# Patient Record
Sex: MALE | Race: WHITE | NOT HISPANIC OR LATINO | Employment: OTHER | URBAN - METROPOLITAN AREA
[De-identification: names, ages, dates, MRNs, and addresses within clinical notes are randomized per-mention and may not be internally consistent; named-entity substitution may affect disease eponyms.]

---

## 2018-07-03 DIAGNOSIS — C76.0 HEAD AND NECK CANCER (H): Primary | ICD-10-CM

## 2018-07-05 NOTE — TELEPHONE ENCOUNTER
FUTURE VISIT INFORMATION      FUTURE VISIT INFORMATION:    Date: 7/10/18    Time: 10:45AM    Location: [facility]  REFERRAL INFORMATION:    Referring provider:  Dr Lalito Moctezuma    Referring providers clinic:  [facility]    Reason for visit/diagnosis  Squamous cell carcinoma    RECORDS REQUESTED FROM:       Clinic name Comments Records Status Imaging Status   [facility]  6/29/18 CT Head  6/26/18 CT Neck  2/7/17 neck Bx  6/28/18  FNA neck Care Everywhere pending   [facility] 6/29/18 Visit notes with Dr Moctezuma  6/28/18 visit notes with Dr Sharif Powell Care Everywhere                              RECORDS STATUS      7/5/18 - 11:59AM sent a request for path fax:  [phone], via [employer] - Amay     7/6/18 Patient dropped off Slides and images [place]. Slides were sent to Surgical Path and images will get put in PACS - Amay

## 2018-07-09 PROCEDURE — 00000346 ZZHCL STATISTIC REVIEW OUTSIDE SLIDES TC 88321: Performed by: OTOLARYNGOLOGY

## 2018-07-10 ENCOUNTER — PRE VISIT (OUTPATIENT)
Dept: OTOLARYNGOLOGY | Facility: CLINIC | Age: 68
End: 2018-07-10

## 2018-07-10 ENCOUNTER — OFFICE VISIT (OUTPATIENT)
Dept: OTOLARYNGOLOGY | Facility: CLINIC | Age: 68
End: 2018-07-10
Payer: MEDICARE

## 2018-07-10 ENCOUNTER — HOSPITAL ENCOUNTER (OUTPATIENT)
Dept: PET IMAGING | Facility: CLINIC | Age: 68
Discharge: HOME | End: 2018-07-10
Attending: OTOLARYNGOLOGY | Admitting: OTOLARYNGOLOGY
Payer: MEDICARE

## 2018-07-10 ENCOUNTER — HOSPITAL ENCOUNTER (OUTPATIENT)
Dept: PET IMAGING | Facility: CLINIC | Age: 68
End: 2018-07-10
Attending: OTOLARYNGOLOGY
Payer: MEDICARE

## 2018-07-10 VITALS — HEIGHT: 72 IN | WEIGHT: 195 LBS | BODY MASS INDEX: 26.41 KG/M2

## 2018-07-10 DIAGNOSIS — C80.1 METASTATIC SQUAMOUS CELL CARCINOMA INVOLVING LYMPH NODE WITH UNKNOWN PRIMARY SITE (H): Primary | ICD-10-CM

## 2018-07-10 DIAGNOSIS — C76.0 HEAD AND NECK CANCER (H): ICD-10-CM

## 2018-07-10 DIAGNOSIS — C77.9 METASTATIC SQUAMOUS CELL CARCINOMA INVOLVING LYMPH NODE WITH UNKNOWN PRIMARY SITE (H): Primary | ICD-10-CM

## 2018-07-10 LAB
COPATH REPORT: NORMAL
CREAT BLD-MCNC: 0.9 MG/DL (ref 0.66–1.25)
GFR SERPL CREATININE-BSD FRML MDRD: 84 ML/MIN/1.7M2
GLUCOSE BLDC GLUCOMTR-MCNC: 110 MG/DL (ref 70–99)

## 2018-07-10 PROCEDURE — 34300033 ZZH RX 343: Performed by: OTOLARYNGOLOGY

## 2018-07-10 PROCEDURE — 71260 CT THORAX DX C+: CPT

## 2018-07-10 PROCEDURE — A9552 F18 FDG: HCPCS | Performed by: OTOLARYNGOLOGY

## 2018-07-10 PROCEDURE — 82565 ASSAY OF CREATININE: CPT

## 2018-07-10 PROCEDURE — 70491 CT SOFT TISSUE NECK W/DYE: CPT

## 2018-07-10 PROCEDURE — 25000128 H RX IP 250 OP 636: Performed by: OTOLARYNGOLOGY

## 2018-07-10 RX ORDER — IOPAMIDOL 755 MG/ML
45-150 INJECTION, SOLUTION INTRAVASCULAR ONCE
Status: COMPLETED | OUTPATIENT
Start: 2018-07-10 | End: 2018-07-10

## 2018-07-10 RX ORDER — ASPIRIN 325 MG
325 TABLET ORAL
COMMUNITY
End: 2020-02-04

## 2018-07-10 RX ADMIN — FLUDEOXYGLUCOSE F-18 12 MCI.: 500 INJECTION, SOLUTION INTRAVENOUS at 07:40

## 2018-07-10 RX ADMIN — IOPAMIDOL 116 ML: 755 INJECTION, SOLUTION INTRAVENOUS at 08:53

## 2018-07-10 ASSESSMENT — PAIN SCALES - GENERAL: PAINLEVEL: NO PAIN (0)

## 2018-07-10 NOTE — PROGRESS NOTES
Dear Dr. Moctezuma:    I had the pleasure of meeting Lazaro Porter in consultation today at the [facility] at your request.     History of Present Illness:     HISTORY OF PRESENT ILLNESS:  Lazaro Porter was seen in clinic today.  He is a gentleman who over the course of a couple months has had a growing mass in the right side of his neck that is about 4 cm.  It is mobile back and forth, but not in an up and down fashion.  It does not cause him pain.  FNA on this shows squamous cell carcinoma.  He has never had any skin cancer or anything on that side of his face.   He is essentially a nonsmoker and nondrinker.  He has not been able to have any sort of a fine needle aspiration workup for HPV.  He underwent a PET CT scan today and the PET CT scan today does not really show a primary disease in his throat.  It just shows that the lesion on the right side of his neck seems to be close to the carotid and vagus sternocleidomastoid muscle.  He has no other current complaints at the present time today at all.                MEDICATIONS:     Current Outpatient Prescriptions   Medication Sig Dispense Refill     aspirin 325 MG tablet Take 325 mg by mouth       Multiple Vitamins-Minerals (MULTIVITAMIN & MINERAL PO)          ALLERGIES:  Not on File    HABITS/SOCIAL HISTORY: non smoker   PAST MEDICAL HISTORY: No past medical history on file.     FAMILY HISTORY:  No family history on file.    REVIEW OF SYSTEMS:  12 point ROS was negative other than the symptoms noted above in the HPI.    PHYSICAL EXAMINATION: PHYSICAL EXAMINATION:    Constitutional:  The patient was unaccompanied, well-groomed, and in no acute distress.    Skin:  Warm and pink.    Neurologic:  Alert and oriented x 3.  CN's III-XII within normal limits.  Voice normal.   Psychiatric:  The patient's affect was calm, cooperative, and appropriate.    Respiratory:  Breathing comfortably without stridor or exertion of accessory muscles.     Eyes: Pupils were equal and reactive.  Extraocular movement intact.    Head:  Normocephalic and atraumatic.  No lesions or scars.    Ears:  Pinnae and tragus non-tender.  EAC's and TM's were clear.     Nose:  Sinuses were non-tender.  Anterior rhinoscopy revealed midline septum and absence of purulence or polyps.    OC/OP:  Normal tongue, floor of mouth, buccal mucosa, and palate.  No lesions or masses on inspection or palpation.  No abnormal lymph tissue in the oropharynx.  The pterygoid region is non-tender.    HP/LX:  Mirror exam of the larynx reveals a sharp epiglottis and mobile arytenoids.  No pooling seen.  The cords themselves appear clear and mobile.   Neck:  Supple with normal laryngeal and tracheal landmarks.  The parotid beds were without masses.  No palpable thyroid.  Lymphatic:  There is no palpable lymphadenopathy in the neck.       Fiberoptic Endoscopy:  Consent for fiberoptic laryngoscopy was obtained, and we confirmed correctness of procedure and identity of patient.  Fiberoptic laryngoscopy was indicated due to  primary.  The nose was topically decongested and anesthetized.  The fiberoptic laryngoscope was passed under endoscopic vision.  The turbinates were normal.  The inferior and middle meati were clear bilaterally without purulence, masses, or polyps.  The nasopharynx was clear.  The Eustachian tubes were clear.  The soft palate appeared normal with good mobility.  The epiglottis was sharp and the visualized portion of the vallecula was clear.  The larynx was clear with mobile cords.  The arytenoids were clear and there was no pooling in the hypopharynx.        ASSESSMENT:  Patient with an unknown primary head and neck cancer on the right neck.  This could very well be HPV.        PLAN:  He needs to go to the operating room for directed biopsies and needs to have a repeat FNA on the right side of the neck just to be able to get enough material to potentially look at a p-16 staining.  We  plan to take him to the OR over the course of the next couple of weeks and we will see him in the operating room at that point in time to get him worked up and to get a good disposition on him.      cc: Lalito Moctezuma MD    [place] [id number]       FO/ms              Thank you very much for the opportunity to participate in the care of your patient.      Garcia Worthy M.D.  Otolaryngology- Head & Neck Surgery  [phone]

## 2018-07-10 NOTE — MR AVS SNAPSHOT
After Visit Summary   7/10/2018    Lazaro Porter    MRN: [id number]           Patient Information     Date Of Birth          1950        Visit Information        Provider Department      7/10/2018 10:45 AM Garcia Worthy MD [facility]        Today's Diagnoses     Metastatic squamous cell carcinoma involving lymph node with unknown primary site (H)    -  1      Care Instructions    -DL with biopsies scheduled with Dr. Worthy on 18. We will call with results and follow up plan once pathology complete.  -Please schedule pre op physical with PCP prior to surgery.           Follow-ups after your visit        Your next 10 appointments already scheduled     2018  3:30 PM CDT   (Arrive by 3:15 PM)   Return Visit with MD RAULITO [facility] ([facility])    [address] [place] [id number] [phone]              Who to contact     Please call your clinic at [phone] to:    Ask questions about your health    Make or cancel appointments    Discuss your medicines    Learn about your test results    Speak to your doctor            Additional Information About Your Visit        MyChart Information     Tricidat is an electronic gateway that provides easy, online access to your medical records. With Offerti, you can request a clinic appointment, read your test results, renew a prescription or communicate with your care team.     To sign up for Tricidat visit the website at [email or web address]   You will be asked to enter the access code listed below, as well as some personal information. Please follow the directions to create your username and password.     Your access code is: [email or web address]  Expires: 10/2/2018  6:31 AM     Your access code will  in 90 days. If you need help or a new code, please contact your [facility] or call [phone] for  assistance.        Care EveryWhere ID     This is your Care EveryWhere ID. This could be used by other organizations to access your [place] medical records  [id number]        Your Vitals Were     Height BMI (Body Mass Index)                1.829 m (6') 26.45 kg/m2           Blood Pressure from Last 3 Encounters:   07/18/18 127/89    Weight from Last 3 Encounters:   07/18/18 88.5 kg (195 lb)   07/10/18 88.5 kg (195 lb)              We Performed the Following     Glucose by meter     Jillian-Operative Worksheet (Head & Neck)        Primary Care Provider    None Specified       No primary provider on file.        Equal Access to Services     [facility]: Hadii ivet Karimi, renita card, stella kaalmatreasure coy, talon hardwick . So [facility] [place] [phone].    ATENCIÓN: Si habla español, tiene a corona disposición servicios gratuitos de asistencia lingüística. Llame al [phone].    We comply with applicable federal civil rights laws and [place] laws. We do not discriminate on the basis of race, color, national origin, age, disability, sex, sexual orientation, or gender identity.            Thank you!     Thank you for choosing [facility]SE [facility]  for your care. Our goal is always to provide you with excellent care. Hearing back from our patients is one way we can continue to improve our services. Please take a few minutes to complete the written survey that you may receive in the mail after your visit with us. Thank you!             Your Updated Medication List - Protect others around you: Learn how to safely use, store and throw away your medicines at www.disposemymeds.org.          This list is accurate as of 7/10/18 11:59 PM.  Always use your most recent med list.                   Brand Name Dispense Instructions for use Diagnosis    acetaminophen 325 MG tablet    TYLENOL    100 tablet    Take 2 tablets (650 mg) by mouth every 4 hours as needed for other (mild pain)     Postoperative pain       aspirin 325 MG tablet      Take 325 mg by mouth        MULTIVITAMIN & MINERAL PO           oxyCODONE IR 5 MG tablet    ROXICODONE    35 tablet    Take 1-2 tablets (5-10 mg) by mouth every 6 hours as needed for moderate to severe pain or severe pain    Postoperative pain

## 2018-07-10 NOTE — LETTER
7/10/2018       RE: Lazaro Porter  [address] [place] [id number]     Dear Colleague,    Thank you for referring your patient, Lazaro Porter, to the [facility] at [facility]. Please see a copy of my visit note below.    Dear Dr. Moctezuma:    I had the pleasure of meeting Lazaro Porter in consultation today at the [facility] at your request.     History of Present Illness:     HISTORY OF PRESENT ILLNESS:  Lazaro Porter was seen in clinic today.  He is a gentleman who over the course of a couple months has had a growing mass in the right side of his neck that is about 4 cm.  It is mobile back and forth, but not in an up and down fashion.  It does not cause him pain.  FNA on this shows squamous cell carcinoma.  He has never had any skin cancer or anything on that side of his face.   He is essentially a nonsmoker and nondrinker.  He has not been able to have any sort of a fine needle aspiration workup for HPV.  He underwent a PET CT scan today and the PET CT scan today does not really show a primary disease in his throat.  It just shows that the lesion on the right side of his neck seems to be close to the carotid and vagus sternocleidomastoid muscle.  He has no other current complaints at the present time today at all.        MEDICATIONS:     Current Outpatient Prescriptions   Medication Sig Dispense Refill     aspirin 325 MG tablet Take 325 mg by mouth       Multiple Vitamins-Minerals (MULTIVITAMIN & MINERAL PO)          ALLERGIES:  Not on File    HABITS/SOCIAL HISTORY: non smoker   PAST MEDICAL HISTORY: No past medical history on file.     FAMILY HISTORY:  No family history on file.    REVIEW OF SYSTEMS:  12 point ROS was negative other than the symptoms noted above in the HPI.    PHYSICAL EXAMINATION: PHYSICAL EXAMINATION:    Constitutional:  The patient was unaccompanied, well-groomed, and in no acute distress.    Skin:  Warm  and pink.    Neurologic:  Alert and oriented x 3.  CN's III-XII within normal limits.  Voice normal.   Psychiatric:  The patient's affect was calm, cooperative, and appropriate.    Respiratory:  Breathing comfortably without stridor or exertion of accessory muscles.    Eyes: Pupils were equal and reactive.  Extraocular movement intact.    Head:  Normocephalic and atraumatic.  No lesions or scars.    Ears:  Pinnae and tragus non-tender.  EAC's and TM's were clear.     Nose:  Sinuses were non-tender.  Anterior rhinoscopy revealed midline septum and absence of purulence or polyps.    OC/OP:  Normal tongue, floor of mouth, buccal mucosa, and palate.  No lesions or masses on inspection or palpation.  No abnormal lymph tissue in the oropharynx.  The pterygoid region is non-tender.    HP/LX:  Mirror exam of the larynx reveals a sharp epiglottis and mobile arytenoids.  No pooling seen.  The cords themselves appear clear and mobile.   Neck:  Supple with normal laryngeal and tracheal landmarks.  The parotid beds were without masses.  No palpable thyroid.  Lymphatic:  There is no palpable lymphadenopathy in the neck.       Fiberoptic Endoscopy:  Consent for fiberoptic laryngoscopy was obtained, and we confirmed correctness of procedure and identity of patient.  Fiberoptic laryngoscopy was indicated due to  primary.  The nose was topically decongested and anesthetized.  The fiberoptic laryngoscope was passed under endoscopic vision.  The turbinates were normal.  The inferior and middle meati were clear bilaterally without purulence, masses, or polyps.  The nasopharynx was clear.  The Eustachian tubes were clear.  The soft palate appeared normal with good mobility.  The epiglottis was sharp and the visualized portion of the vallecula was clear.  The larynx was clear with mobile cords.  The arytenoids were clear and there was no pooling in the hypopharynx.      ASSESSMENT:  Patient with an unknown primary head and neck cancer on  the right neck.  This could very well be HPV.        PLAN:  He needs to go to the operating room for directed biopsies and needs to have a repeat FNA on the right side of the neck just to be able to get enough material to potentially look at a p-16 staining.  We plan to take him to the OR over the course of the next couple of weeks and we will see him in the operating room at that point in time to get him worked up and to get a good disposition on him.        Thank you very much for the opportunity to participate in the care of your patient.      Garcia Worthy M.D.  Otolaryngology- Head & Neck Surgery  [phone]    cc: Lalito Moctezuma MD    [place] [id number]

## 2018-07-10 NOTE — PATIENT INSTRUCTIONS
-DL with biopsies scheduled with Dr. Worthy on 7/18/18. We will call with results and follow up plan once pathology complete.  -Please schedule pre op physical with PCP prior to surgery.

## 2018-07-10 NOTE — NURSING NOTE
Chief Complaint   Patient presents with     Consult     Tumor right side of neck     Hussain Real, EMT

## 2018-07-17 ENCOUNTER — TRANSFERRED RECORDS (OUTPATIENT)
Dept: HEALTH INFORMATION MANAGEMENT | Facility: CLINIC | Age: 68
End: 2018-07-17

## 2018-07-17 ENCOUNTER — ANESTHESIA EVENT (OUTPATIENT)
Dept: SURGERY | Facility: AMBULATORY SURGERY CENTER | Age: 68
End: 2018-07-17

## 2018-07-18 ENCOUNTER — HOSPITAL ENCOUNTER (OUTPATIENT)
Facility: AMBULATORY SURGERY CENTER | Age: 68
End: 2018-07-18
Attending: OTOLARYNGOLOGY
Payer: MEDICARE

## 2018-07-18 ENCOUNTER — SURGERY (OUTPATIENT)
Age: 68
End: 2018-07-18
Payer: MEDICARE

## 2018-07-18 ENCOUNTER — ANESTHESIA (OUTPATIENT)
Dept: SURGERY | Facility: AMBULATORY SURGERY CENTER | Age: 68
End: 2018-07-18

## 2018-07-18 VITALS
OXYGEN SATURATION: 98 % | RESPIRATION RATE: 16 BRPM | DIASTOLIC BLOOD PRESSURE: 89 MMHG | TEMPERATURE: 97 F | WEIGHT: 195 LBS | HEIGHT: 72 IN | BODY MASS INDEX: 26.41 KG/M2 | SYSTOLIC BLOOD PRESSURE: 127 MMHG

## 2018-07-18 DIAGNOSIS — G89.18 POSTOPERATIVE PAIN: Primary | ICD-10-CM

## 2018-07-18 RX ORDER — SODIUM CHLORIDE, SODIUM LACTATE, POTASSIUM CHLORIDE, CALCIUM CHLORIDE 600; 310; 30; 20 MG/100ML; MG/100ML; MG/100ML; MG/100ML
INJECTION, SOLUTION INTRAVENOUS CONTINUOUS
Status: DISCONTINUED | OUTPATIENT
Start: 2018-07-18 | End: 2018-07-18 | Stop reason: HOSPADM

## 2018-07-18 RX ORDER — ONDANSETRON 4 MG/1
4 TABLET, ORALLY DISINTEGRATING ORAL
Status: DISCONTINUED | OUTPATIENT
Start: 2018-07-18 | End: 2018-07-19 | Stop reason: HOSPADM

## 2018-07-18 RX ORDER — ONDANSETRON 2 MG/ML
INJECTION INTRAMUSCULAR; INTRAVENOUS PRN
Status: DISCONTINUED | OUTPATIENT
Start: 2018-07-18 | End: 2018-07-18

## 2018-07-18 RX ORDER — OXYMETAZOLINE HYDROCHLORIDE 0.05 G/100ML
SPRAY NASAL PRN
Status: DISCONTINUED | OUTPATIENT
Start: 2018-07-18 | End: 2018-07-18 | Stop reason: HOSPADM

## 2018-07-18 RX ORDER — GABAPENTIN 300 MG/1
300 CAPSULE ORAL ONCE
Status: COMPLETED | OUTPATIENT
Start: 2018-07-18 | End: 2018-07-18

## 2018-07-18 RX ORDER — SODIUM CHLORIDE, SODIUM LACTATE, POTASSIUM CHLORIDE, CALCIUM CHLORIDE 600; 310; 30; 20 MG/100ML; MG/100ML; MG/100ML; MG/100ML
INJECTION, SOLUTION INTRAVENOUS CONTINUOUS
Status: DISCONTINUED | OUTPATIENT
Start: 2018-07-18 | End: 2018-07-19 | Stop reason: HOSPADM

## 2018-07-18 RX ORDER — LIDOCAINE 40 MG/G
CREAM TOPICAL
Status: DISCONTINUED | OUTPATIENT
Start: 2018-07-18 | End: 2018-07-18 | Stop reason: HOSPADM

## 2018-07-18 RX ORDER — NALOXONE HYDROCHLORIDE 0.4 MG/ML
.1-.4 INJECTION, SOLUTION INTRAMUSCULAR; INTRAVENOUS; SUBCUTANEOUS
Status: DISCONTINUED | OUTPATIENT
Start: 2018-07-18 | End: 2018-07-19 | Stop reason: HOSPADM

## 2018-07-18 RX ORDER — FENTANYL CITRATE 50 UG/ML
INJECTION, SOLUTION INTRAMUSCULAR; INTRAVENOUS PRN
Status: DISCONTINUED | OUTPATIENT
Start: 2018-07-18 | End: 2018-07-18

## 2018-07-18 RX ORDER — ONDANSETRON 2 MG/ML
4 INJECTION INTRAMUSCULAR; INTRAVENOUS EVERY 30 MIN PRN
Status: DISCONTINUED | OUTPATIENT
Start: 2018-07-18 | End: 2018-07-19 | Stop reason: HOSPADM

## 2018-07-18 RX ORDER — DEXAMETHASONE SODIUM PHOSPHATE 10 MG/ML
INJECTION, SOLUTION INTRAMUSCULAR; INTRAVENOUS PRN
Status: DISCONTINUED | OUTPATIENT
Start: 2018-07-18 | End: 2018-07-18

## 2018-07-18 RX ORDER — ONDANSETRON 4 MG/1
4 TABLET, ORALLY DISINTEGRATING ORAL EVERY 30 MIN PRN
Status: DISCONTINUED | OUTPATIENT
Start: 2018-07-18 | End: 2018-07-19 | Stop reason: HOSPADM

## 2018-07-18 RX ORDER — PROPOFOL 10 MG/ML
INJECTION, EMULSION INTRAVENOUS PRN
Status: DISCONTINUED | OUTPATIENT
Start: 2018-07-18 | End: 2018-07-18

## 2018-07-18 RX ORDER — FENTANYL CITRATE 50 UG/ML
25-50 INJECTION, SOLUTION INTRAMUSCULAR; INTRAVENOUS
Status: DISCONTINUED | OUTPATIENT
Start: 2018-07-18 | End: 2018-07-19 | Stop reason: HOSPADM

## 2018-07-18 RX ORDER — ACETAMINOPHEN 325 MG/1
650 TABLET ORAL EVERY 4 HOURS PRN
Qty: 100 TABLET | Refills: 0
Start: 2018-07-18 | End: 2018-07-28

## 2018-07-18 RX ORDER — GLYCOPYRROLATE 0.2 MG/ML
INJECTION, SOLUTION INTRAMUSCULAR; INTRAVENOUS PRN
Status: DISCONTINUED | OUTPATIENT
Start: 2018-07-18 | End: 2018-07-18

## 2018-07-18 RX ORDER — PROPOFOL 10 MG/ML
INJECTION, EMULSION INTRAVENOUS CONTINUOUS PRN
Status: DISCONTINUED | OUTPATIENT
Start: 2018-07-18 | End: 2018-07-18

## 2018-07-18 RX ORDER — ACETAMINOPHEN 325 MG/1
650 TABLET ORAL
Status: DISCONTINUED | OUTPATIENT
Start: 2018-07-18 | End: 2018-07-19 | Stop reason: HOSPADM

## 2018-07-18 RX ORDER — OXYCODONE HYDROCHLORIDE 5 MG/1
5 TABLET ORAL
Status: COMPLETED | OUTPATIENT
Start: 2018-07-18 | End: 2018-07-18

## 2018-07-18 RX ORDER — LIDOCAINE HYDROCHLORIDE 20 MG/ML
INJECTION, SOLUTION INFILTRATION; PERINEURAL PRN
Status: DISCONTINUED | OUTPATIENT
Start: 2018-07-18 | End: 2018-07-18

## 2018-07-18 RX ORDER — ACETAMINOPHEN 325 MG/1
975 TABLET ORAL ONCE
Status: COMPLETED | OUTPATIENT
Start: 2018-07-18 | End: 2018-07-18

## 2018-07-18 RX ORDER — OXYCODONE HYDROCHLORIDE 5 MG/1
5-10 TABLET ORAL EVERY 6 HOURS PRN
Qty: 35 TABLET | Refills: 0 | Status: ON HOLD | OUTPATIENT
Start: 2018-07-18 | End: 2018-08-03

## 2018-07-18 RX ADMIN — DEXAMETHASONE SODIUM PHOSPHATE 4 MG: 10 INJECTION, SOLUTION INTRAMUSCULAR; INTRAVENOUS at 09:03

## 2018-07-18 RX ADMIN — Medication 50 MG: at 08:43

## 2018-07-18 RX ADMIN — PROPOFOL 200 MG: 10 INJECTION, EMULSION INTRAVENOUS at 08:43

## 2018-07-18 RX ADMIN — GLYCOPYRROLATE 0.2 MG: 0.2 INJECTION, SOLUTION INTRAMUSCULAR; INTRAVENOUS at 08:54

## 2018-07-18 RX ADMIN — ONDANSETRON 4 MG: 2 INJECTION INTRAMUSCULAR; INTRAVENOUS at 09:12

## 2018-07-18 RX ADMIN — GABAPENTIN 300 MG: 300 CAPSULE ORAL at 06:43

## 2018-07-18 RX ADMIN — OXYCODONE HYDROCHLORIDE 5 MG: 5 TABLET ORAL at 10:07

## 2018-07-18 RX ADMIN — OXYMETAZOLINE HYDROCHLORIDE 10 ML: 0.05 SPRAY NASAL at 09:34

## 2018-07-18 RX ADMIN — ACETAMINOPHEN 975 MG: 325 TABLET ORAL at 06:43

## 2018-07-18 RX ADMIN — PROPOFOL 150 MCG/KG/MIN: 10 INJECTION, EMULSION INTRAVENOUS at 08:43

## 2018-07-18 RX ADMIN — LIDOCAINE HYDROCHLORIDE 100 MG: 20 INJECTION, SOLUTION INFILTRATION; PERINEURAL at 08:43

## 2018-07-18 RX ADMIN — SODIUM CHLORIDE, SODIUM LACTATE, POTASSIUM CHLORIDE, CALCIUM CHLORIDE: 600; 310; 30; 20 INJECTION, SOLUTION INTRAVENOUS at 08:27

## 2018-07-18 RX ADMIN — FENTANYL CITRATE 50 MCG: 50 INJECTION, SOLUTION INTRAMUSCULAR; INTRAVENOUS at 08:43

## 2018-07-18 NOTE — ANESTHESIA POSTPROCEDURE EVALUATION
Patient: Lazaro Porter    Procedure(s):  Direct Laryngoscopy with Direct Biopsies - Wound Class: II-Clean Contaminated    Diagnosis:Unkown Primary Squamous Cell Carcinoma of the Neck  Diagnosis Additional Information: No value filed.    Anesthesia Type:  General, ETT    Note:  Anesthesia Post Evaluation    Patient location during evaluation: bedside  Patient participation: Able to fully participate in evaluation  Level of consciousness: awake  Pain management: adequate  Airway patency: patent  Cardiovascular status: acceptable  Respiratory status: acceptable  Hydration status: acceptable  PONV: controlled     Anesthetic complications: None          Last vitals:  Vitals:    07/18/18 0945 07/18/18 1000 07/18/18 1015   BP: 135/79 137/90 136/86   Resp: 16 16 16   Temp:   36.1  C (97  F)   SpO2: 97% 97% 98%         Electronically Signed By: Diogenes Mcginnis MD, MD  July 18, 2018  10:29 AM

## 2018-07-18 NOTE — ANESTHESIA PREPROCEDURE EVALUATION
Anesthesia Evaluation     . Pt has had prior anesthetic. Type: General    No history of anesthetic complications          ROS/MED HX    ENT/Pulmonary:  - neg pulmonary ROS     Neurologic:  - neg neurologic ROS     Cardiovascular:  - neg cardiovascular ROS       METS/Exercise Tolerance:  3 - Able to walk 1-2 blocks without stopping   Hematologic:  - neg hematologic  ROS       Musculoskeletal:  - neg musculoskeletal ROS       GI/Hepatic:  - neg GI/hepatic ROS       Renal/Genitourinary:  - ROS Renal section negative       Endo:  - neg endo ROS       Psychiatric:  - neg psychiatric ROS       Infectious Disease:  - neg infectious disease ROS       Malignancy:      - no malignancy   Other:                     Physical Exam  Normal systems: dental    Airway   Mallampati: II  TM distance: >3 FB  Neck ROM: full    Dental     Cardiovascular   Rhythm and rate: regular and normal      Pulmonary    breath sounds clear to auscultation                    Anesthesia Plan      History & Physical Review  History and physical reviewed and following examination; no interval change.    ASA Status:  1 .    NPO Status:  > 6 hours    Plan for General and ETT with Intravenous induction. Maintenance will be TIVA.    PONV prophylaxis:  Ondansetron (or other 5HT-3) and Dexamethasone or Solumedrol       Postoperative Care  Postoperative pain management:  Oral pain medications and Multi-modal analgesia.      Consents  Anesthetic plan, risks, benefits and alternatives discussed with:  Patient..                          .

## 2018-07-18 NOTE — BRIEF OP NOTE
[facility]    Brief Operative Note    Pre-operative diagnosis: Unkown Primary Squamous Cell Carcinoma of the Neck  Post-operative diagnosis * No post-op diagnosis entered *  Procedure: Procedure(s):  Direct Laryngoscopy with Direct Biopsies - Wound Class: II-Clean Contaminated  Surgeon: Surgeon(s) and Role:     * Garcia Worthy MD - Primary  Anesthesia: General   Estimated blood loss: Less than 5 cc  Drains: None  Specimens:   ID Type Source Tests Collected by Time Destination   A : Nasopharynx Tissue Throat SURGICAL PATHOLOGY EXAM Garcia Worthy MD 7/18/2018  8:28 AM    B : Right Puir form Tissue Throat SURGICAL PATHOLOGY EXAM Garcia Worthy MD 7/18/2018  9:09 AM    C : Right Tongue Base Tissue Throat SURGICAL PATHOLOGY EXAM Garcia Worthy MD 7/18/2018  9:11 AM    D : Left Tongue Base Tissue Throat SURGICAL PATHOLOGY EXAM Garcia Worthy MD 7/18/2018  9:12 AM    E : Right Tonsilar Fossa Tissue Throat SURGICAL PATHOLOGY EXAM Garcia Worthy MD 7/18/2018  9:14 AM    F : Left Tonsilar Fossa Tissue Throat SURGICAL PATHOLOGY EXAM Garcia Worthy MD 7/18/2018  9:15 AM    G : Right Neck Mass Test for P 16 Fluid Neck FINE NEEDLE ASPIRATION Garcia Worthy MD 7/18/2018  9:22 AM      Findings:   right level two mass, fixed vertically but mobile horizontally. No lesions on DL examination. .  Complications: None.  Implants: None.

## 2018-07-18 NOTE — DISCHARGE INSTRUCTIONS
[facility]  Home Care Following Anesthesia  For 24 hours after surgery:  1. Get plenty of rest.  A responsible adult must stay with you for at least 24 hours after you leave the surgery center.  2. Do not drive or use heavy equipment.  If you have weakness or tingling, don't drive or use heavy equipment until this feeling goes away.   3. Do not drink alcohol.   4. Avoid strenuous or risky activities.  Ask for help when climbing stairs.  5. You may feel lightheaded.  IF so, sit for a few minutes before standing.  Have someone help you get up.   6. If you have nausea (feel sick to your stomach): Drink only clear liquids such as apple juice, ginger ale, broth or 7-Up.  Rest may also help.  Be sure to drink enough fluids.  Move to a regular diet as you feel able.   7. You may have a slight fever.  Call the doctor if your fever is over 100 F (37.7 C) (taken under the tongue) or lasts longer than 24 hours.  8. You may have a dry mouth, a sore throat, muscle aches or trouble sleeping. These should go away after 24 hours.  9. Do not make important or legal decisions.     Tips for taking pain medications  To get the best pain relief possible, remember these points:    Take pain medications as directed, before pain becomes severe.    Pain medication can upset your stomach: taking it with food may help.    Constipation is a common side effect of pain medication. Drink plenty of  fluids.    Eat foods high in fiber. Take a stool softener if recommended by your doctor or pharmacist.    Do not drink alcohol, drive or operate machinery while taking pain medications.    Ask about other ways to control pain, such as with heat, ice or relaxation.    Tylenol/Acetaminophen Consumption  To help encourage the safe use of acetaminophen, the makers of TYLENOL  have lowered the maximum daily dose for single-ingredient Extra Strength TYLENOL  (acetaminophen) products sold in the [place] from 8 pills per day  (4,000 mg) to 6 pills per day (3,000 mg). The dosing interval has also changed from 2 pills every 4-6 hours to 2 pills every 6 hours.    If you feel your pain relief is insufficient, you may take Tylenol/Acetaminophen in addition to your narcotic pain medication.     Be careful not to exceed 3,000 mg of Tylenol/Acetaminophen in a 24 hour period from all sources.    If you are taking extra strength Tylenol/acetaminophen (500 mg), the maximum dose is 6 tablets in 24 hours.    If you are taking regular strength acetaminophen (325 mg), the maximum dose is 9 tablets in 24 hours.    Call a doctor for any of the followin. Signs of infection (fever, growing tenderness at the surgery site, a large amount of drainage or bleeding, severe pain, foul-smelling drainage, redness, swelling).  2. It has been over 8 to 10 hours since surgery and you are still not able to urinate (pass water).  3. Headache for over 24 hours.  Your doctor is:       Dr. Garcia Worthy, ENT Otolaryngology: [phone]               Or dial [phone] and ask for the resident on call for:  ENT Otolaryngology  For emergency care, call the:  [place] [facility]:  [phone] (TTY for hearing impaired: [phone])

## 2018-07-18 NOTE — IP AVS SNAPSHOT
[facility]    [address] [place] [address]    Phone:  [phone]    Fax:  [phone]                                       After Visit Summary   7/18/2018    Lazaro Porter    MRN: [id number]           After Visit Summary Signature Page     I have received my discharge instructions, and my questions have been answered. I have discussed any challenges I see with this plan with the nurse or doctor.    ..........................................................................................................................................  Patient/Patient Representative Signature      ..........................................................................................................................................  Patient Representative Print Name and Relationship to Patient    ..................................................               ................................................  Date                                            Time    ..........................................................................................................................................  Reviewed by Signature/Title    ...................................................              ..............................................  Date                                                            Time

## 2018-07-18 NOTE — IP AVS SNAPSHOT
MRN:[id number]                      After Visit Summary   7/18/2018    Lazaro Porter    MRN: [id number]           Thank you!     Thank you for choosing [facility] for your care. Our goal is always to provide you with excellent care. Hearing back from our patients is one way we can continue to improve our services. Please take a few minutes to complete the written survey that you may receive in the mail after you visit with us. Thank you!        Patient Information     Date Of Birth          1950        About your hospital stay     You were admitted on:  July 18, 2018 [facility]    You were discharged on:  July 18, 2018       Who to Call     For medical emergencies, please call 911.  For non-urgent questions about your medical care, please call your primary care provider or clinic, None  For questions related to your surgery, please call your surgery clinic        Attending Provider     Provider Specialty    Garcia Worthy MD Otolaryngology       Primary Care Provider    None Specified      After Care Instructions     Diet Instructions       Resume pre procedure diet. May want to avoid spicy, hot, or acidic foods, as they me result uncomfortable.            Discharge Instructions       Patient to follow up with surgeon on 7/31            Discharge Instructions - Lifting restrictions       Lifting Restrictions 10 pounds until seen at Post-op follow up appointment            No driving or operating machinery       While taking narcotic pain medication            Notify Physician        Please notify your doctor if you experience wound breakdown, sustained bleeding from the wound site, or increasing redness, swelling, and/or purulent malorodorous discharge from the wound site which may indicate infection. If you feel it is acute, please return to the emergency department. If you have questions or concerns during the day please call  "ENT clinic and [phone]. If at night you can call [facility] at [phone] and ask for the \"ENT resident on call\".                  Your next 10 appointments already scheduled     Jul 31, 2018  3:30 PM CDT   (Arrive by 3:15 PM)   Return Visit with Garcia Worthy MD   [facility] ([facility])    [address]  4th Floor  [place] [address] [phone]              Further instructions from your care team       [facility]  Home Care Following Anesthesia  For 24 hours after surgery:  1. Get plenty of rest.  A responsible adult must stay with you for at least 24 hours after you leave the surgery center.  2. Do not drive or use heavy equipment.  If you have weakness or tingling, don't drive or use heavy equipment until this feeling goes away.   3. Do not drink alcohol.   4. Avoid strenuous or risky activities.  Ask for help when climbing stairs.  5. You may feel lightheaded.  IF so, sit for a few minutes before standing.  Have someone help you get up.   6. If you have nausea (feel sick to your stomach): Drink only clear liquids such as apple juice, ginger ale, broth or 7-Up.  Rest may also help.  Be sure to drink enough fluids.  Move to a regular diet as you feel able.   7. You may have a slight fever.  Call the doctor if your fever is over 100 F (37.7 C) (taken under the tongue) or lasts longer than 24 hours.  8. You may have a dry mouth, a sore throat, muscle aches or trouble sleeping. These should go away after 24 hours.  9. Do not make important or legal decisions.     Tips for taking pain medications  To get the best pain relief possible, remember these points:    Take pain medications as directed, before pain becomes severe.    Pain medication can upset your stomach: taking it with food may help.    Constipation is a common side effect of pain medication. Drink plenty of  fluids.    Eat foods high " in fiber. Take a stool softener if recommended by your doctor or pharmacist.    Do not drink alcohol, drive or operate machinery while taking pain medications.    Ask about other ways to control pain, such as with heat, ice or relaxation.    Tylenol/Acetaminophen Consumption  To help encourage the safe use of acetaminophen, the makers of TYLENOL  have lowered the maximum daily dose for single-ingredient Extra Strength TYLENOL  (acetaminophen) products sold in the [place] from 8 pills per day (4,000 mg) to 6 pills per day (3,000 mg). The dosing interval has also changed from 2 pills every 4-6 hours to 2 pills every 6 hours.    If you feel your pain relief is insufficient, you may take Tylenol/Acetaminophen in addition to your narcotic pain medication.     Be careful not to exceed 3,000 mg of Tylenol/Acetaminophen in a 24 hour period from all sources.    If you are taking extra strength Tylenol/acetaminophen (500 mg), the maximum dose is 6 tablets in 24 hours.    If you are taking regular strength acetaminophen (325 mg), the maximum dose is 9 tablets in 24 hours.    Call a doctor for any of the followin. Signs of infection (fever, growing tenderness at the surgery site, a large amount of drainage or bleeding, severe pain, foul-smelling drainage, redness, swelling).  2. It has been over 8 to 10 hours since surgery and you are still not able to urinate (pass water).  3. Headache for over 24 hours.  Your doctor is:       Dr. Garcia Worthy, ENT Otolaryngology: [phone]               Or dial [phone] and ask for the resident on call for:  ENT Otolaryngology  For emergency care, call the:  [facility]:  [phone] (TTY for hearing impaired: [phone])                Pending Results     Date and Time Order Name Status Description    2018 0911 Surgical pathology exam In process             Admission Information     Date & Time Provider Department Dept. Phone    2018 Garcia Worthy  MD [facility] [phone]      Your Vitals Were     Blood Pressure Temperature Respirations Height Weight Pulse Oximetry    135/79 97  F (36.1  C) (Temporal) 12 1.829 m (6') 88.5 kg (195 lb) 97%    BMI (Body Mass Index)                   26.45 kg/m2           [employer] Information     [employer] is an electronic gateway that provides easy, online access to your medical records. With [employer], you can request a clinic appointment, read your test results, renew a prescription or communicate with your care team.     To sign up for [employer] visit the website at [email or web address]   You will be asked to enter the access code listed below, as well as some personal information. Please follow the directions to create your username and password.     Your access code is: [email or web address]  Expires: 10/2/2018  6:31 AM     Your access code will  in 90 days. If you need help or a new code, please contact your [facility] or call [phone] for assistance.        Care EveryWhere ID     This is your Care EveryWhere ID. This could be used by other organizations to access your [facility] medical records  [id number]        Equal Access to Services     [facility]: Romario rojaso Sowilliam, waaxda luqadaha, qaybta kaalmada adeegyada, talon fernandez. So [facility] [phone].    ATENCIÓN: Si habla español, tiene a corona disposición servicios gratuitos de asistencia lingüística. Llame al [phone].    We comply with applicable federal civil rights laws and [place] laws. We do not discriminate on the basis of race, color, national origin, age, disability, sex, sexual orientation, or gender identity.               Review of your medicines      UNREVIEWED medicines. Ask your doctor about these medicines        Dose / Directions    aspirin 325 MG tablet        Dose:  325 mg   Take 325 mg by mouth   Refills:  0       MULTIVITAMIN & MINERAL  PO        Refills:  0         START taking        Dose / Directions    acetaminophen 325 MG tablet   Commonly known as:  TYLENOL   Used for:  Postoperative pain        Dose:  650 mg   Take 2 tablets (650 mg) by mouth every 4 hours as needed for other (mild pain)   Quantity:  100 tablet   Refills:  0       oxyCODONE IR 5 MG tablet   Commonly known as:  ROXICODONE   Used for:  Postoperative pain        Dose:  5-10 mg   Take 1-2 tablets (5-10 mg) by mouth every 6 hours as needed for moderate to severe pain or severe pain   Quantity:  35 tablet   Refills:  0            Where to get your medicines      Some of these will need a paper prescription and others can be bought over the counter. Ask your nurse if you have questions.     Bring a paper prescription for each of these medications     oxyCODONE IR 5 MG tablet       You don't need a prescription for these medications     acetaminophen 325 MG tablet                Protect others around you: Learn how to safely use, store and throw away your medicines at www.disposemymeds.org.        Information about OPIOIDS     PRESCRIPTION OPIOIDS: WHAT YOU NEED TO KNOW   We gave you an opioid (narcotic) pain medicine. It is important to manage your pain, but opioids are not always the best choice. You should first try all the other options your care team gave you. Take this medicine for as short a time (and as few doses) as possible.     These medicines have risks:    DO NOT drive when on new or higher doses of pain medicine. These medicines can affect your alertness and reaction times, and you could be arrested for driving under the influence (DUI). If you need to use opioids long-term, talk to your care team about driving.    DO NOT operate heave machinery    DO NOT do any other dangerous activities while taking these medicines.     DO NOT drink any alcohol while taking these medicines.      If the opioid prescribed includes acetaminophen, DO NOT take with any other medicines  that contain acetaminophen. Read all labels carefully. Look for the word  acetaminophen  or  Tylenol.  Ask your pharmacist if you have questions or are unsure.    You can get addicted to pain medicines, especially if you have a history of addiction (chemical, alcohol or substance dependence). Talk to your care team about ways to reduce this risk.    Store your pills in a secure place, locked if possible. We will not replace any lost or stolen medicine. If you don t finish your medicine, please throw away (dispose) as directed by your pharmacist. The [place] Pollution Control Agency has more information about safe disposal: https://www.pca.[facility].[place].us/living-green/managing-unwanted-medications.     All opioids tend to cause constipation. Drink plenty of water and eat foods that have a lot of fiber, such as fruits, vegetables, prune juice, apple juice and high-fiber cereal. Take a laxative (Miralax, milk of magnesia, Colace, Senna) if you don t move your bowels at least every other day.              Medication List: This is a list of all your medications and when to take them. Check marks below indicate your daily home schedule. Keep this list as a reference.      Medications           Morning Afternoon Evening Bedtime As Needed    acetaminophen 325 MG tablet   Commonly known as:  TYLENOL   Take 2 tablets (650 mg) by mouth every 4 hours as needed for other (mild pain)   Last time this was given:  975 mg on 7/18/2018  6:43 AM                                aspirin 325 MG tablet   Take 325 mg by mouth                                MULTIVITAMIN & MINERAL PO                                oxyCODONE IR 5 MG tablet   Commonly known as:  ROXICODONE   Take 1-2 tablets (5-10 mg) by mouth every 6 hours as needed for moderate to severe pain or severe pain

## 2018-07-18 NOTE — ANESTHESIA CARE TRANSFER NOTE
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    Anesthesia Type:   General, ETT     Note:  Airway :Nasal Cannula  Patient transferred to:PACU  Comments: To [facility]  VSS   Drowsy but responding appropriately.   Denies discomfort.   Report to Annette ABREU RNHandoff Report: Identifed the Patient, Identified the Reponsible Provider, Reviewed the pertinent medical history, Discussed the surgical course, Reviewed Intra-OP anesthesia mangement and issues during anesthesia, Set expectations for post-procedure period and Allowed opportunity for questions and acknowledgement of understanding      Vitals: (Last set prior to Anesthesia Care Transfer)    CRNA VITALS  7/18/2018 0910 - 7/18/2018 0947      7/18/2018             Resp Rate (set): 10                Electronically Signed By: LORIE Santana CRNA  July 18, 2018  9:47 AM

## 2018-07-19 NOTE — OP NOTE
Procedure Date: 07/18/2018      PREOPERATIVE DIAGNOSIS:  Unknown primary, right neck.      POSTOPERATIVE DIAGNOSIS:  Unknown primary, right neck.      PROCEDURES:     1.  Direct laryngoscopy and biopsies of right piriform sinus and bilateral tongue bases.     2.  Direct visualization of right and left tonsils and biopsy.     3.  Direct visualization of the nasopharynx and biopsy.   4.  Fine-needle aspiration cytology of right neck.      STAFF SURGEON:  Garcia Worthy MD      RESIDENT SURGEON:  Alexia Guerra MD       BLOOD LOSS:  Less than 10 mL.      COMPLICATIONS:  None.      ANESTHESIA:  General.      OPERATIVE FINDINGS:  Vertically fixed right neck mass with lateral mobility and no obvious tumor site in the throat.        INDICATIONS FOR SURGERY:  Lazaro Porter is a gentleman who is 67 years of age.  He had fine-needle aspiration cytology positive in his right neck for squamous cell carcinoma.  He had a PET scan that was performed, and the PET scan does not demonstrate any uptake in the pharynx, oropharynx, hypopharynx or larynx except for in the right neck mass.  He has an unknown primary, but we know oftentimes one can do directed biopsies to find out the site of the actual disease and establish whether or not it is HPV positive or not as well.      PROCEDURE:  After informed consent was obtained from the patient, he was brought to the operating room, and general endotracheal anesthesia was established.  The patient was draped in the usual fashion.  His eyes were protected, and a split sheet was used.  We first did a manual examination of his oral cavity, oropharynx, hypopharynx and neck.  We found that there was a vertical fixation of the neck mass without horizontal fixation of the neck mass; it measured approximately 4 cm or so.  Palpation of the entire oral cavity, oropharynx and hypopharynx manually and bimanually revealed no additional adenopathy and did not reveal any areas of induration in  the back of the tongue or similar.      Next, we used a Dedo laryngoscope, and we visualized the entire oral cavity, oropharynx, hypopharynx and larynx area.  There was some edema in the area of the epiglottis, but outside of a little bit of epiglottic edema, there were absolutely no abnormalities endolaryngeally, piriform sinuses or visualized abnormalities of the tongue base.  Therefore, we felt that we should go ahead and do directed biopsies of these areas.  We used the Dedo laryngoscope to biopsy first multiple specimens from the right piriform sinus and the right and left tongue base.  These were done in directed fashion.      Next, we visualized the oral cavity and oropharynx.  We took biopsies with a 4 mm cup forceps from both the right and left tonsillar fossae.  He had a tonsillectomy done when he was a child, so tonsils were not removed; they had been removed many years prior.  These biopsies were done and also sent for routine pathology.      Next, we went ahead and we visualized the nasopharynx.  We did not see any abnormalities in the nasopharynx, but we did directed biopsies from the central portion of the nasopharynx behind the soft palate.  This was done with a 4 mm cup forceps as well.  Afrin-soaked cotton was placed over the biopsy sites.  There was no bleeding from any of these areas, and these were kept in place when we did fine-needle aspiration cytology.  We next went ahead and used a 24 gauge needle, and we interrogated the right neck mass with 3 separate syringes, a 24 gauge needle and at least 8-12 sticks per needle.  We went ahead, and this was all placed in formalin for the capacity for it to be examined for HPV, p16 staining and similar.  At this point in time, the bed was turned, the drapes were removed, and the patient was awoken and brought to postoperative recovery.         OLYA ROBERTSON MD             D: 07/18/2018   T: 07/19/2018   MT: niall      Name:     WILLIAN SIDDIQUI   MRN:       [id number]        Account:        [id number]   :      1950           Procedure Date: 2018      Document: [id number]

## 2018-07-20 LAB — COPATH REPORT: NORMAL

## 2018-07-24 LAB — COPATH REPORT: NORMAL

## 2018-07-30 DIAGNOSIS — C76.0 HEAD AND NECK CANCER (H): Primary | ICD-10-CM

## 2018-07-31 ENCOUNTER — OFFICE VISIT (OUTPATIENT)
Dept: OTOLARYNGOLOGY | Facility: CLINIC | Age: 68
End: 2018-07-31
Payer: MEDICARE

## 2018-07-31 VITALS — HEIGHT: 72 IN | BODY MASS INDEX: 26.41 KG/M2 | WEIGHT: 195 LBS

## 2018-07-31 DIAGNOSIS — C76.0 HEAD AND NECK CANCER (H): Primary | ICD-10-CM

## 2018-07-31 ASSESSMENT — PAIN SCALES - GENERAL: PAINLEVEL: NO PAIN (0)

## 2018-07-31 NOTE — MR AVS SNAPSHOT
After Visit Summary   2018    Lazaro Porter    MRN: [id number]           Patient Information     Date Of Birth          1950        Visit Information        Provider Department      2018 3:30 PM Garcia Worthy MD [facility]        Today's Diagnoses     Head and neck cancer (H)    -  1      Care Instructions    1.  You were seen in the ENT Clinic today by Dr. Worthy.  If you have any questions or concerns after your appointment, please call [phone].  Press option #1 for scheduling related needs.  Press option #3 for Nurse advice.  2.  Plan is to proceed with scheduled surgery on Friday 8/3 for transoral robotic resection of the base of tongue.  Pt was provided with presurgical instruction.      Chanell HERNANDEZ, RN  [facility] ENT   Head & Neck Surgery                 Follow-ups after your visit        Who to contact     Please call your clinic at [phone] to:    Ask questions about your health    Make or cancel appointments    Discuss your medicines    Learn about your test results    Speak to your doctor            Additional Information About Your Visit        [employer] Information     [employer] is an electronic gateway that provides easy, online access to your medical records. With [employer], you can request a clinic appointment, read your test results, renew a prescription or communicate with your care team.     To sign up for [employer] visit the website at [email or web address]   You will be asked to enter the access code listed below, as well as some personal information. Please follow the directions to create your username and password.     Your access code is: [email or web address]  Expires: 10/2/2018  6:31 AM     Your access code will  in 90 days. If you need help or a new code, please contact your [facility] or call [phone] for assistance.        Care EveryWhere ID     This is your Care EveryWhere  ID. This could be used by other organizations to access your [facility] medical records  [id number]        Your Vitals Were     Height BMI (Body Mass Index)                1.829 m (6') 26.45 kg/m2           Blood Pressure from Last 3 Encounters:   08/03/18 138/79   07/18/18 127/89    Weight from Last 3 Encounters:   08/03/18 88.3 kg (194 lb 10.7 oz)   07/31/18 88.5 kg (195 lb)   07/18/18 88.5 kg (195 lb)              Today, you had the following     No orders found for display       Primary Care Provider    None Specified       No primary provider on file.        Equal Access to Services     [facility]: Hadyanet Karimi, renita card, stella coy, talon hardwick . So [facility] [phone].    ATENCIÓN: Si habla español, tiene a corona disposición servicios gratuitos de asistencia lingüística. Llame al [phone].    We comply with applicable federal civil rights laws and [place] laws. We do not discriminate on the basis of race, color, national origin, age, disability, sex, sexual orientation, or gender identity.            Thank you!     Thank you for choosing [facility]  for your care. Our goal is always to provide you with excellent care. Hearing back from our patients is one way we can continue to improve our services. Please take a few minutes to complete the written survey that you may receive in the mail after your visit with us. Thank you!             Your Updated Medication List - Protect others around you: Learn how to safely use, store and throw away your medicines at www.disposemymeds.org.          This list is accurate as of 7/31/18 11:59 PM.  Always use your most recent med list.                   Brand Name Dispense Instructions for use Diagnosis    aspirin 325 MG tablet      Take 325 mg by mouth        MULTIVITAMIN & MINERAL PO           oxyCODONE 5 MG/5ML solution    ROXICODONE    250 mL    Take 5 mLs (5 mg) by mouth every 4  hours as needed for severe pain    Postoperative pain

## 2018-07-31 NOTE — PATIENT INSTRUCTIONS
1.  You were seen in the ENT Clinic today by Dr. Worthy.  If you have any questions or concerns after your appointment, please call [phone].  Press option #1 for scheduling related needs.  Press option #3 for Nurse advice.  2.  Plan is to proceed with scheduled surgery on Friday 8/3 for transoral robotic resection of the base of tongue.  Pt was provided with presurgical instruction.      Chanell GUTIERREZN, RN  [facility] ENT   Head & Neck Surgery

## 2018-07-31 NOTE — LETTER
7/31/2018       RE: Lazaro Porter  [address] [place] [id number]     Dear Colleague,    Thank you for referring your patient, Lazaro Porter, to the [facility] at [facility]. Please see a copy of my visit note below.    Lazaro Porter care here for follow-up today.  He had a number of questions about the merits of resecting his lingual tonsils as a way to stage his carcinoma at the present time.  He had a negative PET scan and negative biopsies. We first talked about the right-sided neck mass.  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 Calvillo will perform in the operating room on Friday.        We talked about the merits of a lingual tonsillectomy for trying to  focus down the treatment a little bit more.   Typically for carcinomas of an unknown primary that are p16 positive, there will be relatively generous full dosing to the expected tumor site with a fall off of dosing based on the residence of the tumor and likely tonsil or ipsilateral tonsillar and ipsilateral tongue base.  We explained to the patient today that in approximately 40-80% of cases, by performing a lingual tonsillectomy, there might be able to be a little bit better of a focus of radiation so that the whole Waldeyer's ring does not have to get near a full dose radiation.  We went over a lot of the medical literature on this. There are approximately 31 articles on PubMed and we went over the abstracts of several of the articles with the patient in clinic today.  It appears that the patient will like to go ahead and have a lingual tonsillectomy knowing that dose reductions of radiation may not be huge by definitely finding the primary site.        We spent about 45 minutes explaining this to the patient today.   He had a lot of questions about radiation side effect and chemotherapy side effects which we answered for him as well.  He is to undergo a transoral right lingual tonsillectomy on Friday.  100% counselling time.      Again, thank you for allowing me to participate in the care of your patient.      Sincerely,    Garcia Worthy MD

## 2018-07-31 NOTE — NURSING NOTE
Relevant Diagnosis: Pre-op   Teaching Topic: Transoral robotic resection of base of tongue   Person(s) involved in teaching:  Patient     Teaching Concerns Addressed:  Pre op teaching included the need for an H&P, NPO status pre op, hospital routines, expected recovery, activity  restrictions, antimicrobial scrub, s/s of infection, pain control methods and the importance of follow up appointments.  The patient voiced an understanding of all instructions and will call with questions.     Motivation Level:  Asks Questions:   Yes  Eager to Learn:   Yes  Cooperative:   Yes  Receptive (willing/able to accept information):   Yes     Patient  demonstrates understanding of the following:  Reason for the appointment, diagnosis and treatment plan:   Yes  Knowledge of proper use of medications and conditions for which they are ordered (with special attention to potential side effects or drug interactions):   Yes  Which situations necessitate calling provider and whom to contact:   Yes        Proper use and care of  (medical equip, care aids, etc.):   NA  Nutritional needs and diet plan:   Yes  Pain management techniques:   Yes  Patient instructed on hand hygiene:  Yes  How and/when to access community resources:   NA     Infection Prevention:  Patient   demonstrates understanding of the following:  Surgical procedure site care taught   Signs and symptoms of infection taught Yes  Wound care taught Yes     Instructional Materials Used/Given: Pre op booklet.

## 2018-07-31 NOTE — PROGRESS NOTES
Lazaro German care here for follow-up today.  He had a number of questions about the merits of resecting his lingual tonsils as a way to stage his carcinoma at the present time.  He had a negative PET scan and negative biopsies. We first talked about the right-sided neck mass.  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 Calvillo will perform in the operating room on Friday.        We talked about the merits of a lingual tonsillectomy for trying to  focus down the treatment a little bit more.   Typically for carcinomas of an unknown primary that are p16 positive, there will be relatively generous full dosing to the expected tumor site with a fall off of dosing based on the residence of the tumor and likely tonsil or ipsilateral tonsillar and ipsilateral tongue base.  We explained to the patient today that in approximately 40-80% of cases, by performing a lingual tonsillectomy, there might be able to be a little bit better of a focus of radiation so that the whole Waldeyer's ring does not have to get near a full dose radiation.  We went over a lot of the medical literature on this. There are approximately 31 articles on PubMed and we went over the abstracts of several of the articles with the patient in clinic today.  It appears that the patient will like to go ahead and have a lingual tonsillectomy knowing that dose reductions of radiation may not be huge by definitely finding the primary site.        We spent about 45 minutes explaining this to the patient today.  He had a lot of questions about radiation side effect and chemotherapy side effects which we answered for him as well.  He is to undergo a transoral right lingual tonsillectomy on Friday.  100% counselling time.      FO/ms

## 2018-08-02 ENCOUNTER — ANESTHESIA EVENT (OUTPATIENT)
Dept: SURGERY | Facility: CLINIC | Age: 68
End: 2018-08-02
Payer: MEDICARE

## 2018-08-03 ENCOUNTER — HOSPITAL ENCOUNTER (OUTPATIENT)
Dept: INTERVENTIONAL RADIOLOGY/VASCULAR | Facility: CLINIC | Age: 68
End: 2018-08-03
Attending: OTOLARYNGOLOGY | Admitting: OTOLARYNGOLOGY
Payer: MEDICARE

## 2018-08-03 ENCOUNTER — ANESTHESIA (OUTPATIENT)
Dept: SURGERY | Facility: CLINIC | Age: 68
End: 2018-08-03
Payer: MEDICARE

## 2018-08-03 ENCOUNTER — HOSPITAL ENCOUNTER (OUTPATIENT)
Facility: CLINIC | Age: 68
Discharge: HOME | End: 2018-08-03
Attending: OTOLARYNGOLOGY | Admitting: OTOLARYNGOLOGY
Payer: MEDICARE

## 2018-08-03 VITALS
DIASTOLIC BLOOD PRESSURE: 79 MMHG | TEMPERATURE: 98 F | HEIGHT: 72 IN | RESPIRATION RATE: 16 BRPM | SYSTOLIC BLOOD PRESSURE: 138 MMHG | OXYGEN SATURATION: 97 % | WEIGHT: 194.67 LBS | BODY MASS INDEX: 26.37 KG/M2

## 2018-08-03 DIAGNOSIS — C76.0 HEAD AND NECK CANCER (H): ICD-10-CM

## 2018-08-03 DIAGNOSIS — G89.18 POSTOPERATIVE PAIN: Primary | ICD-10-CM

## 2018-08-03 LAB
ABO + RH BLD: NORMAL
ABO + RH BLD: NORMAL
BLD GP AB SCN SERPL QL: NORMAL
BLOOD BANK CMNT PATIENT-IMP: NORMAL
ERYTHROCYTE [DISTWIDTH] IN BLOOD BY AUTOMATED COUNT: 13.4 % (ref 10–15)
GLUCOSE BLDC GLUCOMTR-MCNC: 99 MG/DL (ref 70–99)
HCT VFR BLD AUTO: 41.8 % (ref 40–53)
HGB BLD-MCNC: 13.4 G/DL (ref 13.3–17.7)
MCH RBC QN AUTO: 30.5 PG (ref 26.5–33)
MCHC RBC AUTO-ENTMCNC: 32.1 G/DL (ref 31.5–36.5)
MCV RBC AUTO: 95 FL (ref 78–100)
PLATELET # BLD AUTO: 200 10E9/L (ref 150–450)
RBC # BLD AUTO: 4.4 10E12/L (ref 4.4–5.9)
SPECIMEN EXP DATE BLD: NORMAL
WBC # BLD AUTO: 5.2 10E9/L (ref 4–11)

## 2018-08-03 PROCEDURE — A9270 NON-COVERED ITEM OR SERVICE: HCPCS | Mod: GY | Performed by: ANESTHESIOLOGY

## 2018-08-03 PROCEDURE — 71000014 ZZH RECOVERY PHASE 1 LEVEL 2 FIRST HR: Performed by: OTOLARYNGOLOGY

## 2018-08-03 PROCEDURE — 86900 BLOOD TYPING SEROLOGIC ABO: CPT | Performed by: ANESTHESIOLOGY

## 2018-08-03 PROCEDURE — 88305 TISSUE EXAM BY PATHOLOGIST: CPT | Performed by: OTOLARYNGOLOGY

## 2018-08-03 PROCEDURE — 37000009 ZZH ANESTHESIA TECHNICAL FEE, EACH ADDTL 15 MIN: Performed by: OTOLARYNGOLOGY

## 2018-08-03 PROCEDURE — 27210794 ZZH OR GENERAL SUPPLY STERILE: Performed by: OTOLARYNGOLOGY

## 2018-08-03 PROCEDURE — 25000132 ZZH RX MED GY IP 250 OP 250 PS 637: Mod: GY | Performed by: ANESTHESIOLOGY

## 2018-08-03 PROCEDURE — 37000008 ZZH ANESTHESIA TECHNICAL FEE, 1ST 30 MIN: Performed by: OTOLARYNGOLOGY

## 2018-08-03 PROCEDURE — 36415 COLL VENOUS BLD VENIPUNCTURE: CPT | Performed by: ANESTHESIOLOGY

## 2018-08-03 PROCEDURE — 86901 BLOOD TYPING SEROLOGIC RH(D): CPT | Performed by: ANESTHESIOLOGY

## 2018-08-03 PROCEDURE — 25000128 H RX IP 250 OP 636: Performed by: NURSE ANESTHETIST, CERTIFIED REGISTERED

## 2018-08-03 PROCEDURE — C9399 UNCLASSIFIED DRUGS OR BIOLOG: HCPCS | Performed by: NURSE ANESTHETIST, CERTIFIED REGISTERED

## 2018-08-03 PROCEDURE — 25000566 ZZH SEVOFLURANE, EA 15 MIN: Performed by: OTOLARYNGOLOGY

## 2018-08-03 PROCEDURE — 40000170 ZZH STATISTIC PRE-PROCEDURE ASSESSMENT II: Performed by: OTOLARYNGOLOGY

## 2018-08-03 PROCEDURE — 38505 NEEDLE BIOPSY LYMPH NODES: CPT

## 2018-08-03 PROCEDURE — 88307 TISSUE EXAM BY PATHOLOGIST: CPT | Performed by: OTOLARYNGOLOGY

## 2018-08-03 PROCEDURE — 25000125 ZZHC RX 250: Performed by: OTOLARYNGOLOGY

## 2018-08-03 PROCEDURE — 25000125 ZZHC RX 250: Performed by: NURSE ANESTHETIST, CERTIFIED REGISTERED

## 2018-08-03 PROCEDURE — 36000090 ZZH SURGERY LEVEL 8 W FLUORO 1ST 30 MIN - UMMC: Performed by: OTOLARYNGOLOGY

## 2018-08-03 PROCEDURE — 86850 RBC ANTIBODY SCREEN: CPT | Performed by: ANESTHESIOLOGY

## 2018-08-03 PROCEDURE — 85027 COMPLETE CBC AUTOMATED: CPT | Performed by: ANESTHESIOLOGY

## 2018-08-03 PROCEDURE — 36000088 ZZH SURGERY LEVEL 8 EA 15 ADDTL MIN - UMMC: Performed by: OTOLARYNGOLOGY

## 2018-08-03 PROCEDURE — 88333 PATH CONSLTJ SURG CYTO XM 1: CPT | Performed by: OTOLARYNGOLOGY

## 2018-08-03 PROCEDURE — 82962 GLUCOSE BLOOD TEST: CPT

## 2018-08-03 PROCEDURE — 88342 IMHCHEM/IMCYTCHM 1ST ANTB: CPT | Performed by: OTOLARYNGOLOGY

## 2018-08-03 PROCEDURE — 25000128 H RX IP 250 OP 636: Performed by: ANESTHESIOLOGY

## 2018-08-03 PROCEDURE — A9270 NON-COVERED ITEM OR SERVICE: HCPCS | Mod: GY | Performed by: OTOLARYNGOLOGY

## 2018-08-03 PROCEDURE — 71000027 ZZH RECOVERY PHASE 2 EACH 15 MINS: Performed by: OTOLARYNGOLOGY

## 2018-08-03 RX ORDER — SODIUM CHLORIDE, SODIUM LACTATE, POTASSIUM CHLORIDE, CALCIUM CHLORIDE 600; 310; 30; 20 MG/100ML; MG/100ML; MG/100ML; MG/100ML
INJECTION, SOLUTION INTRAVENOUS CONTINUOUS
Status: DISCONTINUED | OUTPATIENT
Start: 2018-08-03 | End: 2018-08-03 | Stop reason: HOSPADM

## 2018-08-03 RX ORDER — NALOXONE HYDROCHLORIDE 0.4 MG/ML
.1-.4 INJECTION, SOLUTION INTRAMUSCULAR; INTRAVENOUS; SUBCUTANEOUS
Status: DISCONTINUED | OUTPATIENT
Start: 2018-08-03 | End: 2018-08-03 | Stop reason: HOSPADM

## 2018-08-03 RX ORDER — OXYCODONE HCL 5 MG/5 ML
5 SOLUTION, ORAL ORAL
Status: DISCONTINUED | OUTPATIENT
Start: 2018-08-03 | End: 2018-08-03 | Stop reason: HOSPADM

## 2018-08-03 RX ORDER — DEXAMETHASONE SODIUM PHOSPHATE 4 MG/ML
4 INJECTION, SOLUTION INTRA-ARTICULAR; INTRALESIONAL; INTRAMUSCULAR; INTRAVENOUS; SOFT TISSUE EVERY 10 MIN PRN
Status: DISCONTINUED | OUTPATIENT
Start: 2018-08-03 | End: 2018-08-03 | Stop reason: HOSPADM

## 2018-08-03 RX ORDER — ONDANSETRON 2 MG/ML
INJECTION INTRAMUSCULAR; INTRAVENOUS PRN
Status: DISCONTINUED | OUTPATIENT
Start: 2018-08-03 | End: 2018-08-03

## 2018-08-03 RX ORDER — PROPOFOL 10 MG/ML
INJECTION, EMULSION INTRAVENOUS PRN
Status: DISCONTINUED | OUTPATIENT
Start: 2018-08-03 | End: 2018-08-03

## 2018-08-03 RX ORDER — DEXAMETHASONE SODIUM PHOSPHATE 4 MG/ML
INJECTION, SOLUTION INTRA-ARTICULAR; INTRALESIONAL; INTRAMUSCULAR; INTRAVENOUS; SOFT TISSUE PRN
Status: DISCONTINUED | OUTPATIENT
Start: 2018-08-03 | End: 2018-08-03

## 2018-08-03 RX ORDER — FENTANYL CITRATE 50 UG/ML
25-50 INJECTION, SOLUTION INTRAMUSCULAR; INTRAVENOUS EVERY 5 MIN PRN
Status: DISCONTINUED | OUTPATIENT
Start: 2018-08-03 | End: 2018-08-03 | Stop reason: HOSPADM

## 2018-08-03 RX ORDER — GLYCOPYRROLATE 0.2 MG/ML
INJECTION, SOLUTION INTRAMUSCULAR; INTRAVENOUS PRN
Status: DISCONTINUED | OUTPATIENT
Start: 2018-08-03 | End: 2018-08-03

## 2018-08-03 RX ORDER — ONDANSETRON 4 MG/1
4 TABLET, ORALLY DISINTEGRATING ORAL
Status: DISCONTINUED | OUTPATIENT
Start: 2018-08-03 | End: 2018-08-03 | Stop reason: HOSPADM

## 2018-08-03 RX ORDER — ONDANSETRON 2 MG/ML
4 INJECTION INTRAMUSCULAR; INTRAVENOUS EVERY 30 MIN PRN
Status: DISCONTINUED | OUTPATIENT
Start: 2018-08-03 | End: 2018-08-03 | Stop reason: HOSPADM

## 2018-08-03 RX ORDER — SODIUM CHLORIDE, SODIUM LACTATE, POTASSIUM CHLORIDE, CALCIUM CHLORIDE 600; 310; 30; 20 MG/100ML; MG/100ML; MG/100ML; MG/100ML
INJECTION, SOLUTION INTRAVENOUS CONTINUOUS PRN
Status: DISCONTINUED | OUTPATIENT
Start: 2018-08-03 | End: 2018-08-03

## 2018-08-03 RX ORDER — ACETAMINOPHEN 325 MG/1
975 TABLET ORAL ONCE
Status: COMPLETED | OUTPATIENT
Start: 2018-08-03 | End: 2018-08-03

## 2018-08-03 RX ORDER — OXYCODONE HCL 5 MG/5 ML
5 SOLUTION, ORAL ORAL EVERY 4 HOURS PRN
Qty: 250 ML | Refills: 0 | Status: SHIPPED | OUTPATIENT
Start: 2018-08-03 | End: 2018-10-30

## 2018-08-03 RX ORDER — ACETAMINOPHEN 325 MG/1
650 TABLET ORAL
Status: DISCONTINUED | OUTPATIENT
Start: 2018-08-03 | End: 2018-08-03 | Stop reason: HOSPADM

## 2018-08-03 RX ORDER — ONDANSETRON 4 MG/1
4 TABLET, ORALLY DISINTEGRATING ORAL EVERY 30 MIN PRN
Status: DISCONTINUED | OUTPATIENT
Start: 2018-08-03 | End: 2018-08-03 | Stop reason: HOSPADM

## 2018-08-03 RX ORDER — FENTANYL CITRATE 50 UG/ML
INJECTION, SOLUTION INTRAMUSCULAR; INTRAVENOUS PRN
Status: DISCONTINUED | OUTPATIENT
Start: 2018-08-03 | End: 2018-08-03

## 2018-08-03 RX ORDER — LIDOCAINE 40 MG/G
CREAM TOPICAL
Status: DISCONTINUED | OUTPATIENT
Start: 2018-08-03 | End: 2018-08-03 | Stop reason: HOSPADM

## 2018-08-03 RX ORDER — OXYMETAZOLINE HYDROCHLORIDE 0.05 G/100ML
SPRAY NASAL PRN
Status: DISCONTINUED | OUTPATIENT
Start: 2018-08-03 | End: 2018-08-03 | Stop reason: HOSPADM

## 2018-08-03 RX ORDER — HYDRALAZINE HYDROCHLORIDE 20 MG/ML
2.5-5 INJECTION INTRAMUSCULAR; INTRAVENOUS EVERY 10 MIN PRN
Status: DISCONTINUED | OUTPATIENT
Start: 2018-08-03 | End: 2018-08-03 | Stop reason: HOSPADM

## 2018-08-03 RX ORDER — LIDOCAINE HYDROCHLORIDE 10 MG/ML
INJECTION, SOLUTION EPIDURAL; INFILTRATION; INTRACAUDAL; PERINEURAL PRN
Status: DISCONTINUED | OUTPATIENT
Start: 2018-08-03 | End: 2018-08-03 | Stop reason: HOSPADM

## 2018-08-03 RX ORDER — FENTANYL CITRATE 50 UG/ML
25-50 INJECTION, SOLUTION INTRAMUSCULAR; INTRAVENOUS
Status: DISCONTINUED | OUTPATIENT
Start: 2018-08-03 | End: 2018-08-03 | Stop reason: HOSPADM

## 2018-08-03 RX ORDER — ESMOLOL HYDROCHLORIDE 10 MG/ML
INJECTION INTRAVENOUS PRN
Status: DISCONTINUED | OUTPATIENT
Start: 2018-08-03 | End: 2018-08-03

## 2018-08-03 RX ORDER — EPHEDRINE SULFATE 50 MG/ML
INJECTION, SOLUTION INTRAMUSCULAR; INTRAVENOUS; SUBCUTANEOUS PRN
Status: DISCONTINUED | OUTPATIENT
Start: 2018-08-03 | End: 2018-08-03

## 2018-08-03 RX ORDER — HYDROMORPHONE HYDROCHLORIDE 1 MG/ML
.3-.5 INJECTION, SOLUTION INTRAMUSCULAR; INTRAVENOUS; SUBCUTANEOUS EVERY 10 MIN PRN
Status: DISCONTINUED | OUTPATIENT
Start: 2018-08-03 | End: 2018-08-03 | Stop reason: HOSPADM

## 2018-08-03 RX ORDER — MEPERIDINE HYDROCHLORIDE 50 MG/ML
12.5 INJECTION INTRAMUSCULAR; INTRAVENOUS; SUBCUTANEOUS
Status: DISCONTINUED | OUTPATIENT
Start: 2018-08-03 | End: 2018-08-03 | Stop reason: HOSPADM

## 2018-08-03 RX ADMIN — SUGAMMADEX 200 MG: 100 INJECTION, SOLUTION INTRAVENOUS at 14:32

## 2018-08-03 RX ADMIN — MIDAZOLAM 2 MG: 1 INJECTION INTRAMUSCULAR; INTRAVENOUS at 11:35

## 2018-08-03 RX ADMIN — ROCURONIUM BROMIDE 10 MG: 10 INJECTION INTRAVENOUS at 14:01

## 2018-08-03 RX ADMIN — Medication 0.3 MG: at 15:20

## 2018-08-03 RX ADMIN — DEXAMETHASONE SODIUM PHOSPHATE 6 MG: 4 INJECTION, SOLUTION INTRA-ARTICULAR; INTRALESIONAL; INTRAMUSCULAR; INTRAVENOUS; SOFT TISSUE at 13:12

## 2018-08-03 RX ADMIN — DEXAMETHASONE SODIUM PHOSPHATE 4 MG: 4 INJECTION, SOLUTION INTRA-ARTICULAR; INTRALESIONAL; INTRAMUSCULAR; INTRAVENOUS; SOFT TISSUE at 12:13

## 2018-08-03 RX ADMIN — ROCURONIUM BROMIDE 35 MG: 10 INJECTION INTRAVENOUS at 13:00

## 2018-08-03 RX ADMIN — ONDANSETRON 4 MG: 2 INJECTION INTRAMUSCULAR; INTRAVENOUS at 14:23

## 2018-08-03 RX ADMIN — SODIUM CHLORIDE, POTASSIUM CHLORIDE, SODIUM LACTATE AND CALCIUM CHLORIDE: 600; 310; 30; 20 INJECTION, SOLUTION INTRAVENOUS at 11:34

## 2018-08-03 RX ADMIN — ESMOLOL HYDROCHLORIDE 30 MG: 10 INJECTION, SOLUTION INTRAVENOUS at 14:07

## 2018-08-03 RX ADMIN — ROCURONIUM BROMIDE 65 MG: 10 INJECTION INTRAVENOUS at 11:51

## 2018-08-03 RX ADMIN — FENTANYL CITRATE 100 MCG: 50 INJECTION, SOLUTION INTRAMUSCULAR; INTRAVENOUS at 13:40

## 2018-08-03 RX ADMIN — GLYCOPYRROLATE 0.2 MG: 0.2 INJECTION, SOLUTION INTRAMUSCULAR; INTRAVENOUS at 12:15

## 2018-08-03 RX ADMIN — FENTANYL CITRATE 100 MCG: 50 INJECTION, SOLUTION INTRAMUSCULAR; INTRAVENOUS at 13:00

## 2018-08-03 RX ADMIN — PROPOFOL 120 MG: 10 INJECTION, EMULSION INTRAVENOUS at 11:51

## 2018-08-03 RX ADMIN — SODIUM CHLORIDE, POTASSIUM CHLORIDE, SODIUM LACTATE AND CALCIUM CHLORIDE: 600; 310; 30; 20 INJECTION, SOLUTION INTRAVENOUS at 12:45

## 2018-08-03 RX ADMIN — ACETAMINOPHEN 975 MG: 325 TABLET, FILM COATED ORAL at 10:24

## 2018-08-03 RX ADMIN — Medication 10 MG: at 12:24

## 2018-08-03 RX ADMIN — FENTANYL CITRATE 100 MCG: 50 INJECTION, SOLUTION INTRAMUSCULAR; INTRAVENOUS at 11:51

## 2018-08-03 ASSESSMENT — ENCOUNTER SYMPTOMS
SEIZURES: 0
DYSRHYTHMIAS: 0

## 2018-08-03 ASSESSMENT — COPD QUESTIONNAIRES: COPD: 0

## 2018-08-03 ASSESSMENT — LIFESTYLE VARIABLES: TOBACCO_USE: 0

## 2018-08-03 NOTE — DISCHARGE INSTRUCTIONS
[facility]  Same-Day Surgery   Adult Discharge Orders & Instructions     For 24 hours after surgery    1. Get plenty of rest.  A responsible adult must stay with you for at least 24 hours after you leave the hospital.   2. Do not drive or use heavy equipment.  If you have weakness or tingling, don't drive or use heavy equipment until this feeling goes away.  3. Do not drink alcohol.  4. Avoid strenuous or risky activities.  Ask for help when climbing stairs.   5. You may feel lightheaded.  IF so, sit for a few minutes before standing.  Have someone help you get up.   6. If you have nausea (feel sick to your stomach): Drink only clear liquids such as apple juice, ginger ale, broth or 7-Up.  Rest may also help.  Be sure to drink enough fluids.  Move to a regular diet as you feel able.  7. You may have a slight fever. Call the doctor if your fever is over 100 F (37.7 C) (taken under the tongue) or lasts longer than 24 hours.  8. You may have a dry mouth, a sore throat, muscle aches or trouble sleeping.  These should go away after 24 hours.  9. Do not make important or legal decisions.   Call your doctor for any of the followin.  Signs of infection (fever, growing tenderness at the surgery site, a large amount of drainage or bleeding, severe pain, foul-smelling drainage, redness, swelling).    2. It has been over 8 to 10 hours since surgery and you are still not able to urinate (pass water).    3.  Headache for over 24 hours.    To contact a doctor, call Dr Calvillo's office at [phone] or:        [phone] and ask for the resident on call for ENT/OTOLARYNGOLOGY (answered 24 hours a day)      Emergency Department:    [facility]: [phone]       (TTY for hearing impaired: [phone])

## 2018-08-03 NOTE — ANESTHESIA POSTPROCEDURE EVALUATION
Patient: Lazaro Porter    Procedure(s):  Transoral Robotic Resection of the Base of Tongue,   IR Guided Core Needle Biopsy of Right Neck Mass - Wound Class: II-Clean Contaminated  Ultrasound guided Core Biopsy of a Right Neck Mass - Wound Class: I-Clean    Diagnosis:Head and Neck Cancer  Diagnosis Additional Information: No value filed.    Anesthesia Type:  No value filed.    Note:  Anesthesia Post Evaluation    Patient location during evaluation: PACU  Patient participation: Able to fully participate in evaluation  Level of consciousness: awake and alert  Pain management: adequate  Cardiovascular status: acceptable  Respiratory status: acceptable  Hydration status: acceptable  PONV: controlled     Anesthetic complications: None          Last vitals:  Vitals:    08/03/18 0948   BP: (!) 151/100   Resp: 16   Temp: 36.7  C (98.1  F)   SpO2: 97%         Electronically Signed By: Bri Beltre MD  August 3, 2018  3:52 PM

## 2018-08-03 NOTE — IP AVS SNAPSHOT
MRN:[id number]                      After Visit Summary   8/3/2018    Lazaro Porter    MRN: [id number]           Thank you!     Thank you for choosing [facility] for your care. Our goal is always to provide you with excellent care. Hearing back from our patients is one way we can continue to improve our services. Please take a few minutes to complete the written survey that you may receive in the mail after you visit with us. Thank you!        Patient Information     Date Of Birth          1950        About your hospital stay     You were admitted on:  August 3, 2018 You last received care in the:  Post Anesthesia Care Unit [facility]    You were discharged on:  August 3, 2018       Who to Call     For medical emergencies, please call 911.  For non-urgent questions about your medical care, please call your primary care provider or clinic, None  For questions related to your surgery, please call your surgery clinic        Attending Provider     Provider Blas Singh MD Otolaryngology       Primary Care Provider    None Specified      After Care Instructions     Diet Instructions       Resume pre procedure diet. Start with liquids that aren't too hot, spicy, or acidic. Advance as tolerated. Avoid sharp foods like chips, pretzels, crackers for 2 weeks.            Discharge Instructions       Patient to follow up with surgeon in ~7-14 days.            Discharge Instructions - Lifting restrictions       Lifting Restrictions 10 pounds for two weeks                  Further instructions from your care team       [facility]  Same-Day Surgery   Adult Discharge Orders & Instructions     For 24 hours after surgery    1. Get plenty of rest.  A responsible adult must stay with you for at least 24 hours after you leave the hospital.   2. Do not drive or use heavy equipment.  If you have weakness or tingling, don't drive or use heavy equipment until  this feeling goes away.  3. Do not drink alcohol.  4. Avoid strenuous or risky activities.  Ask for help when climbing stairs.   5. You may feel lightheaded.  IF so, sit for a few minutes before standing.  Have someone help you get up.   6. If you have nausea (feel sick to your stomach): Drink only clear liquids such as apple juice, ginger ale, broth or 7-Up.  Rest may also help.  Be sure to drink enough fluids.  Move to a regular diet as you feel able.  7. You may have a slight fever. Call the doctor if your fever is over 100 F (37.7 C) (taken under the tongue) or lasts longer than 24 hours.  8. You may have a dry mouth, a sore throat, muscle aches or trouble sleeping.  These should go away after 24 hours.  9. Do not make important or legal decisions.   Call your doctor for any of the followin.  Signs of infection (fever, growing tenderness at the surgery site, a large amount of drainage or bleeding, severe pain, foul-smelling drainage, redness, swelling).    2. It has been over 8 to 10 hours since surgery and you are still not able to urinate (pass water).    3.  Headache for over 24 hours.    To contact a doctor, call Dr Calvillo's office at [phone] or:        [phone] and ask for the resident on call for ENT/OTOLARYNGOLOGY (answered 24 hours a day)      Emergency Department:    [facility]: [phone]       (TTY for hearing impaired: [phone])    Pending Results     Date and Time Order Name Status Description    8/3/2018 1235 Surgical pathology exam In process             Admission Information     Date & Time Provider Department Dept. Phone    8/3/2018 Blas Calvillo MD Post Anesthesia Care Unit [facility] [phone]      Your Vitals Were     Blood Pressure Temperature Respirations Height Weight Pulse Oximetry    151/100 98.1  F (36.7  C) (Oral) 16 1.829 m (6') 88.3 kg (194 lb 10.7 oz) 97%    BMI (Body Mass Index)                   26.4 kg/m2           MyChart Information   "   [employer] lets you send messages to your doctor, view your test results, renew your prescriptions, schedule appointments and more. To sign up, go to [email or web address] . Click on \"Log in\" on the left side of the screen, which will take you to the Welcome page. Then click on \"Sign up Now\" on the right side of the page.     You will be asked to enter the access code listed below, as well as some personal information. Please follow the directions to create your username and password.     Your access code is: [email or web address]  Expires: 10/2/2018  6:31 AM     Your access code will  in 90 days. If you need help or a new code, please call your [place] clinic or [phone].        Care EveryWhere ID     This is your Care EveryWhere ID. This could be used by other organizations to access your [place] medical records  [id number]        Equal Access to Services     JOZEF CLEMENTS AH: Hadyanet Karimi, waadriana card, qahilary kaalmada zbigniew, talon hardwick . So [facility] [phone].    ATENCIÓN: Si eugeniela miguel a, tiene a corona disposición servicios gratuitos de asistencia lingüística. Llame al [phone].    We comply with applicable federal civil rights laws and [place] laws. We do not discriminate on the basis of race, color, national origin, age, disability, sex, sexual orientation, or gender identity.               Review of your medicines      UNREVIEWED medicines. Ask your doctor about these medicines        Dose / Directions    aspirin 325 MG tablet        Dose:  325 mg   Take 325 mg by mouth   Refills:  0       MULTIVITAMIN & MINERAL PO        Refills:  0         START taking        Dose / Directions    oxyCODONE 5 MG/5ML solution   Commonly known as:  ROXICODONE   Used for:  Postoperative pain   Replaces:  oxyCODONE IR 5 MG tablet        Dose:  5 mg   Take 5 mLs (5 mg) by mouth every 4 hours as needed for severe pain   Quantity:  250 mL   Refills:  0         STOP " taking     oxyCODONE IR 5 MG tablet   Commonly known as:  ROXICODONE   Replaced by:  oxyCODONE 5 MG/5ML solution                Where to get your medicines      Some of these will need a paper prescription and others can be bought over the counter. Ask your nurse if you have questions.     Bring a paper prescription for each of these medications     oxyCODONE 5 MG/5ML solution                Protect others around you: Learn how to safely use, store and throw away your medicines at www.disposemymeds.org.        Information about OPIOIDS     PRESCRIPTION OPIOIDS: WHAT YOU NEED TO KNOW   We gave you an opioid (narcotic) pain medicine. It is important to manage your pain, but opioids are not always the best choice. You should first try all the other options your care team gave you. Take this medicine for as short a time (and as few doses) as possible.     These medicines have risks:    DO NOT drive when on new or higher doses of pain medicine. These medicines can affect your alertness and reaction times, and you could be arrested for driving under the influence (DUI). If you need to use opioids long-term, talk to your care team about driving.    DO NOT operate heave machinery    DO NOT do any other dangerous activities while taking these medicines.     DO NOT drink any alcohol while taking these medicines.      If the opioid prescribed includes acetaminophen, DO NOT take with any other medicines that contain acetaminophen. Read all labels carefully. Look for the word  acetaminophen  or  Tylenol.  Ask your pharmacist if you have questions or are unsure.    You can get addicted to pain medicines, especially if you have a history of addiction (chemical, alcohol or substance dependence). Talk to your care team about ways to reduce this risk.    Store your pills in a secure place, locked if possible. We will not replace any lost or stolen medicine. If you don t finish your medicine, please throw away (dispose) as directed by  your pharmacist. The [place] Pollution Control Agency has more information about safe disposal: https://www.pca.[facility].mn.us/living-green/managing-unwanted-medications.     All opioids tend to cause constipation. Drink plenty of water and eat foods that have a lot of fiber, such as fruits, vegetables, prune juice, apple juice and high-fiber cereal. Take a laxative (Miralax, milk of magnesia, Colace, Senna) if you don t move your bowels at least every other day.              Medication List: This is a list of all your medications and when to take them. Check marks below indicate your daily home schedule. Keep this list as a reference.      Medications           Morning Afternoon Evening Bedtime As Needed    aspirin 325 MG tablet   Take 325 mg by mouth                                MULTIVITAMIN & MINERAL PO                                oxyCODONE 5 MG/5ML solution   Commonly known as:  ROXICODONE   Take 5 mLs (5 mg) by mouth every 4 hours as needed for severe pain

## 2018-08-03 NOTE — ANESTHESIA CARE TRANSFER NOTE
Patient: Lazaro Porter    Procedure(s):  Transoral Robotic Resection of the Base of Tongue,   IR Guided Core Needle Biopsy of Right Neck Mass - Wound Class: II-Clean Contaminated  Ultrasound guided Core Biopsy of a Right Neck Mass - Wound Class: I-Clean    Diagnosis: Head and Neck Cancer  Diagnosis Additional Information: No value filed.    Anesthesia Type:   No value filed.     Note:  Airway :Face Mask  Patient transferred to:PACU  Comments: Anesthesia Care Transfer Note      Transfer to:  PACU    Patient Vital Signs:  Stable    Airway:  None    Patient transported to PACU with supplemental O2.  Patient alert and breathing comfortably.  VSS.  Care transferred with report to PACU SARA Naidu CRNAHandoff Report: Identifed the Patient, Identified the Reponsible Provider, Reviewed the pertinent medical history, Discussed the surgical course, Reviewed Intra-OP anesthesia mangement and issues during anesthesia, Set expectations for post-procedure period and Allowed opportunity for questions and acknowledgement of understanding      Vitals: (Last set prior to Anesthesia Care Transfer)    CRNA VITALS  8/3/2018 1414 - 8/3/2018 1450      8/3/2018             Resp Rate (observed): 13                Electronically Signed By: LORIE Frias CRNA  August 3, 2018  2:50 PM

## 2018-08-03 NOTE — ANESTHESIA PREPROCEDURE EVALUATION
Anesthesia Evaluation     . Pt has had prior anesthetic.     No history of anesthetic complications          ROS/MED HX    ENT/Pulmonary: Comment: Carcinoma of the neck     (-) tobacco use, asthma and COPD   Neurologic:  - neg neurologic ROS    (-) seizures, CVA and TIA   Cardiovascular:        (-) hypertension, arrhythmias, irregular heartbeat/palpitations and dyslipidemia   METS/Exercise Tolerance:  4 - Raking leaves, gardening   Hematologic:         Musculoskeletal:         GI/Hepatic:        (-) GERD and liver disease   Renal/Genitourinary:      (-) renal disease   Endo:      (-) Type I DM   Psychiatric:         Infectious Disease:         Malignancy:   (+) Malignancy           Other:                     Physical Exam  Normal systems: dental    Airway   Mallampati: III  TM distance: <3 FB  Neck ROM: full    Dental     Cardiovascular   Rhythm and rate: regular and normal  (-) no weak pulses and no murmur    Pulmonary    breath sounds clear to auscultation(-) no rhonchi                    Anesthesia Plan      History & Physical Review      ASA Status:  2 .    NPO Status:  > 8 hours    Plan for General and ETT with Intravenous induction. Maintenance will be Balanced.      Additional equipment: 2nd IV GETA. PIVx2. Standard ASA monitors. IV opioids, adjuncts. PACU postop    Risks and benefits of anesthetic discussed with patient including sore throat, voice hoarseness, injury to vocal cords, throat, mouth, teeth, tongue, and lips from intubation; nausea/vomiting; cardiac arrest, respiratory complications, MI, stroke, blood clots, death.    Transfusion risks discussed include infection, and complications involving the heart and lungs (transfusion reaction)    Presented opportunity to answer patient and family questions. Questions addressed.        Postoperative Care      Consents  Anesthetic plan, risks, benefits and alternatives discussed with:  Patient.  Use of blood products discussed: Yes.   Use of blood  products discussed with Patient.  Consented to blood products.  .                          .

## 2018-08-03 NOTE — IP AVS SNAPSHOT
Post Anesthesia Care Unit [address] [address]    Phone:  [phone]                                       After Visit Summary   8/3/2018    Lazaro Porter    MRN: [id number]           After Visit Summary Signature Page     I have received my discharge instructions, and my questions have been answered. I have discussed any challenges I see with this plan with the nurse or doctor.    ..........................................................................................................................................  Patient/Patient Representative Signature      ..........................................................................................................................................  Patient Representative Print Name and Relationship to Patient    ..................................................               ................................................  Date                                            Time    ..........................................................................................................................................  Reviewed by Signature/Title    ...................................................              ..............................................  Date                                                            Time

## 2018-08-03 NOTE — BRIEF OP NOTE
[facility], [place]    Brief Operative Note    Pre-operative diagnosis: Head and Neck Cancer  Post-operative diagnosis * No post-op diagnosis entered *  Procedure: Procedure(s):  Transoral Robotic Resection of the Base of Tongue,   IR Guided Core Needle Biopsy of Right Neck Mass - Wound Class: II-Clean Contaminated  Ultrasound guided Core Biopsy of a Right Neck Mass - Wound Class: I-Clean   Direct laryngoscopy with electrocautery  Surgeon: Surgeon(s) and Role:  Panel 1:     * Blas Calvillo MD - Primary     * Alexia Pederson MD- Resident surgon  Panel 2:     * Denis Monson MD - Primary  Anesthesia: General   Estimated blood loss: 20 cc  Drains: None  Specimens:   ID Type Source Tests Collected by Time Destination   A : Right Level 2 lymphnode Tissue Neck SURGICAL PATHOLOGY EXAM Blas Calvillo MD 8/3/2018 12:34 PM    B : Right Tongue Bass Tissue Tongue SURGICAL PATHOLOGY EXAM Blas Calvillo MD 8/3/2018  2:02 PM      Findings:   see full op note for details.  Complications: None.  Implants: None.

## 2018-08-06 ENCOUNTER — TELEPHONE (OUTPATIENT)
Dept: OTOLARYNGOLOGY | Facility: CLINIC | Age: 68
End: 2018-08-06

## 2018-08-06 LAB — COPATH REPORT: NORMAL

## 2018-08-06 NOTE — TELEPHONE ENCOUNTER
RN following up with pt regarding plan of care moving forward.  Pt states he is not currently scheduled for a post op appt and wonders if he should be following up with Dr. Worthy or Dr. Calvillo.  Pt is also wondering about scheduling an appt with radiation oncology.  Pt is having family over this weekend Sunday-Tuesday.    RN will touch base with Dr. Worthy tomorrow morning and contact the pt to discuss plans for the next couple weeks.    Chanell GUTIERREZN, RN  [phone]  [facility]   Head & Neck Surgery   8/6/2018 3:54 PM

## 2018-08-06 NOTE — OP NOTE
Procedure Date: 08/03/2018      ATTENDING SURGEON:  Blas Calvillo MD      ASSISTANT:  Alexia Guerra MD.      PREOPERATIVE DIAGNOSIS:  Metastatic squamous cell carcinoma to the right neck causing neck pain.      PROCEDURES:   1.  Diagnostic laryngoscopy.   2.  da Deniz-assisted demucosalization of the right tongue base for identification of primary lesion.        OTHER PROCEDURE:  Core biopsy performed of the right neck by Dr. Monson in Interventional Radiology.      ANESTHESIA:  General.      OPERATIVE INDICATIONS:  Mr. Porter is a patient who was diagnosed with metastatic squamous cell carcinoma to the right neck, although primary site has not yet been identified.  After discussion at Tumor Board, we decided that it would be beneficial to try a robotic procedure to identify the primary lesion if possible to help narrow his radiation field.  In addition, we also wanted to get p16 information about the nature of the tumor and thus, we scheduled him for a core biopsy of the right neck.      OPERATIVE PROCEDURE:  Dr. Monson's team conducted the biopsy first; please see operative notes for details of the procedure.  Once this was complete, we turned the patient 180 degrees, then performed a timeout.  We started by doing diagnostic laryngoscopy and placing both the [place] and the FK retractor.  I felt that I got the best exposure with the FK retractor.  I suspended this into place.  We then docked the robot and placed the spatula Bovie arms and the  microbipolar arm.  I then started just posterior and tapped low of the circumvallate papillae on the right side and came to the lateral aspect and near the midline of the tongue medially.  I took 2-3 mm of depth all the way towards the vallecula so that I could see the tip of the epiglottis. As I could not quite open the vallecula with the FK retractor, once this initial specimen was passed off, I took the Dedo laryngoscope and examined the area for any  additional residual tongue base mucosa on the right side that might be present close to the vallecula and I did identify a small amount of tissue here, so we took a laryngoscope and I performed additional resections using the Sohail suction cautery all the way down to the apex of the vallecula.  Once this was complete, I again performed diagnostic laryngoscopy to ensure that the right-sided tongue base mucosa had been completely removed and indeed, it had.  I cauterized the area to ensure that there was excellent hemostasis and the patient was then brought out of anesthesia without difficulty and taken to the recovery room in satisfactory condition.      COMPLICATIONS:  There were no immediate complications.        BLOOD LOSS:  Less than 10 mL.         ANGELIC EL MD             D: 2018   T: 2018   MT: ADRIANA      Name:     WILLIAN SIDDIQUI   MRN:      [id number]        Account:        [id number]   :      1950           Procedure Date: 2018      Document: [id number]

## 2018-08-07 ENCOUNTER — TELEPHONE (OUTPATIENT)
Dept: OTOLARYNGOLOGY | Facility: CLINIC | Age: 68
End: 2018-08-07

## 2018-08-07 DIAGNOSIS — C76.0 HEAD AND NECK CANCER (H): Primary | ICD-10-CM

## 2018-08-07 NOTE — TELEPHONE ENCOUNTER
RN calling pt to discuss plan of care.  Dr. Worthy recommends the pt be scheduled for chemoradiation therapy consultation appts as soon as possible.  Pt should also follow up with Dr. Worthy in clinic within the next 1-2 weeks.  RN will send a message to clinic coordinators to request assistance in scheduling radiation and chemotherapy consultations in addition to follow up in ENT.  Pt states he has spoken to his dentist and he is aware of the situation.  RN informed pt that he will likely need a clearance exam prior to beginning radiation.    Plan to follow up with pathology when available.     Chanell GUTIERREZN, RN  [phone]  [facility] ENT   Head & Neck Surgery   8/7/2018 10:04 AM

## 2018-08-08 ENCOUNTER — PRE VISIT (OUTPATIENT)
Dept: RADIATION ONCOLOGY | Facility: CLINIC | Age: 68
End: 2018-08-08

## 2018-08-08 ENCOUNTER — TELEPHONE (OUTPATIENT)
Dept: OTOLARYNGOLOGY | Facility: CLINIC | Age: 68
End: 2018-08-08

## 2018-08-08 NOTE — TELEPHONE ENCOUNTER
RN calling pt with pathology results from robotic surgery.  There was a small focus of HPV P16+ SCC in the right tongue base.  The neck node was rebiopsied and is also consistent with HPV P16+ SCC. Pt was informed of results.  The plan is still for the pt to proceed with consultations with CRT.    FINAL DIAGNOSIS:   A. Right level 2 lymph node biopsy, CT guided:        - Metastatic P16 positive non-keratinizing squamous cell carcinoma   consistent with HPV-associated   oropharyngeal squamous cell carcinoma     B. Right tongue base:        - Small foci of P16 positive non-keratinizing squamous cell carcinoma    consistent with HPV-associated   oropharyngeal squamous cell carcinoma   - The largest tumor focus: 0.1 cm     Chanell GUTIERREZN, RN  [phone]  [facility] ENT   Head & Neck Surgery   8/8/2018 8:09 AM

## 2018-08-08 NOTE — TELEPHONE ENCOUNTER
Date of appointment: 8/15/18   Diagnosis/reason for appointment: Cons-H&N cancer-   Referring provider/facility: Dr. Worthy  Who called: Pool message    Recent Studies  Imaging:  Pathology:  Labs:  Previous radiation:No per patient  Records in EPIC/CE    Additional information: Was discussed at tumor conference

## 2018-08-15 ENCOUNTER — OFFICE VISIT (OUTPATIENT)
Dept: RADIATION ONCOLOGY | Facility: CLINIC | Age: 68
End: 2018-08-15
Attending: RADIOLOGY
Payer: MEDICARE

## 2018-08-15 VITALS — BODY MASS INDEX: 25.23 KG/M2 | WEIGHT: 186 LBS

## 2018-08-15 DIAGNOSIS — C01 MALIGNANT NEOPLASM OF BASE OF TONGUE (H): ICD-10-CM

## 2018-08-15 PROCEDURE — G0463 HOSPITAL OUTPT CLINIC VISIT: HCPCS | Performed by: RADIOLOGY

## 2018-08-15 ASSESSMENT — ENCOUNTER SYMPTOMS
SEIZURES: 0
SENSORY CHANGE: 0
DEPRESSION: 0
COUGH: 0
EYE PAIN: 0
TREMORS: 0
BLOOD IN STOOL: 0
DIZZINESS: 0
NECK PAIN: 1
EYE DISCHARGE: 0
SPUTUM PRODUCTION: 0
DOUBLE VISION: 0
MYALGIAS: 0
MEMORY LOSS: 0
HEMATURIA: 0
HEMOPTYSIS: 0
SPEECH CHANGE: 0
FREQUENCY: 0
CHILLS: 0
VOMITING: 0
BRUISES/BLEEDS EASILY: 0
CLAUDICATION: 0
POLYDIPSIA: 0
FALLS: 0
HEADACHES: 1
PND: 0
BACK PAIN: 0
INSOMNIA: 0
NAUSEA: 0
FLANK PAIN: 0
DIAPHORESIS: 0
BLURRED VISION: 0
DIARRHEA: 0
CONSTIPATION: 0
WHEEZING: 0
DYSURIA: 0
PHOTOPHOBIA: 0
FEVER: 0
TINGLING: 0
WEAKNESS: 0
HEARTBURN: 0
WEIGHT LOSS: 1
SHORTNESS OF BREATH: 0
PALPITATIONS: 0
NERVOUS/ANXIOUS: 0
LOSS OF CONSCIOUSNESS: 0
FOCAL WEAKNESS: 0
ORTHOPNEA: 0
HALLUCINATIONS: 0
EYE REDNESS: 0
ABDOMINAL PAIN: 0

## 2018-08-15 ASSESSMENT — LIFESTYLE VARIABLES: SUBSTANCE_ABUSE: 0

## 2018-08-15 NOTE — LETTER
8/15/2018      RE: Lazaro Porter  [address] [place] [id number]       Consultation Note    Name: Lazaro Porter MRN: [id number]   : 1950   Date of Service: 8/15/2018  Referring: Dr. 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     Mr. Porter s history begins with a several month history of an enlarging right neck mass. He underwent a biopsy of this  mass at [facility] ([place], [place]) prior to presentation at the [facility] which was consistent with squamous cell carcinoma, however p16/HPV testing was unable to be done given specimen inadequacy ([facility] specimen [id number]; [facility] specimen USR ). He presented for evaluation initially to ENT on 7/10/2018 where he was found to have a fixed 4 cm lymph node in right level II. The remainder of his examination including flexible endoscopic laryngoscopy did not reveal any abnormality. A PET/CT on 7/10/2018 demonstrated a 3.5 x 2.2 cm hypermetabolic (SUVmax 17.7) right level 2 lymph node with imaging appearance concerning for extranodal extension. There was a loss of the fascial border between the sternocleidomastoid and >180 degree encasement of the right internal carotid artery. No primary tumor was identified in the mucosal space of the head and neck on this study.     Mr. Porter underwent DL with directed biopsies of the nasopharynx, right and left base of tongue, right and left tonsillar fossa, and the right pyriform sinus on 2018 under the care of Dr. Worthy. Operative notes indicate examination findings consistent with  the previous clinical findings, namely a single 4 cm right level II lymph node without any other concerning lymphadenopathy or mucosal abnormality in the oral cavity or pharyngeal axis. There was mild edema noted of the epiglottis, however this was not deemed to be very concerning. Pathology from this procedure (specimen: [id number]) showed no evidence of malignancy within the mucosal sites with FNA of the known right level 2 disease demonstrating a minute focus of p16-positive squamous cell carcinoma.    Mr. Porter then underwent a direct laryngoscopy and robotic demucosalization of the right tongue base on 8/3/2018. A core biopsy of the above lymph node was taken during this procedure. Pathology from this procedure (specimen [id number]) demonstrated p16 positive nonkeratinizing squamous cell carcinoma in both the right level II lymph node and in the right tongue base specimen. The primary right tongue base sample was measured to be 1 mm in largest dimension. Mr. Porter's case was discussed at the [facility]'s interdisciplinary head and neck tumor board with the consensus recommendation to proceed with definitive chemoradiotherapy due to the clinical and radiographic evidence of extracapsular disease extension with questionable involvement of the right internal carotid artery on his staging PET/CT study. He presents to clinic today with his daughter for a further discussion regarding the utility of head and neck radiotherapy in the treatment of his disease.    On exam today, Mr. Porter states that he is doing well and, aside from his right neck mass, has no pressing concerns or complaints. This mass is mildly tender to palpation and his review of systems are otherwise positive for mild to moderate oropharyngeal pain related to his recent biopsies as well as mild fatigue and intermittent headaches. He otherwise denies any fever/chills, vision changes, dysphagia, significant weight loss,  "nausea/vomiting, dyspnea, chest pain or abdominal pain.    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 Recreationally works as an [employer]    Family History:    Mother: He reports that his mother had an unknown cancer which she did not receive treatment for. She did end up passing away from this disease process    Maternal Grandfather: He reports his maternal grandfather had \"jaw cancer\" which was treated with surgical resection and adjuvant radiotherapy.     Review of Systems   A 10-point review of systems was performed. Pertinent findings are noted in the HPI.    Physical Exam   ECOG Performance Status: 0     Weight: 84.4 kg  Pain: 2/10    Gen: Alert, in NAD  Head: NC/AT  Eyes: PERRL, EOMI, sclera anicteric  Ears: No external auricular lesions  Nose/sinus: No rhinorrhea or epistaxis  Oral cavity/oropharynx: MMM. No visible oral cavity lesions. Good dentition with several amalgam fillings in place. Post-procedural irregularity of the right base of tongue on palpation without any additional lesions/masses apparent within the oropharynx or oral cavity.  Neck: 4.5 cm fixed lymph node in the right level II. No other palpable lymph nodes within the bilateral neck.    Pulm: Breathing comfortably on room air. No wheezing, stridor or respiratory distress.  CV: Extremities are warm and well-perfused, no cyanosis, no pedal edema  Musculoskeletal: Normal bulk and tone  Skin: Normal color and turgor,  Neuro: A/Ox3, CN III-XII intact, normal gait    Flexible Fiberoptic Nasopharyngoscopy: Consent for " fiberoptic laryngoscopy was obtained, and we confirmed correctness of procedure and identity of patient. Fiberoptic laryngoscopy was indicated due to right base of tongue tumor. The nose was topically decongested and anesthetized. The fiberoptic laryngoscope was passed through the right naris under endoscopic vision. The turbinates were normal. The inferior and middle meati were clear without purulence, masses, or polyps. The nasopharynx was clear. The Eustachian tubes were clear. The soft palate appeared normal with good mobility. Visualization of the right base of tongue demonstrated post-procedural demucosalization with overlying thickened oral secretions. The remainder of the oropharynx including the vallecula was unremarkable in appearance. The epiglottis was sharp and the larynx was clear with mobile cords bilaterally. The arytenoids were unremarkable and there was no pooling of secretions within the hypopharynx. The scope was then withdrawn and the procedure was terminated without incident.    Imaging/Path/Labs   Imaging: Reviewed.    Path: Reviewed.    Labs: Reviewed.    Assessment    Mr. 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     Plan   We had a long discussion with Mr. Porter regarding treatment of his newly-diagnosed oropharyngeal carcinoma. Specifically, we reiterated the head and neck tumor board's recommendation for concurrent chemoradiotherapy with curative intent and we proposed a treatment course consisting of 70 Gy delivered in 33 daily fractions to the right neck and base of tongue with elective coverage of the contralateral neck. Mr. Porter is currently scheduled to see Dr. Rolon of medical oncology on 8/21/2018 for a further discussion regarding the  use of concurrent chemoradiotherapy for radiosensitization purposes and we deferred discussion of systemic therapy to Dr. Rolon and his team. We reviewed the potential acute and late-term radiation-induced toxicities associated with head and neck radiotherapy and answered Mr. Porter and his daughter's questions to their stated satisfaction. Informed consent for radiotherapy was obtained in clinic.    We will have Mr. Porter follow-up in radiation oncology clinic on 8/21/2018 for a CT-simulation session for radiotherapy planning purposes when he returns to see Lobito Worthy and Gonzales. We are tentatively planning to initiate therapy on Wednesday, 8/29/2018, although will coordinate with our colleagues in medical oncology prior to finalizing any plans for treatment. In the interim, Mr. Porter will follow-up with his primary dentist back in [place], [place] for prophylactic fluoride treatment and pre-radiotherapy dental clearance.     Regarding prophylactic PEG placement, we discussed that, given his excellent performance status and the ability to potentially partially spare the contralateral oropharynx armed with the knowledge of his primary tumor site, we favor reactive PEG placement if he is unable to maintain adequate caloric or fluid intake by mouth. We will defer the decision on port placement to Dr. Rolon. Mr. Porter was provided with our clinic's contact information and given instructions to call us at any point should he develop additional questions or concerns following today's consultation.    The patient was seen and discussed with staff, Dr. Garay. Thank you for involving us in the care of this patient.  Please feel free to contact us with questions or concerns at any time.    Camilo Maeyr MD  Radiation Oncology Resident, PGY-4  [facility]  Phone: [phone]      Attending addendum:   I saw and examined the patient with the resident and agree with the documented plan of  care.    Nathan Garay MD/PhD  Dept of Radiation Oncology  [facility]        HPI  INITIAL PATIENT ASSESSMENT    Diagnosis: Head and Neck Cancer    Prior radiation therapy: None    Prior chemotherapy: None    Prior hormonal therapy:No    Pain Eval:  Denies    Psychosocial  Living arrangements: Lives with family  Fall Risk: independent   referral needs: Not needed    Advanced Directive: Yes - Location: At Home  Implantable Cardiac Device? No      Nurse face-to-face time: Level 5:  over 15 min face to face time    Review of Systems   Constitutional: Positive for malaise/fatigue and weight loss. Negative for chills, diaphoresis and fever.   HENT: Negative.    Eyes: Negative for blurred vision, double vision, photophobia, pain, discharge and redness.   Respiratory: Negative for cough, hemoptysis, sputum production, shortness of breath and wheezing.    Cardiovascular: Negative for chest pain, palpitations, orthopnea, claudication, leg swelling and PND.   Gastrointestinal: Negative for abdominal pain, blood in stool, constipation, diarrhea, heartburn, melena, nausea and vomiting.   Genitourinary: Negative for dysuria, flank pain, frequency, hematuria and urgency.   Musculoskeletal: Positive for neck pain. Negative for back pain, falls, joint pain and myalgias.   Skin: Negative.    Neurological: Positive for headaches. Negative for dizziness, tingling, tremors, sensory change, speech change, focal weakness, seizures, loss of consciousness and weakness.   Endo/Heme/Allergies: Negative for environmental allergies and polydipsia. Does not bruise/bleed easily.   Psychiatric/Behavioral: Negative for depression, hallucinations, memory loss, substance abuse and suicidal ideas. The patient is not nervous/anxious and does not have insomnia.        Nathan Garay MD

## 2018-08-15 NOTE — PROGRESS NOTES
Department of Radiation Oncology  [address] [address]  [phone]       Consultation Note    Name: Lazaro Porter MRN: [id number]   : 1950   Date of Service: 8/15/2018  Referring: Dr. 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     Mr. Porter s history begins with a several month history of an enlarging right neck mass. He underwent a biopsy of this  mass at [facility] ([place], [place]) prior to presentation at the [facility] which was consistent with squamous cell carcinoma, however p16/HPV testing was unable to be done given specimen inadequacy ([facility] specimen [id number]; [facility] specimen [facility]R ). He presented for evaluation initially to ENT on 7/10/2018 where he was found to have a fixed 4 cm lymph node in right level II. The remainder of his examination including flexible endoscopic laryngoscopy did not reveal any abnormality. A PET/CT on 7/10/2018 demonstrated a 3.5 x 2.2 cm hypermetabolic (SUVmax 17.7) right level 2 lymph node with imaging appearance concerning for extranodal extension. There was a loss of the fascial border between the sternocleidomastoid and >180 degree encasement of the right internal carotid artery. No primary tumor was identified in the mucosal space of the head and neck on this study.     Mr. Porter underwent DL with directed biopsies of the nasopharynx, right and left base of tongue, right and left tonsillar fossa, and the right pyriform sinus on 2018 under the care of   Zaynab. Operative notes indicate examination findings consistent with the previous clinical findings, namely a single 4 cm right level II lymph node without any other concerning lymphadenopathy or mucosal abnormality in the oral cavity or pharyngeal axis. There was mild edema noted of the epiglottis, however this was not deemed to be very concerning. Pathology from this procedure (specimen: [id number]) showed no evidence of malignancy within the mucosal sites with FNA of the known right level 2 disease demonstrating a minute focus of p16-positive squamous cell carcinoma.    Mr. Porter then underwent a direct laryngoscopy and robotic demucosalization of the right tongue base on 8/3/2018. A core biopsy of the above lymph node was taken during this procedure. Pathology from this procedure (specimen [id number]) demonstrated p16 positive nonkeratinizing squamous cell carcinoma in both the right level II lymph node and in the right tongue base specimen. The primary right tongue base sample was measured to be 1 mm in largest dimension. Mr. Porter's case was discussed at the [facility]'s interdisciplinary head and neck tumor board with the consensus recommendation to proceed with definitive chemoradiotherapy due to the clinical and radiographic evidence of extracapsular disease extension with questionable involvement of the right internal carotid artery on his staging PET/CT study. He presents to clinic today with his daughter for a further discussion regarding the utility of head and neck radiotherapy in the treatment of his disease.    On exam today, Mr. Porter states that he is doing well and, aside from his right neck mass, has no pressing concerns or complaints. This mass is mildly tender to palpation and his review of systems are otherwise positive for mild to moderate oropharyngeal pain related to his recent biopsies as well as mild fatigue and intermittent headaches. He otherwise denies any  "fever/chills, vision changes, dysphagia, significant weight loss, nausea/vomiting, dyspnea, chest pain or abdominal pain.    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 Recreationally works as an [employer]    Family History:    Mother: He reports that his mother had an unknown cancer which she did not receive treatment for. She did end up passing away from this disease process    Maternal Grandfather: He reports his maternal grandfather had \"jaw cancer\" which was treated with surgical resection and adjuvant radiotherapy.     Review of Systems   A 10-point review of systems was performed. Pertinent findings are noted in the HPI.    Physical Exam   ECOG Performance Status: 0     Weight: 84.4 kg  Pain: 2/10    Gen: Alert, in NAD  Head: NC/AT  Eyes: PERRL, EOMI, sclera anicteric  Ears: No external auricular lesions  Nose/sinus: No rhinorrhea or epistaxis  Oral cavity/oropharynx: MMM. No visible oral cavity lesions. Good dentition with several amalgam fillings in place. Post-procedural irregularity of the right base of tongue on palpation without any additional lesions/masses apparent within the oropharynx or oral cavity.  Neck: 4.5 cm fixed lymph node in the right level II. No other palpable lymph nodes within the bilateral neck.    Pulm: Breathing comfortably on room air. No wheezing, stridor or respiratory distress.  CV: Extremities are warm and well-perfused, no cyanosis, no pedal edema  Musculoskeletal: Normal bulk and tone  Skin: Normal color and turgor,  Neuro: A/Ox3, CN III-XII intact, normal " gait    Flexible Fiberoptic Nasopharyngoscopy: Consent for fiberoptic laryngoscopy was obtained, and we confirmed correctness of procedure and identity of patient. Fiberoptic laryngoscopy was indicated due to right base of tongue tumor. The nose was topically decongested and anesthetized. The fiberoptic laryngoscope was passed through the right naris under endoscopic vision. The turbinates were normal. The inferior and middle meati were clear without purulence, masses, or polyps. The nasopharynx was clear. The Eustachian tubes were clear. The soft palate appeared normal with good mobility. Visualization of the right base of tongue demonstrated post-procedural demucosalization with overlying thickened oral secretions. The remainder of the oropharynx including the vallecula was unremarkable in appearance. The epiglottis was sharp and the larynx was clear with mobile cords bilaterally. The arytenoids were unremarkable and there was no pooling of secretions within the hypopharynx. The scope was then withdrawn and the procedure was terminated without incident.    Imaging/Path/Labs   Imaging: Reviewed.    Path: Reviewed.    Labs: Reviewed.    Assessment    Mr. 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     Plan   We had a long discussion with Mr. Porter regarding treatment of his newly-diagnosed oropharyngeal carcinoma. Specifically, we reiterated the head and neck tumor board's recommendation for concurrent chemoradiotherapy with curative intent and we proposed a treatment course consisting of 70 Gy delivered in 33 daily fractions to the right neck and base of tongue with elective coverage of the contralateral neck. Mr. Porter is currently scheduled to see Dr. Rolon of medical  oncology on 8/21/2018 for a further discussion regarding the use of concurrent chemoradiotherapy for radiosensitization purposes and we deferred discussion of systemic therapy to Dr. Rolon and his team. We reviewed the potential acute and late-term radiation-induced toxicities associated with head and neck radiotherapy and answered Mr. Porter and his daughter's questions to their stated satisfaction. Informed consent for radiotherapy was obtained in clinic.    We will have Mr. Porter follow-up in radiation oncology clinic on 8/21/2018 for a CT-simulation session for radiotherapy planning purposes when he returns to see Lobito Worthy and Gonzales. We are tentatively planning to initiate therapy on Wednesday, 8/29/2018, although will coordinate with our colleagues in medical oncology prior to finalizing any plans for treatment. In the interim, Mr. Porter will follow-up with his primary dentist back in [place], [place] for prophylactic fluoride treatment and pre-radiotherapy dental clearance.     Regarding prophylactic PEG placement, we discussed that, given his excellent performance status and the ability to potentially partially spare the contralateral oropharynx armed with the knowledge of his primary tumor site, we favor reactive PEG placement if he is unable to maintain adequate caloric or fluid intake by mouth. We will defer the decision on port placement to Dr. Rolon. Mr. Porter was provided with our clinic's contact information and given instructions to call us at any point should he develop additional questions or concerns following today's consultation.    The patient was seen and discussed with staff, Dr. Garay. Thank you for involving us in the care of this patient.  Please feel free to contact us with questions or concerns at any time.    Camilo Mayer MD  Radiation Oncology Resident, PGY-4  [facility]  Phone: [phone]      Attending addendum:   I saw and examined the patient with  the resident and agree with the documented plan of care.    Nathan Garay MD/PhD  Dept of Radiation Oncology  [facility]

## 2018-08-15 NOTE — MR AVS SNAPSHOT
After Visit Summary   8/15/2018    Lazaro Porter    MRN: [id number]           Patient Information     Date Of Birth          1950        Visit Information        Provider Department      8/15/2018 10:00 AM Nathan Garay MD Radiation Oncology Clinic        Today's Diagnoses     Malignant neoplasm of base of tongue (H)           Follow-ups after your visit        Your next 10 appointments already scheduled     Aug 21, 2018 12:00 PM CDT   TCT/SIM Suite Visit with Nathan Garay MD   Radiation Oncology Clinic ([facility])    [facility]  1st Floor  [address] [address]   [phone]            Aug 21, 2018  2:15 PM CDT   (Arrive by 2:00 PM)   New Patient Visit with Pillo Rolon MD   [facility] ([facility])    [address]  [address]  [facility] [address] [phone]            Aug 21, 2018  3:30 PM CDT   (Arrive by 3:15 PM)   Return Visit with Garcia Worthy MD   [facility] ([facility])    [address] [place] [address]  4th Floor  [facility] [id number] [phone]              Who to contact     Please call your clinic at [phone] to:    Ask questions about your health    Make or cancel appointments    Discuss your medicines    Learn about your test results    Speak to your doctor            Additional Information About Your Visit        MyChart Information     H[employer]Sur[employer] is an electronic gateway that provides easy, online access to your medical records. With [employer], you can request a clinic appointment, read your test results, renew a prescription or communicate with your care team.     To sign up for HackSurfert visit the website at [email or web address]   You will be asked to enter the access code listed below, as well as some personal information. Please follow the directions to create your username and  password.     Your access code is: [email or web address]  Expires: 10/2/2018  6:31 AM     Your access code will  in 90 days. If you need help or a new code, please contact your [facility] or call [phone] for assistance.        Care EveryWhere ID     This is your Care EveryWhere ID. This could be used by other organizations to access your [facility] medical records  [id number]        Your Vitals Were     BMI (Body Mass Index)                   25.23 kg/m2            Blood Pressure from Last 3 Encounters:   18 138/79   18 127/89    Weight from Last 3 Encounters:   08/15/18 84.4 kg (186 lb)   18 88.3 kg (194 lb 10.7 oz)   18 88.5 kg (195 lb)              Today, you had the following     No orders found for display       Primary Care Provider    None Specified       No primary provider on file.        Equal Access to Services     JOZEF CLEMENTS : Hadii ivet Karimi, waadriana card, qahilary kaalmada zbigniew, talon hardwick . So [facility] [phone].    ATENCIÓN: Si habla español, tiene a corona disposición servicios gratuitos de asistencia lingüística. Llame al [phone].    We comply with applicable federal civil rights laws and [place] laws. We do not discriminate on the basis of race, color, national origin, age, disability, sex, sexual orientation, or gender identity.            Thank you!     Thank you for choosing [facility] ONCOLOGY CLINIC  for your care. Our goal is always to provide you with excellent care. Hearing back from our patients is one way we can continue to improve our services. Please take a few minutes to complete the written survey that you may receive in the mail after your visit with us. Thank you!             Your Updated Medication List - Protect others around you: Learn how to safely use, store and throw away your medicines at www.disposemymeds.org.          This list is accurate as of 8/15/18  3:34 PM.   Always use your most recent med list.                   Brand Name Dispense Instructions for use Diagnosis    aspirin 325 MG tablet      Take 325 mg by mouth        MULTIVITAMIN & MINERAL PO           oxyCODONE 5 MG/5ML solution    ROXICODONE    250 mL    Take 5 mLs (5 mg) by mouth every 4 hours as needed for severe pain    Postoperative pain

## 2018-08-15 NOTE — PROGRESS NOTES
HPI  INITIAL PATIENT ASSESSMENT    Diagnosis: Head and Neck Cancer    Prior radiation therapy: None    Prior chemotherapy: None    Prior hormonal therapy:No    Pain Eval:  Denies    Psychosocial  Living arrangements: Lives with family  Fall Risk: independent   referral needs: Not needed    Advanced Directive: Yes - Location: At Home  Implantable Cardiac Device? No      Nurse face-to-face time: Level 5:  over 15 min face to face time    Review of Systems   Constitutional: Positive for malaise/fatigue and weight loss. Negative for chills, diaphoresis and fever.   HENT: Negative.    Eyes: Negative for blurred vision, double vision, photophobia, pain, discharge and redness.   Respiratory: Negative for cough, hemoptysis, sputum production, shortness of breath and wheezing.    Cardiovascular: Negative for chest pain, palpitations, orthopnea, claudication, leg swelling and PND.   Gastrointestinal: Negative for abdominal pain, blood in stool, constipation, diarrhea, heartburn, melena, nausea and vomiting.   Genitourinary: Negative for dysuria, flank pain, frequency, hematuria and urgency.   Musculoskeletal: Positive for neck pain. Negative for back pain, falls, joint pain and myalgias.   Skin: Negative.    Neurological: Positive for headaches. Negative for dizziness, tingling, tremors, sensory change, speech change, focal weakness, seizures, loss of consciousness and weakness.   Endo/Heme/Allergies: Negative for environmental allergies and polydipsia. Does not bruise/bleed easily.   Psychiatric/Behavioral: Negative for depression, hallucinations, memory loss, substance abuse and suicidal ideas. The patient is not nervous/anxious and does not have insomnia.

## 2018-08-21 ENCOUNTER — ALLIED HEALTH/NURSE VISIT (OUTPATIENT)
Dept: RADIATION ONCOLOGY | Facility: CLINIC | Age: 68
End: 2018-08-21
Attending: RADIOLOGY
Payer: MEDICARE

## 2018-08-21 ENCOUNTER — THERAPY VISIT (OUTPATIENT)
Dept: SPEECH THERAPY | Facility: CLINIC | Age: 68
End: 2018-08-21
Payer: MEDICARE

## 2018-08-21 ENCOUNTER — TRANSFERRED RECORDS (OUTPATIENT)
Dept: HEALTH INFORMATION MANAGEMENT | Facility: CLINIC | Age: 68
End: 2018-08-21

## 2018-08-21 ENCOUNTER — OFFICE VISIT (OUTPATIENT)
Dept: OTOLARYNGOLOGY | Facility: CLINIC | Age: 68
End: 2018-08-21
Payer: MEDICARE

## 2018-08-21 ENCOUNTER — ONCOLOGY VISIT (OUTPATIENT)
Dept: ONCOLOGY | Facility: CLINIC | Age: 68
End: 2018-08-21
Attending: INTERNAL MEDICINE
Payer: MEDICARE

## 2018-08-21 VITALS
BODY MASS INDEX: 25.87 KG/M2 | HEART RATE: 58 BPM | HEIGHT: 72 IN | RESPIRATION RATE: 18 BRPM | WEIGHT: 191 LBS | OXYGEN SATURATION: 98 % | SYSTOLIC BLOOD PRESSURE: 153 MMHG | TEMPERATURE: 97.7 F | DIASTOLIC BLOOD PRESSURE: 85 MMHG

## 2018-08-21 VITALS
SYSTOLIC BLOOD PRESSURE: 156 MMHG | HEIGHT: 72 IN | BODY MASS INDEX: 25.87 KG/M2 | DIASTOLIC BLOOD PRESSURE: 94 MMHG | WEIGHT: 191 LBS

## 2018-08-21 DIAGNOSIS — C01 MALIGNANT NEOPLASM OF BASE OF TONGUE (H): ICD-10-CM

## 2018-08-21 DIAGNOSIS — R13.11 ORAL PHASE DYSPHAGIA: Primary | ICD-10-CM

## 2018-08-21 DIAGNOSIS — R13.12 OROPHARYNGEAL DYSPHAGIA: Primary | ICD-10-CM

## 2018-08-21 DIAGNOSIS — C01 MALIGNANT NEOPLASM OF BASE OF TONGUE (H): Primary | ICD-10-CM

## 2018-08-21 DIAGNOSIS — R13.10 DYSPHAGIA: ICD-10-CM

## 2018-08-21 PROBLEM — E78.00 HYPERCHOLESTEROLEMIA: Status: ACTIVE | Noted: 2018-08-21

## 2018-08-21 PROBLEM — R22.30 SHOULDER MASS: Status: ACTIVE | Noted: 2017-01-06

## 2018-08-21 PROCEDURE — 77333 RADIATION TREATMENT AID(S): CPT | Performed by: RADIOLOGY

## 2018-08-21 PROCEDURE — 77334 RADIATION TREATMENT AID(S): CPT | Performed by: RADIOLOGY

## 2018-08-21 PROCEDURE — 99205 OFFICE O/P NEW HI 60 MIN: CPT | Mod: ZP | Performed by: INTERNAL MEDICINE

## 2018-08-21 PROCEDURE — G0463 HOSPITAL OUTPT CLINIC VISIT: HCPCS | Mod: 25

## 2018-08-21 PROCEDURE — 77470 SPECIAL RADIATION TREATMENT: CPT | Performed by: RADIOLOGY

## 2018-08-21 RX ORDER — EPINEPHRINE 0.3 MG/.3ML
0.3 INJECTION SUBCUTANEOUS EVERY 5 MIN PRN
Status: CANCELLED | OUTPATIENT
Start: 2018-08-29

## 2018-08-21 RX ORDER — SODIUM CHLORIDE 9 MG/ML
1000 INJECTION, SOLUTION INTRAVENOUS CONTINUOUS PRN
Status: CANCELLED
Start: 2018-08-29

## 2018-08-21 RX ORDER — ALBUTEROL SULFATE 0.83 MG/ML
2.5 SOLUTION RESPIRATORY (INHALATION)
Status: CANCELLED | OUTPATIENT
Start: 2018-08-29

## 2018-08-21 RX ORDER — PALONOSETRON 0.05 MG/ML
0.25 INJECTION, SOLUTION INTRAVENOUS ONCE
Status: CANCELLED
Start: 2018-08-29

## 2018-08-21 RX ORDER — EPINEPHRINE 1 MG/ML
0.3 INJECTION, SOLUTION INTRAMUSCULAR; SUBCUTANEOUS EVERY 5 MIN PRN
Status: CANCELLED | OUTPATIENT
Start: 2018-08-29

## 2018-08-21 RX ORDER — METHYLPREDNISOLONE SODIUM SUCCINATE 125 MG/2ML
125 INJECTION, POWDER, LYOPHILIZED, FOR SOLUTION INTRAMUSCULAR; INTRAVENOUS
Status: CANCELLED
Start: 2018-08-29

## 2018-08-21 RX ORDER — ALBUTEROL SULFATE 90 UG/1
1-2 AEROSOL, METERED RESPIRATORY (INHALATION)
Status: CANCELLED
Start: 2018-08-29

## 2018-08-21 RX ORDER — LORAZEPAM 2 MG/ML
0.5 INJECTION INTRAMUSCULAR EVERY 4 HOURS PRN
Status: CANCELLED
Start: 2018-08-29

## 2018-08-21 RX ORDER — DIPHENHYDRAMINE HYDROCHLORIDE 50 MG/ML
50 INJECTION INTRAMUSCULAR; INTRAVENOUS
Status: CANCELLED
Start: 2018-08-29

## 2018-08-21 RX ORDER — MEPERIDINE HYDROCHLORIDE 25 MG/ML
25 INJECTION INTRAMUSCULAR; INTRAVENOUS; SUBCUTANEOUS EVERY 30 MIN PRN
Status: CANCELLED | OUTPATIENT
Start: 2018-08-29

## 2018-08-21 ASSESSMENT — PAIN SCALES - GENERAL
PAINLEVEL: MILD PAIN (3)
PAINLEVEL: MILD PAIN (3)

## 2018-08-21 NOTE — MR AVS SNAPSHOT
After Visit Summary   8/21/2018    Lazaro Porter    MRN: [id number]           Patient Information     Date Of Birth          1950        Visit Information        Provider Department      8/21/2018 3:30 PM Sohnda Rodriguez SLP [facility]        Today's Diagnoses     Oropharyngeal dysphagia    -  1    Malignant neoplasm of base of tongue (H)           Follow-ups after your visit        Your next 10 appointments already scheduled     Aug 29, 2018 11:15 AM CDT   EXTERNAL RADIATION TREATMENT with P RAD ONC VARIAN   Radiation Oncology Clinic ([facility])    [facility]  1st Floor  [address] [address]   [phone]            Aug 30, 2018 11:15 AM CDT   EXTERNAL RADIATION TREATMENT with P RAD ONC VARIAN   Radiation Oncology Clinic ([facility])    [facility]  1st Floor  [address] [address]   [phone]            Aug 31, 2018 11:15 AM CDT   EXTERNAL RADIATION TREATMENT with UMP RAD ONC VARIAN   Radiation Oncology Clinic ([facility])    [facility]  1st Floor  [address] [address]   [phone]            Sep 04, 2018 11:15 AM CDT   EXTERNAL RADIATION TREATMENT with UMP RAD ONC VARIAN   Radiation Oncology Clinic ([facility])    [facility]  1st Floor  [address] [address]   [phone]            Sep 05, 2018 11:15 AM CDT   EXTERNAL RADIATION TREATMENT with UMP RAD ONC VARIAN   Radiation Oncology Clinic ([facility])    [facility]  1st Floor  [address] [address]   [phone]            Sep 05, 2018 11:45 AM CDT   ON TREATMENT VISIT with Nathan Garay MD   Radiation Oncology Clinic ([facility])    [facility]  1st Floor  [address]  [address]   [phone]            Sep 06, 2018 11:15 AM CDT   EXTERNAL RADIATION TREATMENT with UMP RAD ONC VARIAN   Radiation Oncology Clinic ([facility])    [facility]  1st Floor  [address] [address]   [phone]            Sep 07, 2018 11:15 AM CDT   EXTERNAL RADIATION TREATMENT with UMP RAD ONC VARIAN   Radiation Oncology Clinic ([facility])    [facility]  1st Floor  [address] [address]   [phone]            Sep 10, 2018 11:15 AM CDT   EXTERNAL RADIATION TREATMENT with UMP RAD ONC VARIAN   Radiation Oncology Clinic ([facility])    [facility]  1st Floor  [address] [address]   [phone]            Sep 10, 2018 11:45 AM CDT   ON TREATMENT VISIT with Nathan Garay MD   Radiation Oncology Clinic ([facility])    [facility]  1st Floor  [address] [address]   [phone]              Who to contact     Please call your clinic at [phone] to:    Ask questions about your health    Make or cancel appointments    Discuss your medicines    Learn about your test results    Speak to your doctor            Additional Information About Your Visit        Externauticshart Information     Rest[employer] is an electronic gateway that provides easy, online access to your medical records. With [employer], you can request a clinic appointment, read your test results, renew a prescription or communicate with your care team.     To sign up for NextPoint Networkst visit the website at [email or web address]   You will be asked to enter the access code listed below, as well as some personal information. Please follow the directions to create your username and password.     Your access code is: [email or web address]  Expires: 10/2/2018  6:31 AM     Your access code will  in 90 days. If you need help or a new  code, please contact your [facility] or call [phone] for assistance.        Care EveryWhere ID     This is your Care EveryWhere ID. This could be used by other organizations to access your [facility] medical records  [id number]         Blood Pressure from Last 3 Encounters:   08/21/18 (!) 156/94   08/21/18 153/85   08/03/18 138/79    Weight from Last 3 Encounters:   08/21/18 86.6 kg (191 lb)   08/21/18 86.6 kg (191 lb)   08/15/18 84.4 kg (186 lb)              Today, you had the following     No orders found for display       Primary Care Provider Office Phone # Fax #    Nathan Garay [phone]       [address] [place] [address]        Equal Access to Services     JOZEF CLEMENTS : Hadii ivet patel hadasho Sowilliam, waaxda luqadaha, qaybta kaalmada aderoberto carlosyatreasure, talon hardwick . So [facility] [phone].    ATENCIÓN: Si habla español, tiene a corona disposición servicios gratuitos de asistencia lingüística. Uche al [phone].    We comply with applicable federal civil rights laws and [place] laws. We do not discriminate on the basis of race, color, national origin, age, disability, sex, sexual orientation, or gender identity.            Thank you!     Thank you for choosing [facility]  for your care. Our goal is always to provide you with excellent care. Hearing back from our patients is one way we can continue to improve our services. Please take a few minutes to complete the written survey that you may receive in the mail after your visit with us. Thank you!             Your Updated Medication List - Protect others around you: Learn how to safely use, store and throw away your medicines at www.disposemymeds.org.          This list is accurate as of 8/21/18 11:59 PM.  Always use your most recent med list.                   Brand Name Dispense Instructions for use Diagnosis    aspirin 325 MG tablet      Take 325  mg by mouth        IBUPROFEN PO      Take 200 mg by mouth        MULTIVITAMIN & MINERAL PO           oxyCODONE 5 MG/5ML solution    ROXICODONE    250 mL    Take 5 mLs (5 mg) by mouth every 4 hours as needed for severe pain    Postoperative pain

## 2018-08-21 NOTE — PATIENT INSTRUCTIONS
1.  You were seen in the ENT Clinic today by Dr. Worthy.  If you have any questions or concerns after your appointment, please call [phone].  Press option #1 for scheduling related needs.  Press option #3 for Nurse advice.  2.  Plan is to return to clinic in 4 weeks for follow up exam.     Chanell GUTIERREZN, RN  [facility]   Head & Neck Surgery

## 2018-08-21 NOTE — MR AVS SNAPSHOT
"              After Visit Summary   8/21/2018    Lazaro Porter    MRN: [id number]           Patient Information     Date Of Birth          1950        Visit Information        Provider Department      8/21/2018 3:30 PM Garcia Worthy MD [facility]ose [facility] Th[facility]        Today's Diagnoses     Dysphagia    -  1       Follow-ups after your visit        Additional Services     SPEECH THERAPY REFERRAL       *This therapy referral will be filtered to a centralized scheduling office at [facility] and the patient will receive a call to schedule an appointment at a [place] location most convenient for them. *     [facility] provides Speech Therapy evaluation and treatment and many specialty services across the [place] system.  If requesting a specialty program, please choose from the list below.  If you have not heard from the scheduling office within 2 business days, please call [phone] for all locations, with the exception of [place], please call [phone] and [facility], please call [phone]      Treatment: Evaluation & Treatment  Speech Treatment Diagnosis: Dysphagia  Special Instructions: Clinical swallow evaluation and treatment   Special Programs: Clinical Swallow Study    Please be aware that coverage of these services is subject to the terms and limitations of your health insurance plan.  Call member services at your health plan with any benefit or coverage questions.      **Note to Provider:  If you are referring outside of [place] for the therapy appointment, please list the name of the location in the \"special instructions\" above, print the referral and give to the patient to schedule the appointment.                  Follow-up notes from your care team     Return in about 4 weeks (around 9/18/2018).      Your next 10 appointments already scheduled     Aug 29, 2018 11:15 AM CDT   EXTERNAL RADIATION TREATMENT with UMP RAD ONC VARIAN "   Radiation Oncology Clinic ([facility])    [facility]  1st Floor  [address] [facility] [address]   [phone]            Aug 30, 2018 11:15 AM CDT   EXTERNAL RADIATION TREATMENT with UMP RAD ONC VARIAN   Radiation Oncology Clinic ([facility])    [facility]  1st Floor  [address] [facility] [address]   [phone]            Aug 31, 2018 11:15 AM CDT   EXTERNAL RADIATION TREATMENT with UMP RAD ONC VARIAN   Radiation Oncology Clinic ([facility])    [facility]  1st Floor  [address] [facility] [address]   [phone]            Sep 04, 2018 11:15 AM CDT   EXTERNAL RADIATION TREATMENT with UMP RAD ONC VARIAN   Radiation Oncology Clinic ([facility])    [facility]  1st Floor  [address] [facility] [address]   [phone]            Sep 05, 2018 11:15 AM CDT   EXTERNAL RADIATION TREATMENT with UMP RAD ONC VARIAN   Radiation Oncology Clinic ([facility])    [facility]  1st Floor  [address] [facility] [address]   [phone]            Sep 06, 2018 11:15 AM CDT   EXTERNAL RADIATION TREATMENT with UMP RAD ONC VARIAN   Radiation Oncology Clinic ([facility])    [facility]  1st Floor  [address] [facility] [address]   [phone]            Sep 07, 2018 11:15 AM CDT   EXTERNAL RADIATION TREATMENT with UMP RAD ONC VARIAN   Radiation Oncology Clinic ([facility])    [facility]  1st Floor  [address] [facility] [address]   [phone]            Sep 10, 2018 11:15 AM CDT   EXTERNAL RADIATION TREATMENT with UMP RAD ONC VARIAN   Radiation Oncology Clinic ([facility])    [facility]  1st Floor  [address] [facility] [address]   [phone]            Sep  10, 2018 11:45 AM CDT   ON TREATMENT VISIT with Nathan Garay MD   Radiation Oncology Clinic ([facility])    [facility]  1st Floor  [address] [facility] [id number] [phone]            Sep 11, 2018 11:15 AM CDT   EXTERNAL RADIATION TREATMENT with [facility] RAD ONC VARIAN   Radiation Oncology Clinic ([facility])    [facility]  1st Floor  [address] [facility] [address] [phone]              Who to contact     Please call your clinic at [phone] to:    Ask questions about your health    Make or cancel appointments    Discuss your medicines    Learn about your test results    Speak to your doctor            Additional Information About Your Visit        MyChart Information     [employer] is an electronic gateway that provides easy, online access to your medical records. With [employer], you can request a clinic appointment, read your test results, renew a prescription or communicate with your care team.     To sign up for [employer] visit the website at [email or web address]   You will be asked to enter the access code listed below, as well as some personal information. Please follow the directions to create your username and password.     Your access code is: [email or web address]  Expires: 10/2/2018  6:31 AM     Your access code will  in 90 days. If you need help or a new code, please contact your [facility] or call [phone] for assistance.        Care EveryWhere ID     This is your Care EveryWhere ID. This could be used by other organizations to access your [facility] medical records  [id number]        Your Vitals Were     Height BMI (Body Mass Index)                1.829 m (6') 25.9 kg/m2           Blood Pressure from Last 3 Encounters:   18 (!) 156/94   18 153/85   18 138/79    Weight from Last 3 Encounters:   18 86.6 kg (191 lb)   18 86.6 kg  (191 lb)   08/15/18 84.4 kg (186 lb)              We Performed the Following     SPEECH THERAPY REFERRAL        Primary Care Provider Office Phone # Fax #    Nathan Garay [phone]       [address] [address]        Equal Access to Services     JOZEF CLEMENTS : Hadii aad ku hadasho Soomaali, waaxda luqadaha, qaybta kaalmada adeegyada, waxay idiin hayaan aderoberto carlos khsurajsh lajilliann rebecca. So [facility] [phone].    ATENCIÓN: Si habla español, tiene a corona disposición servicios gratuitos de asistencia lingüística. Uche al [phone].    We comply with applicable federal civil rights laws and [place] laws. We do not discriminate on the basis of race, color, national origin, age, disability, sex, sexual orientation, or gender identity.            Thank you!     Thank you for choosing [facility] EAR NOSE AND THROAT  for your care. Our goal is always to provide you with excellent care. Hearing back from our patients is one way we can continue to improve our services. Please take a few minutes to complete the written survey that you may receive in the mail after your visit with us. Thank you!             Your Updated Medication List - Protect others around you: Learn how to safely use, store and throw away your medicines at www.disposemymeds.org.          This list is accurate as of 8/21/18  4:43 PM.  Always use your most recent med list.                   Brand Name Dispense Instructions for use Diagnosis    aspirin 325 MG tablet      Take 325 mg by mouth        IBUPROFEN PO      Take 200 mg by mouth        MULTIVITAMIN & MINERAL PO           oxyCODONE 5 MG/5ML solution    ROXICODONE    250 mL    Take 5 mLs (5 mg) by mouth every 4 hours as needed for severe pain    Postoperative pain

## 2018-08-21 NOTE — LETTER
8/21/2018       RE: Lazaro Porter  [address] [facility] [address]     Dear Colleague,    Thank you for referring your patient, Lazaro Porter, to the [facility]. Please see a copy of my visit note below.    MEDICAL ONCOLOGY CLINIC NOTE    PATIENT NAME: Lazaro Porter  ENCOUNTER DATE: 8/21/2018  REFERRING PROVIDER: Dr. 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     HISTORY OF PRESENT ILLNESS:  Mr. Lazaro Porter is a 67 year old  male referred by Dr. 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     He has had enlarging neck mass for several months and first noticed it in 3/2018. In 5/2018 this was reassessed and prompted imaging and ultimately a biopsy of the mass at [facility] ([place], [place]) which demonstrated squamous cell carcinoma. He was seen by ENT and was found to have a 4 cm lymph node which was hypermetabolic on PET/CT with imaging concerning for loss of border and encasement on the right internal carotid artery. He underwent DL with directed biopsies on 7/18/18 with pathology showing no evidence of malignancy within the mucosal sites. A Robotic demucosalization of the right tongue base on 8/3/18 was performed and pathology from this pathology demonstrated p16 positive nonkeratinizing squamous cell carcinoma in the level node lymph node and in the right tongue base.     At present, he continues to feel generally well. He had some voice changes, pain and odynophagia after his biopsies which has gradually improved. Otherwise he has no concerns. He denies pain at the site of his neck mass. He has some fatigue which is not limiting his activities. He denies f/c/s. No weight changes. No nausea, emesis. No dyspnea, chest discomfort.     ONCOLOGIC HISTORY:   - Robotic-assisted lingual tonsillectomy and FNA on 8/3/2018  - Direct laryngoscopy with biopsy on  "2018    REVIEW OF SYSTEMS:   14 point ROS negative other than the symptoms noted above in the HPI.    PAST MEDICAL HISTORY:   None    PAST SURGICAL HISTORY   1. Irritated sweat gland removed   2. Fatty tumor on his back removed    3. Cataract surgery     MEDICATIONS  1. Multivitamin   2. 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   Tobacco: None  Alcohol: None     FAMILY HISTORY:   Brother with clotting issue  Mother, unknown primary and    Maternal aunt with colon cancer  Maternal grandfather with head/neck cancer  Maternal cousins with cancers unknown types    ALLERGIES: No Known Allergies    CURRENT MEDICATIONS:  1. Multivitamin  2. Aspirin 81 mg (not taking due to recent surgery)    PHYSICAL EXAMINATION:  Vital signs: /85 (BP Location: Right arm, Patient Position: Sitting, Cuff Size: Adult Regular)  Pulse 58  Temp 97.7  F (36.5  C) (Oral)  Resp 18  Ht 1.829 m (6' 0.01\")  Wt 86.6 kg (191 lb)  SpO2 98%  BMI 25.9 kg/m2  ECOG performance status of 0. Fatigue 1.  GENERAL: Well-nourished healthy-appearing male in chair, no acute distress.   HEENT: No icterus, no pallor. Moist mucous membranes. Oropharynx is clear.   NECK: Supple. Firm, nontender, 4 cm in diameter lymph node in the right neck.   LUNGS: Clear to ausculation bilaterally, normal work of breathing.   CARDIOVASCULAR: Regular rate and rhythm, no murmurs, gallops or rubs.   ABDOMEN: Soft, nontender and nondistended, no palpable masses, bowel sounds present.  EXTREMITIES: No cyanosis, no clubbing, no edema.   NEUROLOGIC: No focal deficits, CN 2-12 intact. Normal sensation in upper and lower extremities. Normal strength in upper and lower extremities.   LYMPH NODE EXAM: No palpable adenopathy - cervical or axillary.      LABORATORY DATA:  CBC RESULTS:   Recent Labs   Lab Test  18   1013 "   WBC  5.2   RBC  4.40   HGB  13.4   HCT  41.8   MCV  95   MCH  30.5   MCHC  32.1   RDW  13.4   PLT  200     IMAGING STUDIES:  PET and CT on  7/10/2018 9:41 AM :     IMPRESSION: In this patient with neck ivana squamous cell cancer, the  current scan shows no definite metastasis:  1. Several subcentimeter pulmonary nodules with no increased FDG  uptake. Attention on follow up is recommended.  2. Multiple, too small to characterize hypodensities in the liver.   3. 9 mm nodule in the right adrenal gland, without increased FDG  uptake.  4. 7 mm hypodensity in the pancreatic tail with no increased FDG  uptake, may be a side branch IPMN.  5. Tiny lucencies in the left iliac bone without increased FDG uptake,  possibly focal osteoporotic areas.   6. Please refer to neck PET/CT report for head and neck findings.    PET CT fusion examination  1. Neck CT with contrast  2. PET study of the neck  3. PET CT fusion study of the neck      Impression:   1. 3.5 x 2.2 cm hypermetabolic right level 2 metastatic  lymphadenopathy. There is imaging findings suggestive of extranodal  extension. The mass is inseparable from the right SCM and abuts the  right internal carotid artery about 180 degrees.  2. No abnormal focal FDG uptake along the mucosal spaces to suggest a  primary squamous cell cancer focus..   3. Please refer to the whole body PET CT performed as a separate  report, for the findings of the remainder of the body.    ASSESSMENT AND PLAN:  Mr. Lazaro Porter is a 67 year old  male referred by Dr. 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    Discussed with the patient the role of chemotherapy in his overall treatment plan. Given the concerning findings of extranodal extension and inability to separate it from the right sternocleidomastoid and right carotid artery, there is a role from chemotherapy in addition to the mainstay of his treatment plan, radiation. Discussed with the patient the role of  chemotherapy, how it is delivered, and the schedule. Discussed with him the typical toxicities related to cisplatin therapy including risk of future cancer, hearing loss, neuropathy, bone marrow suppression, kidney injury and less common, kidney failure, risk for infections. He is scheduled to have radiation starting next Wednesday and will arrange for patient to have first cycle of cisplatin that day, 8/29/18 with follow-up in four weeks prior to his second cycle.     The patient was seen and discussed with Dr. Rolon.     [facility]   Internal Medicine PGY3  [id number]      MEDICAL ONCOLOGY CLINIC ATTESTATION NOTE      I, Pillo Rolon, have independently reviewed the diagnostic data and examined the patient during the clinic visit on 8/21/18.  I agree with the findings documented in Dr. Lyn's note with a pertinent summary as under.      DIANE FINDINGS: Mr. 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  His oncologic history is as above.      I had a long conversation with Mr. Porter and his family about the disease biology, trajectory and treatment options, including radiation alone, surgery followed by radiation or concurrent chemoradiation or induction chemotherapy followed by radiation therapy at length.  I explained that [id number] pertains a good prognostic sign overall, and the only worrisome feature from his malignancy is the extracapsular extension from the neck lymph node.  We also reviewed the Tumor Board recommendation of concurrent chemoradiation for his tumor.  I did inform him that for stage I disease, radiation alone may be sufficient, but that is still being evaluated in prospective clinical trials.  The patient is young and otherwise healthy and a good candidate for concurrent chemoradiation with cisplatin 100 mg/m2 every 3 weeks for 3 doses, and we had a detailed discussion about the risks and  benefits of this therapy, including the fact that cisplatin adds about 10%-20% in terms of overall response rate and survival, but radiation is the mainstay of treatment.  The risks of cisplatin therapy, including fatigue, nausea, vomiting, dehydration, diarrhea, neurotoxicity, nephrotoxicity, myelosuppression with increased risk of infection and bleeding, loss of fertility and the risk of long-term toxicity, such as second malignancies, were discussed at length.  An information handout was provided as well.      The patient is agreeable to starting concurrent cisplatin plus radiation therapy with a start date for radiation planned for next Wednesday.  We will get labs today and tentatively plan to give him his first dose of cisplatin next week.  He will come back to see me in 4 weeks from now unless needed sooner.      We also discussed the role of port and prophylactic G-tube placement.  The patient has good peripheral IV and a low to moderate risk for dehydration, weight loss, etc., and hence has opted against prophylactic G-tube and port placement for now.      All questions were answered, and patient and family were in complete agreement with this plan.      I appreciate Dr. Worthy's referral to my clinic, and we will send him this note for review.      BILLIN.      Pillo Rolon MD       cc:   Garcia Worthy MD   [facility] Otolaryngology    [address] [place] [address]      Nathan Garay MD    [facility] Radiation Oncology    [address] [place] [address]              D: 2018   T: 2018   MT: niall      Name:     WILLIAN SIDDIQUI   MRN:      [id number]        Account:      [id number]   :      1950           Service Date: 2018      Document: [id number]

## 2018-08-21 NOTE — PROGRESS NOTES
MEDICAL ONCOLOGY CLINIC NOTE    PATIENT NAME: Lazaro Porter  ENCOUNTER DATE: 8/21/2018  REFERRING PROVIDER: Dr. 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     HISTORY OF PRESENT ILLNESS:  Mr. Lazaro Porter is a 67 year old  male referred by Dr. 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     He has had enlarging neck mass for several months and first noticed it in 3/2018. In 5/2018 this was reassessed and prompted imaging and ultimately a biopsy of the mass at [facility] ([place], [place]) which demonstrated squamous cell carcinoma. He was seen by ENT and was found to have a 4 cm lymph node which was hypermetabolic on PET/CT with imaging concerning for loss of border and encasement on the right internal carotid artery. He underwent DL with directed biopsies on 7/18/18 with pathology showing no evidence of malignancy within the mucosal sites. A Robotic demucosalization of the right tongue base on 8/3/18 was performed and pathology from this pathology demonstrated p16 positive nonkeratinizing squamous cell carcinoma in the level node lymph node and in the right tongue base.     At present, he continues to feel generally well. He had some voice changes, pain and odynophagia after his biopsies which has gradually improved. Otherwise he has no concerns. He denies pain at the site of his neck mass. He has some fatigue which is not limiting his activities. He denies f/c/s. No weight changes. No nausea, emesis. No dyspnea, chest discomfort.     ONCOLOGIC HISTORY:   - Robotic-assisted lingual tonsillectomy and FNA on 8/3/2018  - Direct laryngoscopy with biopsy on 7/18/2018    REVIEW OF SYSTEMS:   14 point ROS negative other than the symptoms noted above in the HPI.    PAST MEDICAL HISTORY:   None    PAST SURGICAL HISTORY   1. Irritated sweat gland removed   2. Fatty tumor on his back removed   "  3. Cataract surgery     MEDICATIONS  1. Multivitamin   2. 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   Tobacco: None  Alcohol: None     FAMILY HISTORY:   Brother with clotting issue  Mother, unknown primary and    Maternal aunt with colon cancer  Maternal grandfather with head/neck cancer  Maternal cousins with cancers unknown types    ALLERGIES: No Known Allergies    CURRENT MEDICATIONS:  1. Multivitamin  2. Aspirin 81 mg (not taking due to recent surgery)    PHYSICAL EXAMINATION:  Vital signs: /85 (BP Location: Right arm, Patient Position: Sitting, Cuff Size: Adult Regular)  Pulse 58  Temp 97.7  F (36.5  C) (Oral)  Resp 18  Ht 1.829 m (6' 0.01\")  Wt 86.6 kg (191 lb)  SpO2 98%  BMI 25.9 kg/m2  ECOG performance status of 0. Fatigue 1.  GENERAL: Well-nourished healthy-appearing male in chair, no acute distress.   HEENT: No icterus, no pallor. Moist mucous membranes. Oropharynx is clear.   NECK: Supple. Firm, nontender, 4 cm in diameter lymph node in the right neck.   LUNGS: Clear to ausculation bilaterally, normal work of breathing.   CARDIOVASCULAR: Regular rate and rhythm, no murmurs, gallops or rubs.   ABDOMEN: Soft, nontender and nondistended, no palpable masses, bowel sounds present.  EXTREMITIES: No cyanosis, no clubbing, no edema.   NEUROLOGIC: No focal deficits, CN 2-12 intact. Normal sensation in upper and lower extremities. Normal strength in upper and lower extremities.   LYMPH NODE EXAM: No palpable adenopathy - cervical or axillary.      LABORATORY DATA:  CBC RESULTS:   Recent Labs   Lab Test  18   1013   WBC  5.2   RBC  4.40   HGB  13.4   HCT  41.8   MCV  95   MCH  30.5   MCHC  32.1   RDW  13.4   PLT  200     IMAGING STUDIES:  PET and CT on  7/10/2018 9:41 AM :     IMPRESSION: In this patient with neck ivana squamous cell cancer, " the  current scan shows no definite metastasis:  1. Several subcentimeter pulmonary nodules with no increased FDG  uptake. Attention on follow up is recommended.  2. Multiple, too small to characterize hypodensities in the liver.   3. 9 mm nodule in the right adrenal gland, without increased FDG  uptake.  4. 7 mm hypodensity in the pancreatic tail with no increased FDG  uptake, may be a side branch IPMN.  5. Tiny lucencies in the left iliac bone without increased FDG uptake,  possibly focal osteoporotic areas.   6. Please refer to neck PET/CT report for head and neck findings.    PET CT fusion examination  1. Neck CT with contrast  2. PET study of the neck  3. PET CT fusion study of the neck      Impression:   1. 3.5 x 2.2 cm hypermetabolic right level 2 metastatic  lymphadenopathy. There is imaging findings suggestive of extranodal  extension. The mass is inseparable from the right SCM and abuts the  right internal carotid artery about 180 degrees.  2. No abnormal focal FDG uptake along the mucosal spaces to suggest a  primary squamous cell cancer focus..   3. Please refer to the whole body PET CT performed as a separate  report, for the findings of the remainder of the body.    ASSESSMENT AND PLAN:  Mr. Lazaro Porter is a 67 year old  male referred by Dr. 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    Discussed with the patient the role of chemotherapy in his overall treatment plan. Given the concerning findings of extranodal extension and inability to separate it from the right sternocleidomastoid and right carotid artery, there is a role from chemotherapy in addition to the mainstay of his treatment plan, radiation. Discussed with the patient the role of chemotherapy, how it is delivered, and the schedule. Discussed with him the typical toxicities related to cisplatin therapy including risk of future cancer, hearing loss, neuropathy, bone marrow suppression, kidney injury and less  common, kidney failure, risk for infections. He is scheduled to have radiation starting next Wednesday and will arrange for patient to have first cycle of cisplatin that day, 8/29/18 with follow-up in four weeks prior to his second cycle.     The patient was seen and discussed with Dr. Rolon.     Chanell Sanz   Internal Medicine PGY3  [id number]

## 2018-08-21 NOTE — NURSING NOTE
"Oncology Rooming Note    August 21, 2018 2:23 PM   Lazaro Porter is a 67 year old male who presents for:    Chief Complaint   Patient presents with     Oncology Clinic Visit     New patient visit related to Head & Neck Cancer     Initial Vitals: /85 (BP Location: Right arm, Patient Position: Sitting, Cuff Size: Adult Regular)  Pulse 58  Temp 97.7  F (36.5  C) (Oral)  Resp 18  Ht 1.829 m (6' 0.01\")  Wt 86.6 kg (191 lb)  SpO2 98%  BMI 25.9 kg/m2 Estimated body mass index is 25.9 kg/(m^2) as calculated from the following:    Height as of this encounter: 1.829 m (6' 0.01\").    Weight as of this encounter: 86.6 kg (191 lb). Body surface area is 2.1 meters squared.  Mild Pain (3) Comment: Data Unavailable   No LMP for male patient.  Allergies reviewed: Yes  Medications reviewed: Yes    Medications: Medication refills not needed today.  Pharmacy name entered into [facility]: [facility] - [place], [place] - [address]    Clinical concerns: No new concerns. Provider was notified.    10 minutes for nursing intake (face to face time)     Thania Umana LPN            "

## 2018-08-21 NOTE — MR AVS SNAPSHOT
After Visit Summary   8/21/2018    Lazaro Porter    MRN: [id number]           Patient Information     Date Of Birth          1950        Visit Information        Provider Department      8/21/2018 12:00 PM Nathan Garay MD Radiation Oncology Clinic        Today's Diagnoses     Malignant neoplasm of base of tongue (H)    -  1       Follow-ups after your visit        Your next 10 appointments already scheduled     Aug 21, 2018  2:15 PM CDT   (Arrive by 2:00 PM)   New Patient Visit with Pillo Rolon MD   [facility] ([facility])    [address]  [address]  [facility] [place] [address]   [phone]            Aug 21, 2018  3:30 PM CDT   (Arrive by 3:15 PM)   Return Visit with Garcia Worthy MD   [facility] ([facility])    [address]  4th [facility] [address]   [phone]            Aug 21, 2018  3:30 PM CDT   (Arrive by 3:15 PM)   Clinical Swallow Eval with JANETTE Florence   [facility] ([facility])    [address]  4th Floor  [place] [address]   [phone]            Aug 29, 2018 11:15 AM CDT   EXTERNAL RADIATION TREATMENT with UMP RAD ONC VARIAN   Radiation Oncology Clinic ([facility])    [facility]  1st Floor  [address] [address]   [phone]            Aug 30, 2018 11:15 AM CDT   EXTERNAL RADIATION TREATMENT with UMP RAD ONC VARIAN   Radiation Oncology Clinic ([facility])    [facility]  1st Floor  [address] [address]   [phone]            Aug 31, 2018 11:15 AM CDT   EXTERNAL RADIATION TREATMENT with UMP RAD ONC VARIAN   Radiation Oncology Clinic ([facility])    [facility]  1st Floor  [address] [address]   [phone]            Sep  2018 11:15 AM CDT   EXTERNAL RADIATION TREATMENT with UMP RAD ONC VARIAN   Radiation Oncology Clinic ([facility])    [facility]  1st Floor  [address] [address]   [phone]            Sep 05, 2018 11:15 AM CDT   EXTERNAL RADIATION TREATMENT with UMP RAD ONC VARIAN   Radiation Oncology Clinic ([facility])    [facility]  1st Floor  [address] [facility] [facility] [facility] [address]   [phone]            Sep 06, 2018 11:15 AM CDT   EXTERNAL RADIATION TREATMENT with UMP RAD ONC VARIAN   Radiation Oncology Clinic ([facility])    [facility]  1st Floor  [address] [facility] [address]   [phone]            Sep 07, 2018 11:15 AM CDT   EXTERNAL RADIATION TREATMENT with UMP RAD ONC VARIAN   Radiation Oncology Clinic ([facility])    [facility]  1st Floor  [address] [facility] [address]   [phone]              Who to contact     Please call your clinic at [phone] to:    Ask questions about your health    Make or cancel appointments    Discuss your medicines    Learn about your test results    Speak to your doctor            Additional Information About Your Visit        The Microhart Information     Pixleet is an electronic gateway that provides easy, online access to your medical records. With Clearhaus, you can request a clinic appointment, read your test results, renew a prescription or communicate with your care team.     To sign up for Pixleet visit the website at [email or web address]   You will be asked to enter the access code listed below, as well as some personal information. Please follow the directions to create your username and password.     Your access code is: [email or web address]  Expires: 10/2/2018  6:31 AM     Your access code will  in 90 days. If you need help or a new code, please contact your [facility]  [place] [facility] or call [phone] for assistance.        Care EveryWhere ID     This is your Care EveryWhere ID. This could be used by other organizations to access your [place] medical records  [id number]         Blood Pressure from Last 3 Encounters:   08/03/18 138/79   07/18/18 127/89    Weight from Last 3 Encounters:   08/15/18 84.4 kg (186 lb)   08/03/18 88.3 kg (194 lb 10.7 oz)   07/31/18 88.5 kg (195 lb)              Today, you had the following     No orders found for display       Primary Care Provider Office Phone # Fax #    Adalidheriberto Clinton Garay [phone]       [address] [place] [address]        Equal Access to Services     JOZEF CLEMENTS : Hadii ivet rojaso Sowilliam, waaxda luqadaha, qaybta kaalmada adeegyada, talon hardwick . So [facility] [phone].    ATENCIÓN: Si habla español, tiene a corona disposición servicios gratuitos de asistencia lingüística. Uche al [phone].    We comply with applicable federal civil rights laws and [place] laws. We do not discriminate on the basis of race, color, national origin, age, disability, sex, sexual orientation, or gender identity.            Thank you!     Thank you for choosing RADIATION ONCOLOGY CLINIC  for your care. Our goal is always to provide you with excellent care. Hearing back from our patients is one way we can continue to improve our services. Please take a few minutes to complete the written survey that you may receive in the mail after your visit with us. Thank you!             Your Updated Medication List - Protect others around you: Learn how to safely use, store and throw away your medicines at www.disposemymeds.org.          This list is accurate as of 8/21/18  1:45 PM.  Always use your most recent med list.                   Brand Name Dispense Instructions for use Diagnosis    aspirin 325 MG tablet      Take 325 mg by mouth        MULTIVITAMIN & MINERAL PO            oxyCODONE 5 MG/5ML solution    ROXICODONE    250 mL    Take 5 mLs (5 mg) by mouth every 4 hours as needed for severe pain    Postoperative pain

## 2018-08-21 NOTE — MR AVS SNAPSHOT
After Visit Summary   8/21/2018    Lazaro Porter    MRN: [id number]           Patient Information     Date Of Birth          1950        Visit Information        Provider Department      8/21/2018 2:15 PM Pillo Rolon MD [facility]        Today's Diagnoses     Malignant neoplasm of base of tongue (H)    -  1       Follow-ups after your visit        Your next 10 appointments already scheduled     Aug 28, 2018  8:30 AM CDT   [facility] Lab Draw with [facility] MASONIC LAB DRAW   [facility] ([facility])    [address]  [address]  [place] [place] [address]   [phone]            Aug 28, 2018  9:00 AM CDT   Infusion 360 with UC ONCOLOGY INFUSION, UC 13 ATC   [facility] ([facility])    [address]  [address]  [facility] [place] [address]   [phone]            Aug 29, 2018 11:15 AM CDT   EXTERNAL RADIATION TREATMENT with UMP RAD ONC VARIAN   Radiation Oncology Clinic ([facility])    [facility]  1st Floor  [address] [address]   [phone]            Aug 30, 2018 11:15 AM CDT   EXTERNAL RADIATION TREATMENT with UMP RAD ONC VARIAN   Radiation Oncology Clinic ([facility])    [facility]  1st Floor  [address] [address]   [phone]            Aug 31, 2018 11:15 AM CDT   EXTERNAL RADIATION TREATMENT with UMP RAD ONC VARIAN   Radiation Oncology Clinic ([facility])    [facility]  1st Floor  [address] [address]   [phone]            Sep 04, 2018 11:15 AM CDT   EXTERNAL RADIATION TREATMENT with UMP RAD ONC VARIAN   Radiation Oncology Clinic ([facility])    [facility]  1st Floor  [address] [address]   [phone]            Sep 05, 2018 11:15 AM CDT   EXTERNAL  "RADIATION TREATMENT with [facility] RAD ONC VARIAN   Radiation Oncology Clinic ([facility])    [facility]  1st Floor  [address] [address]   [phone]            Sep 05, 2018 11:45 AM CDT   ON TREATMENT VISIT with Nathan Garay MD   Radiation Oncology Clinic ([facility])    [facility]  1st Floor  [address] [address]   [phone]            Sep 06, 2018 11:15 AM CDT   EXTERNAL RADIATION TREATMENT with [facility] RAD ONC VARIAN   Radiation Oncology Clinic ([facility])    [facility]  1st Floor  [address] [address]   [phone]              Future tests that were ordered for you today     Open Future Orders        Priority Expected Expires Ordered    TSH Routine  8/27/2019 8/27/2018    T4 free Routine  8/27/2019 8/27/2018            Who to contact     If you have questions or need follow up information about today's clinic visit or your schedule please contact [facility] directly at [phone].  Normal or non-critical lab and imaging results will be communicated to you by HCDChart, letter or phone within 4 business days after the clinic has received the results. If you do not hear from us within 7 days, please contact the clinic through AppAddictivet or phone. If you have a critical or abnormal lab result, we will notify you by phone as soon as possible.  Submit refill requests through [employer] or call your pharmacy and they will forward the refill request to us. Please allow 3 business days for your refill to be completed.          Additional Information About Your Visit        HCDCharAzimo Information     [employer] lets you send messages to your doctor, view your test results, renew your prescriptions, schedule appointments and more. To sign up, go to [email or web address] . Click on \"Log in\" on the left side of the screen, which " "will take you to the Welcome page. Then click on \"Sign up Now\" on the right side of the page.     You will be asked to enter the access code listed below, as well as some personal information. Please follow the directions to create your username and password.     Your access code is: [email or web address]  Expires: 10/2/2018  6:31 AM     Your access code will  in 90 days. If you need help or a new code, please call your [facility] or [phone].        Care EveryWhere ID     This is your Care EveryWhere ID. This could be used by other organizations to access your [place] medical records  [id number]        Your Vitals Were     Pulse Temperature Respirations Height Pulse Oximetry BMI (Body Mass Index)    58 97.7  F (36.5  C) (Oral) 18 1.829 m (6' 0.01\") 98% 25.9 kg/m2       Blood Pressure from Last 3 Encounters:   18 (!) 156/94   18 153/85   18 138/79    Weight from Last 3 Encounters:   18 86.6 kg (191 lb)   18 86.6 kg (191 lb)   08/15/18 84.4 kg (186 lb)              Today, you had the following     No orders found for display       Primary Care Provider Office Phone # Fax #    Nathan Garay [phone]       [address] [place] [address]        Equal Access to Services     [facility] : Hadii ivet patel hadasho Sowilliam, waaxda luqadaha, qaybta kaalmada zbigniew, talon hardwick . So [facility] [phone].    ATENCIÓN: Si eugeniela miguel a, tiene a corona disposición servicios gratuitos de asistencia lingüística. Uche chirinos [phone].    We comply with applicable federal civil rights laws and [place] laws. We do not discriminate on the basis of race, color, national origin, age, disability, sex, sexual orientation, or gender identity.            Thank you!     Thank you for choosing [facility]  for your care. Our goal is always to provide you with excellent care. Hearing back from our patients is one " way we can continue to improve our services. Please take a few minutes to complete the written survey that you may receive in the mail after your visit with us. Thank you!             Your Updated Medication List - Protect others around you: Learn how to safely use, store and throw away your medicines at www.disposemymeds.org.          This list is accurate as of 8/21/18 11:59 PM.  Always use your most recent med list.                   Brand Name Dispense Instructions for use Diagnosis    aspirin 325 MG tablet      Take 325 mg by mouth        IBUPROFEN PO      Take 200 mg by mouth        MULTIVITAMIN & MINERAL PO           oxyCODONE 5 MG/5ML solution    ROXICODONE    250 mL    Take 5 mLs (5 mg) by mouth every 4 hours as needed for severe pain    Postoperative pain

## 2018-08-21 NOTE — PROGRESS NOTES
Mr. Porter is here for follow up today.  We had a 20 minute conversation today about chemoradiotherapy for him.  He has a microscopic 1 mm or 2 mm primary in the right tongue base.  This would allow a small pull down of the oropharyngeal fields which he is to start next week.  We went over the disease status again today, the attitude he should keep up during his chemoradiotherapy.  He is going to try to get by without having a PEG tube placed which I think is reasonable.      I will see him again in about four weeks or so.  I answered all of his questions to the best of my capacity about his disease.

## 2018-08-21 NOTE — LETTER
8/21/2018       RE: Lazaro Porter  [address] [facility] [address]     Dear Colleague,    Thank you for referring your patient, Lazaro Porter, to the [facility] at [facility]. Please see a copy of my visit note below.    Mr. Porter is here for follow up today.  We had a 20 minute conversation today about chemoradiotherapy for him.  He has a microscopic 1 mm or 2 mm primary in the right tongue base.  This would allow a small pull down of the oropharyngeal fields which he is to start next week.  We went over the disease status again today, the attitude he should keep up during his chemoradiotherapy.  He is going to try to get by without having a PEG tube placed which I think is reasonable.      I will see him again in about four weeks or so.  I answered all of his questions to the best of my capacity about his disease.         Again, thank you for allowing me to participate in the care of your patient.      Sincerely,    Garcia Worthy MD

## 2018-08-21 NOTE — PROGRESS NOTES
Radiation Therapy Patient Education    Person involved with teaching: Patient    Patient educational needs for self management of treatment-related side effects assessment completed.  [facility] Patient Ed tab contains Patient Learning Assessment    Education Materials Given  Radiation Therapy for Head and Neck    Educational Topics Discussed  Side effects expected, Pain management, Skin care, Nutrition and weight loss and When to call MD/RN    Response To Teaching  Verbalizes understanding    GYN Only  Vaginal Dilator-given and educated: N/A    Referrals sent: Dental, Speech and Swallowing and Nutrition    Chemotherapy? Yes, notified medical oncology of start date 08/29

## 2018-08-22 NOTE — PROGRESS NOTES
08/21/18 1530   General Information   Type Of Visit Initial   Start Of Care Date 08/21/18   Referring Physician Dr. 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 Plan is for pt to undergo definitive chemoradiation starting 8/29/18. Upon clinical interview today, pt denied history of swallowing difficulties. Stated he had mild difficulties after biopsies but those have since resolved. Reported he tolerates regular textures and thin liquids. Denied coughing/choking with PO intake, unintentional weight loss, odynophagia, or recent pneumonias.    Respiratory Status Room air   Prior Level Of Function Swallowing   Prior Level Of Function Comment Regular textures, thin liquids   Living Environment Other, Comments  (Pt will be living with his daughter and son-in-law during tx)   General Observations Pt highly pleasant and cooperative.   Patient/family Goals To continue to eat and drink during and after treatment   Pain Assessment   Pain Reported No   Clinical Swallow Evaluation   Oral Musculature generally intact   Structural Abnormalities none present   Dentition present and adequate   Mucosal Quality adequate   Mandibular Strength and Mobility intact   Oral Labial Strength and Mobility WFL   Lingual Strength and Mobility WFL   Velar Elevation intact   Laryngeal Function Swallow;Voicing initiated   Oral Musculature Comments WFL   Additional  Documentation Yes   Clinical Swallow Eval: Thin Liquid Texture Trial   Mode of Presentation, Thin Liquids cup;self-fed   Volume of Liquid or Food Presented ~3 oz   Oral Phase of Swallow WFL   Pharyngeal Phase of Swallow intact   Diagnostic Statement Prompt swallow without overt s/sx of aspiration.   Swallow Compensations   Swallow Compensations No compensations were used   Results No difficulties noted   Educational Assessment   Barriers to Learning No barriers   Esophageal Phase of Swallow   Patient reports or presents with symptoms of esophageal dysphagia No   General Therapy Interventions   Planned Therapy Interventions Dysphagia Treatment   Dysphagia treatment Oropharyngeal exercise training;Modified diet education;Instruction of safe swallow strategies;Compensatory strategies for swallowing   Swallow Eval: Clinical Impressions   Skilled Criteria for Therapy Intervention Skilled criteria met.  Treatment indicated.   Functional Assessment Scale (FAS) 6   Treatment Diagnosis Minimal oropharyngeal dysphagia, likely to become severe during treatment   Diet texture recommendations Regular diet;Thin liquids   Recommended Feeding/Eating Techniques alternate between small bites and sips of food/liquid;small sips/bites;other (see comments)  (oral cares)   Rehab Potential good, to achieve stated therapy goals   Therapy Frequency other (see comments)  (2x/month x 3 months )   Anticipated Discharge Disposition home w/ outpatient services   Risks and Benefits of Treatment have been explained. Yes   Patient, family and/or staff in agreement with Plan of Care Yes   Clinical Impression Comments Pt presents with minimal dysphagia in the setting of base of tongue CA prior to beginning chemoradiation treatment. He is tolerating a regular diet and thin liquids with no reported difficulties. No overt s/sx of aspiration were observed today with thin liquids. Pt is at high risk of progressive dysphagia secondary to XRT fibrosis,  mucositis, odynophagia, xerostomia, and trismus; thus, he will likely benefit from ongoing SLP services to prevent and treat these issues in order to maintain functional and safe swallowing abilities during and after radiation.     Swallow Goals   SLP Swallow Goals 1;2   Swallow Goal 1   Goal Identifier Diet   Goal Description 1. Pt will tolerate regular textures and thin liquids without overt clinical s/sx of aspiration/penetration during two consecutive sessions after completion of radiation therapy as judged by clinician assessment and/or pt/caregiver report.    Target Date 11/19/18   Swallow Goal 2   Goal Identifier Exercises   Goal Description 2. Pt will improve ROM and strength of tongue, BOT, pharynx and jaw by completing 10 repetitions of 6/6 exercises 3 times daily with minimal written or verbal cues.   Target Date 11/19/18   Total Session Time   Total Session Time 16   Total Evaluation Time 8     Thank you for the referral of Lazaro Porter. If you have any questions about this report, please contact me using the information below.     Shonda Rodriguez MA, CCC-SLP  Speech-Language Pathologist   [facility]  Department of Otolaryngology/D&T - 4th floor  Pager: [phone]  Phone: [phone]  Email: [email or web address]

## 2018-08-23 NOTE — PROGRESS NOTES
MEDICAL ONCOLOGY CLINIC ATTESTATION NOTE      I, Pillo Rolon, have independently reviewed the diagnostic data and examined the patient during the clinic visit on 8/21/18.  I agree with the findings documented in Dr. Lyn's note with a pertinent summary as under.      IDANE FINDINGS: Mr. 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  His oncologic history is as above.      I had a long conversation with Mr. Porter and his family about the disease biology, trajectory and treatment options, including radiation alone, surgery followed by radiation or concurrent chemoradiation or induction chemotherapy followed by radiation therapy at length.  I explained that [id number] pertains a good prognostic sign overall, and the only worrisome feature from his malignancy is the extracapsular extension from the neck lymph node.  We also reviewed the Tumor Board recommendation of concurrent chemoradiation for his tumor.  I did inform him that for stage I disease, radiation alone may be sufficient, but that is still being evaluated in prospective clinical trials.  The patient is young and otherwise healthy and a good candidate for concurrent chemoradiation with cisplatin 100 mg/m2 every 3 weeks for 3 doses, and we had a detailed discussion about the risks and benefits of this therapy, including the fact that cisplatin adds about 10%-20% in terms of overall response rate and survival, but radiation is the mainstay of treatment.  The risks of cisplatin therapy, including fatigue, nausea, vomiting, dehydration, diarrhea, neurotoxicity, nephrotoxicity, myelosuppression with increased risk of infection and bleeding, loss of fertility and the risk of long-term toxicity, such as second malignancies, were discussed at length.  An information handout was provided as well.      The patient is agreeable to starting concurrent cisplatin plus  radiation therapy with a start date for radiation planned for next Wednesday.  We will get labs today and tentatively plan to give him his first dose of cisplatin next week.  He will come back to see me in 4 weeks from now unless needed sooner.      We also discussed the role of port and prophylactic G-tube placement.  The patient has good peripheral IV and a low to moderate risk for dehydration, weight loss, etc., and hence has opted against prophylactic G-tube and port placement for now.      All questions were answered, and patient and family were in complete agreement with this plan.      I appreciate Dr. Worthy's referral to my clinic, and we will send him this note for review.      BILLIN.      Pillo Rolon MD       cc:   Garcia Worthy MD   [facility] Otolaryngology    [address] [place] [address]      Nathan Garay MD    [facility] Radiation Oncology    [address] [place] [address]              D: 2018   T: 2018   MT: niall      Name:     WILLIAN SIDDIQUI   MRN:      [id number]        Account:      [id number]   :      1950           Service Date: 2018      Document: [id number]

## 2018-08-27 RX ORDER — DEXAMETHASONE 4 MG/1
8 TABLET ORAL
Qty: 6 TABLET | Refills: 2 | Status: SHIPPED | OUTPATIENT
Start: 2018-08-28 | End: 2018-10-12

## 2018-08-27 RX ORDER — PROCHLORPERAZINE MALEATE 10 MG
10 TABLET ORAL EVERY 6 HOURS PRN
Qty: 30 TABLET | Refills: 2 | Status: SHIPPED | OUTPATIENT
Start: 2018-08-27 | End: 2018-10-30

## 2018-08-28 ENCOUNTER — CARE COORDINATION (OUTPATIENT)
Dept: ONCOLOGY | Facility: CLINIC | Age: 68
End: 2018-08-28

## 2018-08-28 ENCOUNTER — APPOINTMENT (OUTPATIENT)
Dept: LAB | Facility: CLINIC | Age: 68
End: 2018-08-28
Attending: INTERNAL MEDICINE
Payer: MEDICARE

## 2018-08-28 ENCOUNTER — INFUSION THERAPY VISIT (OUTPATIENT)
Dept: ONCOLOGY | Facility: CLINIC | Age: 68
End: 2018-08-28
Attending: INTERNAL MEDICINE
Payer: MEDICARE

## 2018-08-28 VITALS
DIASTOLIC BLOOD PRESSURE: 87 MMHG | RESPIRATION RATE: 16 BRPM | BODY MASS INDEX: 26.03 KG/M2 | HEART RATE: 62 BPM | TEMPERATURE: 98 F | WEIGHT: 191.9 LBS | SYSTOLIC BLOOD PRESSURE: 146 MMHG | OXYGEN SATURATION: 97 %

## 2018-08-28 DIAGNOSIS — C01 MALIGNANT NEOPLASM OF BASE OF TONGUE (H): Primary | ICD-10-CM

## 2018-08-28 LAB
ALBUMIN SERPL-MCNC: 3.4 G/DL (ref 3.4–5)
ALP SERPL-CCNC: 86 U/L (ref 40–150)
ALT SERPL W P-5'-P-CCNC: 20 U/L (ref 0–70)
ANION GAP SERPL CALCULATED.3IONS-SCNC: 7 MMOL/L (ref 3–14)
AST SERPL W P-5'-P-CCNC: 17 U/L (ref 0–45)
BASOPHILS # BLD AUTO: 0 10E9/L (ref 0–0.2)
BASOPHILS NFR BLD AUTO: 0.7 %
BILIRUB SERPL-MCNC: 0.5 MG/DL (ref 0.2–1.3)
BUN SERPL-MCNC: 14 MG/DL (ref 7–30)
CALCIUM SERPL-MCNC: 8.9 MG/DL (ref 8.5–10.1)
CHLORIDE SERPL-SCNC: 109 MMOL/L (ref 94–109)
CO2 SERPL-SCNC: 24 MMOL/L (ref 20–32)
CREAT SERPL-MCNC: 0.83 MG/DL (ref 0.66–1.25)
DIFFERENTIAL METHOD BLD: NORMAL
EOSINOPHIL # BLD AUTO: 0.2 10E9/L (ref 0–0.7)
EOSINOPHIL NFR BLD AUTO: 3.5 %
ERYTHROCYTE [DISTWIDTH] IN BLOOD BY AUTOMATED COUNT: 13.2 % (ref 10–15)
GFR SERPL CREATININE-BSD FRML MDRD: >90 ML/MIN/1.7M2
GLUCOSE SERPL-MCNC: 102 MG/DL (ref 70–99)
HCT VFR BLD AUTO: 41.5 % (ref 40–53)
HGB BLD-MCNC: 13.3 G/DL (ref 13.3–17.7)
IMM GRANULOCYTES # BLD: 0 10E9/L (ref 0–0.4)
IMM GRANULOCYTES NFR BLD: 0.2 %
LYMPHOCYTES # BLD AUTO: 1.3 10E9/L (ref 0.8–5.3)
LYMPHOCYTES NFR BLD AUTO: 23.9 %
MAGNESIUM SERPL-MCNC: 2.1 MG/DL (ref 1.6–2.3)
MCH RBC QN AUTO: 29.6 PG (ref 26.5–33)
MCHC RBC AUTO-ENTMCNC: 32 G/DL (ref 31.5–36.5)
MCV RBC AUTO: 92 FL (ref 78–100)
MONOCYTES # BLD AUTO: 0.6 10E9/L (ref 0–1.3)
MONOCYTES NFR BLD AUTO: 11.8 %
NEUTROPHILS # BLD AUTO: 3.2 10E9/L (ref 1.6–8.3)
NEUTROPHILS NFR BLD AUTO: 59.9 %
NRBC # BLD AUTO: 0 10*3/UL
NRBC BLD AUTO-RTO: 0 /100
PLATELET # BLD AUTO: 268 10E9/L (ref 150–450)
POTASSIUM SERPL-SCNC: 4.3 MMOL/L (ref 3.4–5.3)
PROT SERPL-MCNC: 6.7 G/DL (ref 6.8–8.8)
RBC # BLD AUTO: 4.49 10E12/L (ref 4.4–5.9)
SODIUM SERPL-SCNC: 141 MMOL/L (ref 133–144)
T4 FREE SERPL-MCNC: 1.14 NG/DL (ref 0.76–1.46)
TSH SERPL DL<=0.005 MIU/L-ACNC: 1.24 MU/L (ref 0.4–4)
WBC # BLD AUTO: 5.4 10E9/L (ref 4–11)

## 2018-08-28 PROCEDURE — 85025 COMPLETE CBC W/AUTO DIFF WBC: CPT | Performed by: INTERNAL MEDICINE

## 2018-08-28 PROCEDURE — 96367 TX/PROPH/DG ADDL SEQ IV INF: CPT

## 2018-08-28 PROCEDURE — 80053 COMPREHEN METABOLIC PANEL: CPT | Performed by: INTERNAL MEDICINE

## 2018-08-28 PROCEDURE — 96375 TX/PRO/DX INJ NEW DRUG ADDON: CPT

## 2018-08-28 PROCEDURE — 84439 ASSAY OF FREE THYROXINE: CPT | Performed by: INTERNAL MEDICINE

## 2018-08-28 PROCEDURE — 25000128 H RX IP 250 OP 636: Mod: ZF | Performed by: INTERNAL MEDICINE

## 2018-08-28 PROCEDURE — 96413 CHEMO IV INFUSION 1 HR: CPT

## 2018-08-28 PROCEDURE — 84443 ASSAY THYROID STIM HORMONE: CPT | Performed by: INTERNAL MEDICINE

## 2018-08-28 PROCEDURE — 83735 ASSAY OF MAGNESIUM: CPT | Performed by: INTERNAL MEDICINE

## 2018-08-28 PROCEDURE — 96415 CHEMO IV INFUSION ADDL HR: CPT

## 2018-08-28 RX ORDER — MAGNESIUM SULFATE HEPTAHYDRATE 40 MG/ML
2 INJECTION, SOLUTION INTRAVENOUS ONCE
Status: COMPLETED | OUTPATIENT
Start: 2018-08-28 | End: 2018-08-28

## 2018-08-28 RX ORDER — PALONOSETRON 0.05 MG/ML
0.25 INJECTION, SOLUTION INTRAVENOUS ONCE
Status: COMPLETED | OUTPATIENT
Start: 2018-08-28 | End: 2018-08-28

## 2018-08-28 RX ADMIN — SODIUM CHLORIDE 1000 ML: 9 INJECTION, SOLUTION INTRAVENOUS at 09:48

## 2018-08-28 RX ADMIN — MAGNESIUM SULFATE HEPTAHYDRATE 2 G: 40 INJECTION, SOLUTION INTRAVENOUS at 13:19

## 2018-08-28 RX ADMIN — CISPLATIN 210 MG: 1 INJECTION, SOLUTION INTRAVENOUS at 11:02

## 2018-08-28 RX ADMIN — PALONOSETRON HYDROCHLORIDE 0.25 MG: 0.25 INJECTION INTRAVENOUS at 09:48

## 2018-08-28 RX ADMIN — POTASSIUM CHLORIDE AND SODIUM CHLORIDE: 900; 150 INJECTION, SOLUTION INTRAVENOUS at 12:31

## 2018-08-28 RX ADMIN — DEXAMETHASONE SODIUM PHOSPHATE 150 MG: 10 INJECTION, SOLUTION INTRAMUSCULAR; INTRAVENOUS at 09:51

## 2018-08-28 ASSESSMENT — PAIN SCALES - GENERAL: PAINLEVEL: NO PAIN (0)

## 2018-08-28 NOTE — PATIENT INSTRUCTIONS
[facility] Main Line: [phone]    Call triage nurse with chills and/or temperature greater than or equal to 100.4, uncontrolled nausea/vomiting, diarrhea, constipation, dizziness, shortness of breath, chest pain, bleeding, unexplained bruising, or any new/concerning symptoms, questions/concerns.     If you are having any concerning symptoms or wish to speak to a provider before your next infusion visit, please call your care coordinator or triage to notify them so we can adequately serve you.     For triage nurse, after hours, weekends, and holidays: [phone]          August 2018 Sunday Monday Tuesday Wednesday Thursday Friday Saturday                  1     2     3     Admission    9:21 AM   Blas Calvillo MD   [facility] Day Surgery [facility]   (Discharge: 8/3/2018)     IR LYMPH NODE BIOPSY   11:30 AM   (90 min.)   [email or web address]   [facility], [place], Interventional Radiology     DAVINCI RESECT TUMOR TRANSORAL   11:30 AM   Blas Calvillo MD   [facility] OR 4       5     6     7     8     9     10     11       12     13     14     15     [facility] CONSULT   10:00 AM   (90 min.)   Nathan Garay MD   Radiation Oncology Clinic 16     17     18       19     20     21     [facility] TCT/SIM SUITE   12:00 PM   (60 min.)   Nathan Garay MD   Radiation Oncology Clinic     [facility] NEW    2:00 PM   (60 min.)   Pillo Rolon MD   [facility]     SWALLOW EVAL - CLINICAL    3:15 PM   (45 min.)   Shonda Rodriguez SLP   [facility] Rehab     [facility] RETURN    3:15 PM   (15 min.)   Garcia Worthy MD   [facility] Ear Nose and Throat 22     23     24     25       26     27     [facility] TREATMENT PLAN VISIT    7:00 AM   (15 min.)   Nathan Garay MD   [facility]cology [facility] 28     [facility] DRAW    8:30 AM   (15 min.)    [facility]     [facility] ONC INFUSION 360    9:00 AM   (360 min.)   [facility]   [facility] 29      [facility] EXTERNAL RADIATION TREATMT   11:15 AM   (30 min.)   [facility] RAD ONC VARIAN   [facility] Oncology Clinic 30     [facility] EXTERNAL RADIATION TREATMT   11:15 AM   (30 min.)   [facility] ONC VARIAN   [facility] 31     [facility] EXTERNAL RADIATION TREATMT   11:15 AM   (30 min.)   [facility] ONC VARIAN   [facility] Clinic                 September 2018 Sunday Monday Tuesday Wednesday Thursday Friday Saturday                                 1       2     3     4     [facility] EXTERNAL RADIATION TREATMT   11:15 AM   (30 min.)   [facility] ONC VARIAN   [facility] 5     [facility] EXTERNAL RADIATION TREATMT   11:15 AM   (30 min.)   [facility] VARIAN   [facility]     [facility] ON TREATMENT VISIT   11:45 AM   (15 min.)   Nathan Garay MD   [facility] 6     [facility] EXTERNAL RADIATION TREATMT   11:15 AM   (30 min.)   [facility] ONC VARIAN   [facility] 7     [facility] EXTERNAL RADIATION TREATMT   11:15 AM   (30 min.)   [facility] ONC VARIAN   [facility] 8       9     10     [facility] EXTERNAL RADIATION TREATMT   11:15 AM   (30 min.)   [facility] RAD ONC VARIAN   [facility] Clinic     [facility] NUTRITION VISIT   11:45 AM   (30 min.)   Shelli Reyna RD   [facility], [place], Nutrition Services     [facility] ON TREATMENT VISIT   11:45 AM   (15 min.)   Nathan Garay MD   [facility] Clinic 11     [facility] EXTERNAL RADIATION TREATMT   11:15 AM   (30 min.)   [facility] RAD ONC VARIAN   [facility] 12     [facility] EXTERNAL RADIATION TREATMT   11:15 AM   (30 min.)   [facility] ONC VARIAN   [facility] 13     [facility] EXTERNAL RADIATION TREATMT   11:15 AM   (30 min.)   [facility] ONC VARIAN   [facility] 14     [facility] EXTERNAL RADIATION TREATMT   11:15 AM   (30 min.)   [facility] RAD ONC VARIAN   [facility] Clinic 15       16     17     [facility] EXTERNAL RADIATION TREATMT   11:15 AM   (30 min.)   [facility] ONC VARIAN   [facility]     [facility] ON TREATMENT  VISIT   11:45 AM   (15 min.)   Nathan Garay MD   [facility] 18     [facility] EXTERNAL RADIATION TREATMT   11:15 AM   (30 min.)   [facility] RAD ONC VARIAN   [facility] 19     [facility] DRAW    7:45 AM   (15 min.)    [facility] DRAW   [facility] Lab Draw     [facility] RETURN    8:00 AM   (30 min.)   Pillo Rolon MD   [facility]     [facility] ONC INFUSION 360    9:00 AM   (360 min.)   [facility]FUSION   [facility]     [facility] EXTERNAL RADIATION TREATMT   11:15 AM   (30 min.)   [facility] RAD ONC VARIAN   [facility] Oncology Clinic 20     [facility] EXTERNAL RADIATION TREATMT   11:15 AM   (30 min.)   [facility] RAD ONC VARIAN   Radiation Oncology Clinic 21  Happy Birthday!     [facility] EXTERNAL RADIATION TREATMT   11:15 AM   (30 min.)   [facility] RAD ONC VARIAN   [facility]iation Oncology Clinic 22       23     24     [facility] EXTERNAL RADIATION TREATMT   11:15 AM   (30 min.)   [facility] RAD ONC VARIAN   Radiation Oncology Clinic     [facility] ON TREATMENT VISIT   11:45 AM   (15 min.)   Nathan Garay MD   [facility]iation Oncology Clinic 25     [facility] EXTERNAL RADIATION TREATMT   11:15 AM   (30 min.)   [facility] RAD ONC VARIAN   [facility] Oncology Clinic 26     [facility] EXTERNAL RADIATION TREATMT   11:15 AM   (30 min.)   [facility] RAD ONC VARIAN   [facility]cology Clinic 27     [facility] EXTERNAL RADIATION TREATMT   11:15 AM   (30 min.)   [facility] RAD ONC VARIAN   [facility] Clinic 28     [facility] EXTERNAL RADIATION TREATMT   11:15 AM   (30 min.)   [facility] RAD ONC VARIAN   [facility] Oncology Clinic 29       30                                                Lab Results:  Recent Results (from the past 12 hour(s))   CBC with platelets differential    Collection Time: 08/28/18  8:48 AM   Result Value Ref Range    WBC 5.4 4.0 - 11.0 10e9/L    RBC Count 4.49 4.4 - 5.9 10e12/L    Hemoglobin 13.3 13.3 - 17.7 g/dL    Hematocrit 41.5 40.0 - 53.0 %    MCV 92 78 - 100 fl    MCH 29.6 26.5 - 33.0 pg    MCHC 32.0 31.5 - 36.5 g/dL    RDW 13.2  10.0 - 15.0 %    Platelet Count 268 150 - 450 10e9/L    Diff Method Automated Method     % Neutrophils 59.9 %    % Lymphocytes 23.9 %    % Monocytes 11.8 %    % Eosinophils 3.5 %    % Basophils 0.7 %    % Immature Granulocytes 0.2 %    Nucleated RBCs 0 0 /100    Absolute Neutrophil 3.2 1.6 - 8.3 10e9/L    Absolute Lymphocytes 1.3 0.8 - 5.3 10e9/L    Absolute Monocytes 0.6 0.0 - 1.3 10e9/L    Absolute Eosinophils 0.2 0.0 - 0.7 10e9/L    Absolute Basophils 0.0 0.0 - 0.2 10e9/L    Abs Immature Granulocytes 0.0 0 - 0.4 10e9/L    Absolute Nucleated RBC 0.0    Comprehensive metabolic panel    Collection Time: 08/28/18  8:48 AM   Result Value Ref Range    Sodium 141 133 - 144 mmol/L    Potassium 4.3 3.4 - 5.3 mmol/L    Chloride 109 94 - 109 mmol/L    Carbon Dioxide 24 20 - 32 mmol/L    Anion Gap 7 3 - 14 mmol/L    Glucose 102 (H) 70 - 99 mg/dL    Urea Nitrogen 14 7 - 30 mg/dL    Creatinine 0.83 0.66 - 1.25 mg/dL    GFR Estimate >90 >60 mL/min/1.7m2    GFR Estimate If Black >90 >60 mL/min/1.7m2    Calcium 8.9 8.5 - 10.1 mg/dL    Bilirubin Total 0.5 0.2 - 1.3 mg/dL    Albumin 3.4 3.4 - 5.0 g/dL    Protein Total 6.7 (L) 6.8 - 8.8 g/dL    Alkaline Phosphatase 86 40 - 150 U/L    ALT 20 0 - 70 U/L    AST 17 0 - 45 U/L   Magnesium    Collection Time: 08/28/18  8:48 AM   Result Value Ref Range    Magnesium 2.1 1.6 - 2.3 mg/dL   TSH    Collection Time: 08/28/18  8:48 AM   Result Value Ref Range    TSH 1.24 0.40 - 4.00 mU/L   T4 free    Collection Time: 08/28/18  8:48 AM   Result Value Ref Range    T4 Free 1.14 0.76 - 1.46 ng/dL

## 2018-08-28 NOTE — PROGRESS NOTES
Met with patient to discuss chemotherapy and resources available at the [facility]. Provided patient with  My Cancer Guidebook , and [facility] printouts on cisplatin. Reviewed administration, side effects and ongoing symptom support management. Provided phone numbers for triage and after hours care. Highlighted steps to expect when getting chemotherapy (check in, labs, pre meds, infusion). Discussed that chemo may be delayed due to blood counts, infusion schedule or patient s need to modify. Reviewed most concerning symptoms that warrant an immediate call to the clinic nurse line. Patient is declining G-tube and port placements at this time.

## 2018-08-28 NOTE — PROGRESS NOTES
Infusion Nursing Note:  Lazaro Porter presents today for Cycle 1 Day 1 Cisplatin.    Patient seen by provider today: No   present during visit today: Not Applicable.    Note: Patient is new to the infusion room today and is receiving cisplatin for the first time.  Patient oriented to infusion room, location of bathrooms and nutrition stations, and call light.  Verified that patient recieved written chemotherapy information previously.  Verbally reviewed chemotherapy teaching, side effects, take-home medications, and follow-up schedule with patient. Patient instructed to call triage with any questions or if he experiences a temperature >100.5, shaking chills, uncontrolled nausea/vomiting/diarrhea, dizziness, shortness of breath, bleeding not relieved with pressure, or with any other concerns.  Instructed patient to call the after hours nurse line.    Meghann Buck, RNCC, met with patient in infusion and provided patient with his cancer journey binder.     Patient reports feeling well and offers no complaints at this time.     Intravenous Access:  Peripheral IV placed in lab.    Treatment Conditions:  Lab Results   Component Value Date    HGB 13.3 08/28/2018     Lab Results   Component Value Date    WBC 5.4 08/28/2018      Lab Results   Component Value Date    ANEU 3.2 08/28/2018     Lab Results   Component Value Date     08/28/2018      Lab Results   Component Value Date     08/28/2018                   Lab Results   Component Value Date    POTASSIUM 4.3 08/28/2018           Lab Results   Component Value Date    MAG 2.1 08/28/2018            Lab Results   Component Value Date    CR 0.83 08/28/2018                   Lab Results   Component Value Date    KM 8.9 08/28/2018                Lab Results   Component Value Date    BILITOTAL 0.5 08/28/2018           Lab Results   Component Value Date    ALBUMIN 3.4 08/28/2018                    Lab Results   Component Value Date    ALT 20 08/28/2018            Lab Results   Component Value Date    AST 17 08/28/2018       Results reviewed, labs MET treatment parameters, ok to proceed with treatment.    Patient voided adequately before, during, and after cisplatin infusion.    Post Infusion Assessment:  Patient tolerated infusion without incident.  Blood return noted pre and post infusion.  Site patent and intact, free from redness, edema or discomfort.  No evidence of extravasations.  Access discontinued per protocol.    Discharge Plan:   Prescription refills given for Compazine, Decadron.  Discharge instructions reviewed with: Patient.  Patient and/or family verbalized understanding of discharge instructions and all questions answered.  Copy of AVS reviewed with patient and/or family.  Patient will return 9/19/2018 for next appointment.  Patient discharged in stable condition accompanied by: self.  Departure Mode: Ambulatory.    Donavan Mc RN

## 2018-08-28 NOTE — NURSING NOTE
Chief Complaint   Patient presents with     Blood Draw     Labs drawn via PIV by RN, line flushed and saline locked, VS taken     Cris Brooke RN

## 2018-08-28 NOTE — MR AVS SNAPSHOT
After Visit Summary   8/28/2018    Lazaro Porter    MRN: [id number]           Patient Information     Date Of Birth          1950        Visit Information        Provider Department      8/28/2018 9:00 AM UC 13 ATC; [facility] ONCOLOGY INFUSION [facility]        Today's Diagnoses     Malignant neoplasm of base of tongue (H)    -  1      Care Instructions    [facility] Main Line: [phone]    Call triage nurse with chills and/or temperature greater than or equal to 100.4, uncontrolled nausea/vomiting, diarrhea, constipation, dizziness, shortness of breath, chest pain, bleeding, unexplained bruising, or any new/concerning symptoms, questions/concerns.     If you are having any concerning symptoms or wish to speak to a provider before your next infusion visit, please call your care coordinator or triage to notify them so we can adequately serve you.     For triage nurse, after hours, weekends, and holidays: [phone]          August 2018 Sunday Monday Tuesday Wednesday Thursday Friday Saturday                  1     2     3     Admission    9:21 AM   Blas Calvillo MD   Same Day Surgery [facility]   (Discharge: 8/3/2018)     IR LYMPH NODE BIOPSY   11:30 AM   (90 min.)   [id number]   [facility], [place], Interventional Radiology     DAVINCI RESECT TUMOR TRANSORAL   11:30 AM   Blas Calvillo MD   [facility] OR 4       5     6     7     8     9     10     11       12     13     14     15     [facility] CONSULT   10:00 AM   (90 min.)   Nathan Garay MD   Radiation Oncology Clinic 16     17     18       19     20     21     [facility] TCT/SIM SUITE   12:00 PM   (60 min.)   Nathan Garay MD   Radiation Oncology Clinic     [facility]    2:00 PM   (60 min.)   Pillo Rolon MD   [facility]     SWALLOW EVAL - CLINICAL    3:15 PM   (45 min.)   Shonda Rodriguez SLP   [facility]     [facility] RETURN    3:15 PM   (15 min.)   Garcia Worthy MD     [facility] Ear Nose and Throat 22     23     24     25       26     27     [facility] TREATMENT PLAN VISIT    7:00 AM   (15 min.)   Natahn Garay MD   Radiation Oncology Clinic 28     [facility] MASONIC LAB DRAW    8:30 AM   (15 min.)    MASONIC LAB DRAW   [facility] Masonic Lab Draw     [facility] ONC INFUSION 360    9:00 AM   (360 min.)    ONCOLOGY INFUSION   [facility] 29     [facility] EXTERNAL RADIATION TREATMT   11:15 AM   (30 min.)   [facility] RAD ONC VARIAN   Radiation Oncology Clinic 30     [facility] EXTERNAL RADIATION TREATMT   11:15 AM   (30 min.)   [facility] RAD ONC VARIAN   [facility]cology [facility] 31     [facility] EXTERNAL RADIATION TREATMT   11:15 AM   (30 min.)   [facility] RAD ONC VARIAN   [facility]iation Oncology Clinic                 September 2018 Sunday Monday Tuesday Wednesday Thursday Friday Saturday                                 1       2     3     4     [facility] EXTERNAL RADIATION TREATMT   11:15 AM   (30 min.)   [facility] RAD ONC VARIAN   [facility]cology Clinic 5     [facility] EXTERNAL RADIATION TREATMT   11:15 AM   (30 min.)   P RAD ONC VARIAN   Radiation Oncology Clinic     [facility] ON TREATMENT VISIT   11:45 AM   (15 min.)   Nathan Garay MD   Radiation Oncology Clinic 6     [facility] EXTERNAL RADIATION TREATMT   11:15 AM   (30 min.)   [facility] RAD ONC VARIAN   [facility]cology Clinic 7     [facility] EXTERNAL RADIATION TREATMT   11:15 AM   (30 min.)   [facility] RAD ONC VARIAN   [facility]cology Clinic 8       9     10     [facility] EXTERNAL RADIATION TREATMT   11:15 AM   (30 min.)   P RAD ONC VARIAN   Radiation Oncology Clinic     [facility] NUTRITION VISIT   11:45 AM   (30 min.)   Shelli Reyna RD   [facility] [place], Nutrition Services     [facility] ON TREATMENT VISIT   11:45 AM   (15 min.)   Nathan Garay MD   Radiation Oncology Clinic 11     [facility] EXTERNAL RADIATION TREATMT   11:15 AM   (30 min.)   [facility] RAD ONC VARIAN   [facility] Oncology Clinic 12     [facility] EXTERNAL RADIATION TREATMT   11:15 AM   (30 min.)   P RAD ONC VARIAN    Radiation Oncology Clinic 13     [facility] EXTERNAL RADIATION TREATMT   11:15 AM   (30 min.)   [facility] RAD ONC VARIAN   [facility] Oncology Clinic 14     [facility] EXTERNAL RADIATION TREATMT   11:15 AM   (30 min.)   [facility] RAD ONC [facility]   Radiation Oncology Clinic 15       16     17     [facility] EXTERNAL RADIATION TREATMT   11:15 AM   (30 min.)   [facility] RAD ONC [facility]   Radiation Oncology Clinic     [facility] ON TREATMENT VISIT   11:45 AM   (15 min.)   Nathan Garay MD   Radiation Oncology Clinic 18     [facility] EXTERNAL RADIATION TREATMT   11:15 AM   (30 min.)   [facility] RAD ONC [facility]   Radiation Oncology Clinic 19     [facility] LAB DRAW    7:45 AM   (15 min.)    MASONIC LAB DRAW   [facility] Lab Draw     [facility] RETURN    8:00 AM   (30 min.)   Pillo Rolon MD   [facility]     [facility] ONC INFUSION 360    9:00 AM   (360 min.)   [facility] ONCOLOGY INFUSION   [facility]     [facility] EXTERNAL RADIATION TREATMT   11:15 AM   (30 min.)   [facility] RAD ONC [facility]   Radiation Oncology Clinic 20     [facility] EXTERNAL RADIATION TREATMT   11:15 AM   (30 min.)   [facility] RAD ONC [facility]   Radiation Oncology Clinic 21  Happy Birthday!     [facility] EXTERNAL RADIATION TREATMT   11:15 AM   (30 min.)   [facility] RAD ONC [facility]   Radiation Oncology Clinic 22       23     24     [facility] EXTERNAL RADIATION TREATMT   11:15 AM   (30 min.)   [facility] RAD ONC [facility]   Radiation Oncology Clinic     [facility] ON TREATMENT VISIT   11:45 AM   (15 min.)   Nathan Garay MD   Radiation Oncology Clinic 25     [facility] EXTERNAL RADIATION TREATMT   11:15 AM   (30 min.)   [facility] RAD ONC VARIAN   Radiation Oncology Clinic 26     [facility] EXTERNAL RADIATION TREATMT   11:15 AM   (30 min.)   [facility] RAD ONC VARIAN   Radiation Oncology Clinic 27     [facility] EXTERNAL RADIATION TREATMT   11:15 AM   (30 min.)   [facility] RAD ONC VARIAN   Radiation Oncology Clinic 28     [facility] EXTERNAL RADIATION TREATMT   11:15 AM   (30 min.)   [facility] RAD ONC VARIAN   Radiation Oncology Clinic 29       30                                                 Lab Results:  Recent Results (from the past 12 hour(s))   CBC with platelets differential    Collection Time: 08/28/18  8:48 AM   Result Value Ref Range    WBC 5.4 4.0 - 11.0 10e9/L    RBC Count 4.49 4.4 - 5.9 10e12/L    Hemoglobin 13.3 13.3 - 17.7 g/dL    Hematocrit 41.5 40.0 - 53.0 %    MCV 92 78 - 100 fl    MCH 29.6 26.5 - 33.0 pg    MCHC 32.0 31.5 - 36.5 g/dL    RDW 13.2 10.0 - 15.0 %    Platelet Count 268 150 - 450 10e9/L    Diff Method Automated Method     % Neutrophils 59.9 %    % Lymphocytes 23.9 %    % Monocytes 11.8 %    % Eosinophils 3.5 %    % Basophils 0.7 %    % Immature Granulocytes 0.2 %    Nucleated RBCs 0 0 /100    Absolute Neutrophil 3.2 1.6 - 8.3 10e9/L    Absolute Lymphocytes 1.3 0.8 - 5.3 10e9/L    Absolute Monocytes 0.6 0.0 - 1.3 10e9/L    Absolute Eosinophils 0.2 0.0 - 0.7 10e9/L    Absolute Basophils 0.0 0.0 - 0.2 10e9/L    Abs Immature Granulocytes 0.0 0 - 0.4 10e9/L    Absolute Nucleated RBC 0.0    Comprehensive metabolic panel    Collection Time: 08/28/18  8:48 AM   Result Value Ref Range    Sodium 141 133 - 144 mmol/L    Potassium 4.3 3.4 - 5.3 mmol/L    Chloride 109 94 - 109 mmol/L    Carbon Dioxide 24 20 - 32 mmol/L    Anion Gap 7 3 - 14 mmol/L    Glucose 102 (H) 70 - 99 mg/dL    Urea Nitrogen 14 7 - 30 mg/dL    Creatinine 0.83 0.66 - 1.25 mg/dL    GFR Estimate >90 >60 mL/min/1.7m2    GFR Estimate If Black >90 >60 mL/min/1.7m2    Calcium 8.9 8.5 - 10.1 mg/dL    Bilirubin Total 0.5 0.2 - 1.3 mg/dL    Albumin 3.4 3.4 - 5.0 g/dL    Protein Total 6.7 (L) 6.8 - 8.8 g/dL    Alkaline Phosphatase 86 40 - 150 U/L    ALT 20 0 - 70 U/L    AST 17 0 - 45 U/L   Magnesium    Collection Time: 08/28/18  8:48 AM   Result Value Ref Range    Magnesium 2.1 1.6 - 2.3 mg/dL   TSH    Collection Time: 08/28/18  8:48 AM   Result Value Ref Range    TSH 1.24 0.40 - 4.00 mU/L   T4 free    Collection Time: 08/28/18  8:48 AM   Result Value Ref Range    T4 Free 1.14 0.76 - 1.46 ng/dL                    Follow-ups after your visit        Your next 10 appointments already scheduled     Aug 29, 2018 11:15 AM CDT   EXTERNAL RADIATION TREATMENT with [facility] RAD ONC VARIAN   Radiation Oncology Clinic ([facility])    [facility]  1st Floor  [address] [facility] [address]   [phone]            Aug 30, 2018 11:15 AM CDT   EXTERNAL RADIATION TREATMENT with UMP RAD ONC VARIAN   Radiation Oncology Clinic ([facility])    [facility]  1st Floor  [address] [facility] [address] [address]   [phone]            Aug 31, 2018 11:15 AM CDT   EXTERNAL RADIATION TREATMENT with UMP RAD ONC VARIAN   Radiation Oncology Clinic ([facility])    [facility]  1st Floor  [address] [facility] [address]   [phone]            Sep 04, 2018 11:15 AM CDT   EXTERNAL RADIATION TREATMENT with UMP RAD ONC VARIAN   Radiation Oncology Clinic ([facility])    [facility]  1st Floor  [address] [facility] [address] [address]   [phone]            Sep 05, 2018 11:15 AM CDT   EXTERNAL RADIATION TREATMENT with UMP RAD ONC VARIAN   Radiation Oncology Clinic ([facility])    [facility]  1st Floor  [address] [facility] [address] [address]   [phone]            Sep 05, 2018 11:45 AM CDT   ON TREATMENT VISIT with Nathan Garay MD   Radiation Oncology Clinic ([facility])    [facility]  1st Floor  [address] [address]   [phone]            Sep 06, 2018 11:15 AM CDT   EXTERNAL RADIATION TREATMENT with UMP RAD ONC VARIAN   Radiation Oncology Clinic ([facility])    [facility]  1st Floor  [address] [facility] [address] [address]   [phone]            Sep 07, 2018 11:15 AM CDT   EXTERNAL RADIATION TREATMENT with UMP RAD ONC VARIAN   Radiation  "Oncology Clinic ([facility])    [facility]  1st Floor  [address] [facility] [address]   [phone]            Sep 10, 2018 11:15 AM CDT   EXTERNAL RADIATION TREATMENT with [facility] RAD ONC VARIAN   Radiation Oncology Clinic ([facility])    [facility]  1st Floor  [address] [facility] [address]   [phone]            Sep 10, 2018 11:45 AM CDT   ON TREATMENT VISIT with Nathan Garay MD   Radiation Oncology Clinic ([facility])    [facility]  1st Floor  [address] [facility] [address]   [phone]              Who to contact     If you have questions or need follow up information about today's clinic visit or your schedule please contact [facility] directly at [phone].  Normal or non-critical lab and imaging results will be communicated to you by MyChart, letter or phone within 4 business days after the clinic has received the results. If you do not hear from us within 7 days, please contact the clinic through [employer] or phone. If you have a critical or abnormal lab result, we will notify you by phone as soon as possible.  Submit refill requests through [employer] or call your pharmacy and they will forward the refill request to us. Please allow 3 business days for your refill to be completed.          Additional Information About Your Visit        MyChart Information     [employer] lets you send messages to your doctor, view your test results, renew your prescriptions, schedule appointments and more. To sign up, go to [email or web address] . Click on \"Log in\" on the left side of the screen, which will take you to the Welcome page. Then click on \"Sign up Now\" on the right side of the page.     You will be asked to enter the access code listed below, as well as some personal information. Please follow the directions to create your username and " password.     Your access code is: [email or web address]  Expires: 10/2/2018  6:31 AM     Your access code will  in 90 days. If you need help or a new code, please call your [facility] or [phone].        Care EveryWhere ID     This is your Care EveryWhere ID. This could be used by other organizations to access your [facility] medical records  [id number]        Your Vitals Were     Pulse Temperature Respirations Pulse Oximetry BMI (Body Mass Index)       62 98  F (36.7  C) (Oral) 16 97% 26.03 kg/m2        Blood Pressure from Last 3 Encounters:   18 146/87   18 (!) 156/94   18 153/85    Weight from Last 3 Encounters:   18 87 kg (191 lb 14.4 oz)   18 86.6 kg (191 lb)   18 86.6 kg (191 lb)              We Performed the Following     CBC with platelets differential     Comprehensive metabolic panel     Magnesium     T4 free     TSH          Today's Medication Changes          These changes are accurate as of 18  1:33 PM.  If you have any questions, ask your nurse or doctor.               Start taking these medicines.        Dose/Directions    dexamethasone 4 MG tablet   Commonly known as:  DECADRON   Used for:  Malignant neoplasm of base of tongue (H)        Dose:  8 mg   Take 2 tablets (8 mg) by mouth daily (with breakfast) for 3 days Start the day after chemotherapy.   Quantity:  6 tablet   Refills:  2       prochlorperazine 10 MG tablet   Commonly known as:  COMPAZINE   Used for:  Malignant neoplasm of base of tongue (H)        Dose:  10 mg   Take 1 tablet (10 mg) by mouth every 6 hours as needed (Nausea/Vomiting)   Quantity:  30 tablet   Refills:  2            Where to get your medicines      These medications were sent to [place], [place] - [address] [facility] [address]  [address] [place] [address] [address] [place] [id number]    Hours:  TRANSPLANT PHONE NUMBER [phone] Phone:  [phone]     dexamethasone 4 MG tablet    prochlorperazine  10 MG tablet                Primary Care Provider Office Phone # Fax #    Nathan Garay [phone]       [address] [address]        Equal Access to Services     JOZEF CLEMENTS : Hadyanet ivet patel crystalo Sotoiali, waaxda luqadaha, qaybta kaalmada adeapolinar, talon arriaga laJudithvarsha fernandez. So [facility] [phone].    ATENCIÓN: Si habla español, tiene a corona disposición servicios gratuitos de asistencia lingüística. Llame al [phone].    We comply with applicable federal civil rights laws and [place] laws. We do not discriminate on the basis of race, color, national origin, age, disability, sex, sexual orientation, or gender identity.            Thank you!     Thank you for choosing [facility]  for your care. Our goal is always to provide you with excellent care. Hearing back from our patients is one way we can continue to improve our services. Please take a few minutes to complete the written survey that you may receive in the mail after your visit with us. Thank you!             Your Updated Medication List - Protect others around you: Learn how to safely use, store and throw away your medicines at www.disposemymeds.org.          This list is accurate as of 8/28/18  1:33 PM.  Always use your most recent med list.                   Brand Name Dispense Instructions for use Diagnosis    aspirin 325 MG tablet      Take 325 mg by mouth        dexamethasone 4 MG tablet    DECADRON    6 tablet    Take 2 tablets (8 mg) by mouth daily (with breakfast) for 3 days Start the day after chemotherapy.    Malignant neoplasm of base of tongue (H)       IBUPROFEN PO      Take 200 mg by mouth        MULTIVITAMIN & MINERAL PO           oxyCODONE 5 MG/5ML solution    ROXICODONE    250 mL    Take 5 mLs (5 mg) by mouth every 4 hours as needed for severe pain    Postoperative pain       prochlorperazine 10 MG tablet    COMPAZINE    30 tablet    Take 1  tablet (10 mg) by mouth every 6 hours as needed (Nausea/Vomiting)    Malignant neoplasm of base of tongue (H)

## 2018-08-29 ENCOUNTER — APPOINTMENT (OUTPATIENT)
Dept: RADIATION ONCOLOGY | Facility: CLINIC | Age: 68
End: 2018-08-29
Attending: RADIOLOGY
Payer: MEDICARE

## 2018-08-29 PROCEDURE — 77386 ZZH IMRT TREATMENT DELIVERY, COMPLEX: CPT | Performed by: RADIOLOGY

## 2018-08-30 ENCOUNTER — APPOINTMENT (OUTPATIENT)
Dept: RADIATION ONCOLOGY | Facility: CLINIC | Age: 68
End: 2018-08-30
Attending: RADIOLOGY
Payer: MEDICARE

## 2018-08-30 PROCEDURE — 77386 ZZH IMRT TREATMENT DELIVERY, COMPLEX: CPT | Performed by: RADIOLOGY

## 2018-08-31 ENCOUNTER — APPOINTMENT (OUTPATIENT)
Dept: RADIATION ONCOLOGY | Facility: CLINIC | Age: 68
End: 2018-08-31
Attending: RADIOLOGY
Payer: MEDICARE

## 2018-08-31 PROCEDURE — 77386 ZZH IMRT TREATMENT DELIVERY, COMPLEX: CPT | Performed by: RADIOLOGY

## 2018-09-02 ENCOUNTER — NURSE TRIAGE (OUTPATIENT)
Dept: NURSING | Facility: CLINIC | Age: 68
End: 2018-09-02

## 2018-09-02 NOTE — TELEPHONE ENCOUNTER
5 days without a BM.  Should see provider within 24 hours.  Clinic is closed, so paged on call oncologist to [phone] via answering service.  Joyce Cantu RN  [facility]      Reason for Disposition    Last bowel movement (BM) > 4 days ago    Additional Information    Negative: Sounds like a life-threatening emergency to the triager    Negative: [1] Neutropenia known or suspected (e.g., recent cancer chemotherapy) AND [2] fever > 100.4 F (38.0 C)    Negative: Patient sounds very sick or weak to the triager    Negative: [1] Vomiting AND [2] abdomen looks much more swollen than usual    Negative: [1] Vomiting AND [2] contains bile (green color)    Negative: [1] Constant abdominal pain AND [2] present > 2 hours    Negative: [1] Sudden onset rectal pain (straining, rectal pressure or fullness) AND [2] NOT better after SITZ bath, suppository or enema    Negative: [1] Abdominal pain (i.e., mild or intermittent) AND [2] fever    Negative: [1] Abdominal pain (i.e., mild or intermittent) AND [2] abdomen looks much more swollen than usual    Protocols used: CANCER - CONSTIPATION-ADULT-

## 2018-09-03 ENCOUNTER — TELEPHONE (OUTPATIENT)
Dept: OTHER | Facility: CLINIC | Age: 68
End: 2018-09-03

## 2018-09-03 NOTE — TELEPHONE ENCOUNTER
Called this evening regarding constipation.  Pt has not had BM in 5 days.  Not taking narcotics in past several weeks.  Taking senna/bisacodyl as needed.  Had small stool this AM.  Notes more bloating and nausea with PO intake.  No fevers, chills.  Still passing gas he thinks.  No abdominal pain.  Currently at [facility] and asking for OTC regimen for bowel.      Pt is not neutropenic and has normal renal function/lytes on most recent set of labs from 8/28. Recommended miralax BID, senna 2 tabs BID, and bisacodyl suppository this evening.  If no BM in ~24 hours then can add magnesium citrate.  Pt scheduled for RT on 9/4 and would like clinic appt then if no BM.  Discussed danger signs that would require evaluation immediately including fevers, chills, lack of BM/gas, worsening abdominal pain.  Pt and daughter agreed to plan and voiced understanding.  Will send msg to [employer]  about possible appt for 9/4 as well.      Robert Saldana MD, PhD  Hematology/Oncology Fellow  Pager: [phone]

## 2018-09-04 ENCOUNTER — CARE COORDINATION (OUTPATIENT)
Dept: ONCOLOGY | Facility: CLINIC | Age: 68
End: 2018-09-04

## 2018-09-04 ENCOUNTER — APPOINTMENT (OUTPATIENT)
Dept: RADIATION ONCOLOGY | Facility: CLINIC | Age: 68
End: 2018-09-04
Attending: RADIOLOGY
Payer: MEDICARE

## 2018-09-04 PROCEDURE — 77386 ZZH IMRT TREATMENT DELIVERY, COMPLEX: CPT | Performed by: RADIOLOGY

## 2018-09-04 NOTE — PROGRESS NOTES
TC to pt per Dr. Rolon:    RE: Pt appt for 9/4  Received: Yesterday       Pillo Rolon MD Zorko, Nicholas Alexander, MD; HIMANSHU [facility] Cc: Meghann Buck, RN                   Thanks August.     Meghann, can you call pt on Tuesday AM and ask him what's going on? Let's try to manage this on the phone if possible. If not, let the scheduling team know to find an KAROLINE appt. AR            Previous Messages       ----- Message -----      From: Robert Saldana MD      Sent: 9/3/2018   7:04 AM        To: Pillo Rolon MD, [facility]   Subject: Pt appt for 9/4                                   Received call from Mr. Porter for constipation.  Would like KAROLINE appointment 9/4 if he still hasn't had BM.  Already scheduled for RT at 11:15 AM.  Can you please contact him to schedule?     Thanks,     August          Pt states he's had relief with OTC laxatives, and feels much better overall today. He has had no nausea and was able to eat without problem today. Pt stated he did struggle with managing his nausea and has had decreased appetite as a result, additionally he had a metallic taste in his mouth that makes most food unappealing. Offered pt an KAROLINE appt to discuss symptom management, but pt declined at this time. Pt stated he won't hesitate to call with questions or concerns in the future as he feels he waited too long over the weekend to call. Pt also agreed to call clinic if he feels he needs an appointment with an KAROLINE.

## 2018-09-05 ENCOUNTER — TELEPHONE (OUTPATIENT)
Dept: ONCOLOGY | Facility: CLINIC | Age: 68
End: 2018-09-05

## 2018-09-05 ENCOUNTER — OFFICE VISIT (OUTPATIENT)
Dept: RADIATION ONCOLOGY | Facility: CLINIC | Age: 68
End: 2018-09-05
Attending: RADIOLOGY
Payer: MEDICARE

## 2018-09-05 VITALS
DIASTOLIC BLOOD PRESSURE: 86 MMHG | HEART RATE: 65 BPM | BODY MASS INDEX: 24.82 KG/M2 | SYSTOLIC BLOOD PRESSURE: 139 MMHG | WEIGHT: 183 LBS

## 2018-09-05 DIAGNOSIS — C01 MALIGNANT NEOPLASM OF BASE OF TONGUE (H): Primary | ICD-10-CM

## 2018-09-05 PROCEDURE — 77386 ZZH IMRT TREATMENT DELIVERY, COMPLEX: CPT | Performed by: RADIOLOGY

## 2018-09-05 PROCEDURE — 77336 RADIATION PHYSICS CONSULT: CPT | Performed by: RADIOLOGY

## 2018-09-05 NOTE — LETTER
2018       RE: Lazaro Porter  [address] [facility] [address]     Dear Colleague,    Thank you for referring your patient, Lazaro Porter, to the RADIATION ONCOLOGY CLINIC. Please see a copy of my visit note below.    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 No issues.    ED visits/Hospitalizations:  None    Missed Treatments:  None    Subjective: Mr. Porter presents to clinic today for his weekly on-treatment visit. He describes the onset of nausea/vomiting over the weekend which has now resolved. He reports feeling significantly better today on exam and is back to eating a normal diet without any significant difficulty. He otherwise denies any fevers/chills, dyspnea, chest pain, constipation or abdominal pain and his remaining ROS are unremarkable.    ROS:   Constitutional  Pain (0-10): 0  Fatigue: None    CNS  Headaches: None    ENT  Mucositis: None    Cardiopulmonary  Dyspnea: None    GI  Nausea/vomiting: Moderate to severe symptoms over the weekend. Currently asymptomatic.    Nutrition  PEG: No  Diet: Normal    Integumentary  Dermatitis: None    Objective:   Current weight: 83 kg    BP: 139/86 (sitting), 155/86 (standing)  Pulse: 65 (sitting), 71 (standing)    General: Alert, oriented and in NAD  HEENT: Moist mucus membranes. No visible oral cavity mucositis or thrush.  Cardiac: Extremities are warm and well-perfused.  Respiratory: No wheezing, stridor or respiratory distress  Skin: No erythema    Treatment-related  toxicities (CTCAE v5.0):  1. Nausea: Grade 2: Oral intake decreased without significant weight loss, dehydration or malnutrition    Assessment:    Mr. 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 He is undergoing chemoradiotherapy with curative intent. He developed nausea/vomiting over the weekend which has currently resolved and he is otherwise tolerating treatment very well with no additional acute treatment-related toxicities.    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      Sincerely,    Nathan Garay MD

## 2018-09-05 NOTE — TELEPHONE ENCOUNTER
----- Message from Pillo Rolon MD sent at 9/5/2018  3:53 PM CDT -----  Regarding: RE: Pt appt for 9/4  Can you call in Zofran 8mg tablet - 1 tablet q8h PRN - Total # 30 tabs, 3 refills to his pharmacy? Thx. AR    ----- Message -----     From: Meghann Buck, RN     Sent: 9/4/2018   4:24 PM       To: Pillo Rolon MD  Subject: RE: Pt appt for 9/4                              Hello!     I spoke with Farhad and he is feeling much better today. He was able to move his bowels with OTC remedies. He has been struggling with nausea and poor appetite d/t having a metallic taste in his mouth.     Would you consider adding to his antinausea regimen? He only has compazine right now. Anything to help his PO intake. He's still determined to do this without a PEG tube. Let me know what you think. Thanks!    Darrell        ----- Message -----     From: Pillo Rolon MD     Sent: 9/3/2018   3:30 PM       To: Meghann Buck, RN, #  Subject: RE: Pt appt for 9/4                              Thanks Mingo Zavaleta, can you call pt on Tuesday AM and ask him what's going on? Let's try to manage this on the phone if possible. If not, let the scheduling team know to find an KAROLINE appt. AR    ----- Message -----     From: Robert Saldana MD     Sent: 9/3/2018   7:04 AM       To: Pillo Rolon MD, [facility]  Subject: Pt appt for 9/4                                  Received call from Mr. Porter for constipation.  Would like KAROLINE appointment 9/4 if he still hasn't had BM.  Already scheduled for RT at 11:15 AM.  Can you please contact him to schedule?    Thanks,    August

## 2018-09-05 NOTE — MR AVS SNAPSHOT
After Visit Summary   9/5/2018    Lazaro Porter    MRN: [id number]           Patient Information     Date Of Birth          1950        Visit Information        Provider Department      9/5/2018 11:45 AM Nathan Garay MD Radiation Oncology Clinic         Follow-ups after your visit        Your next 10 appointments already scheduled     Sep 05, 2018 11:45 AM CDT   ON TREATMENT VISIT with Nathan Garay MD   Radiation Oncology Clinic ([facility])    [facility]  1st Floor  [address] [address]   [phone]            Sep 06, 2018 11:15 AM CDT   EXTERNAL RADIATION TREATMENT with UMP RAD ONC VARIAN   Radiation Oncology Clinic ([facility])    [facility]  1st Floor  [address] [address]   [phone]            Sep 07, 2018 11:15 AM CDT   EXTERNAL RADIATION TREATMENT with UMP RAD ONC VARIAN   Radiation Oncology Clinic ([facility])    [facility]  1st Floor  [address] [address]   [phone]            Sep 10, 2018 11:15 AM CDT   EXTERNAL RADIATION TREATMENT with UMP RAD ONC VARIAN   Radiation Oncology Clinic ([facility])    [facility]  1st Floor  [address] [address]   [phone]            Sep 10, 2018 11:45 AM CDT   ON TREATMENT VISIT with Nathan Garay MD   Radiation Oncology Clinic ([facility])    [facility]  1st Floor  [address] [address]   [phone]            Sep 10, 2018 11:45 AM CDT   NUTRITION VISIT with Shelli Mcghee RD   [facility] ([facility])    [address]  [place] [place] [facility] [place] [address]               Sep 11, 2018 11:15 AM CDT   EXTERNAL RADIATION TREATMENT with UMP RAD ONC  VARIAN   Radiation Oncology Clinic ([facility])    [facility]  1st Floor  [address] [address]   [phone]            Sep 12, 2018 11:15 AM CDT   EXTERNAL RADIATION TREATMENT with UMP RAD ONC VARIAN   Radiation Oncology Clinic ([facility])    [facility]  1st Floor  [address] [address]   [phone]            Sep 13, 2018 11:15 AM CDT   EXTERNAL RADIATION TREATMENT with UMP RAD ONC VARIAN   Radiation Oncology Clinic ([facility])    [facility]  1st Floor  [address] [address]   [phone]            Sep 14, 2018 11:15 AM CDT   EXTERNAL RADIATION TREATMENT with UMP RAD ONC VARIAN   Radiation Oncology Clinic ([facility])    [facility]  1st Floor  [address] [address]   [phone]              Who to contact     Please call your clinic at [phone] to:    Ask questions about your health    Make or cancel appointments    Discuss your medicines    Learn about your test results    Speak to your doctor            Additional Information About Your Visit        SmartHub Information     SmartHub is an electronic gateway that provides easy, online access to your medical records. With SmartHub, you can request a clinic appointment, read your test results, renew a prescription or communicate with your care team.     To sign up for SmartHub visit the website at [email or web address]   You will be asked to enter the access code listed below, as well as some personal information. Please follow the directions to create your username and password.     Your access code is: [email or web address]  Expires: 10/2/2018  6:31 AM     Your access code will  in 90 days. If you need help or a new code, please contact your [facility] or call [phone] for assistance.         Care EveryWhere ID     This is your Care EveryWhere ID. This could be used by other organizations to access your [place] medical records  [id number]        Your Vitals Were     Pulse BMI (Body Mass Index)                65 24.82 kg/m2           Blood Pressure from Last 3 Encounters:   09/05/18 139/86   08/28/18 146/87   08/21/18 (!) 156/94    Weight from Last 3 Encounters:   09/05/18 83 kg (183 lb)   08/28/18 87 kg (191 lb 14.4 oz)   08/21/18 86.6 kg (191 lb)              Today, you had the following     No orders found for display       Primary Care Provider Office Phone # Fax #    Nathan Garay [phone]       [address] [place] [address]        Equal Access to Services     [facility] AreaANDRE[facility] : Hadii aad ku hadasho Sowilliam, waaxda luqadaha, qaybta kaalmada aderoberto carlosyatreasure, talon hardwick . So [facility] [phone].    ATENCIÓN: Si habla español, tiene a corona disposición servicios gratuitos de asistencia lingüística. Uche al [phone].    We comply with applicable federal civil rights laws and [place] laws. We do not discriminate on the basis of race, color, national origin, age, disability, sex, sexual orientation, or gender identity.            Thank you!     Thank you for choosing [facility]ATION ONCOLOGY CLINIC  for your care. Our goal is always to provide you with excellent care. Hearing back from our patients is one way we can continue to improve our services. Please take a few minutes to complete the written survey that you may receive in the mail after your visit with us. Thank you!             Your Updated Medication List - Protect others around you: Learn how to safely use, store and throw away your medicines at www.disposemymeds.org.          This list is accurate as of 9/5/18 11:25 AM.  Always use your most recent med list.                   Brand Name Dispense Instructions for use Diagnosis    aspirin 325 MG tablet      Take 325 mg by  mouth        dexamethasone 4 MG tablet    DECADRON    6 tablet    Take 2 tablets (8 mg) by mouth daily (with breakfast) for 3 days Start the day after chemotherapy.    Malignant neoplasm of base of tongue (H)       IBUPROFEN PO      Take 200 mg by mouth        MULTIVITAMIN & MINERAL PO           oxyCODONE 5 MG/5ML solution    ROXICODONE    250 mL    Take 5 mLs (5 mg) by mouth every 4 hours as needed for severe pain    Postoperative pain       prochlorperazine 10 MG tablet    COMPAZINE    30 tablet    Take 1 tablet (10 mg) by mouth every 6 hours as needed (Nausea/Vomiting)    Malignant neoplasm of base of tongue (H)

## 2018-09-05 NOTE — PROGRESS NOTES
RADIATION ONCOLOGY WEEKLY ON TREATMENT VISIT   Encounter Date: 2018    Patient Name: Lazaro Porter  MRN: [id number]  : 1950     Disease and Stage: cT1 N1 M0 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  Treatment Site: Base of Tongue (Oropharynx)  Current Dose/Planned Total Dose: 1060 / 6996 cGy  Daily Fraction Size: 200 cGy/day, 5 times/week  Concurrent Chemotherapy: Yes  Drug and Frequency: High Dose Cisplatin (100 mg/m )    Subjective: Mr. Porter presents to clinic today for his weekly on-treatment visit.  He reports that his first fractions of radiotherapy have been uneventful.  He had his first chemotherapy infusion last Tuesday, 2018.  He had the expected severe nausea secondary to chemotherapy administration beginning Friday, 2018 and lasting throughout the weekend.  He reports today his nausea is minimal.  He also reports minor fatigue level of 2 out of 10.  He has been performing routine dental cares with fluoride toothpaste.  He continues to use [employer] [place] Magic for skin care twice daily.  He continues to take all nutrition by mouth and has no PEG tube placed.  He has lost approximately 3 pounds since initial assessment on 8/15/2018.  The remainder of his review of systems were negative.    ROS:   Nutrition Alteration  Diet Type: Patient's Preference  Skin  Skin Reaction: 0 - No changes  Skin Note: Aquaphor     ENT and Mouth Exam  Mucositis - Current: 0 - None   ENT/Mouth Note: No PEG     Gastrointestinal  Nausea: 1 - One to two episodes of nausea/24     Pain Assessment  0-10 Pain Scale: 0    Objective:   Current weight: 83 kg   Weight last week: N/A  Starting weight: 83 kg    Sitting blood pressure: 139/86   Standing blood pressure: 155/86  Sitting pulse: 65  Standing pulse: 71      General: A&O ×3, no acute distress  HEENT: No mucositis or thrush within the oral cavity or visualized oropharynx.  No tenderness to palpation of the oral cavity.  He continues  to have a 4.5 cm fixed lymph node in the right level 2.  Cardiac: No pedal edema  Respiratory: Breathing comfortably on room air  Skin: No erythema    Treatment-related toxicities (CTCAE v4.0):  1. Dry mouth: Grade 0: No toxicity  2. Dysphagia: Grade 0: No toxicity  3. Mucositis: Grade 0: No toxicity  4. Dermatitis: Grade 0: No toxicity    Assessment:    Mr. Porter is a 67 year old male with a cT1 N1 M0 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 undergoing a course of concurrent chemoradiotherapy with high-dose cisplatin for definitive treatment.  He is early in his course of treatment at this time and has no acute toxicities.      cT1 N1 M0 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    Continue chemoradiation    Management of acute nausea and vomiting at [facility] medical oncology    Pain management    No pain at this time.    Plan for the initiation of oxycodone 5 mg every 6 hours as needed for mild to moderate pain as mucositis/odynophagia develops    Fluids/Electrolytes/Nutrition    Mr. Porter is currently taking all nutrition by mouth and maintaining weight.  He is motivated to maintain nutrition without a PEG tube if possible    Labs prior to next chemotherapy cycle    Integument Cares    Mr. Lam currently has no evidence of dermatitis.    [place] Magic at least twice daily applied over the bilateral low face and bilateral lower neck      Mosaiq chart and setup information reviewed  IGRT images reviewed    Medication Review  Med list reviewed with patient?: Yes    Camilo Mayer MD  Radiation Oncology Resident, PGY-4  [facility]  Phone: [phone]    ***This note is a draft and has yet to be approved by an attending***

## 2018-09-05 NOTE — TELEPHONE ENCOUNTER
TC to pt per Dr. Rolon to find out which pharmacy pt wants to use. Requested call back with information. Await response.

## 2018-09-06 ENCOUNTER — APPOINTMENT (OUTPATIENT)
Dept: RADIATION ONCOLOGY | Facility: CLINIC | Age: 68
End: 2018-09-06
Attending: RADIOLOGY
Payer: MEDICARE

## 2018-09-06 PROCEDURE — 77386 ZZH IMRT TREATMENT DELIVERY, COMPLEX: CPT | Performed by: RADIOLOGY

## 2018-09-06 NOTE — TELEPHONE ENCOUNTER
TC from pt stating that he is feeling better and doesn't think he will need anything further for nausea at this time, and that his oral intake has improved. Pt requested to meet with writer during his next infusion visit to discuss a plan for managing nausea moving forward, but will call with any concerns before then if need be.

## 2018-09-07 ENCOUNTER — APPOINTMENT (OUTPATIENT)
Dept: RADIATION ONCOLOGY | Facility: CLINIC | Age: 68
End: 2018-09-07
Attending: RADIOLOGY
Payer: MEDICARE

## 2018-09-07 PROCEDURE — 77386 ZZH IMRT TREATMENT DELIVERY, COMPLEX: CPT | Performed by: RADIOLOGY

## 2018-09-07 NOTE — PROGRESS NOTES
RADIATION ONCOLOGY WEEKLY ON TREATMENT VISIT   Encounter Date: 2018    Patient Name: Lazaro Porter  MRN: [id number]  : 1950     Disease and Stage: cT1 N1 M0 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  Treatment Site: Oropharynx and bilateral neck  Current Dose/Planned Total Dose: 1060 / 6996 cGy  Daily Fraction Size: 212 cGy/day, 5 times/week  Concurrent Chemotherapy: Yes  Drug and Frequency: Cisplatin (100 mg/m2) q3 weeks    Treatment Summary:    2018: Initiation of radiotherapy. No issues.    ED visits/Hospitalizations:  None    Missed Treatments:  None    Subjective: Mr. Porter presents to clinic today for his weekly on-treatment visit. He describes the onset of nausea/vomiting over the weekend which has now resolved. He reports feeling significantly better today on exam and is back to eating a normal diet without any significant difficulty. He otherwise denies any fevers/chills, dyspnea, chest pain, constipation or abdominal pain and his remaining ROS are unremarkable.    ROS:   Constitutional  Pain (0-10): 0  Fatigue: None    CNS  Headaches: None    ENT  Mucositis: None    Cardiopulmonary  Dyspnea: None    GI  Nausea/vomiting: Moderate to severe symptoms over the weekend. Currently asymptomatic.    Nutrition  PEG: No  Diet: Normal    Integumentary  Dermatitis: None    Objective:   Current weight: 83 kg    BP: 139/86 (sitting), 155/86 (standing)  Pulse: 65 (sitting), 71 (standing)    General: Alert, oriented and in NAD  HEENT: Moist mucus membranes. No visible oral cavity mucositis or thrush.  Cardiac: Extremities are warm and well-perfused.  Respiratory: No wheezing, stridor or respiratory distress  Skin: No erythema    Treatment-related toxicities (CTCAE v5.0):  1. Nausea: Grade 2: Oral intake decreased without significant weight loss, dehydration or malnutrition    Assessment:    Mr. Porter is a 67 year old male with a cT1 N1 M0 p16-positive squamous cell  carcinoma of the right base of tongue. He is undergoing chemoradiotherapy with curative intent. He developed nausea/vomiting over the weekend which has currently resolved and he is otherwise tolerating treatment very well with no additional acute treatment-related toxicities.    Plan:   cT1 N1 M0 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:    Continue radiotherapy    Pain management:    No significant pain requiring medical management at this time    Fluids/Electrolytes/Nutrition:    Continue normal diet     Dermatitis:    Recommended starting BID moisturizer use to the bilateral lower face and neck    Mosaiq chart and setup information reviewed  IGRT images reviewed    Medication Review  Med list reviewed with patient?: Yes    Nathan Garay MD/PhD  Department of Radiation Oncology  [facility]

## 2018-09-10 ENCOUNTER — OFFICE VISIT (OUTPATIENT)
Dept: RADIATION ONCOLOGY | Facility: CLINIC | Age: 68
End: 2018-09-10
Attending: RADIOLOGY
Payer: MEDICARE

## 2018-09-10 ENCOUNTER — TRANSFERRED RECORDS (OUTPATIENT)
Dept: HEALTH INFORMATION MANAGEMENT | Facility: CLINIC | Age: 68
End: 2018-09-10

## 2018-09-10 ENCOUNTER — HOSPITAL ENCOUNTER (OUTPATIENT)
Dept: NUTRITION | Facility: CLINIC | Age: 68
Discharge: HOME | End: 2018-09-10
Attending: RADIOLOGY | Admitting: RADIOLOGY
Payer: MEDICARE

## 2018-09-10 VITALS
HEART RATE: 63 BPM | WEIGHT: 184.1 LBS | SYSTOLIC BLOOD PRESSURE: 157 MMHG | BODY MASS INDEX: 24.97 KG/M2 | DIASTOLIC BLOOD PRESSURE: 98 MMHG

## 2018-09-10 DIAGNOSIS — C01 MALIGNANT NEOPLASM OF BASE OF TONGUE (H): Primary | ICD-10-CM

## 2018-09-10 PROCEDURE — 97802 MEDICAL NUTRITION INDIV IN: CPT | Performed by: DIETITIAN, REGISTERED

## 2018-09-10 PROCEDURE — 77386 ZZH IMRT TREATMENT DELIVERY, COMPLEX: CPT | Performed by: RADIOLOGY

## 2018-09-10 NOTE — PROGRESS NOTES
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 No issues.    2018: Moderate nausea/vomiting over the weekend. Otherwise tolerating treatment well.    ED visits/Hospitalizations:  None    Missed Treatments:  None    Subjective: Mr. Porter presents to clinic today for his weekly on-treatment visit. He had an uneventful weekend and reports low-grade nausea without vomiting but has maintained adequate caloric and fluid intake. He denies any significant oral cavity or oropharyngeal pain and he additionally reports no headaches, fever/chills, dyspnea, chest pain or abdominal pain. He does report feeling slightly down while undergoing treatment although he denies any suicidal ideations or thoughts of hurting himself or others. His remaining ROS are otherwise unremarkable.    ROS:   Constitutional  Pain (0-10): 0  Fatigue: Mild    CNS  Headaches: None    ENT  Mucositis: None    Cardiopulmonary  Dyspnea: None    GI  Nausea/vomiting: Mild nausea without vomiting    Nutrition  PEG: No  Diet: Normal diet    Integumentary  Dermatitis: None    Objective:   Current weight: 83.5 kg  Last week's weight: 83 kg  Starting weight: 83 kg    BP: 157/98 (sitting), 154/86 (standing)  Pulse: 63 (sitting), 62 (standing)    General: Alert, oriented and in no acute distress  HEENT: Moist mucous membranes. No visible oral cavity mucositis or thrush.  Cardiac: Extremities are warm and well-perfused  Respiratory: No wheezing, stridor or respiratory  distress  Skin: Mild diffuse erythema over treatment field    Treatment-related toxicities (CTCAE v5.0):  1. Nausea: Grade 2: Oral intake decreased without significant weight loss, dehydration or malnutrition  2. Dermatitis: Grade 1: Faint erythema or dry desquamation    Assessment:    Mr. 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 He is currently undergoing chemoradiotherapy with curative intent and has been tolerating treatment reasonably well with moderate therapy-induced nausea.    Plan:   cT1 N1 M0 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:    Continue chemoradiotherapy    Pain management:    Prescribed viscous lidocaine for as-needed use with development of radiation-induced mucositis    Fluids/Electrolytes/Nutrition:    Continue diet as tolerated    Dermatitis:    Continue twice daily moisturizer use ([place] Magic) to the bilateral lower face and neck    Dysthymia:    Will arrange for visit with /[employer] representative to discuss additional support groups and other options available to him    Mosaiq chart and setup information reviewed  IGRT images reviewed    Medication Review  Med list reviewed with patient?: Yes    Nathan Garay MD/PhD  Department of Radiation Oncology  [facility]

## 2018-09-10 NOTE — PROGRESS NOTES
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 No issues.    ED visits/Hospitalizations:  None    Missed Treatments:  None    Subjective: Mr. Porter presents to clinic today for his weekly on-treatment visit.  He has had no acute events regards to his treatment course over the past week.  He does report some emotional distress over undergoing treatment which we addressed today during our appointment.  He has no psychological drive for nutrition given lack of normal taste.  He continues to take all food by mouth.  He denies any odynophagia or dysphagia.  He has had no weight loss. He reports very mild nausea as well.  He otherwise denies any fevers/chills, dyspnea, chest pain, constipation or abdominal pain and his remaining ROS are unremarkable.    Nursing ROS:   Nutrition Alteration  Diet Type: Patient's Preference  Skin  Skin Reaction: 0 - No changes  Skin Note: Aquaphor     ENT and Mouth Exam  Mucositis - Current: 0 - None   ENT/Mouth Note: No PEG     Gastrointestinal  Nausea: 1 - One to two episodes of nausea/24    Pain Assessment  0-10 Pain Scale: 0    Objective:   Current weight: 83.5 kg  Last Week's Weight: 83 kg    Sitting BP: 157/98  Sitting Pulse: 63  Standing BP: 154/86  Standing Pulse: 62    General: Alert, oriented and in NAD  HEENT: Moist mucus membranes. No visible oral cavity mucositis or thrush.  Cardiac: Extremities are warm and well-perfused.  Respiratory: No wheezing, stridor or respiratory distress  Skin: No  erythema    Treatment-related toxicities (CTCAE v5.0):  1. Nausea: Grade 1: Loss of appetite without alteration in eating habits  2. Pain: Grade 0: No toxicity  3. Dry mouth: Grade 1: Symptomatic without significant dietary alteration; unstimulated saliva flow >0.2 mL/min  4. Dysphagia: Grade 0: No toxicity  5. Mucositis: Grade 0: No toxicity  6. Dermatitis: Grade 0: No toxicity    Assessment:    Mr. 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 He is undergoing chemoradiotherapy with curative intent. He is otherwise tolerating treatment very well with no additional acute treatment-related toxicities.     Plan:   cT1 N1 M0 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:    Continue radiotherapy    Pain management:    No significant pain requiring medical management at this time    Fluids/Electrolytes/Nutrition:    Continue normal diet     Dermatitis:    Recommended starting BID moisturizer use to the bilateral lower face and neck    Mosaiq chart and setup information reviewed  IGRT images reviewed    Medication Review  Med list reviewed with patient?: Yes    Camilo Mayer MD  Radiation Oncology Resident, PGY-4  [facility]  Phone: [phone]    ***This note is a pre-visit draft and has yet to be approved by an attending***

## 2018-09-10 NOTE — MR AVS SNAPSHOT
After Visit Summary   9/10/2018    Lazaro Porter    MRN: [id number]           Patient Information     Date Of Birth          1950        Visit Information        Provider Department      9/10/2018 11:45 AM Nathan Garay MD Radiation Oncology Clinic        Today's Diagnoses     Malignant neoplasm of base of tongue (H)    -  1       Follow-ups after your visit        Your next 10 appointments already scheduled     Sep 11, 2018 11:15 AM CDT   EXTERNAL RADIATION TREATMENT with UMP RAD ONC VARIAN   Radiation Oncology Clinic ([facility])    [facility]  1st Floor  [address] [address]   [phone]            Sep 12, 2018 11:15 AM CDT   EXTERNAL RADIATION TREATMENT with UMP RAD ONC VARIAN   Radiation Oncology Clinic ([facility])    [facility]  1st Floor  [address] [address] [phone]            Sep 13, 2018 11:15 AM CDT   EXTERNAL RADIATION TREATMENT with UMP RAD ONC VARIAN   Radiation Oncology Clinic ([facility])    [facility]  1st Floor  [address] [address] [phone]            Sep 14, 2018 11:15 AM CDT   EXTERNAL RADIATION TREATMENT with UMP RAD ONC VARIAN   Radiation Oncology Clinic ([facility])    [facility]  1st Floor  [address] [address]   [phone]            Sep 17, 2018 11:15 AM CDT   EXTERNAL RADIATION TREATMENT with UMP RAD ONC VARIAN   Radiation Oncology Clinic ([facility])    [facility]  1st Floor  [address] [address] [phone]            Sep 17, 2018 11:45 AM CDT   ON TREATMENT VISIT with Nathan Garay MD   Radiation Oncology Clinic ([facility])    [facility]  1st Floor  [address] [address]    [phone]            Sep 18, 2018 11:15 AM CDT   EXTERNAL RADIATION TREATMENT with [email or web address] RAD ONC VARIAN   Radiation Oncology Clinic ([email or web address])    [facility]  1st Floor  [address] [address] [phone]            Sep 19, 2018  8:15 AM CDT   (Arrive by 8:00 AM)   Return Visit with Pillo Rolon MD   [facility] ([facility])    [address]  [address]  [facility] [address] [phone]            Sep 19, 2018  9:00 AM CDT   Infusion 360 with UC ONCOLOGY INFUSION, UC 26 ATC   [facility] ([facility])    [address]  [address]  [facility] [address] [phone]              Who to contact     Please call your clinic at [phone] to:    Ask questions about your health    Make or cancel appointments    Discuss your medicines    Learn about your test results    Speak to your doctor            Additional Information About Your Visit        [employer] Information     [facility] is an electronic gateway that provides easy, online access to your medical records. With [facility], you can request a clinic appointment, read your test results, renew a prescription or communicate with your care team.     To sign up for [facility] visit the website at [email or web address]   You will be asked to enter the access code listed below, as well as some personal information. Please follow the directions to create your username and password.     Your access code is: [email or web address]  Expires: 10/2/2018  6:31 AM     Your access code will  in 90 days. If you need help or a new code, please contact your [facility] or call [phone] for assistance.        Care EveryWhere ID     This is your Care EveryWhere ID. This could be used by other organizations to access your [place] medical records  [id number]        Your Vitals Were      Pulse BMI (Body Mass Index)                63 24.97 kg/m2           Blood Pressure from Last 3 Encounters:   09/10/18 (!) 157/98   09/05/18 139/86   08/28/18 146/87    Weight from Last 3 Encounters:   09/10/18 83.5 kg (184 lb 1.6 oz)   09/05/18 83 kg (183 lb)   08/28/18 87 kg (191 lb 14.4 oz)              Today, you had the following     No orders found for display         Today's Medication Changes          These changes are accurate as of 9/10/18  4:11 PM.  If you have any questions, ask your nurse or doctor.               Start taking these medicines.        Dose/Directions    lidocaine (viscous) 2 % solution   Commonly known as:  XYLOCAINE   Used for:  Malignant neoplasm of base of tongue (H)   Started by:  Nathan Garay MD        Dose:  15 mL   Take 15 mLs by mouth every 4 hours as needed for moderate pain swish and spit every 3-8 hours as needed; max 8 doses/24 hour period   Quantity:  100 mL   Refills:  3            Where to get your medicines      These medications were sent to [place], [place] - [address]  [address], [facility] [address]    Hours:  TRANSPLANT PHONE NUMBER [phone] Phone:  [phone]     lidocaine (viscous) 2 % solution                Primary Care Provider Office Phone # Fax #    Nathan Garay [phone]       [address]  [facility] [address]        Equal Access to Services     JOZEF CLEMENTS AH: Hadii aad ku hadasho Soomaali, waaxda luqadaha, qaybta kaalmada adeegyada, waxay idiin haykeshavn ayleen fernandez. So [facility] [phone].    ATENCIÓN: Si habla español, tiene a corona disposición servicios gratuitos de asistencia lingüística. Llame al [phone].    We comply with applicable federal civil rights laws and [place] laws. We do not discriminate on the basis of race, color, national origin, age, disability, sex, sexual orientation, or gender  identity.            Thank you!     Thank you for choosing [facility]Y CL[facility]IC  for your care. Our goal is always to provide you with excellent care. Hearing back from our patients is one way we can continue to improve our services. Please take a few minutes to complete the written survey that you may receive in the mail after your visit with us. Thank you!             Your Updated Medication List - Protect others around you: Learn how to safely use, store and throw away your medicines at www.disposemymeds.org.          This list is accurate as of 9/10/18  4:11 PM.  Always use your most recent med list.                   Brand Name Dispense Instructions for use Diagnosis    aspirin 325 MG tablet      Take 325 mg by mouth        dexamethasone 4 MG tablet    DECADRON    6 tablet    Take 2 tablets (8 mg) by mouth daily (with breakfast) for 3 days Start the day after chemotherapy.    Malignant neoplasm of base of tongue (H)       IBUPROFEN PO      Take 200 mg by mouth        lidocaine (viscous) 2 % solution    XYLOCAINE    100 mL    Take 15 mLs by mouth every 4 hours as needed for moderate pain swish and spit every 3-8 hours as needed; max 8 doses/24 hour period    Malignant neoplasm of base of tongue (H)       MULTIVITAMIN & MINERAL PO           oxyCODONE 5 MG/5ML solution    ROXICODONE    250 mL    Take 5 mLs (5 mg) by mouth every 4 hours as needed for severe pain    Postoperative pain       prochlorperazine 10 MG tablet    COMPAZINE    30 tablet    Take 1 tablet (10 mg) by mouth every 6 hours as needed (Nausea/Vomiting)    Malignant neoplasm of base of tongue (H)

## 2018-09-10 NOTE — PROGRESS NOTES
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  He reports that at that time, he was not prescribed 2/3 antiemetics.  Once he started taking them, his nausea resolved.    He currently has dysgeusia.  He reports that he is forcing himself to eat regardless of poor taste.  He notes that cold foods have been working better than hot. He has been adding spices to his foods.  He focuses on adding a protein source to eat meal.       Diet Recall  Breakfast Toast with butter and peanut butter   Lunch Fruit smoothie, yogurt   Dinner Pasta with steak bites sweet potatoes   Snacks CIB with whole milk   Beverages Water, gatorade, V8, Coffee   Alcohol No   Dining out Seldom     ANTHROPOMETRICS  Height: 72\"  Weight: 183 lbs/83kg  BMI: 24.8  Weight Status:  Normal BMI  IBW: 178 lbs  % IBW: 102%  Weight History: 4% wt loss x past 3 weeks.   Wt Readings from Last 5 Encounters:   09/10/18 83.5 kg (184 lb 1.6 oz)   09/05/18 83 kg (183 lb)   08/28/18 87 kg (191 lb 14.4 oz)   08/21/18 86.6 kg (191 lb)   08/21/18 86.6 kg (191 lb)       Medications/vitamins/minerals/herbals:   Reviewed  MVI  LABS  Labs reviewed    ASSESSED NUTRITION NEEDS:  Estimated Energy Needs: 2913-6755 kcals (30-35 Kcal/Kg)  Justification: increased needs with CRT  Estimated Protein Needs: 100-125 grams protein (1.2-1.5 g pro/Kg)  Justification: increased needs with CRT  Estimated Fluid Needs: 3000  mL   Justification: increased " needs with cisplatin    MALNUTRITION:  % Weight Loss:  Weight loss does not meet criteria for malnutrition   % Intake:  Decreased intake does not meet criteria for malnutrition   Subcutaneous Fat Loss:  None observed  Muscle Loss:  None observed  Fluid Retention:  None noted    Malnutrition Diagnosis: Patient does not meet two of the above criteria necessary for diagnosing malnutrition but is at risk.     NUTRITION DIAGNOSIS:  Predicted suboptimal nutrient intake related to receiving treatment for head/neck cancer, No PEG     INTERVENTIONS  Nutrition Education     Provided written & verbal education on:   - Ways to maximize kcal and protein intake. Discussed calorie and protein needs for maintenance of weight and nutrition status.  Advised pt to aim for at least 2500-3000kcal and 100-125g protein via 5-6 small frequent meals.   - Coping with barriers - as chemotherapy/radiation progresses, eating may become more difficult and discussed ways to cope with this.  - ONS (Ensure Enlive, Ensure Plus/Boost Plus, CIB, Benecalorie, Scandi shake etc). Suggested ways to incorporate these supplements to avoid flavor fatigue. Encouraged pt to start consuming 2 ONS daily.       Provided pt with corresponding education materials/handouts on:  Six Small Meals a Day, High Calorie, High Protein Recipe booklet, Tips to Increase the Calories in Your Diet    Pt verbalize understanding of materials provided during consult.   Patient Understanding: Excellent  Expected Compliance: Excellent     Goals  1.  Aim for 5-6 small frequent meals  2.  Aim for 2500-3000kcal and 100-125g protein/day  3. Weight maintenance through cancer treatment      Follow-Up Plans: Pt has RD contact information for questions.    Pt encouraged to follow up with RD in 2 weeks at the time of infusion or radiation treatment.     MONITORING AND EVALUATION:  -Food intake  -Fluid/beverage intake  -Liquid meal replacement or supplement  -Weight trends    Shelli Banks  SIVA Mcghee, LD

## 2018-09-11 ENCOUNTER — APPOINTMENT (OUTPATIENT)
Dept: RADIATION ONCOLOGY | Facility: CLINIC | Age: 68
End: 2018-09-11
Attending: RADIOLOGY
Payer: MEDICARE

## 2018-09-11 PROCEDURE — 77386 ZZH IMRT TREATMENT DELIVERY, COMPLEX: CPT | Performed by: RADIOLOGY

## 2018-09-12 ENCOUNTER — APPOINTMENT (OUTPATIENT)
Dept: RADIATION ONCOLOGY | Facility: CLINIC | Age: 68
End: 2018-09-12
Attending: RADIOLOGY
Payer: MEDICARE

## 2018-09-12 PROCEDURE — 77336 RADIATION PHYSICS CONSULT: CPT | Performed by: RADIOLOGY

## 2018-09-12 PROCEDURE — 77386 ZZH IMRT TREATMENT DELIVERY, COMPLEX: CPT | Performed by: RADIOLOGY

## 2018-09-13 ENCOUNTER — APPOINTMENT (OUTPATIENT)
Dept: RADIATION ONCOLOGY | Facility: CLINIC | Age: 68
End: 2018-09-13
Attending: RADIOLOGY
Payer: MEDICARE

## 2018-09-13 PROCEDURE — 77386 ZZH IMRT TREATMENT DELIVERY, COMPLEX: CPT | Performed by: RADIOLOGY

## 2018-09-14 ENCOUNTER — APPOINTMENT (OUTPATIENT)
Dept: RADIATION ONCOLOGY | Facility: CLINIC | Age: 68
End: 2018-09-14
Attending: RADIOLOGY
Payer: MEDICARE

## 2018-09-14 PROCEDURE — 77386 ZZH IMRT TREATMENT DELIVERY, COMPLEX: CPT | Performed by: RADIOLOGY

## 2018-09-17 ENCOUNTER — APPOINTMENT (OUTPATIENT)
Dept: RADIATION ONCOLOGY | Facility: CLINIC | Age: 68
End: 2018-09-17
Attending: RADIOLOGY
Payer: MEDICARE

## 2018-09-17 ENCOUNTER — HOSPITAL ENCOUNTER (OUTPATIENT)
Dept: NUTRITION | Facility: CLINIC | Age: 68
Discharge: HOME | End: 2018-09-17
Attending: RADIOLOGY | Admitting: RADIOLOGY
Payer: MEDICARE

## 2018-09-17 VITALS
BODY MASS INDEX: 24.14 KG/M2 | SYSTOLIC BLOOD PRESSURE: 155 MMHG | WEIGHT: 178 LBS | HEART RATE: 87 BPM | DIASTOLIC BLOOD PRESSURE: 87 MMHG

## 2018-09-17 DIAGNOSIS — C01 MALIGNANT NEOPLASM OF BASE OF TONGUE (H): Primary | ICD-10-CM

## 2018-09-17 DIAGNOSIS — R13.11 ORAL PHASE DYSPHAGIA: ICD-10-CM

## 2018-09-17 PROCEDURE — 97803 MED NUTRITION INDIV SUBSEQ: CPT | Performed by: DIETITIAN, REGISTERED

## 2018-09-17 PROCEDURE — 77386 ZZH IMRT TREATMENT DELIVERY, COMPLEX: CPT | Performed by: RADIOLOGY

## 2018-09-17 NOTE — MR AVS SNAPSHOT
After Visit Summary   9/17/2018    Lazaro Porter    MRN: [id number]           Patient Information     Date Of Birth          1950        Visit Information        Provider Department      9/17/2018 11:45 AM Nathan Garay MD Radiation Oncology Clinic         Follow-ups after your visit        Your next 10 appointments already scheduled     Sep 18, 2018 11:15 AM CDT   EXTERNAL RADIATION TREATMENT with UMP RAD ONC VARIAN   Radiation Oncology Clinic ([facility])    [facility]  1st Floor  [address]  [facility] [address]   [phone]            Sep 19, 2018  7:45 AM CDT   [facility] Lab Draw with [facility] LAB DRAW   [facility] ([facility])    [address]  [address]  [place] [facility] [address] [phone]            Sep 19, 2018  8:15 AM CDT   (Arrive by 8:00 AM)   Return Visit with Pillo Rolon MD   [facility] ([facility])    [address]  [address]  [facility] [address]   [phone]            Sep 19, 2018  9:00 AM CDT   Infusion 360 with UC ONCOLOGY INFUSION, UC 26 ATC   [facility] ([facility])    [address]  [address]  [facility] [address]   [phone]            Sep 19, 2018 10:30 AM CDT   Treatment with JANETTE Florence   [facility] ([facility])    [address]  4th Floor  [facility] [address]   [phone]            Sep 19, 2018 11:15 AM CDT   EXTERNAL RADIATION TREATMENT with UMP RAD ONC VARIAN   Radiation Oncology Clinic ([facility])    [facility]  1st Floor  [address]  [facility] [address]   [phone]            Sep 20, 2018 11:15 AM CDT   EXTERNAL RADIATION TREATMENT with UMP RAD ONC VARIAN   Radiation Oncology Clinic ([facility])    [facility]  Ctr  1st Floor  [address] [address]   [phone]            Sep 21, 2018 11:15 AM CDT   EXTERNAL RADIATION TREATMENT with UMP RAD ONC VARIAN   Radiation Oncology Clinic ([facility])    [facility]  1st Floor  [address] [address]   [phone]            Sep 24, 2018 11:15 AM CDT   EXTERNAL RADIATION TREATMENT with UMP RAD ONC VARIAN   Radiation Oncology Clinic ([facility])    [facility]  1st Floor  [address] [address]   [phone]              Who to contact     Please call your clinic at [phone] to:    Ask questions about your health    Make or cancel appointments    Discuss your medicines    Learn about your test results    Speak to your doctor            Additional Information About Your Visit        [employer] Information     [employer] is an electronic gateway that provides easy, online access to your medical records. With [employer], you can request a clinic appointment, read your test results, renew a prescription or communicate with your care team.     To sign up for [employer] visit the website at [email or web address]   You will be asked to enter the access code listed below, as well as some personal information. Please follow the directions to create your username and password.     Your access code is: [email or web address]  Expires: 2018 10:28 AM     Your access code will  in 90 days. If you need help or a new code, please contact your [facility] or call [phone] for assistance.        Care EveryWhere ID     This is your Care EveryWhere ID. This could be used by other organizations to access your [place] medical records  [id number]        Your Vitals Were     Pulse BMI (Body Mass Index)                87 24.14 kg/m2           Blood Pressure from Last 3 Encounters:   18 155/87   09/10/18 (!) 157/98   18  139/86    Weight from Last 3 Encounters:   09/17/18 80.7 kg (178 lb)   09/10/18 83.5 kg (184 lb 1.6 oz)   09/05/18 83 kg (183 lb)              Today, you had the following     No orders found for display       Primary Care Provider Office Phone # Fax #    Lalito Moctezuma [phone]       [address] [address]        Equal Access to Services     JOZEF CLEMENTS : Hadii aad ku hadasho Soomaali, waaxda luqadaha, qaybta kaalmada adeegyada, waxay idiin hayaan adeeg natalyaaraoskar lazelalem fernandez. So [facility] [phone].    ATENCIÓN: Si habla español, tiene a corona disposición servicios gratuitos de asistencia lingüística. Uche al [phone].    We comply with applicable federal civil rights laws and [place] laws. We do not discriminate on the basis of race, color, national origin, age, disability, sex, sexual orientation, or gender identity.            Thank you!     Thank you for choosing [facility]  for your care. Our goal is always to provide you with excellent care. Hearing back from our patients is one way we can continue to improve our services. Please take a few minutes to complete the written survey that you may receive in the mail after your visit with us. Thank you!             Your Updated Medication List - Protect others around you: Learn how to safely use, store and throw away your medicines at www.disposemymeds.org.          This list is accurate as of 9/17/18  1:10 PM.  Always use your most recent med list.                   Brand Name Dispense Instructions for use Diagnosis    aspirin 325 MG tablet      Take 325 mg by mouth        dexamethasone 4 MG tablet    DECADRON    6 tablet    Take 2 tablets (8 mg) by mouth daily (with breakfast) for 3 days Start the day after chemotherapy.    Malignant neoplasm of base of tongue (H)       IBUPROFEN PO      Take 200 mg by mouth        lidocaine (viscous) 2 % solution    XYLOCAINE    100 mL    Take 15 mLs by mouth every  4 hours as needed for moderate pain swish and spit every 3-8 hours as needed; max 8 doses/24 hour period    Malignant neoplasm of base of tongue (H)       MULTIVITAMIN & MINERAL PO           oxyCODONE 5 MG/5ML solution    ROXICODONE    250 mL    Take 5 mLs (5 mg) by mouth every 4 hours as needed for severe pain    Postoperative pain       prochlorperazine 10 MG tablet    COMPAZINE    30 tablet    Take 1 tablet (10 mg) by mouth every 6 hours as needed (Nausea/Vomiting)    Malignant neoplasm of base of tongue (H)

## 2018-09-17 NOTE — PROGRESS NOTES
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 No issues.    2018: Moderate nausea/vomiting over the weekend. Otherwise tolerating treatment well.    9/10/2018: Tolerating treatment well although mood is slightly down secondary to ongoing therapy. Referred to [employer] representative for further discussion on supportive care options.    2018: Slightly increased odynophagia secondary to ongoing head and neck radiotherapy. Continuing to tolerate treatment well.    ED visits/Hospitalizations:  None    Missed Treatments:  None    Subjective: Mr. Porter presents to clinic today for his weekly on-treatment visit. He had an uneventful weekend and reports low-grade nausea without vomiting but has maintained adequate caloric and fluid intake. His weight is slightly down today, which he attributes to decrease psychological drive for food and a busy weekend. He denies any significant oral cavity or oropharyngeal pain and he additionally reports no headaches, fever/chills, dyspnea, chest pain or abdominal pain. He continues routine oral cares with salt and soda rinses. He also continues jaw stretching and neck exercises. His remaining ROS are otherwise unremarkable.    Nursing ROS:   Nutrition Alteration  Diet Type: Patient's Preference    Skin  Skin Reaction: Mild erythema     ENT and Mouth Exam  Mucositis - Current: Mild erythema     Gastrointestinal  Nausea: Mild symptoms    Pain Assessment  0-10 Pain Scale:  0    Objective:   Current weight: 80.7 kg  Last week's weight: 83.5 kg  Starting weight: 83 kg    Sitting BP: 155/87   Sitting Pulse: 87  Standing BP: 154/95  Standing Pulse: 94    General: Alert, oriented and in no acute distress  HEENT: Moist mucous membranes. Patchy mucositis involving the posterior soft palate and posterior oropharyngeal wall. No evidence of oral thrush.  Cardiac: Extremities are warm and well-perfused  Respiratory: No wheezing, stridor or respiratory distress  Skin: Mild diffuse erythema over treatment field    Treatment-related toxicities (CTCAE v5.0):  1. Nausea: Grade 2: Oral intake decreased without significant weight loss, dehydration or malnutrition  2. Dermatitis: Grade 1: Faint erythema or dry desquamation    Assessment:    Mr. 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 He is currently undergoing chemoradiotherapy with curative intent and has been tolerating treatment reasonably well with the anticipated acute radiation-induced toxicities.    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 Will continue to monitor symptoms with referral for additional supportive cares if needed.    Mosaiq chart and setup information reviewed  IGRT images reviewed    Medication Review  Med list reviewed with patient?: Yes    Camilo Mayer MD  Radiation Oncology  Resident, PGY-4  [facility]  Phone: [phone]      Attending addendum:   I saw and examined the patient with the resident and agree with the documented plan of care.    Nathan Garay MD/PhD  Dept of Radiation Oncology  [facility]

## 2018-09-17 NOTE — PROGRESS NOTES
CLINICAL NUTRITION SERVICES - REASSESSMENT NOTE   EVALUATION OF PREVIOUS PLAN OF CARE:   Referring Physician: Tanisha  Time spent with patient: 15 minutes.  Current diet: general  Current appetite/intake: fair, declining  PEG Tube: No  Chemotherapy: HD Cisplatin   Radiation: Yes 15/33 fractions completed   Monitoring from previous assessment:   -Food intake - declining 2/2 lack of taste and increased odynophagia.  He has been taking 1-2 CIB mixed with whole milk daily.     -Fluid/beverage intake - continues to take adequate water PO, >10 cups/day including his CIB shakes.     -Liquid meal replacement or supplement -  Does not take 2/2 dislike.  He is receptive to trying samples and potentially mixing with home made shakes and smoothies    -Weight trends - down ~6 lbs x past 1 week.   Wt Readings from Last 7 Encounters:   09/17/18 80.7 kg (178 lb)   09/10/18 83.5 kg (184 lb 1.6 oz)   09/05/18 83 kg (183 lb)   08/28/18 87 kg (191 lb 14.4 oz)   08/21/18 86.6 kg (191 lb)   08/21/18 86.6 kg (191 lb)   08/15/18 84.4 kg (186 lb)          Previous Goals:   1.  Aim for 5-6 small frequent meals - goal not met  2.  Aim for 2500-3000kcal and 100-125g protein/day  -goal not met  3. Weight maintenance through cancer treatment - goal not met  Evaluation: Not met   Previous Nutrition Diagnosis:   Predicted suboptimal nutrient intake related to receiving treatment for head/neck cancer, No PEG   Evaluation: Declining   NEW FINDINGS:   6 lb wt loss x past 1 week   CURRENT NUTRITION DIAGNOSIS   Inadequate oral intake related to dysgeusia and odynophagia as evidenced by 4% wt loss x past 1 week   INTERVENTIONS   Recommendations / Nutrition Prescription   1. Start taking 3-4 ONS/day (>400kcal/shake)   Implementation  General/healthful diet - reviewed nutrition needs and importance of no further weight loss. Reviewed soft, bland foods to try with current barriers to eating. Disussed potential need for PEG tube if patient continue to lose  weight at this rate.   Medical Food Supplement - reviewed ONS - encouraged pt to start drinking at least 3-4 ONS shakes/home made smoothies.   Goals   1. Aim for 2500-3000kcal/day  2. Weight maintenance    MONITORING AND EVALUATION:  -Food intake  -Fluid/beverage intake  -Liquid meal replacement or supplement  -Weight trends     Shelli Mcghee RD, LD

## 2018-09-18 ENCOUNTER — APPOINTMENT (OUTPATIENT)
Dept: RADIATION ONCOLOGY | Facility: CLINIC | Age: 68
End: 2018-09-18
Attending: RADIOLOGY
Payer: MEDICARE

## 2018-09-18 PROCEDURE — 77386 ZZH IMRT TREATMENT DELIVERY, COMPLEX: CPT | Performed by: RADIOLOGY

## 2018-09-19 ENCOUNTER — INFUSION THERAPY VISIT (OUTPATIENT)
Dept: ONCOLOGY | Facility: CLINIC | Age: 68
End: 2018-09-19
Attending: INTERNAL MEDICINE
Payer: MEDICARE

## 2018-09-19 ENCOUNTER — APPOINTMENT (OUTPATIENT)
Dept: RADIATION ONCOLOGY | Facility: CLINIC | Age: 68
End: 2018-09-19
Attending: RADIOLOGY
Payer: MEDICARE

## 2018-09-19 ENCOUNTER — APPOINTMENT (OUTPATIENT)
Dept: LAB | Facility: CLINIC | Age: 68
End: 2018-09-19
Attending: INTERNAL MEDICINE
Payer: MEDICARE

## 2018-09-19 VITALS
TEMPERATURE: 98.5 F | OXYGEN SATURATION: 98 % | WEIGHT: 178.2 LBS | BODY MASS INDEX: 24.17 KG/M2 | SYSTOLIC BLOOD PRESSURE: 128 MMHG | RESPIRATION RATE: 16 BRPM | DIASTOLIC BLOOD PRESSURE: 77 MMHG | HEART RATE: 67 BPM

## 2018-09-19 DIAGNOSIS — C01 MALIGNANT NEOPLASM OF BASE OF TONGUE (H): Primary | ICD-10-CM

## 2018-09-19 DIAGNOSIS — R13.12 OROPHARYNGEAL DYSPHAGIA: ICD-10-CM

## 2018-09-19 LAB
ALBUMIN SERPL-MCNC: 3.6 G/DL (ref 3.4–5)
ALP SERPL-CCNC: 89 U/L (ref 40–150)
ALT SERPL W P-5'-P-CCNC: 20 U/L (ref 0–70)
ANION GAP SERPL CALCULATED.3IONS-SCNC: 8 MMOL/L (ref 3–14)
AST SERPL W P-5'-P-CCNC: 18 U/L (ref 0–45)
BASOPHILS # BLD AUTO: 0 10E9/L (ref 0–0.2)
BASOPHILS NFR BLD AUTO: 0.6 %
BILIRUB SERPL-MCNC: 0.5 MG/DL (ref 0.2–1.3)
BUN SERPL-MCNC: 14 MG/DL (ref 7–30)
CALCIUM SERPL-MCNC: 9 MG/DL (ref 8.5–10.1)
CHLORIDE SERPL-SCNC: 107 MMOL/L (ref 94–109)
CO2 SERPL-SCNC: 25 MMOL/L (ref 20–32)
CREAT SERPL-MCNC: 0.85 MG/DL (ref 0.66–1.25)
DIFFERENTIAL METHOD BLD: ABNORMAL
EOSINOPHIL # BLD AUTO: 0.1 10E9/L (ref 0–0.7)
EOSINOPHIL NFR BLD AUTO: 3 %
ERYTHROCYTE [DISTWIDTH] IN BLOOD BY AUTOMATED COUNT: 12.8 % (ref 10–15)
GFR SERPL CREATININE-BSD FRML MDRD: 89 ML/MIN/1.7M2
GLUCOSE SERPL-MCNC: 126 MG/DL (ref 70–99)
HCT VFR BLD AUTO: 37.5 % (ref 40–53)
HGB BLD-MCNC: 12.7 G/DL (ref 13.3–17.7)
IMM GRANULOCYTES # BLD: 0 10E9/L (ref 0–0.4)
IMM GRANULOCYTES NFR BLD: 0 %
LYMPHOCYTES # BLD AUTO: 0.4 10E9/L (ref 0.8–5.3)
LYMPHOCYTES NFR BLD AUTO: 26.8 %
MAGNESIUM SERPL-MCNC: 2.1 MG/DL (ref 1.6–2.3)
MCH RBC QN AUTO: 30.5 PG (ref 26.5–33)
MCHC RBC AUTO-ENTMCNC: 33.9 G/DL (ref 31.5–36.5)
MCV RBC AUTO: 90 FL (ref 78–100)
MONOCYTES # BLD AUTO: 0.3 10E9/L (ref 0–1.3)
MONOCYTES NFR BLD AUTO: 18.3 %
NEUTROPHILS # BLD AUTO: 0.8 10E9/L (ref 1.6–8.3)
NEUTROPHILS NFR BLD AUTO: 51.3 %
NRBC # BLD AUTO: 0 10*3/UL
NRBC BLD AUTO-RTO: 0 /100
PLATELET # BLD AUTO: 211 10E9/L (ref 150–450)
POTASSIUM SERPL-SCNC: 4.1 MMOL/L (ref 3.4–5.3)
PROT SERPL-MCNC: 6.8 G/DL (ref 6.8–8.8)
RBC # BLD AUTO: 4.16 10E12/L (ref 4.4–5.9)
SODIUM SERPL-SCNC: 140 MMOL/L (ref 133–144)
WBC # BLD AUTO: 1.6 10E9/L (ref 4–11)

## 2018-09-19 PROCEDURE — 25000128 H RX IP 250 OP 636: Mod: ZF | Performed by: INTERNAL MEDICINE

## 2018-09-19 PROCEDURE — G0463 HOSPITAL OUTPT CLINIC VISIT: HCPCS | Mod: 25

## 2018-09-19 PROCEDURE — 36592 COLLECT BLOOD FROM PICC: CPT

## 2018-09-19 PROCEDURE — G0463 HOSPITAL OUTPT CLINIC VISIT: HCPCS | Mod: ZF

## 2018-09-19 PROCEDURE — 80053 COMPREHEN METABOLIC PANEL: CPT | Performed by: INTERNAL MEDICINE

## 2018-09-19 PROCEDURE — 96360 HYDRATION IV INFUSION INIT: CPT

## 2018-09-19 PROCEDURE — 99215 OFFICE O/P EST HI 40 MIN: CPT | Mod: ZP | Performed by: INTERNAL MEDICINE

## 2018-09-19 PROCEDURE — 96361 HYDRATE IV INFUSION ADD-ON: CPT

## 2018-09-19 PROCEDURE — 77336 RADIATION PHYSICS CONSULT: CPT | Performed by: RADIOLOGY

## 2018-09-19 PROCEDURE — 77386 ZZH IMRT TREATMENT DELIVERY, COMPLEX: CPT | Performed by: RADIOLOGY

## 2018-09-19 PROCEDURE — 85025 COMPLETE CBC W/AUTO DIFF WBC: CPT | Performed by: INTERNAL MEDICINE

## 2018-09-19 PROCEDURE — 83735 ASSAY OF MAGNESIUM: CPT | Performed by: INTERNAL MEDICINE

## 2018-09-19 RX ORDER — ONDANSETRON 8 MG/1
8 TABLET, FILM COATED ORAL EVERY 8 HOURS PRN
Qty: 30 TABLET | Refills: 3 | Status: SHIPPED | OUTPATIENT
Start: 2018-09-19 | End: 2018-10-30

## 2018-09-19 RX ADMIN — SODIUM CHLORIDE 2000 ML: 9 INJECTION, SOLUTION INTRAVENOUS at 09:00

## 2018-09-19 ASSESSMENT — PAIN SCALES - GENERAL: PAINLEVEL: MODERATE PAIN (4)

## 2018-09-19 NOTE — NURSING NOTE
Oncology Rooming Note    September 19, 2018 8:22 AM   Lazaro Porter is a 67 year old male who presents for:    Chief Complaint   Patient presents with     Blood Draw     labs drawn with piv start by rn.  vs taken     Oncology Clinic Visit     Head and Neck CA; Tx planning     Initial Vitals: /77 (BP Location: Right arm, Patient Position: Sitting, Cuff Size: Adult Regular)  Pulse 67  Temp 98.5  F (36.9  C) (Oral)  Resp 16  Wt 80.8 kg (178 lb 3.2 oz)  SpO2 98%  BMI 24.17 kg/m2 Estimated body mass index is 24.17 kg/(m^2) as calculated from the following:    Height as of 8/21/18: 1.829 m (6').    Weight as of this encounter: 80.8 kg (178 lb 3.2 oz). Body surface area is 2.03 meters squared.  Moderate Pain (4) Comment: Data Unavailable   No LMP for male patient.  Allergies reviewed: Yes  Medications reviewed: Yes    Medications: Medication refills not needed today.  Pharmacy name entered into [employer]: [facility] - NATALIA PEREZ - [address]    Clinical concerns:      8 minutes for nursing intake (face to face time)     Regi Vazquez CMA

## 2018-09-19 NOTE — PROGRESS NOTES
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     HISTORY OF PRESENT ILLNESS: Mr. 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  His oncologic history is as under.     Farhad was started on cisplatin therapy on 8/28/2018 and had fatigue and constipation right thereafter.  About 3 days later, he started getting more nauseated, had dysguesia, and 3 episodes of emesis which are only partially controlled with Compazine.  Overall, his chemo related side effects resolved by day 7.  He does have any chest pain or shortness of breath.  Occasional cough first thing in the morning due to oral secretions is present and chronic.  Tolerating radiation well and wants to continue chemotherapy today.    ONCOLOGIC HISTORY:  1. 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   - Noticed enlarging neck mass in March 2018. In 5/2018 this was reassessed and prompted imaging and ultimately a biopsy of the mass at [facility] ([place], [place]) which demonstrated squamous cell carcinoma.   - PET/CT 7/10/18: \"3.5 x 2.2 cm hypermetabolic right level 2 metastatic lymphadenopathy. There is imaging findings suggestive of extranodal extension. The mass is inseparable from the right SCM and abuts the right internal carotid artery about 180 degrees. No abnormal focal FDG uptake along the mucosal spaces to suggest a primary squamous cell cancer focus. Several subcentimeter pulmonary nodules with no increased FDG uptake. Attention on follow up is recommended. Multiple, too " "small to characterize hypodensities in the liver. A 9 mm nodule in the right adrenal gland, without increased FDG uptake. A 7 mm hypodensity in the pancreatic tail with no increased FDG uptake, may be a side branch IPMN. Tiny lucencies in the left iliac bone without increased FDG uptake, possibly focal osteoporotic areas.\"  - Seen by ENT and underwent DL with directed biopsies on 7/18/18 with pathology showing no evidence of malignancy within the mucosal sites.   - A Robotic demucosalization of the right tongue base on 8/3/18 was performed and pathology from this pathology demonstrated p16 positive nonkeratinizing squamous cell carcinoma in the level node lymph node and in the right tongue base.   - Started concurrent cisplatin plus radiation therapy based on tumor board recommendations. Cisplatin 100mg/m2 cycle 1: 8/28/18; cycle 2 delayed by 2 days due to - planned for 9/21/18. RT started 8/29/18.     REVIEW OF SYSTEMS:  14 point ROS negative other than the symptoms noted above in the HPI.    PAST MEDICAL HISTORY:   Past Medical History:   Diagnosis Date     Head and neck cancer (H)      PAST SURGICAL HISTORY:   Past Surgical History:   Procedure Laterality Date     DAVINCI RESECT TUMOR TRANSORAL Bilateral 8/3/2018    Procedure: DAVINCI RESECT TUMOR TRANSORAL;  Transoral Robotic Resection of the Base of Tongue,   IR Guided Core Needle Biopsy of Right Neck Mass;  Surgeon: Blas Calvillo MD;  Location: [facility] OR     FINE NEEDLE ASPIRATION WITH IMAGING GUIDANCE Right 8/3/2018    Procedure: FINE NEEDLE ASPIRATION WITH IMAGING GUIDANCE;  Ultrasound guided Core Biopsy of a Right Neck Mass;  Surgeon: Denis Monson MD;  Location: [facility] OR     LARYNGOSCOPY WITH BIOPSY(IES) Bilateral 7/18/2018    Procedure: LARYNGOSCOPY WITH BIOPSY(IES);  Direct Laryngoscopy with Direct Biopsies;  Surgeon: Garcia Worthy MD;  Location[facility] OR   In addition:  1. Irritated sweat gland removed   2. Fatty tumor on his back removed "    3. Cataract surgery     SOCIAL HISTORY:   Social History     Social History     Marital status:      Spouse name: N/A     Number of children: N/A     Years of education: N/A     Occupational History     Not on file.     Social History Main Topics     Smoking status: Never Smoker     Smokeless tobacco: Never Used     Alcohol use Yes      Comment: occasionally     Drug use: No     Sexual activity: Not on file     Other Topics Concern     Not on file     Social History Narrative   Patient is from [place], [place]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     FAMILY HISTORY:   Brother with clotting issue  Mother, unknown primary and    Maternal aunt with colon cancer  Maternal grandfather with head/neck cancer  Maternal cousins with cancers unknown types    ALLERGIES:  No Known Allergies    CURRENT MEDICATIONS:  Current Outpatient Prescriptions   Medication     IBUPROFEN PO     lidocaine, viscous, (XYLOCAINE) 2 % solution     ondansetron (ZOFRAN) 8 MG tablet     prochlorperazine (COMPAZINE) 10 MG tablet     aspirin 325 MG tablet     dexamethasone (DECADRON) 4 MG tablet     Multiple Vitamins-Minerals (MULTIVITAMIN & MINERAL PO)     oxyCODONE (ROXICODONE) 5 MG/5ML solution     No current facility-administered medications for this visit.      Facility-Administered Medications Ordered in Other Visits   Medication     0.9% sodium chloride BOLUS     PHYSICAL EXAMINATION:  Vital signs: /77 (BP Location: Right arm, Patient Position: Sitting, Cuff Size: Adult Regular)  Pulse 67  Temp 98.5  F (36.9  C) (Oral)  Resp 16  Wt 80.8 kg (178 lb 3.2 oz)  SpO2 98%  BMI 24.17 kg/m2  ECOG performance status of 1. Fatigue 1.  GENERAL: Well-nourished healthy-appearing male in chair, no acute distress.   HEENT: No icterus, no pallor. Moist mucous membranes. Oropharynx is clear.   NECK: Supple. Firm, nontender, 2.5 cm in diameter lymph node in the right neck, level  2.  LUNGS: Clear to ausculation bilaterally, normal work of breathing.   CARDIOVASCULAR: Regular rate and rhythm, no murmurs, gallops or rubs.   ABDOMEN: Soft, nontender and nondistended, no palpable masses, bowel sounds present.  EXTREMITIES: No cyanosis, no clubbing, no edema.   NEUROLOGIC: No focal deficits, CN 2-12 intact. Normal sensation in upper and lower extremities. Normal strength in upper and lower extremities.     LABORATORY DATA:  Recent Labs   Lab Test  09/19/18   0815  08/28/18   0848  08/03/18   1013   WBC  1.6*  5.4  5.2   RBC  4.16*  4.49  4.40   HGB  12.7*  13.3  13.4   HCT  37.5*  41.5  41.8   MCV  90  92  95   MCH  30.5  29.6  30.5   MCHC  33.9  32.0  32.1   RDW  12.8  13.2  13.4   PLT  211  268  200   NEUTROPHIL  51.3  59.9   --      Recent Labs   Lab Test  09/19/18   0815  08/28/18   0848   NA  140  141   POTASSIUM  4.1  4.3   CHLORIDE  107  109   CO2  25  24   ANIONGAP  8  7   GLC  126*  102*   BUN  14  14   CR  0.85  0.83   KM  9.0  8.9     Recent Labs   Lab Test  09/19/18   0815  08/28/18   0848   PROTTOTAL  6.8  6.7*   ALBUMIN  3.6  3.4   BILITOTAL  0.5  0.5   ALKPHOS  89  86   AST  18  17   ALT  20  20     IMAGING STUDIES:  As above.    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    1. HNSCC, chemo-induced CTCAE grade 2 neutropenia:  -  Patient's tolerated cycle 1 of cisplatin well overall except for nausea, fatigue and delayed recovery of ANC. Because his ANC is 800 today, we will delay chemotherapy for another couple of day to allow count recovery.   - The goal would be to minimize any delay in any curative intent therapy.    - Recheck CBC this Friday, and as long as ANC is over 1000, we will resume cisplatin 100 mg/m  every 3 weeks.    - If ANC on Friday is similar to today, the better approach would be  switching to cisplatin 40 mg/m  weekly as it would not be that immunosuppressive.  I discussed the higher risk of infections etc if chemo is given with low ANC and that the lower dose of cisplatin may have slightly lower efficacy compared with higher dose, and the patient was agreeable with this risk benefit trade-off if needed.  - Management of CINV as under.    - He's aware of the risks of cisplatin therapy, including fatigue, nausea, vomiting, dehydration, diarrhea, neurotoxicity, nephrotoxicity, myelosuppression with increased risk of infection and bleeding, loss of fertility and the risk of long-term toxicity, such as second malignancies etc.   - We had previously discussed the role of port and prophylactic G-tube placement.  The patient has good peripheral IV and a low to moderate risk for dehydration, weight loss, etc., and hence has opted against prophylactic G-tube and port placement for now.     2. Delayed CINV:  -We will add Zofran 8 mg every 8 hours as needed to patient's antiemetic regimen.  This will be the preferred option  and patient will take Compazine as backup.  - We will also give 2 L of IV hydration today and then weekly PRN.      All questions were answered, and patient and family were in complete agreement with this plan.      BILLIN.      Pillo Rolon MD      cc:   Garcia Worthy MD   [facility] Otolaryngology    [address] [place] [id number]      Nathan Garay MD    [facility] Radiation Oncology    [address] [place] [id number]

## 2018-09-19 NOTE — PATIENT INSTRUCTIONS
Contact Numbers    [facility] Main Line: [phone]  [facility] Triage and after hours / weekends / holidays:  [phone]      Please call the triage or after hours line if you experience a temperature greater than or equal to 100.5, shaking chills, have uncontrolled nausea, vomiting and/or diarrhea, dizziness, shortness of breath, chest pain, bleeding, unexplained bruising, or if you have any other new/concerning symptoms, questions or concerns.      If you are having any concerning symptoms or wish to speak to a provider before your next infusion visit, please call your care coordinator or triage to notify them so we can adequately serve you.     If you need a refill on a narcotic prescription or other medication, please call before your infusion appointment.                   September 2018 Sunday Monday Tuesday Wednesday Thursday Friday Saturday                                 1       2     3     4     [facility] EXTERNAL RADIATION TREATMT   11:15 AM   (30 min.)   [facility] RAD ONC [facility]   Radiation Oncology Clinic 5     P EXTERNAL RADIATION TREATMT   11:15 AM   (30 min.)   P RAD ONC [facility]   Radiation Oncology Clinic     [facility] ON TREATMENT VISIT   11:45 AM   (15 min.)   Nathan Garay MD   Radiation Oncology Clinic 6     [facility] EXTERNAL RADIATION TREATMT   11:15 AM   (30 min.)   P RAD ONC VARIAN   Radiation Oncology Clinic 7     [facility] EXTERNAL RADIATION TREATMT   11:15 AM   (30 min.)   P RAD ONC VARIAN   Radiation Oncology Clinic 8       9     10     [facility] EXTERNAL RADIATION TREATMT   11:15 AM   (30 min.)   P RAD ONC VARIAN   Radiation Oncology Clinic     [facility] NUTRITION VISIT   11:45 AM   (30 min.)   Shelli Reyna RD   [facility], [place], Nutrition Services     [facility] ON TREATMENT VISIT   11:45 AM   (15 min.)   Nathan Garay MD   Radiation Oncology Clinic 11     [facility] EXTERNAL RADIATION TREATMT   11:15 AM   (30 min.)   [facility] RAD ONC VARIAN   Radiation Oncology Clinic 12     P EXTERNAL RADIATION  TREATMT   11:15 AM   (30 min.)   [facility] RAD ONC [facility]   Radiation Oncology Clinic 13     [facility] EXTERNAL RADIATION TREATMT   11:15 AM   (30 min.)   [facility] RAD ONC [facility]   Radiation Oncology Clinic 14     [facility] EXTERNAL RADIATION TREATMT   11:15 AM   (30 min.)   [facility] RAD ONC [facility]   Radiation Oncology Clinic 15       16     17     FOLLOW UP   11:00 AM   (15 min.)   Shelli Reyna RD   [facility], [place], Nutrition Services     [facility] EXTERNAL RADIATION TREATMT   11:15 AM   (30 min.)   [facility] RAD ONC [facility]   Radiation Oncology Clinic     [facility] ON TREATMENT VISIT   11:45 AM   (15 min.)   Nathan Garay MD   Radiation Oncology Clinic 18     [facility] EXTERNAL RADIATION TREATMT   11:15 AM   (30 min.)   [facility] RAD ONC [facility]   Radiation Oncology Clinic 19     P [facility] LAB DRAW    7:45 AM   (15 min.)    [facility] DRAW   [facility] Draw     [facility] RETURN    8:00 AM   (30 min.)   Pillo Rolon MD   [facility] ONC INFUSION 360    9:00 AM   (360 min.)   [facility] ON[facility]Y [facility]   [facility]     TREATMENT   10:30 AM   (30 min.)   Shonda Rodriguez SLP   [facility] Rehab     [facility] EXTERNAL RADIATION TREATMT   11:15 AM   (30 min.)   P RAD ONC VARIAN   Radiation Oncology Clinic 20     [facility] EXTERNAL RADIATION TREATMT   11:15 AM   (30 min.)   P RAD ONC VARIAN   Radiation Oncology Clinic 21  Happy Birthday!     [facility] LAB DRAW   10:30 AM   (15 min.)    [facility] DRAW   [facility] Draw     P ONC INFUSION 360   11:00 AM   (360 min.)   [facility] ONCOLOGY INFUSION   [facility]     [facility] EXTERNAL RADIATION TREATMT   11:15 AM   (30 min.)   [facility] RAD ONC [facility]   Radiation Oncology Clinic 22       23     24     [facility] EXTERNAL RADIATION TREATMT   11:15 AM   (30 min.)   [facility] RAD ONC [facility]   Radiation Oncology Clinic     [facility] ON TREATMENT VISIT   11:45 AM   (15 min.)   Nathan Garay MD   Radiation Oncology Clinic 25     [facility] EXTERNAL RADIATION TREATMT    11:15 AM   (30 min.)   [facility] RAD ONC VARIAN   Radiation Oncology Clinic 26     [facility] EXTERNAL RADIATION TREATMT   11:15 AM   (30 min.)   [facility] RAD ONC VARIAN   Radiation Oncology Clinic 27     [facility] EXTERNAL RADIATION TREATMT   11:15 AM   (30 min.)   [facility] RAD ONC VARIAN   Radiation Oncology Clinic 28     [facility] EXTERNAL RADIATION TREATMT   11:15 AM   (30 min.)   [facility] RAD ONC VARIAN   Radiation Oncology Clinic 29 30 October 2018 Sunday Monday Tuesday Wednesday Thursday Friday Saturday        1     [facility] EXTERNAL RADIATION TREATMT   11:15 AM   (30 min.)   [facility] RAD ONC [facility]   Radiation Oncology Clinic     [facility] ON TREATMENT VISIT   11:45 AM   (15 min.)   Nathan Garay MD   Radiation Oncology Clinic 2     [facility] EXTERNAL RADIATION TREATMT   11:15 AM   (30 min.)   [facility] RAD ONC [facility]IAN   Radiation Oncology Clinic     [facility] RETURN    2:45 PM   (15 min.)   Garcia Worthy MD   [facility] 3     TREATMENT   10:30 AM   (30 min.)   Shonda Rodriguez SLP   [facility] Rehab     [facility] EXTERNAL RADIATION TREATMT   11:15 AM   (30 min.)   [facility] RAD ONC VARIAN   Radiation Oncology Clinic 4     [facility] EXTERNAL RADIATION TREATMT   11:15 AM   (30 min.)   [facility] RAD ONC VARIAN   Radiation Oncology Clinic 5     [facility] EXTERNAL RADIATION TREATMT   11:15 AM   (30 min.)   [facility] RAD ONC [facility]   Radiation Oncology Clinic 6       7     8     [facility] EXTERNAL RADIATION TREATMT   11:15 AM   (30 min.)   [facility] RAD ONC [facility]   Radiation Oncology Clinic     [facility] ON TREATMENT VISIT   11:45 AM   (15 min.)   Nathan Garay MD   Radiation Oncology Clinic 9     [facility] EXTERNAL RADIATION TREATMT   11:15 AM   (30 min.)   [facility] RAD ONC VARIAN   Radiation Oncology Clinic 10     [facility] DRAW    7:15 AM   (15 min.)    [facility] DRAW   [facility] Draw     [facility] RETURN    7:30 AM   (30 min.)   Pillo Rolon MD   [facility]    8:30 AM   (360 min.)     [facility]   11:15 AM   (30 min.)   [facility] RAD ONC [facility]   Radiation Oncology Clinic 11     [facility] EXTERNAL RADIATION TREATMT   11:15 AM   (30 min.)   P RAD ONC VARIAN   Radiation Oncology Clinic 12     TREATMENT   10:30 AM   (30 min.)   Shonda Rodriguez SLP   [facility] Rehab     [facility] EXTERNAL RADIATION TREATMT   11:15 AM   (30 min.)   [facility] RAD ONC VARIAN   Radiation Oncology Clinic 13       14     15     [facility] EXTERNAL RADIATION TREATMT   11:15 AM   (30 min.)   [facility] RAD ONC VARIAN   Radiation Oncology Clinic     [facility] ON TREATMENT VISIT   11:45 AM   (15 min.)   Nathan Garay MD   Radiation Oncology Clinic 16     17     18     19     20       21     22     23     24     25     26     27       28     29     30     31                                 Lab Results:  Recent Results (from the past 12 hour(s))   CBC with platelets differential    Collection Time: 09/19/18  8:15 AM   Result Value Ref Range    WBC 1.6 (L) 4.0 - 11.0 10e9/L    RBC Count 4.16 (L) 4.4 - 5.9 10e12/L    Hemoglobin 12.7 (L) 13.3 - 17.7 g/dL    Hematocrit 37.5 (L) 40.0 - 53.0 %    MCV 90 78 - 100 fl    MCH 30.5 26.5 - 33.0 pg    MCHC 33.9 31.5 - 36.5 g/dL    RDW 12.8 10.0 - 15.0 %    Platelet Count 211 150 - 450 10e9/L    Diff Method Automated Method     % Neutrophils 51.3 %    % Lymphocytes 26.8 %    % Monocytes 18.3 %    % Eosinophils 3.0 %    % Basophils 0.6 %    % Immature Granulocytes 0.0 %    Nucleated RBCs 0 0 /100    Absolute Neutrophil 0.8 (L) 1.6 - 8.3 10e9/L    Absolute Lymphocytes 0.4 (L) 0.8 - 5.3 10e9/L    Absolute Monocytes 0.3 0.0 - 1.3 10e9/L    Absolute Eosinophils 0.1 0.0 - 0.7 10e9/L    Absolute Basophils 0.0 0.0 - 0.2 10e9/L    Abs Immature Granulocytes 0.0 0 - 0.4 10e9/L    Absolute Nucleated RBC 0.0    Comprehensive metabolic panel    Collection Time: 09/19/18  8:15 AM   Result Value Ref Range    Sodium 140 133 - 144 mmol/L    Potassium 4.1 3.4 - 5.3  mmol/L    Chloride 107 94 - 109 mmol/L    Carbon Dioxide 25 20 - 32 mmol/L    Anion Gap 8 3 - 14 mmol/L    Glucose 126 (H) 70 - 99 mg/dL    Urea Nitrogen 14 7 - 30 mg/dL    Creatinine 0.85 0.66 - 1.25 mg/dL    GFR Estimate 89 >60 mL/min/1.7m2    GFR Estimate If Black >90 >60 mL/min/1.7m2    Calcium 9.0 8.5 - 10.1 mg/dL    Bilirubin Total 0.5 0.2 - 1.3 mg/dL    Albumin 3.6 3.4 - 5.0 g/dL    Protein Total 6.8 6.8 - 8.8 g/dL    Alkaline Phosphatase 89 40 - 150 U/L    ALT 20 0 - 70 U/L    AST 18 0 - 45 U/L   Magnesium    Collection Time: 09/19/18  8:15 AM   Result Value Ref Range    Magnesium 2.1 1.6 - 2.3 mg/dL

## 2018-09-19 NOTE — PROGRESS NOTES
Infusion Nursing Note:  Lazaro Porter presents today for Day 22 Cycle 1 Cisplatin (treatment delayed today); 2L NS IVF.    Patient seen by provider today: Yes: Dr. Rolon   present during visit today: Not Applicable.    Note: Patient arrives to infusion reporting that he feels well and does not present any additional questions or concerns not addressed in his appointment with Dr. Rolon prior to infusion today.    Per Dr. Rolon's check-out notes, patient will have Day 22 Cycle 1 Cisplatin delayed today d/t neutropenia. Plan to give 2L NS IVF today in infusion. Plan to reschedule chemotherapy for Friday, 9/21/18.    Intravenous Access:  Peripheral IV placed in lab.    Treatment Conditions:  Lab Results   Component Value Date    HGB 12.7 09/19/2018     Lab Results   Component Value Date    WBC 1.6 09/19/2018      Lab Results   Component Value Date    ANEU 0.8 09/19/2018     Lab Results   Component Value Date     09/19/2018      Lab Results   Component Value Date     09/19/2018                   Lab Results   Component Value Date    POTASSIUM 4.1 09/19/2018           Lab Results   Component Value Date    MAG 2.1 09/19/2018            Lab Results   Component Value Date    CR 0.85 09/19/2018                   Lab Results   Component Value Date    KM 9.0 09/19/2018                Lab Results   Component Value Date    BILITOTAL 0.5 09/19/2018           Lab Results   Component Value Date    ALBUMIN 3.6 09/19/2018                    Lab Results   Component Value Date    ALT 20 09/19/2018           Lab Results   Component Value Date    AST 18 09/19/2018       Results reviewed, labs did NOT meet treatment parameters: ANC 0.8.      Post Infusion Assessment:  Patient tolerated infusion without incident.  Blood return noted pre and post infusion.  Site patent and intact, free from redness, edema or discomfort.  No evidence of extravasations.  Access discontinued per protocol.    Discharge Plan:   Prescription  refills given for Zofran.  Discharge instructions reviewed with: Patient.  Patient and/or family verbalized understanding of discharge instructions and all questions answered.  Copy of AVS reviewed with patient and/or family.  Patient will return 11/21/18 for next appointment.  Patient discharged in stable condition accompanied by: self.  Departure Mode: Ambulatory.  Face to Face time: 0 minutes.    Larry Montenegro RN

## 2018-09-19 NOTE — NURSING NOTE
Chief Complaint   Patient presents with     Blood Draw     labs drawn with piv start by rn.  vs taken     Labs drawn with PIV start by rn.  Pt tolerated well.  VS taken and pt checked in for next appt.  Lani Stephenson RN

## 2018-09-19 NOTE — LETTER
"9/19/2018       RE: Lazaro Porter  [address] [facility] [address]     Dear Colleague,    Thank you for referring your patient, Lazaro Porter, to the [facility]. Please see a copy of my visit note below.    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     HISTORY OF PRESENT ILLNESS: Mr. 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  His oncologic history is as under.     Farhad was started on cisplatin therapy on 8/28/2018 and had fatigue and constipation right thereafter.  About 3 days later, he started getting more nauseated, had dysguesia, and 3 episodes of emesis which are only partially controlled with Compazine.  Overall, his chemo related side effects resolved by day 7.  He does have any chest pain or shortness of breath.  Occasional cough first thing in the morning due to oral secretions is present and chronic.  Tolerating radiation well and wants to continue chemotherapy today.    ONCOLOGIC HISTORY:  1. 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   - Noticed enlarging neck mass in March 2018. In 5/2018 this was reassessed and prompted imaging and ultimately a biopsy of the mass at [facility] ([place], [place]) which demonstrated squamous cell carcinoma.   - PET/CT 7/10/18: \"3.5 x 2.2 cm hypermetabolic right level 2 metastatic lymphadenopathy. There is imaging findings suggestive of extranodal extension. The mass is inseparable from the right SCM and abuts the right internal carotid artery about 180 " "degrees. No abnormal focal FDG uptake along the mucosal spaces to suggest a primary squamous cell cancer focus. Several subcentimeter pulmonary nodules with no increased FDG uptake. Attention on follow up is recommended. Multiple, too small to characterize hypodensities in the liver. A 9 mm nodule in the right adrenal gland, without increased FDG uptake. A 7 mm hypodensity in the pancreatic tail with no increased FDG uptake, may be a side branch IPMN. Tiny lucencies in the left iliac bone without increased FDG uptake, possibly focal osteoporotic areas.\"  - Seen by ENT and underwent DL with directed biopsies on 7/18/18 with pathology showing no evidence of malignancy within the mucosal sites.   - A Robotic demucosalization of the right tongue base on 8/3/18 was performed and pathology from this pathology demonstrated p16 positive nonkeratinizing squamous cell carcinoma in the level node lymph node and in the right tongue base.   - Started concurrent cisplatin plus radiation therapy based on tumor board recommendations. Cisplatin 100mg/m2 cycle 1: 8/28/18; cycle 2 delayed by 2 days due to - planned for 9/21/18. RT started 8/29/18.     REVIEW OF SYSTEMS:  14 point ROS negative other than the symptoms noted above in the HPI.    PAST MEDICAL HISTORY:   Past Medical History:   Diagnosis Date     Head and neck cancer (H)      PAST SURGICAL HISTORY:   Past Surgical History:   Procedure Laterality Date     DAVINCI RESECT TUMOR TRANSORAL Bilateral 8/3/2018    Procedure: DAVINCI RESECT TUMOR TRANSORAL;  Transoral Robotic Resection of the Base of Tongue,   IR Guided Core Needle Biopsy of Right Neck Mass;  Surgeon: Blas Calvillo MD;  Location: [facility] OR     FINE NEEDLE ASPIRATION WITH IMAGING GUIDANCE Right 8/3/2018    Procedure: FINE NEEDLE ASPIRATION WITH IMAGING GUIDANCE;  Ultrasound guided Core Biopsy of a Right Neck Mass;  Surgeon: Denis Monson MD;  Location: [facility] OR     LARYNGOSCOPY WITH BIOPSY(IES) Bilateral " 2018    Procedure: LARYNGOSCOPY WITH BIOPSY(IES);  Direct Laryngoscopy with Direct Biopsies;  Surgeon: Garcia Worthy MD;  Location:  OR   In addition:  1. Irritated sweat gland removed   2. Fatty tumor on his back removed    3. Cataract surgery     SOCIAL HISTORY:   Social History     Social History     Marital status:      Spouse name: N/A     Number of children: N/A     Years of education: N/A     Occupational History     Not on file.     Social History Main Topics     Smoking status: Never Smoker     Smokeless tobacco: Never Used     Alcohol use Yes      Comment: occasionally     Drug use: No     Sexual activity: Not on file     Other Topics Concern     Not on file     Social History Narrative   Patient is from [place], [place]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     FAMILY HISTORY:   Brother with clotting issue  Mother, unknown primary and    Maternal aunt with colon cancer  Maternal grandfather with head/neck cancer  Maternal cousins with cancers unknown types    ALLERGIES:  No Known Allergies    CURRENT MEDICATIONS:  Current Outpatient Prescriptions   Medication     IBUPROFEN PO     lidocaine, viscous, (XYLOCAINE) 2 % solution     ondansetron (ZOFRAN) 8 MG tablet     prochlorperazine (COMPAZINE) 10 MG tablet     aspirin 325 MG tablet     dexamethasone (DECADRON) 4 MG tablet     Multiple Vitamins-Minerals (MULTIVITAMIN & MINERAL PO)     oxyCODONE (ROXICODONE) 5 MG/5ML solution     No current facility-administered medications for this visit.      Facility-Administered Medications Ordered in Other Visits   Medication     0.9% sodium chloride BOLUS     PHYSICAL EXAMINATION:  Vital signs: /77 (BP Location: Right arm, Patient Position: Sitting, Cuff Size: Adult Regular)  Pulse 67  Temp 98.5  F (36.9  C) (Oral)  Resp 16  Wt 80.8 kg (178 lb 3.2 oz)  SpO2 98%  BMI 24.17 kg/m2  ECOG performance status of 1. Fatigue  1.  GENERAL: Well-nourished healthy-appearing male in chair, no acute distress.   HEENT: No icterus, no pallor. Moist mucous membranes. Oropharynx is clear.   NECK: Supple. Firm, nontender, 2.5 cm in diameter lymph node in the right neck, level 2.  LUNGS: Clear to ausculation bilaterally, normal work of breathing.   CARDIOVASCULAR: Regular rate and rhythm, no murmurs, gallops or rubs.   ABDOMEN: Soft, nontender and nondistended, no palpable masses, bowel sounds present.  EXTREMITIES: No cyanosis, no clubbing, no edema.   NEUROLOGIC: No focal deficits, CN 2-12 intact. Normal sensation in upper and lower extremities. Normal strength in upper and lower extremities.     LABORATORY DATA:  Recent Labs   Lab Test  09/19/18   0815  08/28/18   0848  08/03/18   1013   WBC  1.6*  5.4  5.2   RBC  4.16*  4.49  4.40   HGB  12.7*  13.3  13.4   HCT  37.5*  41.5  41.8   MCV  90  92  95   MCH  30.5  29.6  30.5   MCHC  33.9  32.0  32.1   RDW  12.8  13.2  13.4   PLT  211  268  200   NEUTROPHIL  51.3  59.9   --      Recent Labs   Lab Test  09/19/18   0815  08/28/18   0848   NA  140  141   POTASSIUM  4.1  4.3   CHLORIDE  107  109   CO2  25  24   ANIONGAP  8  7   GLC  126*  102*   BUN  14  14   CR  0.85  0.83   KM  9.0  8.9     Recent Labs   Lab Test  09/19/18   0815  08/28/18   0848   PROTTOTAL  6.8  6.7*   ALBUMIN  3.6  3.4   BILITOTAL  0.5  0.5   ALKPHOS  89  86   AST  18  17   ALT  20  20     IMAGING STUDIES:  As above.    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    1. HNSCC, chemo-induced CTCAE grade 2 neutropenia:  -  Patient's tolerated cycle 1 of cisplatin well overall except for nausea, fatigue and delayed recovery of ANC. Because his ANC is 800 today, we will delay chemotherapy for another couple of day to allow count recovery.   - The goal  would be to minimize any delay in any curative intent therapy.    - Recheck CBC this Friday, and as long as ANC is over 1000, we will resume cisplatin 100 mg/m  every 3 weeks.    - If ANC on Friday is similar to today, the better approach would be switching to cisplatin 40 mg/m  weekly as it would not be that immunosuppressive.  I discussed the higher risk of infections etc if chemo is given with low ANC and that the lower dose of cisplatin may have slightly lower efficacy compared with higher dose, and the patient was agreeable with this risk benefit trade-off if needed.  - Management of CINV as under.    - He's aware of the risks of cisplatin therapy, including fatigue, nausea, vomiting, dehydration, diarrhea, neurotoxicity, nephrotoxicity, myelosuppression with increased risk of infection and bleeding, loss of fertility and the risk of long-term toxicity, such as second malignancies etc.   - We had previously discussed the role of port and prophylactic G-tube placement.  The patient has good peripheral IV and a low to moderate risk for dehydration, weight loss, etc., and hence has opted against prophylactic G-tube and port placement for now.     2. Delayed CINV:  -We will add Zofran 8 mg every 8 hours as needed to patient's antiemetic regimen.  This will be the preferred option  and patient will take Compazine as backup.  - We will also give 2 L of IV hydration today and then weekly PRN.      All questions were answered, and patient and family were in complete agreement with this plan.      BILLIN.      Pillo Rolon MD      cc:   Garcia Worthy MD   [facility]    [address] [place] [id number]      Nathan Garay MD    [facility]iation [facility]    [address] [place] [id number]

## 2018-09-19 NOTE — MR AVS SNAPSHOT
After Visit Summary   9/19/2018    Lazaro Porter    MRN: [id number]           Patient Information     Date Of Birth          1950        Visit Information        Provider Department      9/19/2018 9:00 AM  26 ATC;  ONCOLOGY INFUSION [facility]        Today's Diagnoses     Malignant neoplasm of base of tongue (H)    -  1      Care Instructions    Contact Numbers    [facility] Main Line: [phone]  [facility] Triage and after hours / weekends / holidays:  [phone]      Please call the triage or after hours line if you experience a temperature greater than or equal to 100.5, shaking chills, have uncontrolled nausea, vomiting and/or diarrhea, dizziness, shortness of breath, chest pain, bleeding, unexplained bruising, or if you have any other new/concerning symptoms, questions or concerns.      If you are having any concerning symptoms or wish to speak to a provider before your next infusion visit, please call your care coordinator or triage to notify them so we can adequately serve you.     If you need a refill on a narcotic prescription or other medication, please call before your infusion appointment.                   September 2018 Sunday Monday Tuesday Wednesday Thursday Friday Saturday                                 1       2     3     4     [facility] EXTERNAL RADIATION TREATMT   11:15 AM   (30 min.)   [facility] RAD ONC VARIAN   [facility] 5     [facility] EXTERNAL RADIATION TREATMT   11:15 AM   (30 min.)   P RAD ONC VARIAN   [facility]     [facility] ON TREATMENT VISIT   11:45 AM   (15 min.)   Nathan Garay MD   [facility] 6     [facility] EXTERNAL RADIATION TREATMT   11:15 AM   (30 min.)   [facility] RAD ONC VARIAN   [facility] 7     [facility] EXTERNAL RADIATION TREATMT   11:15 AM   (30 min.)   [facility] RAD ONC VARIAN   [facility] 8       9     10     [facility] EXTERNAL RADIATION TREATMT   11:15 AM   (30 min.)   P RAD ONC VARIAN    Radiation Oncology Clinic     [facility] NUTRITION VISIT   11:45 AM   (30 min.)   Shelli Reyna RD   [facility] Nutrition Services     [facility] ON TREATMENT VISIT   11:45 AM   (15 min.)   Nathan Garay MD   [facility] 11     [facility] EXTERNAL RADIATION TREATMT   11:15 AM   (30 min.)   [facility] RAD ONC VARIAN   [facility] 12     [facility] EXTERNAL RADIATION TREATMT   11:15 AM   (30 min.)   [facility] RAD ONC VARIAN   [facility] 13     [facility] EXTERNAL RADIATION TREATMT   11:15 AM   (30 min.)   [facility] RAD ONC VARIAN   [facility] 14     [facility] EXTERNAL RADIATION TREATMT   11:15 AM   (30 min.)   P RAD ONC VARIAN   [facility] 15       16     17     FOLLOW UP   11:00 AM   (15 min.)   Shelli Reyna RD   [facility] Nutrition Services     [facility] EXTERNAL RADIATION TREATMT   11:15 AM   (30 min.)   P RAD ONC [facility]   Radiation Oncology Clinic     [facility] ON TREATMENT VISIT   11:45 AM   (15 min.)   Nathan Garay MD   [facility] 18     [facility] EXTERNAL RADIATION TREATMT   11:15 AM   (30 min.)   P RAD ONC VARIAN   [facility] 19     P [facility] DRAW    7:45 AM   (15 min.)   [facility]   [facility] Draw     [facility] RETURN    8:00 AM   (30 min.)   Pillo Rolon MD   [facility]    9:00 AM   (360 min.)   [facility] ONCOLOGY INFUSION   [facility]     TREATMENT   10:30 AM   (30 min.)   Shonda Rodriguez SLP    Health Rehab     [facility] EXTERNAL RADIATION TREATMT   11:15 AM   (30 min.)   P RAD ONC VARIAN   [facility] 20     [facility] EXTERNAL RADIATION TREATMT   11:15 AM   (30 min.)   [facility] RAD ONC VARIAN   [facility] 21  Happy Birthday!     [facility] DRAW   10:30 AM   (15 min.)   [facility]   [facility] Lab Draw     P ONC INFUSION 360   11:00 AM   (360 min.)   [facility] ONCOLOGY INFUSION   [facility]  Cancer Clinic     [facility] EXTERNAL RADIATION TREATMT   11:15 AM   (30 min.)   [facility] RAD ONC VARIAN   [facility] 22       23     24     [facility] EXTERNAL RADIATION TREATMT   11:15 AM   (30 min.)   [facility] RAD ONC VARIAN   [facility]     [facility] ON TREATMENT VISIT   11:45 AM   (15 min.)   Nathan Garay MD   [facility] 25     [facility] EXTERNAL RADIATION TREATMT   11:15 AM   (30 min.)   [facility] RAD ONC VARIAN   [facility] 26     [facility] EXTERNAL RADIATION TREATMT   11:15 AM   (30 min.)   [facility] RAD ONC VARIAN   [facility] 27     [facility] EXTERNAL RADIATION TREATMT   11:15 AM   (30 min.)   [facility] RAD ONC VARIAN   [facility] 28     [facility] EXTERNAL RADIATION TREATMT   11:15 AM   (30 min.)   [facility] RAD ONC VARIAN   [facility] 29 30 October 2018 Sunday Monday Tuesday Wednesday Thursday Friday Saturday        1     [facility] EXTERNAL RADIATION TREATMT   11:15 AM   (30 min.)   [facility] RAD ONC VARIAN   [facility]     [facility] ON TREATMENT VISIT   11:45 AM   (15 min.)   Nathan Garay MD   [facility] 2     [facility] EXTERNAL RADIATION TREATMT   11:15 AM   (30 min.)   [facility] RAD ONC VARIAN   [facility]     [facility] RETURN    2:45 PM   (15 min.)   Garcia Wotrhy MD   [facility] 3     TREATMENT   10:30 AM   (30 min.)   Shonda Rodriguez SLP   [facility] Rehab     [facility] EXTERNAL RADIATION TREATMT   11:15 AM   (30 min.)   [facility] RAD ONC VARIAN   [facility] 4     [facility] EXTERNAL RADIATION TREATMT   11:15 AM   (30 min.)   [facility] RAD ONC VARIAN   [facility] 5     [facility] EXTERNAL RADIATION TREATMT   11:15 AM   (30 min.)   [facility] RAD ONC VARIAN   [facility] 6       7     8     [facility] EXTERNAL RADIATION TREATMT   11:15 AM   (30 min.)   [facility] RAD ONC VARIAN   [facility]     [facility] ON TREATMENT VISIT   11:45 AM   (15 min.)   Tanisha  Nathan Alonzo MD   [facility] 9     [facility] EXTERNAL RADIATION TREATMT   11:15 AM   (30 min.)   [facility] RAD ONC VARIAN   [facility] 10     [facility] LAB DRAW    7:15 AM   (15 min.)    [facility] LAB DRAW   [facility] Lab Draw     [facility] RETURN    7:30 AM   (30 min.)   Pillo Rolon MD   [facility] ONC INFUSION 360    8:30 AM   (360 min.)   [facility] ONCOL[facility]FUSION   [facility]   11:15 AM   (30 min.)   [facility] RAD ONC VARIAN   [facility] 11     [facility] EXTERNAL RADIATION TREATMT   11:15 AM   (30 min.)   [facility] RAD ONC VARIAN   [facility] 12     TREATMENT   10:30 AM   (30 min.)   Shonda Rodriguez SLP   [facility] Rehab     [facility] EXTERNAL RADIATION TREATMT   11:15 AM   (30 min.)   [facility] RAD ONC VARIAN   [facility] 13       14     15     [facility] EXTERNAL RADIATION TREATMT   11:15 AM   (30 min.)   P RAD ONC VARIAN   Rad[facility] Oncology Clinic     [facility] ON TREATMENT VISIT   11:45 AM   (15 min.)   Nathan Garay MD   [facility] 16     17     18     19     20       21     22     23     24     25     26     27       28     29     30     31                                 Lab Results:  Recent Results (from the past 12 hour(s))   CBC with platelets differential    Collection Time: 09/19/18  8:15 AM   Result Value Ref Range    WBC 1.6 (L) 4.0 - 11.0 10e9/L    RBC Count 4.16 (L) 4.4 - 5.9 10e12/L    Hemoglobin 12.7 (L) 13.3 - 17.7 g/dL    Hematocrit 37.5 (L) 40.0 - 53.0 %    MCV 90 78 - 100 fl    MCH 30.5 26.5 - 33.0 pg    MCHC 33.9 31.5 - 36.5 g/dL    RDW 12.8 10.0 - 15.0 %    Platelet Count 211 150 - 450 10e9/L    Diff Method Automated Method     % Neutrophils 51.3 %    % Lymphocytes 26.8 %    % Monocytes 18.3 %    % Eosinophils 3.0 %    % Basophils 0.6 %    % Immature Granulocytes 0.0 %    Nucleated RBCs 0 0 /100    Absolute Neutrophil 0.8 (L) 1.6 - 8.3 10e9/L     Absolute Lymphocytes 0.4 (L) 0.8 - 5.3 10e9/L    Absolute Monocytes 0.3 0.0 - 1.3 10e9/L    Absolute Eosinophils 0.1 0.0 - 0.7 10e9/L    Absolute Basophils 0.0 0.0 - 0.2 10e9/L    Abs Immature Granulocytes 0.0 0 - 0.4 10e9/L    Absolute Nucleated RBC 0.0    Comprehensive metabolic panel    Collection Time: 09/19/18  8:15 AM   Result Value Ref Range    Sodium 140 133 - 144 mmol/L    Potassium 4.1 3.4 - 5.3 mmol/L    Chloride 107 94 - 109 mmol/L    Carbon Dioxide 25 20 - 32 mmol/L    Anion Gap 8 3 - 14 mmol/L    Glucose 126 (H) 70 - 99 mg/dL    Urea Nitrogen 14 7 - 30 mg/dL    Creatinine 0.85 0.66 - 1.25 mg/dL    GFR Estimate 89 >60 mL/min/1.7m2    GFR Estimate If Black >90 >60 mL/min/1.7m2    Calcium 9.0 8.5 - 10.1 mg/dL    Bilirubin Total 0.5 0.2 - 1.3 mg/dL    Albumin 3.6 3.4 - 5.0 g/dL    Protein Total 6.8 6.8 - 8.8 g/dL    Alkaline Phosphatase 89 40 - 150 U/L    ALT 20 0 - 70 U/L    AST 18 0 - 45 U/L   Magnesium    Collection Time: 09/19/18  8:15 AM   Result Value Ref Range    Magnesium 2.1 1.6 - 2.3 mg/dL               Follow-ups after your visit        Your next 10 appointments already scheduled     Sep 19, 2018 11:15 AM CDT   EXTERNAL RADIATION TREATMENT with [facility] RAD ONC VARIAN   Radiation Oncology Clinic ([facility])    [facility]  1st Floor  [address]  [place] [address] [phone]            Sep 20, 2018 11:15 AM CDT   EXTERNAL RADIATION TREATMENT with [facility] RAD ONC VARIAN   Radiation Oncology Clinic ([facility])    [facility]  1st Floor  [address]  [place] [id number] [phone]            Sep 21, 2018 10:30 AM CDT   [facility] Draw with  [facility] LAB DRAW   [facility] Lab Draw ([facility])    [address]  [address]  [place] [id number] [phone]            Sep 21, 2018 11:00 AM CDT   Infusion 360 with  ONCOLOGY INFUSION,  21 ATC   [facility]  Cancer Clinic ([facility])    [address]  [address]  [place] [address]   [phone]            Sep 21, 2018 11:15 AM CDT   EXTERNAL RADIATION TREATMENT with [facility] RAD ONC VARIAN   Radiation Oncology Clinic ([facility])    [facility]  1st Floor  [address] [address]   [phone]            Sep 24, 2018 11:15 AM CDT   EXTERNAL RADIATION TREATMENT with [facility] RAD ONC VARIAN   Radiation Oncology Clinic ([facility])    [facility]  1st Floor  [address] [address]   [phone]            Sep 24, 2018 11:45 AM CDT   ON TREATMENT VISIT with Nathan Garay MD   Radiation Oncology Clinic ([facility])    [facility]  1st Floor  [address] [address]   [phone]            Sep 25, 2018 11:15 AM CDT   EXTERNAL RADIATION TREATMENT with [facility] RAD ONC VARIAN   Radiation Oncology Clinic ([facility])    [facility]  1st Floor  [address] [address]   [phone]            Sep 26, 2018 11:15 AM CDT   EXTERNAL RADIATION TREATMENT with [facility] RAD ONC VARIAN   Radiation Oncology Clinic ([facility])    [facility]  1st Floor  [address] [address]   [phone]              Future tests that were ordered for you today     Open Future Orders        Priority Expected Expires Ordered    CBC with platelets differential Routine 9/21/2018 10/10/2018 9/19/2018            Who to contact     If you have questions or need follow up information about today's clinic visit or your schedule please contact [facility]IC directly at [phone].  Normal or non-critical lab and imaging results will be communicated to you by MyChart, letter or phone within 4 business days after the clinic has received the  "results. If you do not hear from us within 7 days, please contact the clinic through [employer] or phone. If you have a critical or abnormal lab result, we will notify you by phone as soon as possible.  Submit refill requests through [employer] or call your pharmacy and they will forward the refill request to us. Please allow 3 business days for your refill to be completed.          Additional Information About Your Visit        [employer] Information     [employer] lets you send messages to your doctor, view your test results, renew your prescriptions, schedule appointments and more. To sign up, go to [email or web address] . Click on \"Log in\" on the left side of the screen, which will take you to the Welcome page. Then click on \"Sign up Now\" on the right side of the page.     You will be asked to enter the access code listed below, as well as some personal information. Please follow the directions to create your username and password.     Your access code is: [email or web address]  Expires: 2018 10:28 AM     Your access code will  in 90 days. If you need help or a new code, please call your [facility] clinic or [phone].        Care EveryWhere ID     This is your Care EveryWhere ID. This could be used by other organizations to access your [facility] medical records  [id number]         Blood Pressure from Last 3 Encounters:   18 128/77   18 155/87   09/10/18 (!) 157/98    Weight from Last 3 Encounters:   18 80.8 kg (178 lb 3.2 oz)   18 80.7 kg (178 lb)   09/10/18 83.5 kg (184 lb 1.6 oz)              We Performed the Following     CBC with platelets differential     Comprehensive metabolic panel     Magnesium          Today's Medication Changes          These changes are accurate as of 18 10:36 AM.  If you have any questions, ask your nurse or doctor.               Start taking these medicines.        Dose/Directions    ondansetron 8 MG tablet   Commonly known as:  ZOFRAN   Used for:  " Malignant neoplasm of base of tongue (H)   Started by:  Pillo Rolon MD        Dose:  8 mg   Take 1 tablet (8 mg) by mouth every 8 hours as needed (take first for nausea/vomiting. If it doesn't work in 45-60 mins, take Compazine.)   Quantity:  30 tablet   Refills:  3            Where to get your medicines      These medications were sent to [place], [place] - [address]  [address], [place] [address]    Hours:  TRANSPLANT PHONE NUMBER [phone] Phone:  [phone]     ondansetron 8 MG tablet                Primary Care Provider Office Phone # Fax #    Lalito Moctezuma [phone]       [address] [address]        Equal Access to Services     JOZEF CLEMENTS : Romario rojaso Sowilliam, waaxda luqadaha, qaybta kaalmada aderoberto carlosyatreasure, talon fernandez. So [facility] [phone].    ATENCIÓN: Si habla español, tiene a corona disposición servicios gratuitos de asistencia lingüística. Uche al [phone].    We comply with applicable federal civil rights laws and [place] laws. We do not discriminate on the basis of race, color, national origin, age, disability, sex, sexual orientation, or gender identity.            Thank you!     Thank you for choosing [facility]  for your care. Our goal is always to provide you with excellent care. Hearing back from our patients is one way we can continue to improve our services. Please take a few minutes to complete the written survey that you may receive in the mail after your visit with us. Thank you!             Your Updated Medication List - Protect others around you: Learn how to safely use, store and throw away your medicines at www.disposemymeds.org.          This list is accurate as of 9/19/18 10:36 AM.  Always use your most recent med list.                   Brand Name Dispense Instructions for use Diagnosis     aspirin 325 MG tablet      Take 325 mg by mouth        dexamethasone 4 MG tablet    DECADRON    6 tablet    Take 2 tablets (8 mg) by mouth daily (with breakfast) for 3 days Start the day after chemotherapy.    Malignant neoplasm of base of tongue (H)       IBUPROFEN PO      Take 200 mg by mouth        lidocaine (viscous) 2 % solution    XYLOCAINE    500 mL    Take 15 mLs by mouth every 4 hours as needed for moderate pain swish and spit every 3-8 hours as needed; max 8 doses/24 hour period    Malignant neoplasm of base of tongue (H)       MULTIVITAMIN & MINERAL PO           ondansetron 8 MG tablet    ZOFRAN    30 tablet    Take 1 tablet (8 mg) by mouth every 8 hours as needed (take first for nausea/vomiting. If it doesn't work in 45-60 mins, take Compazine.)    Malignant neoplasm of base of tongue (H)       oxyCODONE 5 MG/5ML solution    ROXICODONE    250 mL    Take 5 mLs (5 mg) by mouth every 4 hours as needed for severe pain    Postoperative pain       prochlorperazine 10 MG tablet    COMPAZINE    30 tablet    Take 1 tablet (10 mg) by mouth every 6 hours as needed (Nausea/Vomiting)    Malignant neoplasm of base of tongue (H)

## 2018-09-19 NOTE — MR AVS SNAPSHOT
After Visit Summary   9/19/2018    Lazaro Porter    MRN: [id number]           Patient Information     Date Of Birth          1950        Visit Information        Provider Department      9/19/2018 8:15 AM Pillo Rolon MD [facility]        Today's Diagnoses     Malignant neoplasm of base of tongue (H)    -  1    Oropharyngeal dysphagia           Follow-ups after your visit        Your next 10 appointments already scheduled     Sep 19, 2018 10:30 AM CDT   Treatment with JANETTE Florence   [facility] ([facility])    [address]  4th Floor  [facility] [address]   [phone]            Sep 19, 2018 11:15 AM CDT   EXTERNAL RADIATION TREATMENT with UMP RAD ONC VARIAN   Radiation Oncology Clinic ([facility])    [facility]  1st Floor  [address] [address]   [phone]            Sep 20, 2018 11:15 AM CDT   EXTERNAL RADIATION TREATMENT with UMP RAD ONC VARIAN   Radiation Oncology Clinic ([facility])    [facility]  1st Floor  [address] [address]   [phone]            Sep 21, 2018 11:15 AM CDT   EXTERNAL RADIATION TREATMENT with UMP RAD ONC VARIAN   Radiation Oncology Clinic ([facility])    [facility]  1st Floor  [address] [address]   [phone]            Sep 24, 2018 11:15 AM CDT   EXTERNAL RADIATION TREATMENT with UMP RAD ONC VARIAN   Radiation Oncology Clinic ([facility])    [facility]  1st Floor  [address] [address]   [phone]            Sep 24, 2018 11:45 AM CDT   ON TREATMENT VISIT with Nathan Garay MD   Radiation Oncology Clinic ([facility])    [facility]  1st Floor  [address] [address]   [phone]            Sep 25,  2018 11:15 AM CDT   EXTERNAL RADIATION TREATMENT with UMP RAD ONC VARIAN   Radiation Oncology Clinic ([facility])    [facility]  1st Floor  [address] [facility] [address]   [phone]            Sep 26, 2018 11:15 AM CDT   EXTERNAL RADIATION TREATMENT with UMP RAD ONC VARIAN   Radiation Oncology Clinic ([facility])    [facility]  1st Floor  [address] [address]   [phone]            Sep 27, 2018 11:15 AM CDT   EXTERNAL RADIATION TREATMENT with UMP RAD ONC VARIAN   Radiation Oncology Clinic ([facility])    [facility]  1st Floor  [address] [address]   [phone]            Sep 28, 2018 11:15 AM CDT   EXTERNAL RADIATION TREATMENT with UMP RAD ONC VARIAN   Radiation Oncology Clinic ([facility])    [facility]  1st Floor  [address] [facility] [address]   [phone]              Future tests that were ordered for you today     Open Future Orders        Priority Expected Expires Ordered    CBC with platelets differential Routine 9/21/2018 10/10/2018 9/19/2018            Who to contact     If you have questions or need follow up information about today's clinic visit or your schedule please contact [facility] directly at [phone].  Normal or non-critical lab and imaging results will be communicated to you by MyChart, letter or phone within 4 business days after the clinic has received the results. If you do not hear from us within 7 days, please contact the clinic through MyChart or phone. If you have a critical or abnormal lab result, we will notify you by phone as soon as possible.  Submit refill requests through [employer] or call your pharmacy and they will forward the refill request to us. Please allow 3 business days for your refill to be completed.          Additional  "Information About Your Visit        MyChart Information     [employer] lets you send messages to your doctor, view your test results, renew your prescriptions, schedule appointments and more. To sign up, go to [email or web address] . Click on \"Log in\" on the left side of the screen, which will take you to the Welcome page. Then click on \"Sign up Now\" on the right side of the page.     You will be asked to enter the access code listed below, as well as some personal information. Please follow the directions to create your username and password.     Your access code is: [email or web address]  Expires: 2018 10:28 AM     Your access code will  in 90 days. If you need help or a new code, please call your [facility] clinic or [phone].        Care EveryWhere ID     This is your Care EveryWhere ID. This could be used by other organizations to access your [facility] medical records  [id number]        Your Vitals Were     Pulse Temperature Respirations Pulse Oximetry BMI (Body Mass Index)       67 98.5  F (36.9  C) (Oral) 16 98% 24.17 kg/m2        Blood Pressure from Last 3 Encounters:   18 128/77   18 155/87   09/10/18 (!) 157/98    Weight from Last 3 Encounters:   18 80.8 kg (178 lb 3.2 oz)   18 80.7 kg (178 lb)   09/10/18 83.5 kg (184 lb 1.6 oz)                 Today's Medication Changes          These changes are accurate as of 18  9:50 AM.  If you have any questions, ask your nurse or doctor.               Start taking these medicines.        Dose/Directions    ondansetron 8 MG tablet   Commonly known as:  ZOFRAN   Used for:  Malignant neoplasm of base of tongue (H)   Started by:  Pillo Rolon MD        Dose:  8 mg   Take 1 tablet (8 mg) by mouth every 8 hours as needed (take first for nausea/vomiting. If it doesn't work in 45-60 mins, take Compazine.)   Quantity:  30 tablet   Refills:  3            Where to get your medicines      These medications were sent to [facility] " [place], [place] - [address]  [address], [facility] [address]    Hours:  TRANSPLANT PHONE NUMBER [phone] Phone:  [phone]     ondansetron 8 MG tablet                Primary Care Provider Office Phone # Fax #    Lalito Moctezuma [phone]       [address] [address]        Equal Access to Services     JOZEF CLEMENTS : Hadii aad ku hadasho Soomaali, waaxda luqadaha, qaybta kaalmada adeegyada, waxay idiin hayaan adeeg kharash lazelalem fernandez. So [facility] [phone].    ATENCIÓN: Si habla español, tiene a corona disposición servicios gratuitos de asistencia lingüística. Uche al [phone].    We comply with applicable federal civil rights laws and [place] laws. We do not discriminate on the basis of race, color, national origin, age, disability, sex, sexual orientation, or gender identity.            Thank you!     Thank you for choosing [facility]  for your care. Our goal is always to provide you with excellent care. Hearing back from our patients is one way we can continue to improve our services. Please take a few minutes to complete the written survey that you may receive in the mail after your visit with us. Thank you!             Your Updated Medication List - Protect others around you: Learn how to safely use, store and throw away your medicines at www.disposemymeds.org.          This list is accurate as of 9/19/18  9:50 AM.  Always use your most recent med list.                   Brand Name Dispense Instructions for use Diagnosis    aspirin 325 MG tablet      Take 325 mg by mouth        dexamethasone 4 MG tablet    DECADRON    6 tablet    Take 2 tablets (8 mg) by mouth daily (with breakfast) for 3 days Start the day after chemotherapy.    Malignant neoplasm of base of tongue (H)       IBUPROFEN PO      Take 200 mg by mouth        lidocaine (viscous) 2 % solution    XYLOCAINE    500 mL    Take  15 mLs by mouth every 4 hours as needed for moderate pain swish and spit every 3-8 hours as needed; max 8 doses/24 hour period    Malignant neoplasm of base of tongue (H)       MULTIVITAMIN & MINERAL PO           ondansetron 8 MG tablet    ZOFRAN    30 tablet    Take 1 tablet (8 mg) by mouth every 8 hours as needed (take first for nausea/vomiting. If it doesn't work in 45-60 mins, take Compazine.)    Malignant neoplasm of base of tongue (H)       oxyCODONE 5 MG/5ML solution    ROXICODONE    250 mL    Take 5 mLs (5 mg) by mouth every 4 hours as needed for severe pain    Postoperative pain       prochlorperazine 10 MG tablet    COMPAZINE    30 tablet    Take 1 tablet (10 mg) by mouth every 6 hours as needed (Nausea/Vomiting)    Malignant neoplasm of base of tongue (H)

## 2018-09-20 ENCOUNTER — APPOINTMENT (OUTPATIENT)
Dept: RADIATION ONCOLOGY | Facility: CLINIC | Age: 68
End: 2018-09-20
Attending: RADIOLOGY
Payer: MEDICARE

## 2018-09-20 PROCEDURE — 77386 ZZH IMRT TREATMENT DELIVERY, COMPLEX: CPT | Performed by: RADIOLOGY

## 2018-09-21 ENCOUNTER — APPOINTMENT (OUTPATIENT)
Dept: LAB | Facility: CLINIC | Age: 68
End: 2018-09-21
Attending: INTERNAL MEDICINE
Payer: MEDICARE

## 2018-09-21 ENCOUNTER — APPOINTMENT (OUTPATIENT)
Dept: RADIATION ONCOLOGY | Facility: CLINIC | Age: 68
End: 2018-09-21
Attending: RADIOLOGY
Payer: MEDICARE

## 2018-09-21 ENCOUNTER — INFUSION THERAPY VISIT (OUTPATIENT)
Dept: ONCOLOGY | Facility: CLINIC | Age: 68
End: 2018-09-21
Attending: INTERNAL MEDICINE
Payer: MEDICARE

## 2018-09-21 VITALS
BODY MASS INDEX: 24.56 KG/M2 | RESPIRATION RATE: 16 BRPM | TEMPERATURE: 99 F | OXYGEN SATURATION: 97 % | DIASTOLIC BLOOD PRESSURE: 83 MMHG | HEART RATE: 68 BPM | SYSTOLIC BLOOD PRESSURE: 138 MMHG | WEIGHT: 181.1 LBS

## 2018-09-21 DIAGNOSIS — C01 MALIGNANT NEOPLASM OF BASE OF TONGUE (H): Primary | ICD-10-CM

## 2018-09-21 LAB
BASOPHILS # BLD AUTO: 0 10E9/L (ref 0–0.2)
BASOPHILS NFR BLD AUTO: 0.5 %
DIFFERENTIAL METHOD BLD: ABNORMAL
EOSINOPHIL # BLD AUTO: 0.1 10E9/L (ref 0–0.7)
EOSINOPHIL NFR BLD AUTO: 2.4 %
ERYTHROCYTE [DISTWIDTH] IN BLOOD BY AUTOMATED COUNT: 12.7 % (ref 10–15)
HCT VFR BLD AUTO: 36.5 % (ref 40–53)
HGB BLD-MCNC: 12 G/DL (ref 13.3–17.7)
IMM GRANULOCYTES # BLD: 0 10E9/L (ref 0–0.4)
IMM GRANULOCYTES NFR BLD: 0.5 %
LYMPHOCYTES # BLD AUTO: 0.5 10E9/L (ref 0.8–5.3)
LYMPHOCYTES NFR BLD AUTO: 23.9 %
MCH RBC QN AUTO: 29.6 PG (ref 26.5–33)
MCHC RBC AUTO-ENTMCNC: 32.9 G/DL (ref 31.5–36.5)
MCV RBC AUTO: 90 FL (ref 78–100)
MONOCYTES # BLD AUTO: 0.4 10E9/L (ref 0–1.3)
MONOCYTES NFR BLD AUTO: 18.5 %
NEUTROPHILS # BLD AUTO: 1.1 10E9/L (ref 1.6–8.3)
NEUTROPHILS NFR BLD AUTO: 54.2 %
NRBC # BLD AUTO: 0 10*3/UL
NRBC BLD AUTO-RTO: 0 /100
PLATELET # BLD AUTO: 207 10E9/L (ref 150–450)
RBC # BLD AUTO: 4.05 10E12/L (ref 4.4–5.9)
WBC # BLD AUTO: 2.1 10E9/L (ref 4–11)

## 2018-09-21 PROCEDURE — 25000128 H RX IP 250 OP 636: Mod: ZF | Performed by: INTERNAL MEDICINE

## 2018-09-21 PROCEDURE — 96367 TX/PROPH/DG ADDL SEQ IV INF: CPT

## 2018-09-21 PROCEDURE — 85025 COMPLETE CBC W/AUTO DIFF WBC: CPT | Performed by: INTERNAL MEDICINE

## 2018-09-21 PROCEDURE — 96413 CHEMO IV INFUSION 1 HR: CPT

## 2018-09-21 PROCEDURE — 96375 TX/PRO/DX INJ NEW DRUG ADDON: CPT

## 2018-09-21 PROCEDURE — 96361 HYDRATE IV INFUSION ADD-ON: CPT

## 2018-09-21 PROCEDURE — 25000128 H RX IP 250 OP 636: Mod: ZF | Performed by: PHYSICIAN ASSISTANT

## 2018-09-21 PROCEDURE — 77386 ZZH IMRT TREATMENT DELIVERY, COMPLEX: CPT | Performed by: RADIOLOGY

## 2018-09-21 RX ORDER — MEPERIDINE HYDROCHLORIDE 25 MG/ML
25 INJECTION INTRAMUSCULAR; INTRAVENOUS; SUBCUTANEOUS EVERY 30 MIN PRN
Status: CANCELLED | OUTPATIENT
Start: 2018-09-21

## 2018-09-21 RX ORDER — DIPHENHYDRAMINE HYDROCHLORIDE 50 MG/ML
50 INJECTION INTRAMUSCULAR; INTRAVENOUS
Status: CANCELLED
Start: 2018-09-21

## 2018-09-21 RX ORDER — PALONOSETRON 0.05 MG/ML
0.25 INJECTION, SOLUTION INTRAVENOUS ONCE
Status: COMPLETED | OUTPATIENT
Start: 2018-09-21 | End: 2018-09-21

## 2018-09-21 RX ORDER — ALBUTEROL SULFATE 0.83 MG/ML
2.5 SOLUTION RESPIRATORY (INHALATION)
Status: CANCELLED | OUTPATIENT
Start: 2018-09-21

## 2018-09-21 RX ORDER — EPINEPHRINE 1 MG/ML
0.3 INJECTION, SOLUTION INTRAMUSCULAR; SUBCUTANEOUS EVERY 5 MIN PRN
Status: CANCELLED | OUTPATIENT
Start: 2018-09-21

## 2018-09-21 RX ORDER — METHYLPREDNISOLONE SODIUM SUCCINATE 125 MG/2ML
125 INJECTION, POWDER, LYOPHILIZED, FOR SOLUTION INTRAMUSCULAR; INTRAVENOUS
Status: CANCELLED
Start: 2018-09-21

## 2018-09-21 RX ORDER — SODIUM CHLORIDE 9 MG/ML
1000 INJECTION, SOLUTION INTRAVENOUS CONTINUOUS PRN
Status: CANCELLED
Start: 2018-09-21

## 2018-09-21 RX ORDER — EPINEPHRINE 0.3 MG/.3ML
0.3 INJECTION SUBCUTANEOUS EVERY 5 MIN PRN
Status: CANCELLED | OUTPATIENT
Start: 2018-09-21

## 2018-09-21 RX ORDER — MAGNESIUM SULFATE HEPTAHYDRATE 40 MG/ML
2 INJECTION, SOLUTION INTRAVENOUS ONCE
Status: COMPLETED | OUTPATIENT
Start: 2018-09-21 | End: 2018-09-21

## 2018-09-21 RX ORDER — POLYETHYLENE GLYCOL 3350 17 G/17G
1 POWDER, FOR SOLUTION ORAL DAILY PRN
COMMUNITY
End: 2018-10-30

## 2018-09-21 RX ORDER — ALBUTEROL SULFATE 90 UG/1
1-2 AEROSOL, METERED RESPIRATORY (INHALATION)
Status: CANCELLED
Start: 2018-09-21

## 2018-09-21 RX ORDER — LORAZEPAM 2 MG/ML
0.5 INJECTION INTRAMUSCULAR EVERY 4 HOURS PRN
Status: CANCELLED
Start: 2018-09-21

## 2018-09-21 RX ADMIN — MAGNESIUM SULFATE IN WATER 2 G: 40 INJECTION, SOLUTION INTRAVENOUS at 12:28

## 2018-09-21 RX ADMIN — SODIUM CHLORIDE 1000 ML: 9 INJECTION, SOLUTION INTRAVENOUS at 11:36

## 2018-09-21 RX ADMIN — DEXAMETHASONE SODIUM PHOSPHATE 150 MG: 10 INJECTION, SOLUTION INTRAMUSCULAR; INTRAVENOUS at 11:57

## 2018-09-21 RX ADMIN — PALONOSETRON HYDROCHLORIDE 0.25 MG: 0.25 INJECTION INTRAVENOUS at 11:54

## 2018-09-21 RX ADMIN — CISPLATIN 210 MG: 1 INJECTION, SOLUTION INTRAVENOUS at 13:12

## 2018-09-21 RX ADMIN — POTASSIUM CHLORIDE AND SODIUM CHLORIDE: 900; 150 INJECTION, SOLUTION INTRAVENOUS at 15:21

## 2018-09-21 NOTE — PATIENT INSTRUCTIONS
Contact Numbers    [facility] Main Line: [phone]  [facility] Triage and After Hours Nurse Line:  [phone]    Please call the [facility] nurse triage or the after hours nurse line if you experience a temperature greater than or equal to 100.5, shaking chills, have uncontrolled nausea, vomiting and/or diarrhea, dizziness, lightheadedness, shortness of breath, chest pain, bleeding, unexplained bruising, or if you have any other new/concerning symptoms, questions or concerns.     If you are having any concerning symptoms or wish to speak to a provider before your next infusion visit, please call your care coordinator or triage to notify them so we can adequately serve you.     If you need a refill on a narcotic prescription or other medication, please call triage before your infusion appointment.           September 2018 Sunday Monday Tuesday Wednesday Thursday Friday Saturday                                 1       2     3     4     P EXTERNAL RADIATION TREATMT   11:15 AM   (30 min.)   [facility] RAD ONC [facility]   Radiation Oncology Clinic 5     [facility] EXTERNAL RADIATION TREATMT   11:15 AM   (30 min.)   [facility] RAD ONC [facility]   Radiation Oncology Clinic     [facility] ON TREATMENT VISIT   11:45 AM   (15 min.)   Nathan Garay MD   Radiation Oncology Clinic 6     P EXTERNAL RADIATION TREATMT   11:15 AM   (30 min.)   [facility] RAD ONC [facility]   Radiation Oncology Clinic 7     P EXTERNAL RADIATION TREATMT   11:15 AM   (30 min.)   [facility] RAD ONC [facility]   Radiation Oncology Clinic 8       9     10     P EXTERNAL RADIATION TREATMT   11:15 AM   (30 min.)   [facility] RAD ONC [facility]   Radiation Oncology Clinic     [facility] NUTRITION VISIT   11:45 AM   (30 min.)   Shelli Reyna RD   [facility], [place], Nutrition Services     [facility] ON TREATMENT VISIT   11:45 AM   (15 min.)   Nathan Garay MD   Rad[facility] Oncology Clinic 11     [facility] EXTERNAL RADIATION TREATMT   11:15 AM   (30 min.)   [facility] RAD ONC [facility]   Radiation Oncology Clinic 12     [facility]  EXTERNAL RADIATION TREATMT   11:15 AM   (30 min.)   [facility]   [facility] 13     [facility] EXTERNAL RADIATION TREATMT   11:15 AM   (30 min.)   [facility] RAD [facility]C VARIAN   [facility] Clinic 14     [facility] EXTERNAL RADIATION TREATMT   11:15 AM   (30 min.)   [facility] VAR[facility]   [facility] Clinic 15       16     17     FOLLOW UP   11:00 AM   (15 min.)   Shelli Reyna RD   [facility], [place], Nutrition Services     [facility] EXTERNAL RADIATION TREATMT   11:15 AM   (30 min.)   [facility]C VARIAN   Rad[facility] Oncology Clinic     [facility] ON TREATMENT VISIT   11:45 AM   (15 min.)   Nathan Garay MD   [facility] 18     [facility] EXTERNAL RADIATION TREATMT   11:15 AM   (30 min.)   [facility] RAD [facility]C VARIAN   [facility] 19     [facility] DRAW    7:45 AM   (15 min.)    [facility] DRAW   [facility] Draw     [facility] RETURN    8:00 AM   (30 min.)   Pillo Rolon MD   [facility]    9:00 AM   (360 min.)   [facility]   [facility]     TREATMENT   10:30 AM   (30 min.)   Shonda Rodriguez SLP   [facility]     [facility] EXTERNAL RADIATION TREATMT   11:15 AM   (30 min.)   [facility] RAD ONC VARIAN   [facility] Oncology Clinic 20     [facility] EXTERNAL RADIATION TREATMT   11:15 AM   (30 min.)   [facility] RAD ONC [facility]   Radiation Oncology Clinic 21  Happy Birthday!     [facility] DRAW   10:30 AM   (15 min.)    [facility] DRAW   [facility] Draw     P ONC INFUSION 360   11:00 AM   (360 min.)   [facility] ON[facility]FUS[facility]   [facility]     [facility] EXTERNAL RADIATION TREATMT   11:15 AM   (30 min.)   [facility] RAD ONC [facility]   Radiation Oncology Clinic 22       23     24     [facility] EXTERNAL RADIATION TREATMT   11:15 AM   (30 min.)   [facility] RAD ONC VARIAN   Radiation Oncology Clinic     [facility] ON TREATMENT VISIT   11:45 AM   (15 min.)   Nathan Garay MD   [facility] 25     [facility] EXTERNAL  RADIATION TREATMT   11:15 AM   (30 min.)   [facility] RAD ONC VARIAN   [facility] Clinic 26     [facility] EXTERNAL RADIATION TREATMT   11:15 AM   (30 min.)   [facility] RAD ONC VARIAN   [facility] 27     [facility] EXTERNAL RADIATION TREATMT   11:15 AM   (30 min.)   [facility] RAD ONC VARIAN   [facility] 28     [facility] EXTERNAL RADIATION TREATMT   11:15 AM   (30 min.)   [facility] RAD ONC VARIAN   [facility] 29 30 October 2018 Sunday Monday Tuesday Wednesday Thursday Friday Saturday        1     [facility] EXTERNAL RADIATION TREATMT   11:15 AM   (30 min.)   [facility] RAD [facility]C VARIAN   [facility]     [facility] ON TREATMENT VISIT   11:45 AM   (15 min.)   Nathan Garay MD   [facility] 2     [facility] EXTERNAL RADIATION TREATMT   11:15 AM   (30 min.)   [facility] RAD ONC VARIAN   [facility] Oncology Clinic     [facility] RETURN    2:45 PM   (15 min.)   Garcia Worthy MD   [facility] 3     TREATMENT   10:30 AM   (30 min.)   Shonda Rodriguez SLP   [facility]     [facility] EXTERNAL RADIATION TREATMT   11:15 AM   (30 min.)   [facility] RAD ONC VARIAN   [facility] 4     [facility] EXTERNAL RADIATION TREATMT   11:15 AM   (30 min.)   [facility] RAD ONC VARIAN   [facility] 5     [facility] EXTERNAL RADIATION TREATMT   11:15 AM   (30 min.)   [facility] RAD ONC VARIAN   [facility] Clinic 6       7     8     [facility] EXTERNAL RADIATION TREATMT   11:15 AM   (30 min.)   [facility] RAD ONC VARIAN   [facility]     [facility] ON TREATMENT VISIT   11:45 AM   (15 min.)   Nathan Garay MD   [facility] 9     [facility] EXTERNAL RADIATION TREATMT   11:15 AM   (30 min.)   [facility] RAD ONC VARIAN   [facility]cology [facility] 10     [facility] DRAW    7:15 AM   (15 min.)    [facility] DRAW   [facility] Draw     [facility] RETURN    7:30 AM   (30 min.)   Pillo Rolon MD   [facility] ONC INFUSION 360    8:30  AM   (360 min.)   [facility] ON[facility]FUS[facility]   [facility]     [facility] EXTERNAL RADIATION TREATMT   11:15 AM   (30 min.)   [facility] RAD ONC VARIAN   [facility] 11     [facility] EXTERNAL RADIATION TREATMT   11:15 AM   (30 min.)   [facility] RAD [facility]C VARIAN   [facility] 12     TREATMENT   10:30 AM   (30 min.)   Shonda Rodriguez SLP   [facility]     P EXTERNAL RADIATION TREATMT   11:15 AM   (30 min.)   [facility] RAD ONC VARIAN   [facility] Oncology Clinic 13       14     15     [facility] EXTERNAL RADIATION TREATMT   11:15 AM   (30 min.)   [facility] RAD ONC VARIAN   [facility] Oncology Clinic     P ON TREATMENT VISIT   11:45 AM   (15 min.)   Nathan Garay MD   [facility] Clinic 16     17     18     19     20       21     22     23     24     25     26     27       28     29     30     31                                Recent Results (from the past 24 hour(s))   CBC with platelets differential    Collection Time: 09/21/18 11:00 AM   Result Value Ref Range    WBC 2.1 (L) 4.0 - 11.0 10e9/L    RBC Count 4.05 (L) 4.4 - 5.9 10e12/L    Hemoglobin 12.0 (L) 13.3 - 17.7 g/dL    Hematocrit 36.5 (L) 40.0 - 53.0 %    MCV 90 78 - 100 fl    MCH 29.6 26.5 - 33.0 pg    MCHC 32.9 31.5 - 36.5 g/dL    RDW 12.7 10.0 - 15.0 %    Platelet Count 207 150 - 450 10e9/L    Diff Method Automated Method     % Neutrophils 54.2 %    % Lymphocytes 23.9 %    % Monocytes 18.5 %    % Eosinophils 2.4 %    % Basophils 0.5 %    % Immature Granulocytes 0.5 %    Nucleated RBCs 0 0 /100    Absolute Neutrophil 1.1 (L) 1.6 - 8.3 10e9/L    Absolute Lymphocytes 0.5 (L) 0.8 - 5.3 10e9/L    Absolute Monocytes 0.4 0.0 - 1.3 10e9/L    Absolute Eosinophils 0.1 0.0 - 0.7 10e9/L    Absolute Basophils 0.0 0.0 - 0.2 10e9/L    Abs Immature Granulocytes 0.0 0 - 0.4 10e9/L    Absolute Nucleated RBC 0.0

## 2018-09-21 NOTE — PROGRESS NOTES
Infusion Nursing Note:  Lazaro Porter presents today for Cycle 1 Day 22 Cisplatin.    Patient seen by provider today: No  Patient seen by Dr. Pillo Rolon on 9/19/18.  No changes per patient.    Intravenous Access:  Peripheral IV placed in lab today.    Treatment Conditions:  Lab Results   Component Value Date    HGB 12.0 09/21/2018     Lab Results   Component Value Date    WBC 2.1 09/21/2018      Lab Results   Component Value Date    ANEU 1.1 09/21/2018     Lab Results   Component Value Date     09/21/2018      Lab Results   Component Value Date     09/19/2018                   Lab Results   Component Value Date    POTASSIUM 4.1 09/19/2018           Lab Results   Component Value Date    MAG 2.1 09/19/2018            Lab Results   Component Value Date    CR 0.85 09/19/2018                   Lab Results   Component Value Date    KM 9.0 09/19/2018                Lab Results   Component Value Date    BILITOTAL 0.5 09/19/2018           Lab Results   Component Value Date    ALBUMIN 3.6 09/19/2018                    Lab Results   Component Value Date    ALT 20 09/19/2018           Lab Results   Component Value Date    AST 18 09/19/2018       Results reviewed, labs MET treatment parameters, ok to proceed with treatment.  See order in treatment plan from Stephanie Nicholson PA-C regarding ANC.    9/21/18 1132 TORB:  Stephanie Nicholson PA-C/Migel Noonan RN  - OK to give chemo today per Dr. Rolon as long as ANC >1  - No dose adjustments needed at this time.    Post Infusion Assessment:  Patient tolerated infusion without incident.  Patient voided pre, during, and post Cisplatin infusion.  Blood return noted pre and post infusion.  Site patent and intact, free from redness, edema or discomfort.  No evidence of extravasations.  Access discontinued per protocol.    Discharge Plan:   Patient declined prescription refills.  Discharge instructions reviewed with: Patient.  Patient and/or family verbalized understanding of  discharge instructions and all questions answered.  Copy of AVS reviewed with patient and/or family.  Patient will return 10/10/18 for next appointment.  Patient discharged in stable condition accompanied by: self.  Departure Mode: Ambulatory.    NELSY BRISENO RN

## 2018-09-21 NOTE — NURSING NOTE
Chief Complaint   Patient presents with     Blood Draw     Labs drawn via PIV by RN. VS taken. Pt checked in for next appt     Labs drawn from PIV placed by RN. Line flushed with saline. Vitals taken. Pt checked in for appointment(s).    Mariposa BLAS RN PHN BSN  BMT/Oncology Lab

## 2018-09-21 NOTE — MR AVS SNAPSHOT
After Visit Summary   9/21/2018    Lazaro Porter    MRN: [id number]           Patient Information     Date Of Birth          1950        Visit Information        Provider Department      9/21/2018 11:00 AM  21 ATC;  ONCOLOGY INFUSION [facility]        Today's Diagnoses     Malignant neoplasm of base of tongue (H)    -  1      Care Instructions    Contact Numbers    [facility] [place]: [phone]  [facility] [place] and After Hours [facility] Line:  [phone]    Please call the [facility] nurse triage or the after hours nurse line if you experience a temperature greater than or equal to 100.5, shaking chills, have uncontrolled nausea, vomiting and/or diarrhea, dizziness, lightheadedness, shortness of breath, chest pain, bleeding, unexplained bruising, or if you have any other new/concerning symptoms, questions or concerns.     If you are having any concerning symptoms or wish to speak to a provider before your next infusion visit, please call your care coordinator or triage to notify them so we can adequately serve you.     If you need a refill on a narcotic prescription or other medication, please call triage before your infusion appointment.           September 2018 Sunday Monday Tuesday Wednesday Thursday Friday Saturday                                 1       2     3     4     [facility] EXTERNAL RADIATION TREATMT   11:15 AM   (30 min.)   [facility] RAD ONC VARIAN   [facility]cology [facility] 5     [facility] EXTERNAL RADIATION TREATMT   11:15 AM   (30 min.)   [facility] RAD ONC VARIAN   Radiation Oncology Clinic     [facility] ON TREATMENT VISIT   11:45 AM   (15 min.)   Nathan Garay MD   [facility] 6     [facility] EXTERNAL RADIATION TREATMT   11:15 AM   (30 min.)   P RAD ONC VARIAN   [facility] 7     P EXTERNAL RADIATION TREATMT   11:15 AM   (30 min.)   P RAD ONC VARIAN   [facility] 8       9     10     [facility] EXTERNAL RADIATION TREATMT   11:15 AM   (30 min.)    [facility] RAD ONC VARIAN   [facility]     [facility] NUTRITION VISIT   11:45 AM   (30 min.)   Shelli Reyna RD   [facility]     [facility] ON TREATMENT VISIT   11:45 AM   (15 min.)   Nathan Garay MD   [facility] 11     [facility] EXTERNAL RADIATION TREATMT   11:15 AM   (30 min.)   [facility] RAD ONC VARIAN   [facility] 12     [facility] EXTERNAL RADIATION TREATMT   11:15 AM   (30 min.)   [facility] RAD ONC VARIAN   [facility] 13     [facility] EXTERNAL RADIATION TREATMT   11:15 AM   (30 min.)   [facility] RAD ONC VARIAN   [facility] 14     [facility] EXTERNAL RADIATION TREATMT   11:15 AM   (30 min.)   [facility] RAD ONC VARIAN   [facility] 15       16     17     FOLLOW UP   11:00 AM   (15 min.)   Shelli Reyna RD   [facility]     P EXTERNAL RADIATION TREATMT   11:15 AM   (30 min.)   [facility] RAD ONC VARIAN   Rad[facility] Oncology Clinic     [facility] ON TREATMENT VISIT   11:45 AM   (15 min.)   Nathan Garay MD   [facility] 18     [facility] EXTERNAL RADIATION TREATMT   11:15 AM   (30 min.)   [facility] RAD ONC VARIAN   [facility] 19     [facility] DRAW    7:45 AM   (15 min.)   [facility]    [facility]     [facility] RETURN    8:00 AM   (30 min.)   Pillo Rolon MD   [facility]     [facility] ONC INFUSION 360    9:00 AM   (360 min.)   [facility] ONCOLOGY INFUSION   [facility]     TREATMENT   10:30 AM   (30 min.)   Shonda Rodriguez SLP    [facility]     [facility] EXTERNAL RADIATION TREATMT   11:15 AM   (30 min.)   [facility] RAD ONC VARIAN   [facility] 20     [facility] EXTERNAL RADIATION TREATMT   11:15 AM   (30 min.)   [facility] RAD ONC VARIAN   [facility] 21  Happy Birthday!     [facility] DRAW   10:30 AM   (15 min.)   [facility]    [facility]     P ONC INFUSION 360   11:00 AM   (360 min.)   [facility] ONCOLOGY INFUSION   M  [facility]     [facility] EXTERNAL RADIATION TREATMT   11:15 AM   (30 min.)   [facility] RAD ONC VARIAN   [facility] 22       23     24     [facility] EXTERNAL RADIATION TREATMT   11:15 AM   (30 min.)   [facility] RAD ONC VARIAN   [facility]     [facility] ON TREATMENT VISIT   11:45 AM   (15 min.)   Nathan aGray MD   [facility] 25     [facility] EXTERNAL RADIATION TREATMT   11:15 AM   (30 min.)   [facility] RAD ONC VARIAN   [facility] 26     [facility] EXTERNAL RADIATION TREATMT   11:15 AM   (30 min.)   [facility] RAD ONC VARIAN   [facility] 27     [facility] EXTERNAL RADIATION TREATMT   11:15 AM   (30 min.)   [facility] RAD ONC VARIAN   [facility] 28     [facility] EXTERNAL RADIATION TREATMT   11:15 AM   (30 min.)   [facility] RAD ONC VARIAN   [facility] 29 30 October 2018 Sunday Monday Tuesday Wednesday Thursday Friday Saturday        1     [facility] EXTERNAL RADIATION TREATMT   11:15 AM   (30 min.)   [facility] RAD ONC VARIAN   [facility]     [facility] ON TREATMENT VISIT   11:45 AM   (15 min.)   Nathan Garay MD   [facility] 2     [facility] EXTERNAL RADIATION TREATMT   11:15 AM   (30 min.)   [facility] RAD ONC VARIAN   [facility]     [facility] RETURN    2:45 PM   (15 min.)   Garcia Worthy MD   [facility] 3     TREATMENT   10:30 AM   (30 min.)   Shonda Rodriguez SLP   [facility]     [facility] EXTERNAL RADIATION TREATMT   11:15 AM   (30 min.)   [facility] RAD ONC VARIAN   [facility] 4     [facility] EXTERNAL RADIATION TREATMT   11:15 AM   (30 min.)   [facility] RAD ONC VARIAN   [facility] 5     [facility] EXTERNAL RADIATION TREATMT   11:15 AM   (30 min.)   [facility] RAD ONC VARIAN   [facility] 6       7     8     [facility] EXTERNAL RADIATION TREATMT   11:15 AM   (30 min.)   [facility] RAD ONC VARIAN   [facility]     [facility] ON TREATMENT VISIT   11:45 AM   (15  min.)   Nathan Garay MD   [facility] 9     [facility] EXTERNAL RADIATION TREATMT   11:15 AM   (30 min.)   [facility] RAD ONC VARIAN   [facility] 10     [facility] DRAW    7:15 AM   (15 min.)    [facility]     [facility] RETURN    7:30 AM   (30 min.)   Pillo Rolon MD   [facility] ONC INFUSION 360    8:30 AM   (360 min.)   [facility]   11:15 AM   (30 min.)   [facility] RAD ONC VARIAN   [facility] 11     [facility] EXTERNAL RADIATION TREATMT   11:15 AM   (30 min.)   P RAD ONC VARIAN   [facility] 12     TREATMENT   10:30 AM   (30 min.)   Shonda Rodriguez SLP   [facility]     UMP EXTERNAL RADIATION TREATMT   11:15 AM   (30 min.)   [facility] RAD ONC VARIAN   [facility] 13       14     15     [facility] EXTERNAL RADIATION TREATMT   11:15 AM   (30 min.)   P RAD ONC VARIAN   [facility]     [facility] ON TREATMENT VISIT   11:45 AM   (15 min.)   Nathan Garay MD   [facility] 16     17     18     19     20       21     22     23     24     25     26     27       28     29     30     31                                Recent Results (from the past 24 hour(s))   CBC with platelets differential    Collection Time: 09/21/18 11:00 AM   Result Value Ref Range    WBC 2.1 (L) 4.0 - 11.0 10e9/L    RBC Count 4.05 (L) 4.4 - 5.9 10e12/L    Hemoglobin 12.0 (L) 13.3 - 17.7 g/dL    Hematocrit 36.5 (L) 40.0 - 53.0 %    MCV 90 78 - 100 fl    MCH 29.6 26.5 - 33.0 pg    MCHC 32.9 31.5 - 36.5 g/dL    RDW 12.7 10.0 - 15.0 %    Platelet Count 207 150 - 450 10e9/L    Diff Method Automated Method     % Neutrophils 54.2 %    % Lymphocytes 23.9 %    % Monocytes 18.5 %    % Eosinophils 2.4 %    % Basophils 0.5 %    % Immature Granulocytes 0.5 %    Nucleated RBCs 0 0 /100    Absolute Neutrophil 1.1 (L) 1.6 - 8.3 10e9/L     Absolute Lymphocytes 0.5 (L) 0.8 - 5.3 10e9/L    Absolute Monocytes 0.4 0.0 - 1.3 10e9/L    Absolute Eosinophils 0.1 0.0 - 0.7 10e9/L    Absolute Basophils 0.0 0.0 - 0.2 10e9/L    Abs Immature Granulocytes 0.0 0 - 0.4 10e9/L    Absolute Nucleated RBC 0.0                  Follow-ups after your visit        Your next 10 appointments already scheduled     Sep 24, 2018 11:15 AM CDT   EXTERNAL RADIATION TREATMENT with [facility] RAD ONC VARIAN   Radiation Oncology Clinic ([facility])    [facility]  1st Floor  [address] [place] [address]   [phone]            Sep 24, 2018 11:45 AM CDT   ON TREATMENT VISIT with Nathan Garay MD   Radiation Oncology Clinic ([facility])    [facility]  1st Floor  [address] [place] [address]   [phone]            Sep 25, 2018 11:15 AM CDT   EXTERNAL RADIATION TREATMENT with [facility] RAD ONC VARIAN   Radiation Oncology Clinic ([facility])    [facility]  1st Floor  [address] [place] [address]   [phone]            Sep 26, 2018 11:15 AM CDT   EXTERNAL RADIATION TREATMENT with [facility] RAD ONC VARIAN   Radiation Oncology Clinic ([facility])    [facility]  1st Floor  [address] [place] [address]   [phone]            Sep 27, 2018 11:15 AM CDT   EXTERNAL RADIATION TREATMENT with [facility] RAD ONC VARIAN   Radiation Oncology Clinic ([facility])    [facility]  1st Floor  [address] [place] [address]   [phone]            Sep 28, 2018 11:15 AM CDT   EXTERNAL RADIATION TREATMENT with [facility] RAD ONC VARIAN   Radiation Oncology Clinic ([facility])    [facility]  1st Floor  [address] [place] [address]   [phone]            Oct 01, 2018 11:15 AM CDT   EXTERNAL RADIATION TREATMENT with [facility] RAD ONC  "VARIAN   Radiation Oncology Clinic ([facility])    [facility]  1st Floor  [address] [address]   [phone]            Oct 01, 2018 11:45 AM CDT   ON TREATMENT VISIT with Nathan Garay MD   Radiation Oncology Clinic ([facility])    [facility]  1st Floor  [address] [address]   [phone]            Oct 02, 2018 11:15 AM CDT   EXTERNAL RADIATION TREATMENT with [facility] RAD ONC VARIAN   Radiation Oncology Clinic ([facility])    [facility]  1st Floor  [address] [address]   [phone]            Oct 02, 2018  3:00 PM CDT   (Arrive by 2:45 PM)   Return Visit with Garcia Worthy MD   [facility] ([facility])    [address]  4th Floor  [facility] [id number] [phone]              Who to contact     If you have questions or need follow up information about today's clinic visit or your schedule please contact [facility] directly at [phone].  Normal or non-critical lab and imaging results will be communicated to you by MyChart, letter or phone within 4 business days after the clinic has received the results. If you do not hear from us within 7 days, please contact the clinic through Netskopehart or phone. If you have a critical or abnormal lab result, we will notify you by phone as soon as possible.  Submit refill requests through Onit or call your pharmacy and they will forward the refill request to us. Please allow 3 business days for your refill to be completed.          Additional Information About Your Visit        NetskopeharIncreaseCard Information     Onit lets you send messages to your doctor, view your test results, renew your prescriptions, schedule appointments and more. To sign up, go to [email or web address] . Click on \"Log in\" on the left side of " "the screen, which will take you to the Welcome page. Then click on \"Sign up Now\" on the right side of the page.     You will be asked to enter the access code listed below, as well as some personal information. Please follow the directions to create your username and password.     Your access code is: [email or web address]  Expires: 2018 10:28 AM     Your access code will  in 90 days. If you need help or a new code, please call your [facility] or [phone].        Care EveryWhere ID     This is your Care EveryWhere ID. This could be used by other organizations to access your [place] medical records  [id number]        Your Vitals Were     Pulse Temperature Respirations Pulse Oximetry BMI (Body Mass Index)       68 99  F (37.2  C) (Oral) 16 97% 24.56 kg/m2        Blood Pressure from Last 3 Encounters:   18 138/83   18 128/77   18 155/87    Weight from Last 3 Encounters:   18 82.1 kg (181 lb 1.6 oz)   18 80.8 kg (178 lb 3.2 oz)   18 80.7 kg (178 lb)              We Performed the Following     CBC with platelets differential        Primary Care Provider Office Phone # Fax #    Lalito Moctezuma [phone]       [address] [address]        Equal Access to Services     JOZEF CLEMENTS : Hadii ivet rojaso Sowilliam, waaxda luqadaha, qaybta kaalmada aderoberto carlosyada, talon fernandez. So [facility] [phone].    ATENCIÓN: Si habla español, tiene a corona disposición servicios gratuitos de asistencia lingüística. Llame al [phone].    We comply with applicable federal civil rights laws and [place] laws. We do not discriminate on the basis of race, color, national origin, age, disability, sex, sexual orientation, or gender identity.            Thank you!     Thank you for choosing [facility]  for your care. Our goal is always to provide you with excellent care. Hearing back from our patients is " one way we can continue to improve our services. Please take a few minutes to complete the written survey that you may receive in the mail after your visit with us. Thank you!             Your Updated Medication List - Protect others around you: Learn how to safely use, store and throw away your medicines at www.disposemymeds.org.          This list is accurate as of 9/21/18  5:25 PM.  Always use your most recent med list.                   Brand Name Dispense Instructions for use Diagnosis    aspirin 325 MG tablet      Take 325 mg by mouth        dexamethasone 4 MG tablet    DECADRON    6 tablet    Take 2 tablets (8 mg) by mouth daily (with breakfast) for 3 days Start the day after chemotherapy.    Malignant neoplasm of base of tongue (H)       IBUPROFEN PO      Take 200 mg by mouth        lidocaine (viscous) 2 % solution    XYLOCAINE    500 mL    Take 15 mLs by mouth every 4 hours as needed for moderate pain swish and spit every 3-8 hours as needed; max 8 doses/24 hour period    Malignant neoplasm of base of tongue (H)       MULTIVITAMIN & MINERAL PO           ondansetron 8 MG tablet    ZOFRAN    30 tablet    Take 1 tablet (8 mg) by mouth every 8 hours as needed (take first for nausea/vomiting. If it doesn't work in 45-60 mins, take Compazine.)    Malignant neoplasm of base of tongue (H)       oxyCODONE 5 MG/5ML solution    ROXICODONE    250 mL    Take 5 mLs (5 mg) by mouth every 4 hours as needed for severe pain    Postoperative pain       polyethylene glycol Packet    MIRALAX/GLYCOLAX     Take 1 packet by mouth daily as needed for constipation        prochlorperazine 10 MG tablet    COMPAZINE    30 tablet    Take 1 tablet (10 mg) by mouth every 6 hours as needed (Nausea/Vomiting)    Malignant neoplasm of base of tongue (H)

## 2018-09-24 ENCOUNTER — APPOINTMENT (OUTPATIENT)
Dept: RADIATION ONCOLOGY | Facility: CLINIC | Age: 68
End: 2018-09-24
Attending: RADIOLOGY
Payer: MEDICARE

## 2018-09-24 VITALS
WEIGHT: 186.6 LBS | DIASTOLIC BLOOD PRESSURE: 87 MMHG | HEART RATE: 55 BPM | SYSTOLIC BLOOD PRESSURE: 154 MMHG | BODY MASS INDEX: 25.31 KG/M2

## 2018-09-24 DIAGNOSIS — C01 MALIGNANT NEOPLASM OF BASE OF TONGUE (H): Primary | ICD-10-CM

## 2018-09-24 PROCEDURE — 77386 ZZH IMRT TREATMENT DELIVERY, COMPLEX: CPT | Performed by: RADIOLOGY

## 2018-09-24 RX ORDER — NYSTATIN 100000/ML
500000 SUSPENSION, ORAL (FINAL DOSE FORM) ORAL 4 TIMES DAILY
Qty: 100 ML | Refills: 0 | Status: SHIPPED | OUTPATIENT
Start: 2018-09-24 | End: 2018-10-30

## 2018-09-24 NOTE — MR AVS SNAPSHOT
After Visit Summary   9/24/2018    Lazaro Porter    MRN: [id number]           Patient Information     Date Of Birth          1950        Visit Information        Provider Department      9/24/2018 11:45 AM Nathan Garay MD Radiation Oncology Clinic        Today's Diagnoses     Malignant neoplasm of base of tongue (H)    -  1       Follow-ups after your visit        Your next 10 appointments already scheduled     Sep 25, 2018 11:15 AM CDT   EXTERNAL RADIATION TREATMENT with UMP RAD ONC VARIAN   Radiation Oncology Clinic ([facility])    [facility]  1st Floor  [address] [address]   [phone]            Sep 26, 2018 11:15 AM CDT   EXTERNAL RADIATION TREATMENT with UMP RAD ONC VARIAN   Radiation Oncology Clinic ([facility])    [facility]  1st Floor  [address] [address]   [phone]            Sep 27, 2018 11:15 AM CDT   EXTERNAL RADIATION TREATMENT with UMP RAD ONC VARIAN   Radiation Oncology Clinic ([facility])    [facility]  1st Floor  [address] [address]   [phone]            Sep 28, 2018 11:15 AM CDT   EXTERNAL RADIATION TREATMENT with UMP RAD ONC VARIAN   Radiation Oncology Clinic ([facility])    [facility]  1st Floor  [address] [address]   [phone]            Oct 01, 2018 11:15 AM CDT   EXTERNAL RADIATION TREATMENT with UMP RAD ONC VARIAN   Radiation Oncology Clinic ([facility])    [facility]  1st Floor  [address] [address]   [phone]            Oct 01, 2018 11:30 AM CDT   Follow Up with Shelli Mcghee RD   [facility] ([facility] [place])    [address] [facility] [address]  [facility] [place] [address]               Oct  2018 11:45 AM CDT   ON TREATMENT VISIT with Nathan Garay MD   Radiation Oncology Clinic ([facility])    [facility]  1st Floor  [address] [address] [phone]            Oct 02, 2018 11:15 AM CDT   EXTERNAL RADIATION TREATMENT with [facility] RAD ONC VARIAN   Radiation Oncology Clinic ([facility])    [facility]  1st Floor  [address] [address] [phone]            Oct 02, 2018  3:00 PM CDT   (Arrive by 2:45 PM)   Return Visit with Garcia Worthy MD   [facility] ([facility])    [address]  4th Floor  [facility] [address] [phone]            Oct 03, 2018 11:15 AM CDT   EXTERNAL RADIATION TREATMENT with [facility] RAD ONC VARIAN   Radiation Oncology Clinic ([facility])    [facility]  1st Floor  [address] [address] [phone]              Who to contact     Please call your clinic at [phone] to:    Ask questions about your health    Make or cancel appointments    Discuss your medicines    Learn about your test results    Speak to your doctor            Additional Information About Your Visit        MyChart Information     [employer] is an electronic gateway that provides easy, online access to your medical records. With [facility], you can request a clinic appointment, read your test results, renew a prescription or communicate with your care team.     To sign up for My[employer] visit the website at [email or web address]   You will be asked to enter the access code listed below, as well as some personal information. Please follow the directions to create your username and password.     Your access code is: [email or web address]  Expires: 2018 10:28 AM     Your access code will  in 90 days. If you need help or a new code, please contact your [facility]  [facility] [facility] or call [phone] for assistance.        Care EveryWhere ID     This is your Care EveryWhere ID. This could be used by other organizations to access your [facility] medical records  [id number]        Your Vitals Were     Pulse BMI (Body Mass Index)                55 25.31 kg/m2           Blood Pressure from Last 3 Encounters:   09/24/18 154/87   09/21/18 138/83   09/19/18 128/77    Weight from Last 3 Encounters:   09/24/18 84.6 kg (186 lb 9.6 oz)   09/21/18 82.1 kg (181 lb 1.6 oz)   09/19/18 80.8 kg (178 lb 3.2 oz)              Today, you had the following     No orders found for display         Today's Medication Changes          These changes are accurate as of 9/24/18  3:13 PM.  If you have any questions, ask your nurse or doctor.               Start taking these medicines.        Dose/Directions    nystatin 286648 UNIT/ML suspension   Commonly known as:  MYCOSTATIN   Used for:  Malignant neoplasm of base of tongue (H)   Started by:  Nathan Garay MD        Dose:  403349 Units   Take 5 mLs (500,000 Units) by mouth 4 times daily   Quantity:  100 mL   Refills:  0            Where to get your medicines      These medications were sent to [facility] [place], [place] - [address]  [address] [address]     Phone:  [phone]     nystatin 733195 UNIT/ML suspension                Primary Care Provider Office Phone # Fax #    Lalito Moctezuma [phone]       [address] [address]        Equal Access to Services     JOZEF CLEMENTS : Hadii ivet ku hadasho Soomaali, waaxda luqadaha, qaybta kaalmada talon coy. So [facility] [phone].    ATENCIÓN: Si habla español, tiene a corona disposición servicios gratuitos de asistencia lingüística. Llame al [phone].    We comply with applicable federal civil rights laws and [place] laws. We do not  discriminate on the basis of race, color, national origin, age, disability, sex, sexual orientation, or gender identity.            Thank you!     Thank you for choosing [facility]  for your care. Our goal is always to provide you with excellent care. Hearing back from our patients is one way we can continue to improve our services. Please take a few minutes to complete the written survey that you may receive in the mail after your visit with us. Thank you!             Your Updated Medication List - Protect others around you: Learn how to safely use, store and throw away your medicines at www.disposemymeds.org.          This list is accurate as of 9/24/18  3:13 PM.  Always use your most recent med list.                   Brand Name Dispense Instructions for use Diagnosis    aspirin 325 MG tablet      Take 325 mg by mouth        dexamethasone 4 MG tablet    DECADRON    6 tablet    Take 2 tablets (8 mg) by mouth daily (with breakfast) for 3 days Start the day after chemotherapy.    Malignant neoplasm of base of tongue (H)       IBUPROFEN PO      Take 200 mg by mouth        lidocaine (viscous) 2 % solution    XYLOCAINE    500 mL    Take 15 mLs by mouth every 4 hours as needed for moderate pain swish and spit every 3-8 hours as needed; max 8 doses/24 hour period    Malignant neoplasm of base of tongue (H)       MULTIVITAMIN & MINERAL PO           nystatin 488379 UNIT/ML suspension    MYCOSTATIN    100 mL    Take 5 mLs (500,000 Units) by mouth 4 times daily    Malignant neoplasm of base of tongue (H)       ondansetron 8 MG tablet    ZOFRAN    30 tablet    Take 1 tablet (8 mg) by mouth every 8 hours as needed (take first for nausea/vomiting. If it doesn't work in 45-60 mins, take Compazine.)    Malignant neoplasm of base of tongue (H)       oxyCODONE 5 MG/5ML solution    ROXICODONE    250 mL    Take 5 mLs (5 mg) by mouth every 4 hours as needed for severe pain    Postoperative pain       polyethylene glycol  Packet    MIRALAX/GLYCOLAX     Take 1 packet by mouth daily as needed for constipation        prochlorperazine 10 MG tablet    COMPAZINE    30 tablet    Take 1 tablet (10 mg) by mouth every 6 hours as needed (Nausea/Vomiting)    Malignant neoplasm of base of tongue (H)

## 2018-09-24 NOTE — PROGRESS NOTES
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 No issues.    2018: Moderate nausea/vomiting over the weekend. Otherwise tolerating treatment well.    9/10/2018: Tolerating treatment well though mood is slightly done secondary to ongoing therapy. Referred to [employer] representative for further discussion of supportive care options.    2018: Slightly increased odynophagia secondary to ongoing head and neck radiotherapy. Continuing to tolerate treatment well.    2018: Second cycle of chemotherapy administered after it was held 2 days secondary to neutropenia.    ED visits/Hospitalizations:  None    Missed Treatments:    9/3/2018: 1-day treatment break between fractions 3-4 secondary to Labor Day holiday.    Subjective: Mr. Porter presents to clinic today for his weekly on-treatment visit. Overall, he reports that he is tolerating definitive chemoradiotherapy very well and he has no pressing concerns or complaints on examination. His second infusion of high-dose cisplatin was held for 2 days secondary to neutropenia and was eventually administered on 2018. His dose of antiemetics was increased with this latest infusion given his symptomatic nausea/vomiting following his first treatment and he reports ongoing low-grade nausea today without vomiting. He otherwise is eating a normal diet with no significant odynophagia/dysphagia and his oral cavity/oropharyngeal pain is well-controlled with minimal  as-needed use of viscous lidocaine. His remaining ROS are unremarkable.    ROS:   Constitutional  Pain (0-10): 3 (mouth), 3 (throat), 1 (skin)  Fatigue: Moderate symptoms    CNS  Headaches: Intermittent mild symptoms    ENT  Mucositis: Mild symptoms    Cardiopulmonary  Dyspnea: None    GI  Nausea/vomiting: Intermittent nausea after chemotherapy    Nutrition  PEG: No  Diet: Normal diet    Integumentary  Dermatitis: Mild symptoms    Objective:   Current weight: 84.6 kg  Last week's weight: 80.7 kg  Starting weight: 83 kg    BP: 154/87 (sitting), 153/88 (standing)  Pulse: 55 (sitting), 58 (standing)    General: Alert, oriented and in no acute distress  HEENT: Mild erythema involving the posterior aspect of the soft palate and posterior oropharyngeal wall. Scattered thrush most prominently involving the right posterior soft palate.  Cardiac: Extremities are warm and well-perfused  Respiratory: No wheezing, stridor or respiratory distress  Skin: Mild diffuse erythema over treatment field    Treatment-related toxicities (CTCAE v5.0):  1. Nausea: Grade 2: Oral intake decreased without significant weight loss, dehydration or malnutrition  2. Mucositis: Grade 1: Asymptomatic or mild symptoms; intervention not indicated  3. Dermatitis: Grade 1: Faint erythema or dry desquamation    Assessment:    Mr. 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 He is undergoing definitive chemoradiotherapy with high-dose cisplatin which he is tolerating reasonably well with the anticipated acute radiation-induced toxicities.    Plan:   cT1 N1 M0 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:    Continue chemoradiotherapy    Pain management:    Continue as-needed viscous lidocaine for mild to moderate oral cavity/oropharyngeal pain    Fluids/Electrolytes/Nutrition:    Continue diet as tolerated with caloric goals as delineated by the registered  dietitian    Dermatitis:    Continue BID/TID moisturizer use to the lower face and bilateral neck    Oral thrush:    Start nystatin suspension for oral thrush    Mosaiq chart and setup information reviewed  IGRT images reviewed    Medication Review  Med list reviewed with patient?: Yes    Nathan Garay MD/PhD  Department of Radiation Oncology  [facility]

## 2018-09-25 ENCOUNTER — CARE COORDINATION (OUTPATIENT)
Dept: ONCOLOGY | Facility: CLINIC | Age: 68
End: 2018-09-25

## 2018-09-25 ENCOUNTER — APPOINTMENT (OUTPATIENT)
Dept: RADIATION ONCOLOGY | Facility: CLINIC | Age: 68
End: 2018-09-25
Attending: RADIOLOGY
Payer: MEDICARE

## 2018-09-25 PROCEDURE — 77386 ZZH IMRT TREATMENT DELIVERY, COMPLEX: CPT | Performed by: RADIOLOGY

## 2018-09-25 NOTE — PROGRESS NOTES
TC to pt per Dr. Rolon:    Pillo Rolon MD Jankovich, Diana, RN                   Big chance he may not have sufficient counts to get third dose of cis 100mg/m2 on 10/10, but I can still see him then and give chemo on the Friday worse-case. This is because I'm not on clinic on Friday.     If he would much rather see a provider and have a better chance of getting chemo the same day, do Friday 10/12 with Migel.     It's ultimately his preference.     AR            Previous Messages       ----- Message -----      From: Meghann Buck RN      Sent: 9/24/2018   9:01 AM        To: Pillo Rolon MD   Subject: FW: schedule                                     Hello!     Please see Migel' question below. Thanks!     -Meghann     ----- Message -----      From: Migel Noonan RN      Sent: 9/21/2018   5:43 PM        To: Meghann Buck RN   Subject: schedule                                         Hi MeghannFarhad was in infusion today for his Cisplatin.  He is scheduled to come back 10/10 to see  and receive his next infusion.  He was delayed 2 days because of a low ANC this week.  He was asking if he should still come in 10/10 or if those appointments should be pushed back a couple of days?  I told him he would most likely have to keep his appointment with Dr. Rolon and that 2 days early may be fine depending on his labs, but he wanted me to ask.  Could you check with Dr. Rolon and follow-up with the patient.  Thanks!   -Migel          No answer. [facility] clinic to review plans for next infusion planned for 10/10. Await response.     Per Pamela in rad onc, pt stated that he will do whatever the providers think is best. Per Dr. Rolon:    Pillo Rolon MD Jankovich, Diana, RN                   Friday with Migel for last dose of chemo and then in 3 weeks after that with me. Diana Zavaleta.   AR            Previous Messages       ----- Message -----      From: Meghann Buck RN      Sent: 9/27/2018  11:53 AM        To: Pillo  MD Gonzales   Subject: RE: schedule                                     I just heard from Farhad, and he doesn't care one way or the other. He will go with whatever you think is best. Should we have him moved to Friday?     -Meghann          Message sent to scheduling to make changes and notify pt.

## 2018-09-26 ENCOUNTER — APPOINTMENT (OUTPATIENT)
Dept: RADIATION ONCOLOGY | Facility: CLINIC | Age: 68
End: 2018-09-26
Attending: RADIOLOGY
Payer: MEDICARE

## 2018-09-26 PROCEDURE — 77336 RADIATION PHYSICS CONSULT: CPT | Performed by: RADIOLOGY

## 2018-09-26 PROCEDURE — 77386 ZZH IMRT TREATMENT DELIVERY, COMPLEX: CPT | Performed by: RADIOLOGY

## 2018-09-27 ENCOUNTER — APPOINTMENT (OUTPATIENT)
Dept: RADIATION ONCOLOGY | Facility: CLINIC | Age: 68
End: 2018-09-27
Attending: RADIOLOGY
Payer: MEDICARE

## 2018-09-27 PROCEDURE — 77386 ZZH IMRT TREATMENT DELIVERY, COMPLEX: CPT | Performed by: RADIOLOGY

## 2018-09-28 ENCOUNTER — APPOINTMENT (OUTPATIENT)
Dept: RADIATION ONCOLOGY | Facility: CLINIC | Age: 68
End: 2018-09-28
Attending: RADIOLOGY
Payer: MEDICARE

## 2018-09-28 PROCEDURE — 77386 ZZH IMRT TREATMENT DELIVERY, COMPLEX: CPT | Performed by: RADIOLOGY

## 2018-10-01 ENCOUNTER — APPOINTMENT (OUTPATIENT)
Dept: RADIATION ONCOLOGY | Facility: CLINIC | Age: 68
End: 2018-10-01
Attending: RADIOLOGY
Payer: MEDICARE

## 2018-10-01 ENCOUNTER — HOSPITAL ENCOUNTER (OUTPATIENT)
Dept: NUTRITION | Facility: CLINIC | Age: 68
Discharge: HOME | End: 2018-10-01
Attending: RADIOLOGY | Admitting: RADIOLOGY
Payer: MEDICARE

## 2018-10-01 VITALS — WEIGHT: 172.5 LBS | BODY MASS INDEX: 23.4 KG/M2

## 2018-10-01 DIAGNOSIS — C01 MALIGNANT NEOPLASM OF BASE OF TONGUE (H): Primary | ICD-10-CM

## 2018-10-01 PROCEDURE — 77386 ZZH IMRT TREATMENT DELIVERY, COMPLEX: CPT | Performed by: RADIOLOGY

## 2018-10-01 PROCEDURE — 97803 MED NUTRITION INDIV SUBSEQ: CPT | Performed by: DIETITIAN, REGISTERED

## 2018-10-01 NOTE — PROGRESS NOTES
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 No issues.    2018: Moderate nausea/vomiting over the weekend. Otherwise tolerating treatment well.    9/10/2018: Tolerating treatment well though mood is slightly done secondary to ongoing therapy. Referred [facility] [employer] [facility] of supportive care options.    2018: Slightly increased odynophagia secondary to ongoing head and neck radiotherapy. Continuing to tolerate treatment well.    2018: Second cycle of chemotherapy administered after it was held 2 days secondary to neutropenia.    ED visits/Hospitalizations:  None    Missed Treatments:    9/3/2018: 1-day treatment break between fractions 3-4 secondary to Labor Day holiday.    Subjective: Mr. Porter presents to clinic today for his weekly on-treatment visit. Overall, he reports that he is tolerating definitive chemoradiotherapy very well and he has no pressing concerns or complaints on examination.  Over the weekend he reported he had an episode of constipation for 3 days.  Since this time he has had a bowel movement after responding to laxatives.  He reports that he has decreased psychological intake for food secondary to decreased taste, however he has no dysphagia/ odynophagia causing any difficulty with swallowing.  He otherwise is eating a normal diet with no significant odynophagia/dysphagia and his oral cavity/oropharyngeal pain is well-controlled with minimal as-needed use  of viscous lidocaine. His remaining ROS are unremarkable.    Objective:   Current weight: 78.2 kg  Last week's weight: 84.6 g  Starting weight: 83 kg    General: Alert, oriented and in no acute distress  HEENT: Mild erythema involving the posterior aspect of the soft palate and posterior oropharyngeal wall. Patchy mucositis over the inferior part of the visualized oropharynx. No evidence of thrush in the visualized oral cavity or oropharynx.   Cardiac: Extremities are warm and well-perfused  Respiratory: Breathing comfortably on room air.   Skin: Moderate diffuse erythema over treatment field    Treatment-related toxicities (CTCAE v5.0):  1. Nausea: Grade 1: Loss of appetite without alteration in eating habits  2. Mucositis: Grade 1: Asymptomatic or mild symptoms; intervention not indicated  3. Dermatitis: Grade 2: Moderate to brisk erythema; patchy moist desquamation, mostly confined to skin folds and creases; moderate erythema    Assessment:    Mr. 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 He is undergoing definitive chemoradiotherapy with high-dose cisplatin which he is tolerating reasonably well with the anticipated acute radiation-induced toxicities.    Plan:   cT1 N1 M0 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:    Continue chemoradiotherapy    Pain management:    Continue as-needed viscous lidocaine for mild to moderate oral cavity/oropharyngeal pain    Fluids/Electrolytes/Nutrition:    Continue diet as tolerated with caloric goals as delineated by the registered dietitian    Dermatitis:    Continue BID/TID moisturizer use to the lower face and bilateral neck    Oral thrush:    No clinical evidence of any oral cavity or oropharyngeal pain and no evidence on examination for thrush     Continue prescribed course of nystatin swish and swallow until the completion of treatment with    Mosaiq chart and setup information  reviewed  IGRT images reviewed    Medication Review  Med list reviewed with patient?: Yes    Camilo Mayer MD  Radiation Oncology Resident, PGY-4  [facility]  Phone: [phone]    I saw the patient with the resident.  I agree with the resident note and plan of care.      Marquita Dawson MD  [phone]

## 2018-10-01 NOTE — LETTER
10/1/2018       RE: Lazaro Porter  [address] [facility] [place] [address]     Dear Colleague,    Thank you for referring your patient, Lazaro Porter, to the RADIATION ONCOLOGY CLINIC. Please see a copy of my visit note below.    RADIATION ONCOLOGY WEEKLY ON TREATMENT VISIT   Encounter Date: 10/1/2018    Patient Name: Lazaro Porter  MRN: [id number]  : 1950     Disease and Stage: cT1 N1 M0 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  Treatment Site: Oropharynx and bilateral neck  Current Dose/Planned Total Dose: 4876/ 6996 cGy  Daily Fraction Size: 212 cGy/day, 5 times/week  Concurrent Chemotherapy: Yes  Drug and Frequency: Cisplatin (100 mg/m ) every 3 weeks    Treatment Summary:    2018: Initiation of radiotherapy. No issues.    2018: Moderate nausea/vomiting over the weekend. Otherwise tolerating treatment well.    9/10/2018: Tolerating treatment well though mood is slightly done secondary to ongoing therapy. Referred to [employer] representative [facility] of supportive care options.    2018: Slightly increased odynophagia secondary to ongoing head and neck radiotherapy. Continuing to tolerate treatment well.    2018: Second cycle of chemotherapy administered after it was held 2 days secondary to neutropenia.    ED visits/Hospitalizations:  None    Missed Treatments:    9/3/2018: 1-day treatment break between fractions 3-4 secondary to Labor Day holiday.    Subjective: Mr. Porter presents to clinic today for his weekly on-treatment visit. Overall, he reports that he is tolerating definitive chemoradiotherapy very well and he has no pressing concerns or complaints on examination.  Over the weekend he reported he had an episode of constipation for 3 days.  Since this time he has had a bowel movement after responding to laxatives.  He reports that he has decreased psychological intake for food secondary to decreased taste, however he has no  dysphagia/ odynophagia causing any difficulty with swallowing.  He otherwise is eating a normal diet with no significant odynophagia/dysphagia and his oral cavity/oropharyngeal pain is well-controlled with minimal as-needed use of viscous lidocaine. His remaining ROS are unremarkable.    Objective:   Current weight: 78.2 kg  Last week's weight: 84.6 g  Starting weight: 83 kg    General: Alert, oriented and in no acute distress  HEENT: Mild erythema involving the posterior aspect of the soft palate and posterior oropharyngeal wall. Patchy mucositis over the inferior part of the visualized oropharynx. No evidence of thrush in the visualized oral cavity or oropharynx.   Cardiac: Extremities are warm and well-perfused  Respiratory: Breathing comfortably on room air.   Skin: Moderate diffuse erythema over treatment field    Treatment-related toxicities (CTCAE v5.0):  1. Nausea: Grade 1: Loss of appetite without alteration in eating habits  2. Mucositis: Grade 1: Asymptomatic or mild symptoms; intervention not indicated  3. Dermatitis: Grade 2: Moderate to brisk erythema; patchy moist desquamation, mostly confined to skin folds and creases; moderate erythema    Assessment:    Mr. 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 He is undergoing definitive chemoradiotherapy with high-dose cisplatin which he is tolerating reasonably well with the anticipated acute radiation-induced toxicities.    Plan:   cT1 N1 M0 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:    Continue chemoradiotherapy    Pain management:    Continue as-needed viscous lidocaine for mild to moderate oral cavity/oropharyngeal pain    Fluids/Electrolytes/Nutrition:    Continue diet as tolerated with caloric goals as delineated by the registered dietitian    Dermatitis:    Continue BID/TID moisturizer use to the lower face and bilateral neck    Oral thrush:    No clinical  evidence of any oral cavity or oropharyngeal pain and no evidence on examination for thrush     Continue prescribed course of nystatin swish and swallow until the completion of treatment with    Mosaiq chart and setup information reviewed  IGRT images reviewed    Medication Review  Med list reviewed with patient?: Yes    Camilo Mayer MD  Radiation Oncology Resident, PGY-4  [facility]  Phone: [phone]    I saw the patient with the resident.  I agree with the resident note and plan of care.      Marquita Dawson MD  [phone]      Again, thank you for allowing me to participate in the care of your patient.      Sincerely,    Marquita Dawson MD

## 2018-10-01 NOTE — MR AVS SNAPSHOT
After Visit Summary   10/1/2018    Lazaro Porter    MRN: [id number]           Patient Information     Date Of Birth          1950        Visit Information        Provider Department      10/1/2018 11:45 AM Marquita Dawson MD Radiation Oncology Clinic         Follow-ups after your visit        Your next 10 appointments already scheduled     Oct 01, 2018 11:30 AM CDT   Follow Up with Shelli Mcghee RD   [facility] Services ([facility], [facility])    [address]  [facility] [address]               Oct 01, 2018 11:45 AM CDT   ON TREATMENT VISIT with Marquita Dawson MD   Radiation Oncology Clinic ([facility])    [facility]  1st Floor  [address] [address]   [phone]            Oct 02, 2018 11:15 AM CDT   EXTERNAL RADIATION TREATMENT with [facility] RAD ONC VARIAN   Radiation Oncology Clinic ([facility])    [facility]  1st Floor  [address] [address]   [phone]            Oct 02, 2018  3:00 PM CDT   (Arrive by 2:45 PM)   Return Visit with Garcia Worthy MD   [facility] Ear Nose and Throat ([facility])    [address] [place] [address]   [phone]            Oct 03, 2018 10:30 AM CDT   Treatment with JANETTE Florence   [facility] Rehab ([facility])    [address] [place] [address]   [phone]            Oct 03, 2018 11:15 AM CDT   EXTERNAL RADIATION TREATMENT with P RAD ONC VARIAN   Radiation Oncology Clinic ([facility])    [facility]  1st Floor  [address] [address]   [phone]            Oct 04, 2018 11:15 AM CDT   EXTERNAL RADIATION TREATMENT with P RAD ONC VARIAN   Radiation Oncology Clinic ([facility])     [facility]  1st Floor  [address] [address]   [phone]            Oct 05, 2018 11:15 AM CDT   EXTERNAL RADIATION TREATMENT with [facility] RAD ONC VARIAN   Radiation Oncology Clinic ([facility])    [facility]  1st Floor  [address] [address]   [phone]            Oct 08, 2018 11:15 AM CDT   EXTERNAL RADIATION TREATMENT with [facility] RAD ONC VARIAN   Radiation Oncology Clinic ([facility])    [facility]  1st Floor  [address] [address]   [phone]            Oct 09, 2018 11:15 AM CDT   EXTERNAL RADIATION TREATMENT with [facility] RAD ONC VARIAN   Radiation Oncology Clinic ([facility])    [facility]  1st Floor  [address] [address]   [phone]              Who to contact     Please call your clinic at [phone] to:    Ask questions about your health    Make or cancel appointments    Discuss your medicines    Learn about your test results    Speak to your doctor            Additional Information About Your Visit        [employer] Information     [employer] is an electronic gateway that provides easy, online access to your medical records. With [employer], you can request a clinic appointment, read your test results, renew a prescription or communicate with your care team.     To sign up for [employer] visit the website at [email or web address]   You will be asked to enter the access code listed below, as well as some personal information. Please follow the directions to create your username and password.     Your access code is: [email or web address]  Expires: 2018 10:28 AM     Your access code will  in 90 days. If you need help or a new code, please contact your [facility] or call [phone] for assistance.        Care EveryWhere ID     This is your Care EveryWhere ID.  This could be used by other organizations to access your [facility] medical records  [id number]         Blood Pressure from Last 3 Encounters:   09/24/18 154/87   09/21/18 138/83   09/19/18 128/77    Weight from Last 3 Encounters:   09/24/18 84.6 kg (186 lb 9.6 oz)   09/21/18 82.1 kg (181 lb 1.6 oz)   09/19/18 80.8 kg (178 lb 3.2 oz)              Today, you had the following     No orders found for display       Primary Care Provider Office Phone # Fax #    Lalito Moctezuma [phone]       [facility] [address] [facility] [address]        Equal Access to Services     JOZEF CLEMENTS : Romario rojaso Sowilliam, waaxda luqadaha, qaybta kaalmada adeegyada, talon fernandez. So [facility] [phone].    ATENCIÓN: Si habla español, tiene a corona disposición servicios gratuitos de asistencia lingüística. [place] al [phone].    We comply with applicable federal civil rights laws and [place] laws. We do not discriminate on the basis of race, color, national origin, age, disability, sex, sexual orientation, or gender identity.            Thank you!     Thank you for choosing RADIATION ONCOLOGY CLINIC  for your care. Our goal is always to provide you with excellent care. Hearing back from our patients is one way we can continue to improve our services. Please take a few minutes to complete the written survey that you may receive in the mail after your visit with us. Thank you!             Your Updated Medication List - Protect others around you: Learn how to safely use, store and throw away your medicines at www.disposemymeds.org.          This list is accurate as of 10/1/18 11:20 AM.  Always use your most recent med list.                   Brand Name Dispense Instructions for use Diagnosis    aspirin 325 MG tablet      Take 325 mg by mouth        dexamethasone 4 MG tablet    DECADRON    6 tablet    Take 2 tablets (8 mg) by mouth daily (with breakfast) for 3 days Start the  day after chemotherapy.    Malignant neoplasm of base of tongue (H)       IBUPROFEN PO      Take 200 mg by mouth        lidocaine (viscous) 2 % solution    XYLOCAINE    500 mL    Take 15 mLs by mouth every 4 hours as needed for moderate pain swish and spit every 3-8 hours as needed; max 8 doses/24 hour period    Malignant neoplasm of base of tongue (H)       MULTIVITAMIN & MINERAL PO           nystatin 596986 UNIT/ML suspension    MYCOSTATIN    100 mL    Take 5 mLs (500,000 Units) by mouth 4 times daily    Malignant neoplasm of base of tongue (H)       ondansetron 8 MG tablet    ZOFRAN    30 tablet    Take 1 tablet (8 mg) by mouth every 8 hours as needed (take first for nausea/vomiting. If it doesn't work in 45-60 mins, take Compazine.)    Malignant neoplasm of base of tongue (H)       oxyCODONE 5 MG/5ML solution    ROXICODONE    250 mL    Take 5 mLs (5 mg) by mouth every 4 hours as needed for severe pain    Postoperative pain       polyethylene glycol Packet    MIRALAX/GLYCOLAX     Take 1 packet by mouth daily as needed for constipation        prochlorperazine 10 MG tablet    COMPAZINE    30 tablet    Take 1 tablet (10 mg) by mouth every 6 hours as needed (Nausea/Vomiting)    Malignant neoplasm of base of tongue (H)

## 2018-10-01 NOTE — PROGRESS NOTES
CLINICAL NUTRITION SERVICES - REASSESSMENT NOTE   EVALUATION OF PREVIOUS PLAN OF CARE:   Referring Physician: Tanisha  Current diet: soft foods  Current appetite/intake: poor  PEG Tube: No  Chemotherapy: HD Cisplatin   Radiation: 23/33 fractions completed     Monitoring from previous assessment:   Food intake - declining 2/2 lack of taste and nausea from chemo.  He has been taking 3 CIB mixed with whole milk daily. Has been taking bites of noodles, mashed potatoes, ice cream, peanut butter toast, mac n cheese and eggs with butter.      -Fluid/beverage intake - continues to take alkaline water PO, >8cups/day, has declined slightly       -Weight trends - down ~9-14lbs x past 1 week, question accuracy.    Has lost ~20 lbs x past 6 weeks.   Wt Readings from Last 8 Encounters:   10/01/18 78.2 kg (172 lb 8 oz)   09/24/18 84.6 kg (186 lb 9.6 oz)   09/21/18 82.1 kg (181 lb 1.6 oz)   09/19/18 80.8 kg (178 lb 3.2 oz)   09/17/18 80.7 kg (178 lb)   09/10/18 83.5 kg (184 lb 1.6 oz)   09/05/18 83 kg (183 lb)   08/28/18 87 kg (191 lb 14.4 oz)       Previous Goals:   1. Aim for 2500-3000kcal/day  2. Weight maintenance  Evaluation: Not met   Previous Nutrition Diagnosis:   Inadequate oral intake related to dysgeusia and odynophagia as evidenced by 4% wt loss x past 1 week   Evaluation: Declining with further weight loss  NEW FINDINGS:   9-14 lb wt loss x past 1 week after chemo   CURRENT NUTRITION DIAGNOSIS   Inadequate oral intake related to dysgeusia and nausea as evidenced by ongoing and significant wt loss x past week.     INTERVENTIONS   Recommendations / Nutrition Prescription   1. Start taking 4-5 CIB daily  2. >2500kcal, 100g protein/day via small frequent meals.   Implementation  -General/healthful diet - reviewed calorie and protein needs.  Reviewed Coping with taste changes.    -Medical Food Supplement  - encouraged pt to try increasing CIB to 4-5/day. Reviewed alternative CIB options.   Goals   1. Aim for  2500-3000kcal/day  2. Weight stability  MONITORING AND EVALUATION:  -Food intake  -Fluid/beverage intake  -Weight trends      Shelli Mcghee RD, LD

## 2018-10-02 ENCOUNTER — THERAPY VISIT (OUTPATIENT)
Dept: SPEECH THERAPY | Facility: CLINIC | Age: 68
End: 2018-10-02
Payer: MEDICARE

## 2018-10-02 ENCOUNTER — OFFICE VISIT (OUTPATIENT)
Dept: OTOLARYNGOLOGY | Facility: CLINIC | Age: 68
End: 2018-10-02
Payer: MEDICARE

## 2018-10-02 ENCOUNTER — APPOINTMENT (OUTPATIENT)
Dept: RADIATION ONCOLOGY | Facility: CLINIC | Age: 68
End: 2018-10-02
Attending: RADIOLOGY
Payer: MEDICARE

## 2018-10-02 ENCOUNTER — TELEPHONE (OUTPATIENT)
Dept: OTOLARYNGOLOGY | Facility: CLINIC | Age: 68
End: 2018-10-02

## 2018-10-02 VITALS — BODY MASS INDEX: 23.03 KG/M2 | HEIGHT: 72 IN | WEIGHT: 170 LBS

## 2018-10-02 DIAGNOSIS — C01 MALIGNANT NEOPLASM OF BASE OF TONGUE (H): Primary | ICD-10-CM

## 2018-10-02 DIAGNOSIS — R13.12 OROPHARYNGEAL DYSPHAGIA: Primary | ICD-10-CM

## 2018-10-02 DIAGNOSIS — C01 MALIGNANT NEOPLASM OF BASE OF TONGUE (H): ICD-10-CM

## 2018-10-02 PROCEDURE — 77386 ZZH IMRT TREATMENT DELIVERY, COMPLEX: CPT | Performed by: RADIOLOGY

## 2018-10-02 ASSESSMENT — PAIN SCALES - GENERAL: PAINLEVEL: NO PAIN (0)

## 2018-10-02 NOTE — PROGRESS NOTES
HISTORY OF PRESENT ILLNESS:  Lazaro is 68 years of age.  He is here for follow-up today.  He has nine radiation treatments left at the present time.  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   He has no new complaints at the present time today.      PHYSICAL EXAMINATION:  The patient is alert, oriented x3 and pleasant.  Skin of the face, lips, and neck on him is quite normal.  Oral cavity and oropharynx is clear.  He as some burn along the right palate.  Neck exam shows no masses, adenopathy or thyromegaly throughout except the right neck mass is just about gone at the present time today.      ASSESSMENT:  Patient with a history of a squamous carcinoma of the oropharynx, HPV positive.  He is doing well presently.        PLAN:  We will see him again in about six weeks for a scope exam.  We had an extensive conversation with him today for an extra 15-20 minutes regarding strategies for eating now that food does not taste good for him and things of this nature.  He has not had any weight loss of substance, he has about 10 pounds of weight loss total.  We will see how he is doing again over the course of the next six weeks or so.      FO/ms

## 2018-10-02 NOTE — MR AVS SNAPSHOT
After Visit Summary   10/2/2018    Lazaro Porter    MRN: [id number]           Patient Information     Date Of Birth          1950        Visit Information        Provider Department      10/2/2018 3:00 PM Garcia Worthy MD [facility] Ear Nose and Throat        Today's Diagnoses     Malignant neoplasm of base of tongue (H)    -  1      Care Instructions    1.  You were seen in the ENT Clinic today by Dr. Worthy.  If you have any questions or concerns after your appointment, please call [phone].  Press option #1 for scheduling related needs.  Press option #3 for Nurse advice.  2.  Plan is to return to clinic in 6 weeks for follow up and cancer surveillance.      Chanell HERNANDEZ, RN  [facility] ENT   Head & Neck Surgery                 Follow-ups after your visit        Follow-up notes from your care team     Return in about 6 weeks (around 11/13/2018).      Your next 10 appointments already scheduled     Oct 29, 2018 11:30 AM CDT   Return Visit with LORIE Chisholm CNP   Radiation Oncology Clinic ([facility])    [facility]  1st Floor  [address] [id number] [phone]            Nov 19, 2018 10:45 AM CST   LAB with  LAB    Health Lab ([facility] [place])    [address]  [address] [facility] [address] [phone]           Please do not eat 10-12 hours before your appointment if you are coming in fasting for labs on lipids, cholesterol, or glucose (sugar). This does not apply to pregnant women. Water, hot tea and black coffee (with nothing added) are okay. Do not drink other fluids, diet soda or chew gum.            Nov 19, 2018 12:00 PM CST   CT SOFT TISSUE NECK W CONTRAST with UUCT1   [facility] [place] [place] ([facility])    [address] [address] [phone]           How do I prepare for my exam?  (Food and drink instructions) **You will have contrast for this exam.** Do not eat or drink for 2 hours before your exam. If you need to take medicine, you may take it with small sips of water. (We may ask you to take liquid medicine as well.)  The day before your exam, drink extra fluids at least six 8-ounce glasses (unless your doctor tells you to restrict your fluids).  How do I prepare for my exam? (Other instructions) Patients over 70 or patients with diabetes or kidney problems: If you haven t had a blood test (creatinine test) within the last 30 days, the Cardiologist/Radiologist may require you to get this test prior to your exam.  What should I wear: Please wear loose clothing, such as a sweat suit or jogging clothes.  Avoid snaps, zippers and other metal. We may ask you to undress and put on a hospital gown.  How long does the exam take: Most scans take less than 20 minutes.  What should I bring: Please bring any scans or X-rays taken at other hospitals, if similar tests were done. Also bring a list of your medicines, including vitamins, minerals and over-the-counter drugs. It is safest to leave personal items at home.  Do I need a :  No  is needed.  What do I need to tell my doctor? Be sure to tell your doctor: * If you have any allergies. * If there s any chance you are pregnant. * If you are breastfeeding. * If you have diabetes as your medication may need to be adjusted for this exam.  What should I do after the exam: No restrictions, You may resume normal activities.  What is this test: A CT (computed tomography) scan is a series of pictures that allows us to look inside your body. The scanner creates images of the body in cross sections, much like slices of bread. This helps us see any problems more clearly. You may receive contrast (X-ray dye) before or during your scan. Contrast is given through an IV (small needle in your arm).  Who should I call with questions: If you have any  questions, please call the Imaging Department where you will have your exam. Directions, parking instructions, and other information is available on our website, [email or web address].            2018  1:00 PM CST   Return Visit with Nathan Garay MD   Radiation Oncology Clinic ([facility])    [facility]  1st Floor  [address] [address] [phone]            2018  2:00 PM CST   (Arrive by 1:45 PM)   Return Visit with Garcia Worthy MD   [facility] Ear Nose and Throat ([facility] [place])    [address]  [address] [facility] [id number] [phone]              Who to contact     Please call your clinic at [phone] to:    Ask questions about your health    Make or cancel appointments    Discuss your medicines    Learn about your test results    Speak to your doctor            Additional Information About Your Visit        MyChart Information     [employer] is an electronic gateway that provides easy, online access to your medical records. With [employer], you can request a clinic appointment, read your test results, renew a prescription or communicate with your care team.     To sign up for [employer] visit the website at [email or web address]   You will be asked to enter the access code listed below, as well as some personal information. Please follow the directions to create your username and password.     Your access code is: [email or web address]  Expires: 2018 10:28 AM     Your access code will  in 90 days. If you need help or a new code, please contact your [facility] or call [phone] for assistance.        Care EveryWhere ID     This is your Care EveryWhere ID. This could be used by other organizations to access your [facility] medical records  [id number]        Your Vitals Were     Height BMI (Body Mass Index)                1.829 m (6') 23.06  kg/m2           Blood Pressure from Last 3 Encounters:   10/12/18 138/81   10/10/18 135/83   09/24/18 154/87    Weight from Last 3 Encounters:   10/12/18 74.7 kg (164 lb 11.2 oz)   10/10/18 75.3 kg (166 lb)   10/02/18 77.1 kg (170 lb)              Today, you had the following     No orders found for display       Primary Care Provider Office Phone # Fax #    Lalito Moctezuma [phone]       [facility] [address] [address]        Equal Access to Services     JOZEF CLEMENTS : Hadii aad ku hadasho Soomaali, waaxda luqadaha, qaybta kaalmada adeegyada, talon hardwick . So [facility] [phone].    ATENCIÓN: Si habla español, tiene a corona disposición servicios gratuitos de asistencia lingüística. Llame al [phone].    We comply with applicable federal civil rights laws and [place] laws. We do not discriminate on the basis of race, color, national origin, age, disability, sex, sexual orientation, or gender identity.            Thank you!     Thank you for choosing [facility] EAR NOSE AND THROAT  for your care. Our goal is always to provide you with excellent care. Hearing back from our patients is one way we can continue to improve our services. Please take a few minutes to complete the written survey that you may receive in the mail after your visit with us. Thank you!             Your Updated Medication List - Protect others around you: Learn how to safely use, store and throw away your medicines at www.disposemymeds.org.          This list is accurate as of 10/2/18 11:59 PM.  Always use your most recent med list.                   Brand Name Dispense Instructions for use Diagnosis    aspirin 325 MG tablet      Take 325 mg by mouth        IBUPROFEN PO      Take 200 mg by mouth        lidocaine (viscous) 2 % solution    XYLOCAINE    500 mL    Take 15 mLs by mouth every 4 hours as needed for moderate pain swish and spit every 3-8 hours as needed; max 8 doses/24  hour period    Malignant neoplasm of base of tongue (H)       MULTIVITAMIN & MINERAL PO           nystatin [id number] UNIT/ML suspension    MYCOSTATIN    100 mL    Take 5 mLs (500,000 Units) by mouth 4 times daily    Malignant neoplasm of base of tongue (H)       ondansetron 8 MG tablet    ZOFRAN    30 tablet    Take 1 tablet (8 mg) by mouth every 8 hours as needed (take first for nausea/vomiting. If it doesn't work in 45-60 mins, take Compazine.)    Malignant neoplasm of base of tongue (H)       oxyCODONE 5 MG/5ML solution    ROXICODONE    250 mL    Take 5 mLs (5 mg) by mouth every 4 hours as needed for severe pain    Postoperative pain       polyethylene glycol Packet    MIRALAX/GLYCOLAX     Take 1 packet by mouth daily as needed for constipation        prochlorperazine 10 MG tablet    COMPAZINE    30 tablet    Take 1 tablet (10 mg) by mouth every 6 hours as needed (Nausea/Vomiting)    Malignant neoplasm of base of tongue (H)

## 2018-10-02 NOTE — PATIENT INSTRUCTIONS
1.  You were seen in the ENT Clinic today by Dr. Worthy.  If you have any questions or concerns after your appointment, please call [phone].  Press option #1 for scheduling related needs.  Press option #3 for Nurse advice.  2.  Plan is to return to clinic in 6 weeks for follow up and cancer surveillance.      Chanell GUTIERREZN, RN  [facility] ENT   Head & Neck Surgery

## 2018-10-02 NOTE — LETTER
10/2/2018       RE: Lazaro Porter  [address] [facility] [address]     Dear Colleague,    Thank you for referring your patient, Lazaro Porter, to the [facility] EAR NOSE AND THROAT at [facility]. Please see a copy of my visit note below.    HISTORY OF PRESENT ILLNESS:  Lazaro is 68 years of age.  He is here for follow-up today.  He has nine radiation treatments left at the present time.  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   He has no new complaints at the present time today.      PHYSICAL EXAMINATION:  The patient is alert, oriented x3 and pleasant.  Skin of the face, lips, and neck on him is quite normal.  Oral cavity and oropharynx is clear.  He as some burn along the right palate.  Neck exam shows no masses, adenopathy or thyromegaly throughout except the right neck mass is just about gone at the present time today.      ASSESSMENT:  Patient with a history of a squamous carcinoma of the oropharynx, HPV positive.  He is doing well presently.        PLAN:  We will see him again in about six weeks for a scope exam.  We had an extensive conversation with him today for an extra 15-20 minutes regarding strategies for eating now that food does not taste good for him and things of this nature.  He has not had any weight loss of substance, he has about 10 pounds of weight loss total.  We will see how he is doing again over the course of the next six weeks or so.      FO/ms     Sincerely,    Garcia Worthy MD

## 2018-10-02 NOTE — MR AVS SNAPSHOT
After Visit Summary   10/2/2018    Lazaro Porter    MRN: [id number]           Patient Information     Date Of Birth          1950        Visit Information        Provider Department      10/2/2018 3:00 PM Shonda Rodriguez SLP [facility]        Today's Diagnoses     Oropharyngeal dysphagia    -  1    Malignant neoplasm of base of tongue (H)           Follow-ups after your visit        Your next 10 appointments already scheduled     Oct 03, 2018 11:15 AM CDT   EXTERNAL RADIATION TREATMENT with UMP RAD ONC VARIAN   Radiation Oncology Clinic ([facility])    [facility]  1st Floor  [address] [address]   [phone]            Oct 04, 2018 11:15 AM CDT   EXTERNAL RADIATION TREATMENT with UMP RAD ONC VARIAN   Radiation Oncology Clinic ([facility])    [facility]  1st Floor  [address] [address]   [phone]            Oct 05, 2018 11:15 AM CDT   EXTERNAL RADIATION TREATMENT with UMP RAD ONC VARIAN   Radiation Oncology Clinic ([facility])    [facility]  1st Floor  [address] [address]   [phone]            Oct 08, 2018 11:15 AM CDT   EXTERNAL RADIATION TREATMENT with UMP RAD ONC VARIAN   Radiation Oncology Clinic ([facility])    [facility]  1st Floor  [address] [address]   [phone]            Oct 09, 2018 11:15 AM CDT   EXTERNAL RADIATION TREATMENT with UMP RAD ONC VARIAN   Radiation Oncology Clinic ([facility])    [facility]  1st Floor  [address] [address]   [phone]            Oct 10, 2018 11:15 AM CDT   EXTERNAL RADIATION TREATMENT with UMP RAD ONC VARIAN   Radiation Oncology Clinic ([facility])    [facility]  1st Floor  [address]  [address]   [phone]            Oct 10, 2018 11:45 AM CDT   ON TREATMENT VISIT with Nathan Garay MD   Radiation Oncology Clinic ([facility])    [facility]  1st Floor  [address] [address]   [phone]            Oct 11, 2018 11:15 AM CDT   EXTERNAL RADIATION TREATMENT with P RAD ONC VARIAN   Radiation Oncology Clinic ([facility])    [facility]  1st Floor  [address] [address]   [phone]            Oct 12, 2018 10:30 AM CDT   Treatment with JANETTE Florence   [facility] ([facility])    [address]  4th Floor  [facility] [address] [phone]            Oct 12, 2018 11:15 AM CDT   EXTERNAL RADIATION TREATMENT with UMP RAD ONC VARIAN   Radiation Oncology Clinic ([facility])    [facility]  1st Floor  [address] [address]   [phone]              Who to contact     Please call your clinic at [phone] to:    Ask questions about your health    Make or cancel appointments    Discuss your medicines    Learn about your test results    Speak to your doctor            Additional Information About Your Visit        Flipswaphart Information     Debt[employer] is an electronic gateway that provides easy, online access to your medical records. With Deem, you can request a clinic appointment, read your test results, renew a prescription or communicate with your care team.     To sign up for [employer] visit the website at [email or web address]   You will be asked to enter the access code listed below, as well as some personal information. Please follow the directions to create your username and password.     Your access code is: [email or web address]  Expires: 2018 10:28 AM     Your access code will  in 90 days. If you need help or a new code, please contact your [facility]  [place] [facility] or call [phone] for assistance.        Care EveryWhere ID     This is your Care EveryWhere ID. This could be used by other organizations to access your [place] medical records  [id number]         Blood Pressure from Last 3 Encounters:   09/24/18 154/87   09/21/18 138/83   09/19/18 128/77    Weight from Last 3 Encounters:   10/02/18 77.1 kg (170 lb)   10/01/18 78.2 kg (172 lb 8 oz)   09/24/18 84.6 kg (186 lb 9.6 oz)              Today, you had the following     No orders found for display       Primary Care Provider Office Phone # Fax #    Lalito Moctezuma [phone]       [facility] [address] [facility] [address]        Equal Access to Services     JOZEF CLEMENTS : Hadii aad ku hadasho Sowilliam, waaxda luqadaha, qaybta kaalmada adeegyada, talon hardwick . So [facility] [phone].    ATENCIÓN: Si habla español, tiene a corona disposición servicios gratuitos de asistencia lingüística. Uche al [phone].    We comply with applicable federal civil rights laws and [place] laws. We do not discriminate on the basis of race, color, national origin, age, disability, sex, sexual orientation, or gender identity.            Thank you!     Thank you for choosing [facility]  for your care. Our goal is always to provide you with excellent care. Hearing back from our patients is one way we can continue to improve our services. Please take a few minutes to complete the written survey that you may receive in the mail after your visit with us. Thank you!             Your Updated Medication List - Protect others around you: Learn how to safely use, store and throw away your medicines at www.disposemymeds.org.          This list is accurate as of 10/2/18 11:59 PM.  Always use your most recent med list.                   Brand Name Dispense Instructions for use Diagnosis    aspirin 325 MG tablet      Take 325 mg by mouth        dexamethasone 4 MG  tablet    DECADRON    6 tablet    Take 2 tablets (8 mg) by mouth daily (with breakfast) for 3 days Start the day after chemotherapy.    Malignant neoplasm of base of tongue (H)       IBUPROFEN PO      Take 200 mg by mouth        lidocaine (viscous) 2 % solution    XYLOCAINE    500 mL    Take 15 mLs by mouth every 4 hours as needed for moderate pain swish and spit every 3-8 hours as needed; max 8 doses/24 hour period    Malignant neoplasm of base of tongue (H)       MULTIVITAMIN & MINERAL PO           nystatin 797880 UNIT/ML suspension    MYCOSTATIN    100 mL    Take 5 mLs (500,000 Units) by mouth 4 times daily    Malignant neoplasm of base of tongue (H)       ondansetron 8 MG tablet    ZOFRAN    30 tablet    Take 1 tablet (8 mg) by mouth every 8 hours as needed (take first for nausea/vomiting. If it doesn't work in 45-60 mins, take Compazine.)    Malignant neoplasm of base of tongue (H)       oxyCODONE 5 MG/5ML solution    ROXICODONE    250 mL    Take 5 mLs (5 mg) by mouth every 4 hours as needed for severe pain    Postoperative pain       polyethylene glycol Packet    MIRALAX/GLYCOLAX     Take 1 packet by mouth daily as needed for constipation        prochlorperazine 10 MG tablet    COMPAZINE    30 tablet    Take 1 tablet (10 mg) by mouth every 6 hours as needed (Nausea/Vomiting)    Malignant neoplasm of base of tongue (H)

## 2018-10-03 ENCOUNTER — APPOINTMENT (OUTPATIENT)
Dept: RADIATION ONCOLOGY | Facility: CLINIC | Age: 68
End: 2018-10-03
Attending: RADIOLOGY
Payer: MEDICARE

## 2018-10-03 PROCEDURE — 77336 RADIATION PHYSICS CONSULT: CPT | Performed by: RADIOLOGY

## 2018-10-03 PROCEDURE — 77386 ZZH IMRT TREATMENT DELIVERY, COMPLEX: CPT | Performed by: RADIOLOGY

## 2018-10-04 ENCOUNTER — APPOINTMENT (OUTPATIENT)
Dept: RADIATION ONCOLOGY | Facility: CLINIC | Age: 68
End: 2018-10-04
Attending: RADIOLOGY
Payer: MEDICARE

## 2018-10-04 PROCEDURE — 77386 ZZH IMRT TREATMENT DELIVERY, COMPLEX: CPT | Performed by: RADIOLOGY

## 2018-10-05 ENCOUNTER — APPOINTMENT (OUTPATIENT)
Dept: RADIATION ONCOLOGY | Facility: CLINIC | Age: 68
End: 2018-10-05
Attending: RADIOLOGY
Payer: MEDICARE

## 2018-10-05 PROCEDURE — 77386 ZZH IMRT TREATMENT DELIVERY, COMPLEX: CPT | Performed by: RADIOLOGY

## 2018-10-08 ENCOUNTER — APPOINTMENT (OUTPATIENT)
Dept: RADIATION ONCOLOGY | Facility: CLINIC | Age: 68
End: 2018-10-08
Attending: RADIOLOGY
Payer: MEDICARE

## 2018-10-08 PROCEDURE — 77386 ZZH IMRT TREATMENT DELIVERY, COMPLEX: CPT | Performed by: RADIOLOGY

## 2018-10-09 ENCOUNTER — APPOINTMENT (OUTPATIENT)
Dept: RADIATION ONCOLOGY | Facility: CLINIC | Age: 68
End: 2018-10-09
Attending: RADIOLOGY
Payer: MEDICARE

## 2018-10-09 PROCEDURE — 77386 ZZH IMRT TREATMENT DELIVERY, COMPLEX: CPT | Performed by: RADIOLOGY

## 2018-10-10 ENCOUNTER — APPOINTMENT (OUTPATIENT)
Dept: RADIATION ONCOLOGY | Facility: CLINIC | Age: 68
End: 2018-10-10
Attending: RADIOLOGY
Payer: MEDICARE

## 2018-10-10 VITALS
DIASTOLIC BLOOD PRESSURE: 83 MMHG | HEART RATE: 81 BPM | BODY MASS INDEX: 22.51 KG/M2 | SYSTOLIC BLOOD PRESSURE: 135 MMHG | WEIGHT: 166 LBS

## 2018-10-10 DIAGNOSIS — E03.9 HYPOTHYROIDISM, UNSPECIFIED TYPE: ICD-10-CM

## 2018-10-10 DIAGNOSIS — C01 MALIGNANT NEOPLASM OF BASE OF TONGUE (H): Primary | ICD-10-CM

## 2018-10-10 PROCEDURE — 77386 ZZH IMRT TREATMENT DELIVERY, COMPLEX: CPT | Performed by: RADIOLOGY

## 2018-10-10 PROCEDURE — 77336 RADIATION PHYSICS CONSULT: CPT | Performed by: RADIOLOGY

## 2018-10-10 RX ORDER — GUAIFENESIN 200 MG/10ML
10 LIQUID ORAL EVERY 4 HOURS PRN
Qty: 560 ML | Refills: 1 | Status: SHIPPED | OUTPATIENT
Start: 2018-10-10 | End: 2018-10-30

## 2018-10-10 RX ORDER — SILVER SULFADIAZINE 10 MG/G
CREAM TOPICAL DAILY
Qty: 50 G | Refills: 3 | Status: SHIPPED | OUTPATIENT
Start: 2018-10-10 | End: 2018-10-30

## 2018-10-10 NOTE — Clinical Note
Follow-up with NP in 2 weeks and with me in 6 weeks with CT neck and labs (TSH) prior to appointment.

## 2018-10-10 NOTE — PROGRESS NOTES
OUTPATIENT SWALLOW  EVALUATION  PLAN OF TREATMENT FOR OUTPATIENT REHABILITATION  (COMPLETE FOR INITIAL CLAIMS ONLY)  Patient's Last Name, First Name, M.I.  YOB: 1950  Lazaro Porter        Provider's Name   Shonda Rodriguez   Medical Record No.  [id number]     Start of Care Date:   8/21/18   Onset Date:   8/3/18   Type:     ___PT   ____OT  ___X_SLP Medical Diagnosis:   Dysphagia; Malignant neoplasm of base of tongue     Treatment Diagnosis:  Mild-moderate oropharyngeal dysphagia, likely to become severe during treatment Visits from SOC:  2     _________________________________________________________________________________  Plan of Treatment/Functional Goals:                           Goals   1. Goal Identifier: Diet       Goal Description: 1. Pt will tolerate regular textures and thin liquids without overt clinical s/sx of aspiration/penetration during two consecutive sessions after completion of radiation therapy as judged by clinician assessment and/or pt/caregiver report.        Target Date: 11/19/18           2. Goal Identifier: Exercises       Goal Description: 2. Pt will improve ROM and strength of tongue, BOT, pharynx and jaw by completing 10 repetitions of 6/6 exercises 3 times daily with minimal written or verbal cues.       Target Date: 11/19/18           3.                             4.                             5.                            6.                              7.                              8.                               Therapy Frequency: 2x/month x 3 months         Shonda Rodriguez, SLP       I CERTIFY THE NEED FOR THESE SERVICES FURNISHED UNDER        THIS PLAN OF TREATMENT AND WHILE UNDER MY CARE     (Physician attestation of this document indicates review and certification of the therapy plan).                Certification date from: 10/2/18  Certification date to:  12/31/18                                                                                                                                                                    Referring Physician: Dr. Garcia Worthy                    Initial Assessment        See Epic Evaluation Start Of Care Date: 8/21/18

## 2018-10-10 NOTE — MR AVS SNAPSHOT
After Visit Summary   10/10/2018    Lazaro Porter    MRN: [id number]           Patient Information     Date Of Birth          1950        Visit Information        Provider Department      10/10/2018 11:45 AM Nathan Garay MD Radiation Oncology Clinic        Today's Diagnoses     Malignant neoplasm of base of tongue (H)    -  1       Follow-ups after your visit        Your next 10 appointments already scheduled     Oct 11, 2018 11:15 AM CDT   EXTERNAL RADIATION TREATMENT with UMP RAD ONC VARIAN   Radiation Oncology Clinic ([facility])    [facility]  1st Floor  [address] [address]   [phone]            Oct 12, 2018  7:30 AM CDT   [facility] Lab Draw with  [facility] LAB DRAW   [facility] ([facility])    [address]  [address]  [facility] [address]   [phone]            Oct 12, 2018  8:00 AM CDT   (Arrive by 7:45 AM)   Return Visit with LEE Arciniega   [facility] ([facility])    [address]  [address]  [facility] [address]   [phone]            Oct 12, 2018  9:00 AM CDT   Infusion 360 with UC ONCOLOGY INFUSION, UC 26 ATC   [facility] ([facility])    [address]  [address]  [facility] [address]   [phone]            Oct 12, 2018 10:30 AM CDT   Treatment with JANETTE Florence   [facility] ([facility])    [address]  4th Floor  [facility] [address]   [phone]            Oct 12, 2018 11:15 AM CDT   EXTERNAL RADIATION TREATMENT with UMP RAD ONC VARIAN   Radiation Oncology Clinic ([facility])    [facility]  1st Floor  [address] [address]   [phone]            Oct 13, 2018 10:00 AM CDT   EXTERNAL RADIATION TREATMENT with P RAD ONC  VARIAN   Radiation Oncology Clinic ([facility])    [facility]  1st Floor  [address] [id number] [phone]              Who to contact     Please call your clinic at [phone] to:    Ask questions about your health    Make or cancel appointments    Discuss your medicines    Learn about your test results    Speak to your doctor            Additional Information About Your Visit        MyChart Information     Magztert is an electronic gateway that provides easy, online access to your medical records. With EverPresent, you can request a clinic appointment, read your test results, renew a prescription or communicate with your care team.     To sign up for Magztert visit the website at [email or web address]   You will be asked to enter the access code listed below, as well as some personal information. Please follow the directions to create your username and password.     Your access code is: [email or web address]  Expires: 2018 10:28 AM     Your access code will  in 90 days. If you need help or a new code, please contact your [facility] or call [phone] for assistance.        Care EveryWhere ID     This is your Care EveryWhere ID. This could be used by other organizations to access your [place] medical records  [id number]        Your Vitals Were     Pulse BMI (Body Mass Index)                81 22.51 kg/m2           Blood Pressure from Last 3 Encounters:   10/10/18 135/83   18 154/87   18 138/83    Weight from Last 3 Encounters:   10/10/18 75.3 kg (166 lb)   10/02/18 77.1 kg (170 lb)   10/01/18 78.2 kg (172 lb 8 oz)              Today, you had the following     No orders found for display       Primary Care Provider Office Phone # Fax #    Lalito Moctezuma [phone]       [facility] [address] [address]        Equal Access to Services     JOZEF CLEMENTS AH: Romario patel  marisela Karimi, wacalda luleroyadaha, qaybta kalincolnda zbigniew, talon geniain hayaajayde veraroberto carlos natalyavenkata laJudithvarsha rebecca. So [place] [phone].    ATENCIÓN: Si eugeniela miguel a, tiene a corona disposición servicios gratuitos de asistencia lingüística. Uche al [phone].    We comply with applicable federal civil rights laws and [place] laws. We do not discriminate on the basis of race, color, national origin, age, disability, sex, sexual orientation, or gender identity.            Thank you!     Thank you for choosing [facility]Y CLINIC  for your care. Our goal is always to provide you with excellent care. Hearing back from our patients is one way we can continue to improve our services. Please take a few minutes to complete the written survey that you may receive in the mail after your visit with us. Thank you!             Your Updated Medication List - Protect others around you: Learn how to safely use, store and throw away your medicines at www.disposemymeds.org.          This list is accurate as of 10/10/18 11:48 AM.  Always use your most recent med list.                   Brand Name Dispense Instructions for use Diagnosis    aspirin 325 MG tablet      Take 325 mg by mouth        dexamethasone 4 MG tablet    DECADRON    6 tablet    Take 2 tablets (8 mg) by mouth daily (with breakfast) for 3 days Start the day after chemotherapy.    Malignant neoplasm of base of tongue (H)       IBUPROFEN PO      Take 200 mg by mouth        lidocaine (viscous) 2 % solution    XYLOCAINE    500 mL    Take 15 mLs by mouth every 4 hours as needed for moderate pain swish and spit every 3-8 hours as needed; max 8 doses/24 hour period    Malignant neoplasm of base of tongue (H)       MULTIVITAMIN & MINERAL PO           nystatin [id number] UNIT/ML suspension    MYCOSTATIN    100 mL    Take 5 mLs (500,000 Units) by mouth 4 times daily    Malignant neoplasm of base of tongue (H)       ondansetron 8 MG tablet    ZOFRAN    30 tablet    Take 1 tablet (8 mg)  by mouth every 8 hours as needed (take first for nausea/vomiting. If it doesn't work in 45-60 mins, take Compazine.)    Malignant neoplasm of base of tongue (H)       oxyCODONE 5 MG/5ML solution    ROXICODONE    250 mL    Take 5 mLs (5 mg) by mouth every 4 hours as needed for severe pain    Postoperative pain       polyethylene glycol Packet    MIRALAX/GLYCOLAX     Take 1 packet by mouth daily as needed for constipation        prochlorperazine 10 MG tablet    COMPAZINE    30 tablet    Take 1 tablet (10 mg) by mouth every 6 hours as needed (Nausea/Vomiting)    Malignant neoplasm of base of tongue (H)

## 2018-10-10 NOTE — PROGRESS NOTES
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 No issues.    2018: Moderate nausea/vomiting over the weekend. Otherwise tolerating treatment well.    9/10/2018: Tolerating treatment well although mood is slightly depressed secondary to ongoing therapy. Referred to [employer] representative for further discussion of supportive care options.    2018: Slightly increased odynophagia secondary to ongoing head and neck radiotherapy. Continuing to tolerate treatment well.    2018: Second cycle of chemotherapy administered after it was held 2 days secondary to neutropenia.    2018: Stable oral cavity/oropharyngeal pain which is well-controlled with as-needed viscous lidocaine.    10/1/2018: Moderately increased dermatitis and odynophagia.    ED visits/Hospitalizations:  None    Missed Treatments:    9/3/2018: 1-day treatment break between fractions 3-4 secondary to Labor Day holiday.    Subjective: Mr. Porter presents to clinic today for his weekly on-treatment visit. He describes mildly increased oral cavity/oropharyngeal pain over the past week, currently rating his symptoms as a 3-6/10 in severity. He has refrained from taking any of his prescribed opioid pain medication due to concern with possible side effects as well as a feeling that his pain is currently reasonably well-controlled with his viscous lidocaine rinses and PRN Tylenol. His biggest issue today is his treatment-induced thickened oral  secretions which are causing gagging and nausea. He is currently doing multiple salt/soda rinses throughout the day to combat the symptoms although he continues to remain quite symptomatic from these. He otherwise reports no headaches, lightheadedness, fever/chills, dyspnea, chest pain or abdominal pain and his remaining ROS are unremarkable.    ROS:   Constitutional  Pain (0-10): 3 (mouth), 6 (throat), 6 (skin)  Fatigue: Moderate    CNS  Headaches: None    ENT  Mucositis: Moderate symptoms    Cardiopulmonary  Dyspnea: None    GI  Nausea/vomiting: Intermittent symptoms which are controlled with PRN antiemetics    Nutrition  PEG: No  Diet: Liquid diet    Integumentary  Dermatitis: Moderate symptoms    Objective:   Current weight: 75.3 kg  Last week's weight: 78.2 kg  Starting weight: 83 kg    BP: 135/83 (sitting), 128/78 (standing)  Pulse: 81 (sitting), 88 (standing)    General: Moderately fatigued-appearing but in no acute distress.  HEENT: Mildly dry mucous membranes with thickened oral secretions. Confluent mucositis involving the posterior oral cavity and oropharyngeal mucosa. No evidence of thrush.  Cardiac: Extremities are warm and well-perfused.  Respiratory: No wheezing, stridor respiratory distress.  Skin: Brisk erythema with confluent desquamation involving the bilateral neck (right > left).    Treatment-related toxicities (CTCAE v 5.0):  1. Nausea: Grade 2: Oral intake decreased without significant weight loss, dehydration or malnutrition  2. Mucositis: Grade 2: Moderate pain; not interfering with oral intake; modified diet indicated  3. Dermatitis: Grade 2: Moderate to brisk erythema; patchy moist desquamation, mostly confined to skin folds and creases; moderate erythema    Assessment:    Mr. 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 He is currently undergoing definitive chemoradiotherapy with high-dose cisplatin and is tolerating  treatment with the anticipated acute radiation-induced side effects, namely dermatitis and mucositis.    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 Porter felt that his symptoms were reasonably well-controlled with his current regimen    Continue PRN viscous lidocaine with consideration of initiation of oxycodone for moderate to severe oral cavity/oropharyngeal pain    Fluids/Electrolytes/Nutrition:    Recommended increasing caloric intake due to a 2-3 kg/week weight loss over the past 2 weeks    Repeat labs prior to medical oncology visit on 10/12/2018    Dermatitis:    Continue BID/TID use of Peruvian Magic to the bilateral neck and lower face    Start daily Silvadene application to the areas of desquamation within the right lateral neck    Mucositis and thickened oral secretions:    Continue salt/soda rinses at least >8 times daily    Increase free water intake    Recommended starting diet ginger ale gargles and prescribed guaifenesin for thickened secretions    Oral thrush:    No evidence of active disease - we will continue to monitor for now    Mosaiq chart and setup information reviewed  IGRT images reviewed    Medication Review  Med list reviewed with patient?: Yes    Nathan Garay MD/PhD  Department of Radiation Oncology  [facility]

## 2018-10-11 ENCOUNTER — APPOINTMENT (OUTPATIENT)
Dept: RADIATION ONCOLOGY | Facility: CLINIC | Age: 68
End: 2018-10-11
Attending: RADIOLOGY
Payer: MEDICARE

## 2018-10-11 PROCEDURE — 77386 ZZH IMRT TREATMENT DELIVERY, COMPLEX: CPT | Performed by: RADIOLOGY

## 2018-10-12 ENCOUNTER — APPOINTMENT (OUTPATIENT)
Dept: RADIATION ONCOLOGY | Facility: CLINIC | Age: 68
End: 2018-10-12
Attending: RADIOLOGY
Payer: MEDICARE

## 2018-10-12 ENCOUNTER — ONCOLOGY VISIT (OUTPATIENT)
Dept: ONCOLOGY | Facility: CLINIC | Age: 68
End: 2018-10-12
Attending: PHYSICIAN ASSISTANT
Payer: MEDICARE

## 2018-10-12 ENCOUNTER — THERAPY VISIT (OUTPATIENT)
Dept: SPEECH THERAPY | Facility: CLINIC | Age: 68
End: 2018-10-12
Payer: MEDICARE

## 2018-10-12 ENCOUNTER — INFUSION THERAPY VISIT (OUTPATIENT)
Dept: ONCOLOGY | Facility: CLINIC | Age: 68
End: 2018-10-12
Attending: INTERNAL MEDICINE
Payer: MEDICARE

## 2018-10-12 VITALS
RESPIRATION RATE: 18 BRPM | OXYGEN SATURATION: 98 % | SYSTOLIC BLOOD PRESSURE: 138 MMHG | TEMPERATURE: 98.7 F | DIASTOLIC BLOOD PRESSURE: 81 MMHG | HEART RATE: 83 BPM | HEIGHT: 72 IN | BODY MASS INDEX: 22.31 KG/M2 | WEIGHT: 164.7 LBS

## 2018-10-12 DIAGNOSIS — T45.1X5A CHEMOTHERAPY-INDUCED NEUTROPENIA (H): ICD-10-CM

## 2018-10-12 DIAGNOSIS — R11.2 NAUSEA AND VOMITING, INTRACTABILITY OF VOMITING NOT SPECIFIED, UNSPECIFIED VOMITING TYPE: ICD-10-CM

## 2018-10-12 DIAGNOSIS — R13.12 OROPHARYNGEAL DYSPHAGIA: Primary | ICD-10-CM

## 2018-10-12 DIAGNOSIS — C01 MALIGNANT NEOPLASM OF BASE OF TONGUE (H): ICD-10-CM

## 2018-10-12 DIAGNOSIS — D70.1 CHEMOTHERAPY-INDUCED NEUTROPENIA (H): ICD-10-CM

## 2018-10-12 DIAGNOSIS — C01 MALIGNANT NEOPLASM OF BASE OF TONGUE (H): Primary | ICD-10-CM

## 2018-10-12 LAB
ALBUMIN SERPL-MCNC: 3.5 G/DL (ref 3.4–5)
ALP SERPL-CCNC: 92 U/L (ref 40–150)
ALT SERPL W P-5'-P-CCNC: 19 U/L (ref 0–70)
ANION GAP SERPL CALCULATED.3IONS-SCNC: 10 MMOL/L (ref 3–14)
AST SERPL W P-5'-P-CCNC: 17 U/L (ref 0–45)
BASOPHILS # BLD AUTO: 0 10E9/L (ref 0–0.2)
BASOPHILS NFR BLD AUTO: 0.7 %
BILIRUB SERPL-MCNC: 0.7 MG/DL (ref 0.2–1.3)
BUN SERPL-MCNC: 18 MG/DL (ref 7–30)
CALCIUM SERPL-MCNC: 9.2 MG/DL (ref 8.5–10.1)
CHLORIDE SERPL-SCNC: 104 MMOL/L (ref 94–109)
CO2 SERPL-SCNC: 24 MMOL/L (ref 20–32)
CREAT SERPL-MCNC: 0.93 MG/DL (ref 0.66–1.25)
DIFFERENTIAL METHOD BLD: ABNORMAL
EOSINOPHIL # BLD AUTO: 0 10E9/L (ref 0–0.7)
EOSINOPHIL NFR BLD AUTO: 2.8 %
ERYTHROCYTE [DISTWIDTH] IN BLOOD BY AUTOMATED COUNT: 12.9 % (ref 10–15)
GFR SERPL CREATININE-BSD FRML MDRD: 80 ML/MIN/1.7M2
GLUCOSE SERPL-MCNC: 141 MG/DL (ref 70–99)
HCT VFR BLD AUTO: 36.7 % (ref 40–53)
HGB BLD-MCNC: 12.2 G/DL (ref 13.3–17.7)
IMM GRANULOCYTES # BLD: 0 10E9/L (ref 0–0.4)
IMM GRANULOCYTES NFR BLD: 0.7 %
LYMPHOCYTES # BLD AUTO: 0.3 10E9/L (ref 0.8–5.3)
LYMPHOCYTES NFR BLD AUTO: 22.4 %
MAGNESIUM SERPL-MCNC: 2.2 MG/DL (ref 1.6–2.3)
MCH RBC QN AUTO: 30.2 PG (ref 26.5–33)
MCHC RBC AUTO-ENTMCNC: 33.2 G/DL (ref 31.5–36.5)
MCV RBC AUTO: 91 FL (ref 78–100)
MONOCYTES # BLD AUTO: 0.3 10E9/L (ref 0–1.3)
MONOCYTES NFR BLD AUTO: 21.7 %
NEUTROPHILS # BLD AUTO: 0.7 10E9/L (ref 1.6–8.3)
NEUTROPHILS NFR BLD AUTO: 51.7 %
NRBC # BLD AUTO: 0 10*3/UL
NRBC BLD AUTO-RTO: 0 /100
PLATELET # BLD AUTO: 176 10E9/L (ref 150–450)
PLATELET # BLD EST: ABNORMAL 10*3/UL
POTASSIUM SERPL-SCNC: 4 MMOL/L (ref 3.4–5.3)
PROT SERPL-MCNC: 7.2 G/DL (ref 6.8–8.8)
RBC # BLD AUTO: 4.04 10E12/L (ref 4.4–5.9)
SODIUM SERPL-SCNC: 137 MMOL/L (ref 133–144)
WBC # BLD AUTO: 1.4 10E9/L (ref 4–11)

## 2018-10-12 PROCEDURE — G0463 HOSPITAL OUTPT CLINIC VISIT: HCPCS | Mod: ZF

## 2018-10-12 PROCEDURE — 96415 CHEMO IV INFUSION ADDL HR: CPT

## 2018-10-12 PROCEDURE — 80053 COMPREHEN METABOLIC PANEL: CPT | Performed by: PHYSICIAN ASSISTANT

## 2018-10-12 PROCEDURE — 99214 OFFICE O/P EST MOD 30 MIN: CPT | Mod: ZP | Performed by: PHYSICIAN ASSISTANT

## 2018-10-12 PROCEDURE — 85025 COMPLETE CBC W/AUTO DIFF WBC: CPT | Performed by: PHYSICIAN ASSISTANT

## 2018-10-12 PROCEDURE — 96413 CHEMO IV INFUSION 1 HR: CPT

## 2018-10-12 PROCEDURE — 83735 ASSAY OF MAGNESIUM: CPT | Performed by: PHYSICIAN ASSISTANT

## 2018-10-12 PROCEDURE — 96367 TX/PROPH/DG ADDL SEQ IV INF: CPT

## 2018-10-12 PROCEDURE — 25000128 H RX IP 250 OP 636: Mod: ZF | Performed by: PHYSICIAN ASSISTANT

## 2018-10-12 PROCEDURE — 96366 THER/PROPH/DIAG IV INF ADDON: CPT

## 2018-10-12 PROCEDURE — 96375 TX/PRO/DX INJ NEW DRUG ADDON: CPT

## 2018-10-12 PROCEDURE — 77386 ZZH IMRT TREATMENT DELIVERY, COMPLEX: CPT | Performed by: RADIOLOGY

## 2018-10-12 RX ORDER — SODIUM CHLORIDE 9 MG/ML
1000 INJECTION, SOLUTION INTRAVENOUS CONTINUOUS PRN
Status: CANCELLED
Start: 2018-10-12

## 2018-10-12 RX ORDER — LORAZEPAM 0.5 MG/1
0.5 TABLET ORAL EVERY 6 HOURS PRN
Qty: 30 TABLET | Refills: 0 | Status: SHIPPED | OUTPATIENT
Start: 2018-10-12 | End: 2019-11-13

## 2018-10-12 RX ORDER — PALONOSETRON 0.05 MG/ML
0.25 INJECTION, SOLUTION INTRAVENOUS ONCE
Status: CANCELLED
Start: 2018-10-12

## 2018-10-12 RX ORDER — ALBUTEROL SULFATE 90 UG/1
1-2 AEROSOL, METERED RESPIRATORY (INHALATION)
Status: CANCELLED
Start: 2018-10-12

## 2018-10-12 RX ORDER — OLANZAPINE 5 MG/1
5 TABLET ORAL
Qty: 10 TABLET | Refills: 1 | Status: SHIPPED | OUTPATIENT
Start: 2018-10-12 | End: 2018-10-30

## 2018-10-12 RX ORDER — DIPHENHYDRAMINE HYDROCHLORIDE 50 MG/ML
50 INJECTION INTRAMUSCULAR; INTRAVENOUS
Status: CANCELLED
Start: 2018-10-12

## 2018-10-12 RX ORDER — LEVOFLOXACIN 250 MG/1
250 TABLET, FILM COATED ORAL DAILY
Qty: 7 TABLET | Refills: 0 | Status: SHIPPED | OUTPATIENT
Start: 2018-10-12 | End: 2018-10-30

## 2018-10-12 RX ORDER — EPINEPHRINE 1 MG/ML
0.3 INJECTION, SOLUTION INTRAMUSCULAR; SUBCUTANEOUS EVERY 5 MIN PRN
Status: CANCELLED | OUTPATIENT
Start: 2018-10-12

## 2018-10-12 RX ORDER — EPINEPHRINE 0.3 MG/.3ML
0.3 INJECTION SUBCUTANEOUS EVERY 5 MIN PRN
Status: CANCELLED | OUTPATIENT
Start: 2018-10-12

## 2018-10-12 RX ORDER — PALONOSETRON 0.05 MG/ML
0.25 INJECTION, SOLUTION INTRAVENOUS ONCE
Status: COMPLETED | OUTPATIENT
Start: 2018-10-12 | End: 2018-10-12

## 2018-10-12 RX ORDER — DEXAMETHASONE 4 MG/1
8 TABLET ORAL
Qty: 9 TABLET | Refills: 2 | Status: SHIPPED | OUTPATIENT
Start: 2018-10-12 | End: 2018-10-30

## 2018-10-12 RX ORDER — LORAZEPAM 2 MG/ML
0.5 INJECTION INTRAMUSCULAR EVERY 4 HOURS PRN
Status: CANCELLED
Start: 2018-10-12

## 2018-10-12 RX ORDER — METHYLPREDNISOLONE SODIUM SUCCINATE 125 MG/2ML
125 INJECTION, POWDER, LYOPHILIZED, FOR SOLUTION INTRAMUSCULAR; INTRAVENOUS
Status: CANCELLED
Start: 2018-10-12

## 2018-10-12 RX ORDER — ALBUTEROL SULFATE 0.83 MG/ML
2.5 SOLUTION RESPIRATORY (INHALATION)
Status: CANCELLED | OUTPATIENT
Start: 2018-10-12

## 2018-10-12 RX ORDER — MEPERIDINE HYDROCHLORIDE 25 MG/ML
25 INJECTION INTRAMUSCULAR; INTRAVENOUS; SUBCUTANEOUS EVERY 30 MIN PRN
Status: CANCELLED | OUTPATIENT
Start: 2018-10-12

## 2018-10-12 RX ADMIN — CISPLATIN 78 MG: 1 INJECTION, SOLUTION INTRAVENOUS at 11:09

## 2018-10-12 RX ADMIN — PALONOSETRON HYDROCHLORIDE 0.25 MG: 0.25 INJECTION INTRAVENOUS at 09:54

## 2018-10-12 RX ADMIN — MAGNESIUM SULFATE HEPTAHYDRATE: 500 INJECTION, SOLUTION INTRAMUSCULAR; INTRAVENOUS at 12:48

## 2018-10-12 RX ADMIN — SODIUM CHLORIDE 1000 ML: 9 INJECTION, SOLUTION INTRAVENOUS at 09:32

## 2018-10-12 RX ADMIN — DEXAMETHASONE SODIUM PHOSPHATE 150 MG: 10 INJECTION, SOLUTION INTRAMUSCULAR; INTRAVENOUS at 09:58

## 2018-10-12 ASSESSMENT — PAIN SCALES - GENERAL: PAINLEVEL: NO PAIN (0)

## 2018-10-12 NOTE — NURSING NOTE
"Oncology Rooming Note    October 12, 2018 7:51 AM   Lazaro Porter is a 68 year old male who presents for:    Chief Complaint   Patient presents with     Blood Draw     Labs drawn via PIV placed by RN. Line flushed with saline. VS taken.      Oncology Clinic Visit     Return visit related to Head & Neck Cancer     Initial Vitals: /81 (BP Location: Right arm, Patient Position: Sitting, Cuff Size: Adult Regular)  Pulse 83  Temp 98.7  F (37.1  C) (Oral)  Resp 18  Ht 1.829 m (6' 0.01\")  Wt 74.7 kg (164 lb 11.2 oz)  SpO2 98%  BMI 22.33 kg/m2 Estimated body mass index is 22.33 kg/(m^2) as calculated from the following:    Height as of this encounter: 1.829 m (6' 0.01\").    Weight as of this encounter: 74.7 kg (164 lb 11.2 oz). Body surface area is 1.95 meters squared.  No Pain (0) Comment: Data Unavailable   No LMP for male patient.  Allergies reviewed: Yes  Medications reviewed: Yes    Medications: Medication refills not needed today.  Pharmacy name entered into [employer]: [id number] - [place], [place] - [address]    Clinical concerns: No new concerns. Provider was notified.    10 minutes for nursing intake (face to face time)     Thania Umana LPN            "

## 2018-10-12 NOTE — MR AVS SNAPSHOT
After Visit Summary   10/12/2018    Lazaro Porter    MRN: [id number]           Patient Information     Date Of Birth          1950        Visit Information        Provider Department      10/12/2018 9:00 AM  26 ATC;  ONCOLOGY INFUSION [facility]        Today's Diagnoses     Malignant neoplasm of base of tongue (H)    -  1      Care Instructions    Contact Numbers  [facility] Main Line: [phone]  [facility] NurseTriage and After Hours:  [phone]    Call triage with chills and/or temperature greater than or equal to 100.5, uncontrolled nausea/vomiting, diarrhea, constipation, dizziness, shortness of breath, chest pain, bleeding, unexplained bruising, or any new/concerning symptoms, questions/concerns.     If after hours, weekends, or holidays, call the nurse triage number. Calls may be forwarded to the hospital , please ask for the adult oncology doctor on call.     If you are having any concerning symptoms or wish to speak to a provider before your next infusion visit, please call your care coordinator or triage to notify them so we can adequately serve you.     If you need a refill on a narcotic prescription, please call triage or your care coordinator before your infusion appointment.             October 2018 Sunday Monday Tuesday Wednesday Thursday Friday Saturday        1     [facility] EXTERNAL RADIATION TREATMT   11:15 AM   (30 min.)   [facility] RAD ONC VARIAN   Radiation Oncology Clinic     FOLLOW UP   11:30 AM   (30 min.)   Shelli Reyna RD   [facility], [place], Nutrition Services     [facility] ON TREATMENT VISIT   11:45 AM   (15 min.)   Marquita Dawson MD   Radiation Oncology Clinic 2     [facility] EXTERNAL RADIATION TREATMT   11:15 AM   (30 min.)   [facility] RAD ONC [facility]   Radiation Oncology Clinic     SWALLOW TREATMENT    2:45 PM   (30 min.)   Shonda Rodriguez SLP    Health Rehab     [facility] RETURN    2:45 PM   (15 min.)   Garcia Worthy MD   [facility] Ear Nose and  Throat 3     [facility] EXTERNAL RADIATION TREATMT   11:15 AM   (30 min.)   [facility] RAD ONC [facility]   Radiation Oncology Clinic 4     [facility] EXTERNAL RADIATION TREATMT   11:15 AM   (30 min.)   [facility] RAD ONC [facility]   Radiation Oncology Clinic 5     [facility] EXTERNAL RADIATION TREATMT   11:15 AM   (30 min.)   [facility] RAD ONC [facility]   Radiation Oncology Clinic 6       7     8     [facility] EXTERNAL RADIATION TREATMT   11:15 AM   (30 min.)   [facility] RAD ONC [facility]   Radiation Oncology Clinic 9     [facility] EXTERNAL RADIATION TREATMT   11:15 AM   (30 min.)   [facility] RAD ONC [facility]   Radiation Oncology Clinic 10     [facility] EXTERNAL RADIATION TREATMT   11:15 AM   (30 min.)   [facility] RAD ONC [facility]   Radiation Oncology Clinic     [facility] ON TREATMENT VISIT   11:45 AM   (15 min.)   Nathan Garay MD   Radiation Oncology Clinic 11     [facility] EXTERNAL RADIATION TREATMT   11:15 AM   (30 min.)   [facility] RAD ONC [facility]   Radiation Oncology Clinic 12     [facility] DRAW    7:30 AM   (15 min.)    [facility]     [facility] RETURN    7:45 AM   (50 min.)   Stephanie Nicholson PA   [facility]     [facility] ONC INFUSION 360    9:00 AM   (360 min.)   [facility] ONCOLOGY INFUSION   [facility]     TREATMENT   10:30 AM   (30 min.)   Shonda Rodriguez SLP   [facility] Rehab     [facility] EXTERNAL RADIATION TREATMT    2:45 PM   (30 min.)   [facility] RAD ONC [facility]   Radiation Oncology Clinic 13     [facility] EXTERNAL RADIATION TREATMT   10:00 AM   (30 min.)   P RAD ONC [facility]   Radiation Oncology Clinic   14     15     16     17     18     19     20       21     22     23     24     25     26     27       28     29     [facility] RETURN   11:30 AM   (30 min.)   Geraldine Elliott APRN CNP   Radiation Oncology Clinic 30 31 November 2018 Sunday Monday Tuesday Wednesday Thursday Friday Saturday                       1     2     3       4     5     6     7     8     9     10       11     12     13     14     15     16      17       18     19     20     [facility] RETURN    1:45 PM   (15 min.)   Garcia Worthy MD   [facility] Ear Nose and Throat 21     22     23     24       25     26     27     28     29     30                       Lab Results:  Recent Results (from the past 12 hour(s))   CBC with platelets differential    Collection Time: 10/12/18  7:40 AM   Result Value Ref Range    WBC 1.4 (L) 4.0 - 11.0 10e9/L    RBC Count 4.04 (L) 4.4 - 5.9 10e12/L    Hemoglobin 12.2 (L) 13.3 - 17.7 g/dL    Hematocrit 36.7 (L) 40.0 - 53.0 %    MCV 91 78 - 100 fl    MCH 30.2 26.5 - 33.0 pg    MCHC 33.2 31.5 - 36.5 g/dL    RDW 12.9 10.0 - 15.0 %    Platelet Count 176 150 - 450 10e9/L    Diff Method Automated Method     % Neutrophils 51.7 %    % Lymphocytes 22.4 %    % Monocytes 21.7 %    % Eosinophils 2.8 %    % Basophils 0.7 %    % Immature Granulocytes 0.7 %    Nucleated RBCs 0 0 /100    Absolute Neutrophil 0.7 (L) 1.6 - 8.3 10e9/L    Absolute Lymphocytes 0.3 (L) 0.8 - 5.3 10e9/L    Absolute Monocytes 0.3 0.0 - 1.3 10e9/L    Absolute Eosinophils 0.0 0.0 - 0.7 10e9/L    Absolute Basophils 0.0 0.0 - 0.2 10e9/L    Abs Immature Granulocytes 0.0 0 - 0.4 10e9/L    Absolute Nucleated RBC 0.0     Platelet Estimate Confirming automated cell count    Comprehensive metabolic panel    Collection Time: 10/12/18  7:40 AM   Result Value Ref Range    Sodium 137 133 - 144 mmol/L    Potassium 4.0 3.4 - 5.3 mmol/L    Chloride 104 94 - 109 mmol/L    Carbon Dioxide 24 20 - 32 mmol/L    Anion Gap 10 3 - 14 mmol/L    Glucose 141 (H) 70 - 99 mg/dL    Urea Nitrogen 18 7 - 30 mg/dL    Creatinine 0.93 0.66 - 1.25 mg/dL    GFR Estimate 80 >60 mL/min/1.7m2    GFR Estimate If Black >90 >60 mL/min/1.7m2    Calcium 9.2 8.5 - 10.1 mg/dL    Bilirubin Total 0.7 0.2 - 1.3 mg/dL    Albumin 3.5 3.4 - 5.0 g/dL    Protein Total 7.2 6.8 - 8.8 g/dL    Alkaline Phosphatase 92 40 - 150 U/L    ALT 19 0 - 70 U/L    AST 17 0 - 45 U/L   Magnesium    Collection Time: 10/12/18  7:40 AM   Result  Value Ref Range    Magnesium 2.2 1.6 - 2.3 mg/dL               Follow-ups after your visit        Your next 10 appointments already scheduled     Oct 12, 2018  2:45 PM CDT   EXTERNAL RADIATION TREATMENT with [facility] RAD ONC VARIAN   Radiation Oncology Clinic ([facility])    [facility]  1st Floor  [address] [facility] [address]   [phone]            Oct 13, 2018 10:00 AM CDT   EXTERNAL RADIATION TREATMENT with [facility] RAD ONC VARIAN   Radiation Oncology Clinic ([facility])    [facility]  1st Floor  [address] [facility] [address]   [phone]            Oct 29, 2018 11:30 AM CDT   Return Visit with LORIE Chisholm CNP   Radiation Oncology Clinic ([facility])    [facility]  1st Floor  [address] [facility] [address]   [phone]            Nov 20, 2018  2:00 PM CST   (Arrive by 1:45 PM)   Return Visit with Garcia Worthy MD   [facility] Ear Nose and Throat ([facility])    [address]  4th Floor  [facility] [id number] [phone]              Future tests that were ordered for you today     Open Future Orders        Priority Expected Expires Ordered    Phosphorus Routine 10/12/2018 10/11/2019 10/11/2018            Who to contact     If you have questions or need follow up information about today's clinic visit or your schedule please contact [facility] directly at [phone].  Normal or non-critical lab and imaging results will be communicated to you by MyChart, letter or phone within 4 business days after the clinic has received the results. If you do not hear from us within 7 days, please contact the clinic through MyChart or phone. If you have a critical or abnormal lab result, we will notify you by phone as soon as possible.  Submit refill requests through [employer] or call your pharmacy and they will  "forward the refill request to us. Please allow 3 business days for your refill to be completed.          Additional Information About Your Visit        [employer] Information     [employer] lets you send messages to your doctor, view your test results, renew your prescriptions, schedule appointments and more. To sign up, go to [email or web address] . Click on \"Log in\" on the left side of the screen, which will take you to the Welcome page. Then click on \"Sign up Now\" on the right side of the page.     You will be asked to enter the access code listed below, as well as some personal information. Please follow the directions to create your username and password.     Your access code is: [email or web address]  Expires: 2018 10:28 AM     Your access code will  in 90 days. If you need help or a new code, please call your [facility] clinic or [phone].        Care EveryWhere ID     This is your Care EveryWhere ID. This could be used by other organizations to access your [facility] medical records  [id number]         Blood Pressure from Last 3 Encounters:   10/12/18 138/81   10/10/18 135/83   18 154/87    Weight from Last 3 Encounters:   10/12/18 74.7 kg (164 lb 11.2 oz)   10/10/18 75.3 kg (166 lb)   10/02/18 77.1 kg (170 lb)              We Performed the Following     CBC with platelets differential     Comprehensive metabolic panel     Magnesium          Today's Medication Changes          These changes are accurate as of 10/12/18  2:02 PM.  If you have any questions, ask your nurse or doctor.               Start taking these medicines.        Dose/Directions    levofloxacin 250 MG tablet   Commonly known as:  LEVAQUIN   Used for:  Chemotherapy-induced neutropenia (H)   Started by:  Stephanie Nicholson PA        Dose:  250 mg   Take 1 tablet (250 mg) by mouth daily   Quantity:  7 tablet   Refills:  0       LORazepam 0.5 MG tablet   Commonly known as:  ATIVAN   Used for:  Nausea and vomiting, intractability of " vomiting not specified, unspecified vomiting type   Started by:  Stephanie Nicholson PA        Dose:  0.5 mg   Take 1 tablet (0.5 mg) by mouth every 6 hours as needed for nausea   Quantity:  30 tablet   Refills:  0       OLANZapine 5 MG tablet   Commonly known as:  zyPREXA   Used for:  Nausea and vomiting, intractability of vomiting not specified, unspecified vomiting type   Started by:  Stephanie Nicholson PA        Dose:  5 mg   Take 1 tablet (5 mg) by mouth nightly as needed (nausea)   Quantity:  10 tablet   Refills:  1         These medicines have changed or have updated prescriptions.        Dose/Directions    dexamethasone 4 MG tablet   Commonly known as:  DECADRON   This may have changed:  additional instructions   Used for:  Malignant neoplasm of base of tongue (H)   Changed by:  Stehpanie Nicholson PA        Dose:  8 mg   Take 2 tablets (8 mg) by mouth daily (with breakfast) For 3 days. Then take 1 tablet for 2 days, then take 1/2 tablet for 2 days.   Quantity:  9 tablet   Refills:  2            Where to get your medicines      These medications were sent to [address] [address]    Hours:  TRANSPLANT PHONE NUMBER [phone] Phone:  [phone]     dexamethasone 4 MG tablet    levofloxacin 250 MG tablet    OLANZapine 5 MG tablet         Some of these will need a paper prescription and others can be bought over the counter.  Ask your nurse if you have questions.     Bring a paper prescription for each of these medications     LORazepam 0.5 MG tablet                Primary Care Provider Office Phone # Fax #    Lalito Moctezuma [phone]       [address] [address]        Equal Access to Services     JOZEF CLEMENTS AH: Romario Karimi, renita card, qatalon sapp. [facility]  [phone].    ATENCIÓN: Si brian grady, tiene a corona disposición servicios gratuitos de asistencia lingüística. Uche chirinos [phone].    We comply with applicable federal civil rights laws and [place] laws. We do not discriminate on the basis of race, color, national origin, age, disability, sex, sexual orientation, or gender identity.            Thank you!     Thank you for choosing [facility]  for your care. Our goal is always to provide you with excellent care. Hearing back from our patients is one way we can continue to improve our services. Please take a few minutes to complete the written survey that you may receive in the mail after your visit with us. Thank you!             Your Updated Medication List - Protect others around you: Learn how to safely use, store and throw away your medicines at www.disposemymeds.org.          This list is accurate as of 10/12/18  2:02 PM.  Always use your most recent med list.                   Brand Name Dispense Instructions for use Diagnosis    aspirin 325 MG tablet      Take 325 mg by mouth        dexamethasone 4 MG tablet    DECADRON    9 tablet    Take 2 tablets (8 mg) by mouth daily (with breakfast) For 3 days. Then take 1 tablet for 2 days, then take 1/2 tablet for 2 days.    Malignant neoplasm of base of tongue (H)       guaiFENesin 100 MG/5ML Liqd    ROBITUSSIN    560 mL    Take 10 mLs by mouth every 4 hours as needed for cough    Malignant neoplasm of base of tongue (H)       IBUPROFEN PO      Take 200 mg by mouth        levofloxacin 250 MG tablet    LEVAQUIN    7 tablet    Take 1 tablet (250 mg) by mouth daily    Chemotherapy-induced neutropenia (H)       lidocaine (viscous) 2 % solution    XYLOCAINE    500 mL    Take 15 mLs by mouth every 4 hours as needed for moderate pain swish and spit every 3-8 hours as needed; max 8 doses/24 hour period    Malignant neoplasm of base of tongue (H)       LORazepam 0.5 MG tablet    ATIVAN    30 tablet     Take 1 tablet (0.5 mg) by mouth every 6 hours as needed for nausea    Nausea and vomiting, intractability of vomiting not specified, unspecified vomiting type       MULTIVITAMIN & MINERAL PO           nystatin 688589 UNIT/ML suspension    MYCOSTATIN    100 mL    Take 5 mLs (500,000 Units) by mouth 4 times daily    Malignant neoplasm of base of tongue (H)       OLANZapine 5 MG tablet    zyPREXA    10 tablet    Take 1 tablet (5 mg) by mouth nightly as needed (nausea)    Nausea and vomiting, intractability of vomiting not specified, unspecified vomiting type       ondansetron 8 MG tablet    ZOFRAN    30 tablet    Take 1 tablet (8 mg) by mouth every 8 hours as needed (take first for nausea/vomiting. If it doesn't work in 45-60 mins, take Compazine.)    Malignant neoplasm of base of tongue (H)       oxyCODONE 5 MG/5ML solution    ROXICODONE    250 mL    Take 5 mLs (5 mg) by mouth every 4 hours as needed for severe pain    Postoperative pain       polyethylene glycol Packet    MIRALAX/GLYCOLAX     Take 1 packet by mouth daily as needed for constipation        prochlorperazine 10 MG tablet    COMPAZINE    30 tablet    Take 1 tablet (10 mg) by mouth every 6 hours as needed (Nausea/Vomiting)    Malignant neoplasm of base of tongue (H)       silver sulfADIAZINE 1 % cream    SILVADENE    50 g    Apply topically daily T    Malignant neoplasm of base of tongue (H)

## 2018-10-12 NOTE — PATIENT INSTRUCTIONS
Contact Numbers  [facility] [place] [facility] Main Line: [phone]  [facility] NurseTriage and After Hours:  [phone]    Call triage with chills and/or temperature greater than or equal to 100.5, uncontrolled nausea/vomiting, diarrhea, constipation, dizziness, shortness of breath, chest pain, bleeding, unexplained bruising, or any new/concerning symptoms, questions/concerns.     If after hours, weekends, or holidays, call the nurse triage number. Calls may be forwarded to the hospital , please ask for the adult oncology doctor on call.     If you are having any concerning symptoms or wish to speak to a provider before your next infusion visit, please call your care coordinator or triage to notify them so we can adequately serve you.     If you need a refill on a narcotic prescription, please call triage or your care coordinator before your infusion appointment.             October 2018 Sunday Monday Tuesday Wednesday Thursday Friday Saturday        1     P EXTERNAL RADIATION TREATMT   11:15 AM   (30 min.)   [facility] RAD ONC VARIAN   Radiation Oncology Clinic     FOLLOW UP   11:30 AM   (30 min.)   Shelli Reyna RD   [facility], [place], Nutrition Services     [facility] ON TREATMENT VISIT   11:45 AM   (15 min.)   Marquita Dawson MD   Radiation Oncology Clinic 2     [facility] EXTERNAL RADIATION TREATMT   11:15 AM   (30 min.)   [facility] RAD ONC VARIAN   Radiation Oncology Clinic     SWALLOW TREATMENT    2:45 PM   (30 min.)   Shonda Rodriguez SLP   [facility] Rehab     [facility] RETURN    2:45 PM   (15 min.)   Garcia Worthy MD   [facility] Ear Nose and Throat 3     [facility] EXTERNAL RADIATION TREATMT   11:15 AM   (30 min.)   P RAD ONC VARIAN   Radiation Oncology Clinic 4     [facility] EXTERNAL RADIATION TREATMT   11:15 AM   (30 min.)   P RAD ONC VARIAN   Radiation Oncology Clinic 5     P EXTERNAL RADIATION TREATMT   11:15 AM   (30 min.)   P RAD ONC VARIAN   Radiation Oncology Clinic 6       7     8     [facility] EXTERNAL RADIATION TREATMT   11:15  AM   (30 min.)   UMP RAD ONC [facility]   Radiation Oncology Clinic 9     [facility] EXTERNAL RADIATION TREATMT   11:15 AM   (30 min.)   [facility] RAD ONC [facility]   Radiation Oncology Clinic 10     [facility] EXTERNAL RADIATION TREATMT   11:15 AM   (30 min.)   [facility] RAD ONC [facility]   Radiation Oncology Clinic     [facility] ON TREATMENT VISIT   11:45 AM   (15 min.)   Nathan Garay MD   Radiation Oncology Clinic 11     [facility] EXTERNAL RADIATION TREATMT   11:15 AM   (30 min.)   [facility] RAD ONC [facility]   Radiation Oncology Clinic 12     [facility] DRAW    7:30 AM   (15 min.)    [facility] LAB DRAW   [facility] Lab Draw     [facility] RETURN    7:45 AM   (50 min.)   Stephanie Nicholson PA   [facility]     [facility] ONC INFUSION 360    9:00 AM   (360 min.)   UC ONCOLOGY INFUSION   [facility]     TREATMENT   10:30 AM   (30 min.)   Shonda Rodriguez SLP   [facility] Rehab     [facility] EXTERNAL RADIATION TREATMT    2:45 PM   (30 min.)   UMP RAD ONC [facility]   Radiation Oncology Clinic 13     [facility] EXTERNAL RADIATION TREATMT   10:00 AM   (30 min.)   [facility] RAD ONC [facility]   Radiation Oncology Clinic   14     15     16     17     18     19     20       21     22     23     24     25     26     27       28     29     [facility] RETURN   11:30 AM   (30 min.)   Geraldine Elliott APRN CNP   Radiation Oncology Clinic 30 31 November 2018 Sunday Monday Tuesday Wednesday Thursday Friday Saturday                       1     2     3       4     5     6     7     8     9     10       11     12     13     14     15     16     17       18     19     20     [facility] RETURN    1:45 PM   (15 min.)   Garcia Worthy MD   [facility] Ear Nose and Throat 21     22     23     24       25     26     27     28     29     30                       Lab Results:  Recent Results (from the past 12 hour(s))   CBC with platelets differential    Collection Time: 10/12/18  7:40 AM   Result Value Ref Range    WBC 1.4 (L) 4.0 - 11.0  10e9/L    RBC Count 4.04 (L) 4.4 - 5.9 10e12/L    Hemoglobin 12.2 (L) 13.3 - 17.7 g/dL    Hematocrit 36.7 (L) 40.0 - 53.0 %    MCV 91 78 - 100 fl    MCH 30.2 26.5 - 33.0 pg    MCHC 33.2 31.5 - 36.5 g/dL    RDW 12.9 10.0 - 15.0 %    Platelet Count 176 150 - 450 10e9/L    Diff Method Automated Method     % Neutrophils 51.7 %    % Lymphocytes 22.4 %    % Monocytes 21.7 %    % Eosinophils 2.8 %    % Basophils 0.7 %    % Immature Granulocytes 0.7 %    Nucleated RBCs 0 0 /100    Absolute Neutrophil 0.7 (L) 1.6 - 8.3 10e9/L    Absolute Lymphocytes 0.3 (L) 0.8 - 5.3 10e9/L    Absolute Monocytes 0.3 0.0 - 1.3 10e9/L    Absolute Eosinophils 0.0 0.0 - 0.7 10e9/L    Absolute Basophils 0.0 0.0 - 0.2 10e9/L    Abs Immature Granulocytes 0.0 0 - 0.4 10e9/L    Absolute Nucleated RBC 0.0     Platelet Estimate Confirming automated cell count    Comprehensive metabolic panel    Collection Time: 10/12/18  7:40 AM   Result Value Ref Range    Sodium 137 133 - 144 mmol/L    Potassium 4.0 3.4 - 5.3 mmol/L    Chloride 104 94 - 109 mmol/L    Carbon Dioxide 24 20 - 32 mmol/L    Anion Gap 10 3 - 14 mmol/L    Glucose 141 (H) 70 - 99 mg/dL    Urea Nitrogen 18 7 - 30 mg/dL    Creatinine 0.93 0.66 - 1.25 mg/dL    GFR Estimate 80 >60 mL/min/1.7m2    GFR Estimate If Black >90 >60 mL/min/1.7m2    Calcium 9.2 8.5 - 10.1 mg/dL    Bilirubin Total 0.7 0.2 - 1.3 mg/dL    Albumin 3.5 3.4 - 5.0 g/dL    Protein Total 7.2 6.8 - 8.8 g/dL    Alkaline Phosphatase 92 40 - 150 U/L    ALT 19 0 - 70 U/L    AST 17 0 - 45 U/L   Magnesium    Collection Time: 10/12/18  7:40 AM   Result Value Ref Range    Magnesium 2.2 1.6 - 2.3 mg/dL

## 2018-10-12 NOTE — PROGRESS NOTES
Infusion Nursing Note:  Lazaro Porter presents today for Day 43 Cycle 1 Cisplatin.    Patient seen by provider today: Yes: LEE Ji    present during visit today: Not Applicable.    Note: Per LEE Ji, OK to give reduced dose of Cisplatin today with ANC 0.7.     Intravenous Access:  Peripheral IV placed.    Treatment Conditions:  Lab Results   Component Value Date    HGB 12.2 10/12/2018     Lab Results   Component Value Date    WBC 1.4 10/12/2018      Lab Results   Component Value Date    ANEU 0.7 10/12/2018     Lab Results   Component Value Date     10/12/2018      Lab Results   Component Value Date     10/12/2018                   Lab Results   Component Value Date    POTASSIUM 4.0 10/12/2018           Lab Results   Component Value Date    MAG 2.2 10/12/2018            Lab Results   Component Value Date    CR 0.93 10/12/2018                   Lab Results   Component Value Date    KM 9.2 10/12/2018                Lab Results   Component Value Date    BILITOTAL 0.7 10/12/2018           Lab Results   Component Value Date    ALBUMIN 3.5 10/12/2018                    Lab Results   Component Value Date    ALT 19 10/12/2018           Lab Results   Component Value Date    AST 17 10/12/2018       Results reviewed, labs did NOT meet treatment parameters: See above TORB.    Post Infusion Assessment:  Patient tolerated infusion without incident.  Blood return noted pre and post infusion.  Site patent and intact, free from redness, edema or discomfort.  No evidence of extravasations.  Access discontinued per protocol.    Discharge Plan:   Prescription refills given for Dexamethasone, Zyprexa, Ativan, and Levaquin.  Copy of AVS reviewed with patient and/or family.  Patient will return for follow-up as scheduled.  Patient discharged in stable condition accompanied by: self.  Departure Mode: Ambulatory.    Vaishali Carrion RN

## 2018-10-12 NOTE — PROGRESS NOTES
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 He is currently undergoing treatment with cisplatin (100 mg/m ) every 3 weeks with concurrent radiation to oropharynx and bilateral neck. Cisplatin 100mg/m2 cycle 1: 8/28/18; cycle 2 delayed by 2 days due to  and given on 9/21/18. RT started 8/29/18.  Please see Dr. Rolon's previous notes for further details on the patient's history. He comes in today for routine follow up prior to cycle 1 day 43 .    Interval History:  Farhad reports difficulty with secretions, throat pain, mucositis, swallowing, and lack of taste. He has been struggling with nausea and reports neither the PO zofran or compazine helps to alleviate the nausea. He reports vomiting every couple of days, particularly when the secretions are very thick or when he brushes his teeth. He was recently prescribed liquid guaifenesin which burned his mouth. He bought the OTC pills which have provided some relief. He reports occasional cough related to clearing his throat. He has been drinking 2 instant carnation breakfasts a day and has been eating as much as he can with approximately 3 16 oz bottles of water a day. He recently started silvadene for his neck dermatitis which has provided relief.     Farhad is not interested in continuing with his last dose of cisplatin today. He feels that his side effects have been difficult and with his last dose of radiation tomorrow, he would like to recover and start gaining weight. He reports nausea unrelieved by zofran and compazine. He reports increased nausea about day 3-4 of chemotherapy, and vomiting on average about 3-4 times/week. He has thick mucous secretions that he feels are contributing, and forcing himself to eat has been a struggle.  His weight continues to decline and he has lost about 17lbs since his last dose of chemo and about 27lbs since start of chemoradiation. He has ongoing fatigue, although denies dizziness. He has also had constipation alternating with diarrhea. He has managed his constipation with miralax and senna. He notes that the anti-nausea medicine has caused constipation so he has stopped taking it. He manages his throat pain with viscous lidocaine and does not take the prescribed oxycodone.     Review of Systems:  Patient denies any of the following except if noted above: fevers, chills, vision changes, hearing changes, tinnitus, chest pain, dyspnea, abdominal pain, diarrhea, urinary concerns, headaches, numbness, tingling, issues with sleep or mood. He also denies lumps, bumps, bleeding or bruising issues.    Current Outpatient Prescriptions   Medication Sig Dispense Refill     dexamethasone (DECADRON) 4 MG tablet Take 2 tablets (8 mg) by mouth daily (with breakfast) For 3 days. Then take 1 tablet for 2 days, then take 1/2 tablet for 2 days. 9 tablet 2     levofloxacin (LEVAQUIN) 250 MG tablet Take 1 tablet (250 mg) by mouth daily 7 tablet 0     LORazepam (ATIVAN) 0.5 MG tablet Take 1 tablet (0.5 mg) by mouth every 6 hours as needed for nausea 30 tablet 0     OLANZapine (ZYPREXA) 5 MG tablet Take 1 tablet (5 mg) by mouth nightly as needed (nausea) 10 tablet 1     silver sulfADIAZINE (SILVADENE) 1 % cream Apply topically daily T 50 g 3     aspirin 325 MG tablet Take 325 mg by mouth       guaiFENesin (ROBITUSSIN) 100 MG/5ML LIQD Take 10 mLs by mouth every 4 hours as needed for cough (Patient not taking: Reported on 10/12/2018) 560 mL 1     IBUPROFEN PO Take 200 mg by mouth       lidocaine, viscous, (XYLOCAINE) 2 % solution Take 15 mLs by mouth every 4 hours as needed for moderate pain swish and spit every 3-8 hours as needed; max 8 doses/24 hour period (Patient not taking: Reported on 10/12/2018) 500 mL 3     Multiple  "Vitamins-Minerals (MULTIVITAMIN & MINERAL PO)        nystatin (MYCOSTATIN) 851653 UNIT/ML suspension Take 5 mLs (500,000 Units) by mouth 4 times daily (Patient not taking: Reported on 10/12/2018) 100 mL 0     ondansetron (ZOFRAN) 8 MG tablet Take 1 tablet (8 mg) by mouth every 8 hours as needed (take first for nausea/vomiting. If it doesn't work in 45-60 mins, take Compazine.) (Patient not taking: Reported on 10/12/2018) 30 tablet 3     oxyCODONE (ROXICODONE) 5 MG/5ML solution Take 5 mLs (5 mg) by mouth every 4 hours as needed for severe pain (Patient not taking: Reported on 10/12/2018) 250 mL 0     polyethylene glycol (MIRALAX/GLYCOLAX) Packet Take 1 packet by mouth daily as needed for constipation       prochlorperazine (COMPAZINE) 10 MG tablet Take 1 tablet (10 mg) by mouth every 6 hours as needed (Nausea/Vomiting) (Patient not taking: Reported on 10/12/2018) 30 tablet 2     [DISCONTINUED] dexamethasone (DECADRON) 4 MG tablet Take 2 tablets (8 mg) by mouth daily (with breakfast) for 3 days Start the day after chemotherapy. (Patient not taking: Reported on 8/28/2018) 6 tablet 2       Physical Examination:  /81 (BP Location: Right arm, Patient Position: Sitting, Cuff Size: Adult Regular)  Pulse 83  Temp 98.7  F (37.1  C) (Oral)  Resp 18  Ht 1.829 m (6' 0.01\")  Wt 74.7 kg (164 lb 11.2 oz)  SpO2 98%  BMI 22.33 kg/m2  Wt Readings from Last 10 Encounters:   10/12/18 74.7 kg (164 lb 11.2 oz)   10/10/18 75.3 kg (166 lb)   10/02/18 77.1 kg (170 lb)   10/01/18 78.2 kg (172 lb 8 oz)   09/24/18 84.6 kg (186 lb 9.6 oz)   09/21/18 82.1 kg (181 lb 1.6 oz)   09/19/18 80.8 kg (178 lb 3.2 oz)   09/17/18 80.7 kg (178 lb)   09/10/18 83.5 kg (184 lb 1.6 oz)   09/05/18 83 kg (183 lb)     General: The patient is a pleasant male in no acute distress.  HEENT: EOMI, PERRL. Sclerae are anicteric. Oral mucosa is slightly dry, erythematous, but with no visible ulcers.  No visible thrush.   Lymph: No lymphadenopathy in the " cervical or supraclavicular areas.   Heart: Regular rate and rhythm with no murmurs, rubs or gallops.  Lungs: Clear to auscultation bilaterally.   Abdomen: Bowel sounds present, soft, nontender with no palpable hepatosplenomegaly or masses.   Extremities: No lower extremity edema noted bilaterally.   Neuro: Cranial nerves II through XII are grossly intact.  Skin: Erythema of the neck with mild skin break down to the right side of the neck. Tenderness to palpation. No warmth, induration, streaking or pus. No petechiae, or bruising noted on exposed skin.    Laboratory Data:   10/12/2018 07:40   Sodium 137   Potassium 4.0   Chloride 104   Carbon Dioxide 24   Urea Nitrogen 18   Creatinine 0.93   GFR Estimate 80   GFR Estimate If Black >90   Calcium 9.2   Anion Gap 10   Magnesium 2.2   Albumin 3.5   Protein Total 7.2   Bilirubin Total 0.7   Alkaline Phosphatase 92   ALT 19   AST 17   Glucose 141 (H)   WBC 1.4 (L)   Hemoglobin 12.2 (L)   Hematocrit 36.7 (L)   Platelet Count 176   RBC Count 4.04 (L)   MCV 91   MCH 30.2   MCHC 33.2   RDW 12.9   Diff Method Automated Method   % Neutrophils 51.7   % Lymphocytes 22.4   % Monocytes 21.7   % Eosinophils 2.8   % Basophils 0.7   % Immature Granulocytes 0.7   Nucleated RBCs 0   Absolute Neutrophil 0.7 (L)   Absolute Lymphocytes 0.3 (L)   Absolute Monocytes 0.3   Absolute Eosinophils 0.0   Absolute Basophils 0.0   Abs Immature Granulocytes 0.0   Absolute Nucleated RBC 0.0   Platelet Estimate Confirming automa...       Assessment and Plan:  Farhad is a 68 year old male with stage I (T1 N1 M0) 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 He is currently undergoing definitive chemoradiotherapy with high-dose cisplatin and concurrent radiation to oropharynx and bilateral neck.    1. HSNCC, chemo-therapy induced neutropenia  Patient tolerated day 1 cycle 1 of cisplatin well except for nausea, fatigue, and delayed recovery of ANC. His second dose of cisplatin was delayed  to ANC of 800. Patient was able to receive day 22 cycle 1 two days after delay with ANC of 1,100. Patient is here for assessment prior to day 43 cycle 1. His ANC is 700 today.   - Patient's last dose of radiation is tomorrow.  - Spoke with Dr. Rolon who recommended either low dose cisplatin (40 mg/m ) or cetuximab given his ANC today. Patient is reluctant to receive any further chemotherapy. Discussed with patient that many of his symptoms are related to radiation and that we can try additional measures to control nausea. Dr. Rolon recommends receiving a 3rd dose of chemotherapy for improved outcome in terms of reduced risk of recurrence. Patient elected to receive low dose cisplatin today.   - Given the patient's low ANC and increased risk of infection, prophylactic Levaquin 250 mg was prescribed for the next seven days. Reviewed neutropenic precautions with patient, discussed with him to call immediately if he has temp of 100.4 F or more. Patient expressed understanding.     2. Nausea and vomiting  Patient has had continual nausea with occasional vomiting since starting chemoradiation. He has tried compazine and zofran without relief and increase in constipation. He has stopped taking both medications as he felt this has not helped.  - Prescribed longer dexamethasone taper as patient expressed the three days after treatment have been more manageable than other times. Patient will take 3 days of 8 mg of dexamethasone daily, 2 days of 4 mg daily, and 2 days of 2 mg.   - Prescribed Ativan 0.5 mg tablet PRN to help with nausea. Discussed sedative effect.  - Prescribed Zyprexa 5 mg tablet at bedtime to try    3. Radiation induced dermatitis, mucositis, anorexia, and thick secretions.  - Patient started silvadene two days ago, encouraged to continue use silvadene and Afghan magic cream.  - Patient is continuing with OTC robitussin pills to help decrease oral secretions. Continue to do salt water gargles to help with pain.    - Encouraged patient to try and drink more instant carnation per nutritionist guidelines (4-5 per day). Discussed importance of increasing food and water intake.     4. Health Maintenance: Influenza vaccine  - Patient declined influenza vaccine at today's visit. Would like to wait till he is feeling better before getting flu shot, as he tends to get sick from the vaccine. Encouraged patient to get vaccine in the next month as he is at an increased risk for infection due to immunosuppression    Radha Gomez PA-S on 10/12/2018 at 9:40 AM    Patient was seen and examined by me, as noted above. Radha Gomez (PA student) acted as a scribe. I have reviewed and edited the above note.    Stephanie Nicholson PA-C  [facility]  [address], [place] [address] [phone]

## 2018-10-12 NOTE — MR AVS SNAPSHOT
After Visit Summary   10/12/2018    Lazaro Porter    MRN: [id number]           Patient Information     Date Of Birth          1950        Visit Information        Provider Department      10/12/2018 8:00 AM Stephanie Nicholson PA [facility]        Today's Diagnoses     Malignant neoplasm of base of tongue (H)    -  1    Chemotherapy-induced neutropenia (H)        Nausea and vomiting, intractability of vomiting not specified, unspecified vomiting type           Follow-ups after your visit        Your next 10 appointments already scheduled     Oct 12, 2018  2:45 PM CDT   EXTERNAL RADIATION TREATMENT with [facility] RAD ONC [facility]   Radiation Oncology Clinic ([facility])    [facility]  1st Floor  [address] [address]   [phone]            Oct 13, 2018 10:00 AM CDT   EXTERNAL RADIATION TREATMENT with [facility] RAD ONC VAR[facility]   Radiation Oncology Clinic ([facility])    [facility]  1st Floor  [address] [address]   [phone]            Oct 29, 2018 11:30 AM CDT   Return Visit with LORIE Chisholm CNP   Radiation Oncology Clinic ([facility])    [facility]  1st Floor  [address] [address]   [phone]            Nov 20, 2018  2:00 PM CST   (Arrive by 1:45 PM)   Return Visit with Garcia Worthy MD   [facility] ([facility])    [address]  4th Floor  [facility] [address] [phone]              Future tests that were ordered for you today     Open Future Orders        Priority Expected Expires Ordered    Phosphorus Routine 10/12/2018 10/11/2019 10/11/2018            Who to contact     If you have questions or need follow up information about today's clinic visit or your schedule please contact [facility] directly at  "[phone].  Normal or non-critical lab and imaging results will be communicated to you by MyChart, letter or phone within 4 business days after the clinic has received the results. If you do not hear from us within 7 days, please contact the clinic through Reading Roomhart or phone. If you have a critical or abnormal lab result, we will notify you by phone as soon as possible.  Submit refill requests through [email or web address] or call your pharmacy and they will forward the refill request to us. Please allow 3 business days for your refill to be completed.          Additional Information About Your Visit        Reading Roomharuberlife Information     [email or web address] lets you send messages to your doctor, view your test results, renew your prescriptions, schedule appointments and more. To sign up, go to [email or web address] . Click on \"Log in\" on the left side of the screen, which will take you to the Welcome page. Then click on \"Sign up Now\" on the right side of the page.     You will be asked to enter the access code listed below, as well as some personal information. Please follow the directions to create your username and password.     Your access code is: [email or web address]  Expires: 2018 10:28 AM     Your access code will  in 90 days. If you need help or a new code, please call your [facility] clinic or [phone].        Care EveryWhere ID     This is your Care EveryWhere ID. This could be used by other organizations to access your [facility] medical records  [id number]        Your Vitals Were     Pulse Temperature Respirations Height Pulse Oximetry BMI (Body Mass Index)    83 98.7  F (37.1  C) (Oral) 18 1.829 m (6' 0.01\") 98% 22.33 kg/m2       Blood Pressure from Last 3 Encounters:   10/12/18 138/81   10/10/18 135/83   18 154/87    Weight from Last 3 Encounters:   10/12/18 74.7 kg (164 lb 11.2 oz)   10/10/18 75.3 kg (166 lb)   10/02/18 77.1 kg (170 lb)                 Today's Medication Changes          These changes are accurate as " of 10/12/18  2:07 PM.  If you have any questions, ask your nurse or doctor.               Start taking these medicines.        Dose/Directions    levofloxacin 250 MG tablet   Commonly known as:  LEVAQUIN   Used for:  Chemotherapy-induced neutropenia (H)   Started by:  Stephanie Nicholson PA        Dose:  250 mg   Take 1 tablet (250 mg) by mouth daily   Quantity:  7 tablet   Refills:  0       LORazepam 0.5 MG tablet   Commonly known as:  ATIVAN   Used for:  Nausea and vomiting, intractability of vomiting not specified, unspecified vomiting type   Started by:  Stephanie Nicholson PA        Dose:  0.5 mg   Take 1 tablet (0.5 mg) by mouth every 6 hours as needed for nausea   Quantity:  30 tablet   Refills:  0       OLANZapine 5 MG tablet   Commonly known as:  zyPREXA   Used for:  Nausea and vomiting, intractability of vomiting not specified, unspecified vomiting type   Started by:  Stephanie Nicholson PA        Dose:  5 mg   Take 1 tablet (5 mg) by mouth nightly as needed (nausea)   Quantity:  10 tablet   Refills:  1         These medicines have changed or have updated prescriptions.        Dose/Directions    dexamethasone 4 MG tablet   Commonly known as:  DECADRON   This may have changed:  additional instructions   Used for:  Malignant neoplasm of base of tongue (H)   Changed by:  Stephanie Nicholson PA        Dose:  8 mg   Take 2 tablets (8 mg) by mouth daily (with breakfast) For 3 days. Then take 1 tablet for 2 days, then take 1/2 tablet for 2 days.   Quantity:  9 tablet   Refills:  2            Where to get your medicines      These medications were sent to [address] [address]    Hours:  TRANSPLANT PHONE NUMBER [phone] Phone:  [phone]     dexamethasone 4 MG tablet    levofloxacin 250 MG tablet    OLANZapine 5 MG tablet         Some of these will need a paper prescription and others can be bought  over the counter.  Ask your nurse if you have questions.     Bring a paper prescription for each of these medications     LORazepam 0.5 MG tablet                Primary Care Provider Office Phone # Fax #    Lalito Moctezuma [phone]       [facility] [address] [address]        Equal Access to Services     JOZEF CLEMENTS : Hadii aad ku hadasho Soomaali, waaxda luqadaha, qaybta kaalmada adeegyada, waxhunter cormierin hayaan aderoberto carlos hoangsurajoskar fernandez. So [facility] [phone].    ATENCIÓN: Si habla español, tiene a corona disposición servicios gratuitos de asistencia lingüística. Veronicaame al [phone].    We comply with applicable federal civil rights laws and [place] laws. We do not discriminate on the basis of race, color, national origin, age, disability, sex, sexual orientation, or gender identity.            Thank you!     Thank you for choosing [facility]  for your care. Our goal is always to provide you with excellent care. Hearing back from our patients is one way we can continue to improve our services. Please take a few minutes to complete the written survey that you may receive in the mail after your visit with us. Thank you!             Your Updated Medication List - Protect others around you: Learn how to safely use, store and throw away your medicines at www.disposemymeds.org.          This list is accurate as of 10/12/18  2:07 PM.  Always use your most recent med list.                   Brand Name Dispense Instructions for use Diagnosis    aspirin 325 MG tablet      Take 325 mg by mouth        dexamethasone 4 MG tablet    DECADRON    9 tablet    Take 2 tablets (8 mg) by mouth daily (with breakfast) For 3 days. Then take 1 tablet for 2 days, then take 1/2 tablet for 2 days.    Malignant neoplasm of base of tongue (H)       guaiFENesin 100 MG/5ML Liqd    ROBITUSSIN    560 mL    Take 10 mLs by mouth every 4 hours as needed for cough    Malignant neoplasm of base of  tongue (H)       IBUPROFEN PO      Take 200 mg by mouth        levofloxacin 250 MG tablet    LEVAQUIN    7 tablet    Take 1 tablet (250 mg) by mouth daily    Chemotherapy-induced neutropenia (H)       lidocaine (viscous) 2 % solution    XYLOCAINE    500 mL    Take 15 mLs by mouth every 4 hours as needed for moderate pain swish and spit every 3-8 hours as needed; max 8 doses/24 hour period    Malignant neoplasm of base of tongue (H)       LORazepam 0.5 MG tablet    ATIVAN    30 tablet    Take 1 tablet (0.5 mg) by mouth every 6 hours as needed for nausea    Nausea and vomiting, intractability of vomiting not specified, unspecified vomiting type       MULTIVITAMIN & MINERAL PO           nystatin 440483 UNIT/ML suspension    MYCOSTATIN    100 mL    Take 5 mLs (500,000 Units) by mouth 4 times daily    Malignant neoplasm of base of tongue (H)       OLANZapine 5 MG tablet    zyPREXA    10 tablet    Take 1 tablet (5 mg) by mouth nightly as needed (nausea)    Nausea and vomiting, intractability of vomiting not specified, unspecified vomiting type       ondansetron 8 MG tablet    ZOFRAN    30 tablet    Take 1 tablet (8 mg) by mouth every 8 hours as needed (take first for nausea/vomiting. If it doesn't work in 45-60 mins, take Compazine.)    Malignant neoplasm of base of tongue (H)       oxyCODONE 5 MG/5ML solution    ROXICODONE    250 mL    Take 5 mLs (5 mg) by mouth every 4 hours as needed for severe pain    Postoperative pain       polyethylene glycol Packet    MIRALAX/GLYCOLAX     Take 1 packet by mouth daily as needed for constipation        prochlorperazine 10 MG tablet    COMPAZINE    30 tablet    Take 1 tablet (10 mg) by mouth every 6 hours as needed (Nausea/Vomiting)    Malignant neoplasm of base of tongue (H)       silver sulfADIAZINE 1 % cream    SILVADENE    50 g    Apply topically daily T    Malignant neoplasm of base of tongue (H)

## 2018-10-12 NOTE — NURSING NOTE
Chief Complaint   Patient presents with     Blood Draw     Labs drawn via PIV placed by RN. Line flushed with saline. VS taken.      Samir Bedoya RN

## 2018-10-12 NOTE — LETTER
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 He is currently undergoing treatment with cisplatin (100 mg/m ) every 3 weeks with concurrent radiation to oropharynx and bilateral neck. Cisplatin 100mg/m2 cycle 1: 8/28/18; cycle 2 delayed by 2 days due to  and given on 9/21/18. RT started 8/29/18.  Please see Dr. Rolon's previous notes for further details on the patient's history. He comes in today for routine follow up prior to cycle 1 day 43 .    Interval History:  Farhad reports difficulty with secretions, throat pain, mucositis, swallowing, and lack of taste. He has been struggling with nausea and reports neither the PO zofran or compazine helps to alleviate the nausea. He reports vomiting every couple of days, particularly when the secretions are very thick or when he brushes his teeth. He was recently prescribed liquid guaifenesin which burned his mouth. He bought the OTC pills which have provided some relief. He reports occasional cough related to clearing his throat. He has been drinking 2 instant carnation breakfasts a day and has been eating as much as he can with approximately 3 16 oz bottles of water a day. He recently started silvadene for his neck dermatitis which has provided relief.     Farhad is not interested in continuing with his last dose of cisplatin today. He feels that his side effects have been difficult and with his last dose of radiation tomorrow, he would like to recover and start gaining weight. He reports nausea unrelieved by zofran and compazine. He reports increased nausea about day 3-4 of chemotherapy, and vomiting on average about 3-4 times/week. He has thick mucous secretions  that he feels are contributing, and forcing himself to eat has been a struggle. His weight continues to decline and he has lost about 17lbs since his last dose of chemo and about 27lbs since start of chemoradiation. He has ongoing fatigue, although denies dizziness. He has also had constipation alternating with diarrhea. He has managed his constipation with miralax and senna. He notes that the anti-nausea medicine has caused constipation so he has stopped taking it. He manages his throat pain with viscous lidocaine and does not take the prescribed oxycodone.     Review of Systems:  Patient denies any of the following except if noted above: fevers, chills, vision changes, hearing changes, tinnitus, chest pain, dyspnea, abdominal pain, diarrhea, urinary concerns, headaches, numbness, tingling, issues with sleep or mood. He also denies lumps, bumps, bleeding or bruising issues.    Current Outpatient Prescriptions   Medication Sig Dispense Refill     dexamethasone (DECADRON) 4 MG tablet Take 2 tablets (8 mg) by mouth daily (with breakfast) For 3 days. Then take 1 tablet for 2 days, then take 1/2 tablet for 2 days. 9 tablet 2     levofloxacin (LEVAQUIN) 250 MG tablet Take 1 tablet (250 mg) by mouth daily 7 tablet 0     LORazepam (ATIVAN) 0.5 MG tablet Take 1 tablet (0.5 mg) by mouth every 6 hours as needed for nausea 30 tablet 0     OLANZapine (ZYPREXA) 5 MG tablet Take 1 tablet (5 mg) by mouth nightly as needed (nausea) 10 tablet 1     silver sulfADIAZINE (SILVADENE) 1 % cream Apply topically daily T 50 g 3     aspirin 325 MG tablet Take 325 mg by mouth       guaiFENesin (ROBITUSSIN) 100 MG/5ML LIQD Take 10 mLs by mouth every 4 hours as needed for cough (Patient not taking: Reported on 10/12/2018) 560 mL 1     IBUPROFEN PO Take 200 mg by mouth       lidocaine, viscous, (XYLOCAINE) 2 % solution Take 15 mLs by mouth every 4 hours as needed for moderate pain swish and spit every 3-8 hours as needed; max 8 doses/24 hour  "period (Patient not taking: Reported on 10/12/2018) 500 mL 3     Multiple Vitamins-Minerals (MULTIVITAMIN & MINERAL PO)        nystatin (MYCOSTATIN) 382047 UNIT/ML suspension Take 5 mLs (500,000 Units) by mouth 4 times daily (Patient not taking: Reported on 10/12/2018) 100 mL 0     ondansetron (ZOFRAN) 8 MG tablet Take 1 tablet (8 mg) by mouth every 8 hours as needed (take first for nausea/vomiting. If it doesn't work in 45-60 mins, take Compazine.) (Patient not taking: Reported on 10/12/2018) 30 tablet 3     oxyCODONE (ROXICODONE) 5 MG/5ML solution Take 5 mLs (5 mg) by mouth every 4 hours as needed for severe pain (Patient not taking: Reported on 10/12/2018) 250 mL 0     polyethylene glycol (MIRALAX/GLYCOLAX) Packet Take 1 packet by mouth daily as needed for constipation       prochlorperazine (COMPAZINE) 10 MG tablet Take 1 tablet (10 mg) by mouth every 6 hours as needed (Nausea/Vomiting) (Patient not taking: Reported on 10/12/2018) 30 tablet 2     [DISCONTINUED] dexamethasone (DECADRON) 4 MG tablet Take 2 tablets (8 mg) by mouth daily (with breakfast) for 3 days Start the day after chemotherapy. (Patient not taking: Reported on 8/28/2018) 6 tablet 2       Physical Examination:  /81 (BP Location: Right arm, Patient Position: Sitting, Cuff Size: Adult Regular)  Pulse 83  Temp 98.7  F (37.1  C) (Oral)  Resp 18  Ht 1.829 m (6' 0.01\")  Wt 74.7 kg (164 lb 11.2 oz)  SpO2 98%  BMI 22.33 kg/m2  Wt Readings from Last 10 Encounters:   10/12/18 74.7 kg (164 lb 11.2 oz)   10/10/18 75.3 kg (166 lb)   10/02/18 77.1 kg (170 lb)   10/01/18 78.2 kg (172 lb 8 oz)   09/24/18 84.6 kg (186 lb 9.6 oz)   09/21/18 82.1 kg (181 lb 1.6 oz)   09/19/18 80.8 kg (178 lb 3.2 oz)   09/17/18 80.7 kg (178 lb)   09/10/18 83.5 kg (184 lb 1.6 oz)   09/05/18 83 kg (183 lb)     General: The patient is a pleasant male in no acute distress.  HEENT: EOMI, PERRL. Sclerae are anicteric. Oral mucosa is slightly dry, erythematous, but with no " visible ulcers.  No visible thrush.   Lymph: No lymphadenopathy in the cervical or supraclavicular areas.   Heart: Regular rate and rhythm with no murmurs, rubs or gallops.  Lungs: Clear to auscultation bilaterally.   Abdomen: Bowel sounds present, soft, nontender with no palpable hepatosplenomegaly or masses.   Extremities: No lower extremity edema noted bilaterally.   Neuro: Cranial nerves II through XII are grossly intact.  Skin: Erythema of the neck with mild skin break down to the right side of the neck. Tenderness to palpation. No warmth, induration, streaking or pus. No petechiae, or bruising noted on exposed skin.    Laboratory Data:   10/12/2018 07:40   Sodium 137   Potassium 4.0   Chloride 104   Carbon Dioxide 24   Urea Nitrogen 18   Creatinine 0.93   GFR Estimate 80   GFR Estimate If Black >90   Calcium 9.2   Anion Gap 10   Magnesium 2.2   Albumin 3.5   Protein Total 7.2   Bilirubin Total 0.7   Alkaline Phosphatase 92   ALT 19   AST 17   Glucose 141 (H)   WBC 1.4 (L)   Hemoglobin 12.2 (L)   Hematocrit 36.7 (L)   Platelet Count 176   RBC Count 4.04 (L)   MCV 91   MCH 30.2   MCHC 33.2   RDW 12.9   Diff Method Automated Method   % Neutrophils 51.7   % Lymphocytes 22.4   % Monocytes 21.7   % Eosinophils 2.8   % Basophils 0.7   % Immature Granulocytes 0.7   Nucleated RBCs 0   Absolute Neutrophil 0.7 (L)   Absolute Lymphocytes 0.3 (L)   Absolute Monocytes 0.3   Absolute Eosinophils 0.0   Absolute Basophils 0.0   Abs Immature Granulocytes 0.0   Absolute Nucleated RBC 0.0   Platelet Estimate Confirming automa...       Assessment and Plan:  Farhad is a 68 year old male with stage I (T1 N1 M0) 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 He is currently undergoing definitive chemoradiotherapy with high-dose cisplatin and concurrent radiation to oropharynx and bilateral neck.    1. HSNCC, chemo-therapy induced neutropenia  Patient tolerated day 1 cycle 1 of cisplatin well except for nausea,  fatigue, and delayed recovery of ANC. His second dose of cisplatin was delayed to ANC of 800. Patient was able to receive day 22 cycle 1 two days after delay with ANC of 1,100. Patient is here for assessment prior to day 43 cycle 1. His ANC is 700 today.   - Patient's last dose of radiation is tomorrow.  - Spoke with Dr. Rolon who recommended either low dose cisplatin (40 mg/m  ) or cetuximab given his ANC today. Patient is reluctant to receive any further chemotherapy. Discussed with patient that many of his symptoms are related to radiation and that we can try additional measures to control nausea. Dr. Rolon recommends receiving a 3rd dose of chemotherapy for improved outcome in terms of reduced risk of recurrence. Patient elected to receive low dose cisplatin today.   - Given the patient's low ANC and increased risk of infection, prophylactic Levaquin 250 mg was prescribed for the next seven days. Reviewed neutropenic precautions with patient, discussed with him to call immediately if he has temp of 100.4 F or more. Patient expressed understanding.     2. Nausea and vomiting  Patient has had continual nausea with occasional vomiting since starting chemoradiation. He has tried compazine and zofran without relief and increase in constipation. He has stopped taking both medications as he felt this has not helped.  - Prescribed longer dexamethasone taper as patient expressed the three days after treatment have been more manageable than other times. Patient will take 3 days of 8 mg of dexamethasone daily, 2 days of 4 mg daily, and 2 days of 2 mg.   - Prescribed Ativan 0.5 mg tablet PRN to help with nausea. Discussed sedative effect.  - Prescribed Zyprexa 5 mg tablet at bedtime to try    3. Radiation induced dermatitis, mucositis, anorexia, and thick secretions.  - Patient started silvadene two days ago, encouraged to continue use silvadene and Gu[place] magic cream.  - Patient is continuing with OTC robitussin pills to  help decrease oral secretions. Continue to do salt water gargles to help with pain.   - Encouraged patient to try and drink more instant carnation per nutritionist guidelines (4-5 per day). Discussed importance of increasing food and water intake.     4. Health Maintenance: Influenza vaccine  - Patient declined influenza vaccine at today's visit. Would like to wait till he is feeling better before getting flu shot, as he tends to get sick from the vaccine. Encouraged patient to get vaccine in the next month as he is at an increased risk for infection due to immunosuppression    Radha MAGANAS on 10/12/2018 at 9:40 AM    Patient was seen and examined by me, as noted above. Radha Gomez (PA student) acted as a scribe. I have reviewed and edited the above note.    Stephanie Nicholson PA-C  [facility] [address] [place], [place] [address] [phone]

## 2018-10-13 ENCOUNTER — ONCOLOGY VISIT (OUTPATIENT)
Dept: RADIATION ONCOLOGY | Facility: CLINIC | Age: 68
End: 2018-10-13

## 2018-10-13 ENCOUNTER — APPOINTMENT (OUTPATIENT)
Dept: RADIATION ONCOLOGY | Facility: CLINIC | Age: 68
End: 2018-10-13
Attending: RADIOLOGY
Payer: MEDICARE

## 2018-10-13 PROCEDURE — 77336 RADIATION PHYSICS CONSULT: CPT | Performed by: RADIOLOGY

## 2018-10-13 PROCEDURE — 77386 ZZH IMRT TREATMENT DELIVERY, COMPLEX: CPT | Performed by: RADIOLOGY

## 2018-10-20 NOTE — PROCEDURES
Radiotherapy Treatment Summary          Date of Report: 2018     PATIENT: WILLIAN SIDDIQUI  MEDICAL RECORD NO: [id number]  : 1950     DIAGNOSIS: C01 Malignant neoplasm of base of tongue  INTENT OF RADIOTHERAPY: Curative  PATHOLOGY: 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  STAGE: cT1 N1 M0  CONCURRENT SYSTEMIC THERAPY: Cisplatin (100 mg/m2) every 3 weeks     Details of the treatments summarized below are found in records kept in the Department of Radiation Oncology at [facility].     Treatment Summary:  Treatment Site  Total Dose Modality From  To  Elapsed Days Fx.  Gross Tumor   6,996 cGy 06 X   8/29/2018 10/13/2018  45  33  High risk neck       6,600  cGy 06 X   8/29/2018 10/13/2018  45  33  Inter. risk neck       6,171  cGy 06 X   8/29/2018 10/13/2018  45  33  Elective neck         5,610  cGy 06 X   8/29/2018 10/13/2018  45  33          Dose per Fraction:    Gross tumor: 212 cGy  High-risk neck: 200 cGy  Intermediate risk neck: 187 cGy  Elective neck: 170 cGy      COMMENTS:  Mr. 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 He initially presented with an enlarged right level II neck mass, which was biopsy-proven to be a p16-positive squamous cell carcinoma. Initial staging revealed that this node was encasing the right carotid artery, and had extranodal extension. Directly laryngoscopy ultimately determined that the primary tumor likely arose from the right base of tongue. He underwent treatment with definitive concurrent chemoradiation with high dose cisplatin, as described above. Four dose levels were prescribed and delivered using a simultaneous integrated boost technique. The gross disease in the involved level II LN received 70Gy; the right oral cavity, right oropharynx and right levels IB- II received 66Gy; the remainder of the oral cavity, and right levels III-IV received 62Gy; and the elective bilateral neck including RP  nodes received 56Gy. Overall Mr. Porter tolerated treatment well, developing the expected acute toxicities radiation-induced toxicities, namely mucositis and dermatitis. His second cycle of chemotherapy was delayed a total of 2 days secondary to neutropenia with his final cycle switched to a low dose (40 mg/m2) cisplatin infusion secondary to ongoing cytopenias. He had a 1-day treatment break between fractions 3-4 secondary to the Labor Day holiday with the missed treatment made up over the last weekend of his treatment course. He otherwise did not require unplanned breaks in therapy.     Acute Toxicity Profile (CTC v5.0)  Nausea: Grade 2: Oral intake decreased without significant weight loss, dehydration or malnutrition  Mucositis: Grade 2: Moderate pain; not interfering with oral intake; modified diet indicated  Dermatitis: Grade 2: Moderate to brisk erythema; patchy moist desquamation, mostly confined to skin folds and creases; moderate erythema     PAIN MANAGEMENT: The patient's oral cavity and oropharyngeal pain was managed with viscous lidocaine. He was provided oxycodone to use as-needed for moderate to severe pain, but elected not to use narcotic medications due to concern for possible side effects.     FOLLOW UP PLAN:   1. Follow-up with NP in 2 weeks and with MD in 6 weeks with post-treatment baseline CT neck  2. Encouragement to continue to use salt/soda rinses and viscous lidocaine PRN for pain  3. Routine skin and dental cares     Resident Physician: Devon Klein MD, PhD  Staff Physician: Nathan Garay MD, PhD  Physicist: Javad Vizcaino, PhD     CC:   MD Pillo Baer MD                                    Radiation Oncology:  [facility] [address] [place] [address]

## 2018-10-30 ENCOUNTER — OFFICE VISIT (OUTPATIENT)
Dept: RADIATION ONCOLOGY | Facility: CLINIC | Age: 68
End: 2018-10-30
Attending: NURSE PRACTITIONER
Payer: MEDICARE

## 2018-10-30 ENCOUNTER — APPOINTMENT (OUTPATIENT)
Dept: LAB | Facility: CLINIC | Age: 68
End: 2018-10-30
Attending: INTERNAL MEDICINE
Payer: MEDICARE

## 2018-10-30 ENCOUNTER — ONCOLOGY VISIT (OUTPATIENT)
Dept: ONCOLOGY | Facility: CLINIC | Age: 68
End: 2018-10-30
Attending: INTERNAL MEDICINE
Payer: MEDICARE

## 2018-10-30 VITALS
HEART RATE: 92 BPM | BODY MASS INDEX: 22.24 KG/M2 | WEIGHT: 164 LBS | DIASTOLIC BLOOD PRESSURE: 76 MMHG | SYSTOLIC BLOOD PRESSURE: 135 MMHG

## 2018-10-30 VITALS
WEIGHT: 163.5 LBS | SYSTOLIC BLOOD PRESSURE: 131 MMHG | HEART RATE: 78 BPM | RESPIRATION RATE: 18 BRPM | TEMPERATURE: 98 F | OXYGEN SATURATION: 96 % | HEIGHT: 72 IN | BODY MASS INDEX: 22.14 KG/M2 | DIASTOLIC BLOOD PRESSURE: 72 MMHG

## 2018-10-30 DIAGNOSIS — C01 MALIGNANT NEOPLASM OF BASE OF TONGUE (H): Primary | ICD-10-CM

## 2018-10-30 DIAGNOSIS — T66.XXXA ADVERSE EFFECT OF RADIATION, INITIAL ENCOUNTER: ICD-10-CM

## 2018-10-30 PROCEDURE — 99214 OFFICE O/P EST MOD 30 MIN: CPT | Mod: GC | Performed by: INTERNAL MEDICINE

## 2018-10-30 PROCEDURE — G0463 HOSPITAL OUTPT CLINIC VISIT: HCPCS | Mod: ZF

## 2018-10-30 PROCEDURE — 36415 COLL VENOUS BLD VENIPUNCTURE: CPT

## 2018-10-30 PROCEDURE — G0463 HOSPITAL OUTPT CLINIC VISIT: HCPCS | Performed by: NURSE PRACTITIONER

## 2018-10-30 ASSESSMENT — PAIN SCALES - GENERAL: PAINLEVEL: NO PAIN (0)

## 2018-10-30 NOTE — MR AVS SNAPSHOT
After Visit Summary   10/30/2018    Lazaro Porter    MRN: [id number]           Patient Information     Date Of Birth          1950        Visit Information        Provider Department      10/30/2018 2:45 PM Pillo Rolon MD [facility]        Today's Diagnoses     Malignant neoplasm of base of tongue (H)    -  1    Adverse effect of radiation, initial encounter            Follow-ups after your visit        Your next 10 appointments already scheduled     Nov 19, 2018 10:45 AM CST   LAB with  LAB   [facility] ([facility])    [address] [id number] [phone]           Please do not eat 10-12 hours before your appointment if you are coming in fasting for labs on lipids, cholesterol, or glucose (sugar). This does not apply to pregnant women. Water, hot tea and black coffee (with nothing added) are okay. Do not drink other fluids, diet soda or chew gum.            Nov 19, 2018 12:00 PM CST   CT SOFT TISSUE NECK W CONTRAST with UUCT1   [facility], [place], [place] ([facility])    [address] [id number] [phone]           How do I prepare for my exam? (Food and drink instructions) **You will have contrast for this exam.** Do not eat or drink for 2 hours before your exam. If you need to take medicine, you may take it with small sips of water. (We may ask you to take liquid medicine as well.)  The day before your exam, drink extra fluids at least six 8-ounce glasses (unless your doctor tells you to restrict your fluids).  How do I prepare for my exam? (Other instructions) Patients over 70 or patients with diabetes or kidney problems: If you haven t had a blood test (creatinine test) within the last 30 days, the Cardiologist/Radiologist may require you to get this test prior to your exam.  What should I wear: Please wear loose clothing, such as a sweat suit  or jogging clothes.  Avoid snaps, zippers and other metal. We may ask you to undress and put on a hospital gown.  How long does the exam take: Most scans take less than 20 minutes.  What should I bring: Please bring any scans or X-rays taken at other hospitals, if similar tests were done. Also bring a list of your medicines, including vitamins, minerals and over-the-counter drugs. It is safest to leave personal items at home.  Do I need a :  No  is needed.  What do I need to tell my doctor? Be sure to tell your doctor: * If you have any allergies. * If there s any chance you are pregnant. * If you are breastfeeding. * If you have diabetes as your medication may need to be adjusted for this exam.  What should I do after the exam: No restrictions, You may resume normal activities.  What is this test: A CT (computed tomography) scan is a series of pictures that allows us to look inside your body. The scanner creates images of the body in cross sections, much like slices of bread. This helps us see any problems more clearly. You may receive contrast (X-ray dye) before or during your scan. Contrast is given through an IV (small needle in your arm).  Who should I call with questions: If you have any questions, please call the Imaging Department where you will have your exam. Directions, parking instructions, and other information is available on our website, [email or web address].            Nov 19, 2018  1:00 PM CST   Return Visit with Nathan Garay MD   Radiation Oncology Clinic ([facility])    [facility]  1st Floor  [address] [id number] [phone]            Nov 20, 2018  2:00 PM CST   (Arrive by 1:45 PM)   Return Visit with Garcia Worthy MD   [facility] ([email or web address])    [address]  4th Floor  [facility] [id number] [phone]              Future tests that were ordered  "for you today     Open Future Orders        Priority Expected Expires Ordered    CBC with platelets differential Routine 2018 2018 10/30/2018    Comprehensive metabolic panel Routine 2018 2018 10/30/2018    TSH with free T4 reflex Routine  10/30/2019 10/30/2018            Who to contact     If you have questions or need follow up information about today's clinic visit or your schedule please contact [facility] directly at [phone].  Normal or non-critical lab and imaging results will be communicated to you by MyChart, letter or phone within 4 business days after the clinic has received the results. If you do not hear from us within 7 days, please contact the clinic through [employer] or phone. If you have a critical or abnormal lab result, we will notify you by phone as soon as possible.  Submit refill requests through [employer] or call your pharmacy and they will forward the refill request to us. Please allow 3 business days for your refill to be completed.          Additional Information About Your Visit        [employer] Information     [employer] lets you send messages to your doctor, view your test results, renew your prescriptions, schedule appointments and more. To sign up, go to [email or web address] . Click on \"Log in\" on the left side of the screen, which will take you to the Welcome page. Then click on \"Sign up Now\" on the right side of the page.     You will be asked to enter the access code listed below, as well as some personal information. Please follow the directions to create your username and password.     Your access code is: [email or web address]  Expires: 2018 10:28 AM     Your access code will  in 90 days. If you need help or a new code, please call your [place] clinic or [phone].        Care EveryWhere ID     This is your Care EveryWhere ID. This could be used by other organizations to access your [place] medical records  [id number]      " "  Your Vitals Were     Pulse Temperature Respirations Height Pulse Oximetry BMI (Body Mass Index)    78 98  F (36.7  C) (Oral) 18 1.829 m (6' 0.01\") 96% 22.17 kg/m2       Blood Pressure from Last 3 Encounters:   10/30/18 135/76   10/30/18 131/72   10/12/18 138/81    Weight from Last 3 Encounters:   10/30/18 74.4 kg (164 lb)   10/30/18 74.2 kg (163 lb 8 oz)   10/12/18 74.7 kg (164 lb 11.2 oz)                 Today's Medication Changes          These changes are accurate as of 10/30/18  9:31 PM.  If you have any questions, ask your nurse or doctor.               Stop taking these medicines if you haven't already. Please contact your care team if you have questions.     levofloxacin 250 MG tablet   Commonly known as:  LEVAQUIN   Stopped by:  Pillo Rolon MD                    Primary Care Provider Office Phone # Fax #    Lalito Moctezuma [phone]       [address] [address]        Equal Access to Services     JOZEF CLEMENTS AH: Hadii ivet patel hadnavo Sowilliam, waaxda luqadaha, qaybta kaalmada zbigniew, talon hardwick . So [facility] [phone].    ATENCIÓN: Si habla español, tiene a corona disposición servicios gratuitos de asistencia lingüística. Uche al [phone].    We comply with applicable federal civil rights laws and [place] laws. We do not discriminate on the basis of race, color, national origin, age, disability, sex, sexual orientation, or gender identity.            Thank you!     Thank you for choosing [facility]  for your care. Our goal is always to provide you with excellent care. Hearing back from our patients is one way we can continue to improve our services. Please take a few minutes to complete the written survey that you may receive in the mail after your visit with us. Thank you!             Your Updated Medication List - Protect others around you: Learn how to safely use, store and throw away your medicines at " www.disposemymeds.org.          This list is accurate as of 10/30/18  9:31 PM.  Always use your most recent med list.                   Brand Name Dispense Instructions for use Diagnosis    aspirin 325 MG tablet      Take 325 mg by mouth        IBUPROFEN PO      Take 200 mg by mouth        LORazepam 0.5 MG tablet    ATIVAN    30 tablet    Take 1 tablet (0.5 mg) by mouth every 6 hours as needed for nausea    Nausea and vomiting, intractability of vomiting not specified, unspecified vomiting type       MULTIVITAMIN & MINERAL PO

## 2018-10-30 NOTE — PROGRESS NOTES
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    HISTORY OF PRESENT ILLNESS: Mr. 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  His oncologic history is as under.     Mr. Porter returns to clinic today with his son-in-law and 3-month-old granddaughter.  He is feeling better since the end of his chemoradiation, though still having a lot of nausea.  This is most pronounced in the evenings when he tries to brush his teeth and he has thick oral secretions.  He says that food still has no taste.  Has vomited a couple of times, but is overall keeping a lot more food down.  He lost 2-3 pounds with his last cycle of cisplatin, but weight is now stable and he is eating 3 times per day.      ONCOLOGIC HISTORY:  1. 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   - Noticed enlarging neck mass in March 2018. In 5/2018 this was reassessed and prompted imaging and ultimately a biopsy of the mass at [facility] ([place], [place]) which demonstrated squamous cell carcinoma.   - PET/CT 7/10/18: \"3.5 x 2.2 cm hypermetabolic right level 2 metastatic lymphadenopathy. There is imaging findings suggestive of extranodal extension. The mass is inseparable from the right SCM and abuts the right internal carotid artery about 180 degrees. No abnormal focal FDG uptake along the mucosal spaces to suggest a primary squamous cell cancer focus. Several subcentimeter pulmonary nodules with no increased FDG uptake. Attention on follow up " "is recommended. Multiple, too small to characterize hypodensities in the liver. A 9 mm nodule in the right adrenal gland, without increased FDG uptake. A 7 mm hypodensity in the pancreatic tail with no increased FDG uptake, may be a side branch IPMN. Tiny lucencies in the left iliac bone without increased FDG uptake, possibly focal osteoporotic areas.\"  - Seen by ENT and underwent DL with directed biopsies on 7/18/18 with pathology showing no evidence of malignancy within the mucosal sites.   - A Robotic demucosalization of the right tongue base on 8/3/18 was performed and pathology from this pathology demonstrated p16 positive nonkeratinizing squamous cell carcinoma in the level node lymph node and in the right tongue base.   - Started concurrent cisplatin plus radiation therapy based on tumor board recommendations. Cisplatin 100mg/m2 cycle 1: 8/28/18. Cycle 2 - delayed by 2 days due to  and given on 9/21/18. Cycle 3 - unable to tolerate 100mg/m2 due to myelosuppression and patient preference. Ultimately received cisplatin 40mg/m2 x1 on 10/12/18. RT - 6996 cGy over 33 fractions - 8/29/18 to 10/13/18.     REVIEW OF SYSTEMS:  14 point ROS negative other than the symptoms noted above in the HPI.    PAST MEDICAL HISTORY:   Past Medical History:   Diagnosis Date     Head and neck cancer (H)      PAST SURGICAL HISTORY:   Past Surgical History:   Procedure Laterality Date     DAVINCI RESECT TUMOR TRANSORAL Bilateral 8/3/2018    Procedure: DAVINCI RESECT TUMOR TRANSORAL;  Transoral Robotic Resection of the Base of Tongue,   IR Guided Core Needle Biopsy of Right Neck Mass;  Surgeon: Blas Calvillo MD;  Location: [facility] OR     FINE NEEDLE ASPIRATION WITH IMAGING GUIDANCE Right 8/3/2018    Procedure: FINE NEEDLE ASPIRATION WITH IMAGING GUIDANCE;  Ultrasound guided Core Biopsy of a Right Neck Mass;  Surgeon: Denis Monson MD;  Location: [facility] OR     HEAD & NECK SURGERY       LARYNGOSCOPY WITH BIOPSY(IES) Bilateral " "2018    Procedure: LARYNGOSCOPY WITH BIOPSY(IES);  Direct Laryngoscopy with Direct Biopsies;  Surgeon: Garcia Worthy MD;  Location: [facility] OR     TONSILLECTOMY     In addition:  1. Irritated sweat gland removed   2. Fatty tumor on his back removed    3. Cataract surgery     SOCIAL HISTORY:   Social History     Social History     Marital status:      Spouse name: N/A     Number of children: N/A     Years of education: N/A     Occupational History     Not on file.     Social History Main Topics     Smoking status: Never Smoker     Smokeless tobacco: Never Used     Alcohol use Yes      Comment: occasionally     Drug use: No     Sexual activity: Yes     Partners: Female     Birth control/ protection: Other     Other Topics Concern     Not on file     Social History Narrative   Patient is from [place], [place]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     FAMILY HISTORY:   Brother with clotting issue  Mother, unknown primary and    Maternal aunt with colon cancer  Maternal grandfather with head/neck cancer  Maternal cousins with cancers unknown types    ALLERGIES:  No Known Allergies    CURRENT MEDICATIONS:   Current Outpatient Prescriptions:      LORazepam (ATIVAN) 0.5 MG tablet, Take 1 tablet (0.5 mg) by mouth every 6 hours as needed for nausea, Disp: 30 tablet, Rfl: 0     aspirin 325 MG tablet, Take 325 mg by mouth, Disp: , Rfl:      IBUPROFEN PO, Take 200 mg by mouth, Disp: , Rfl:      Multiple Vitamins-Minerals (MULTIVITAMIN & MINERAL PO), , Disp: , Rfl:     PHYSICAL EXAMINATION:  Vital signs: /72 (BP Location: Left arm, Patient Position: Chair, Cuff Size: Adult Large)  Pulse 78  Temp 98  F (36.7  C) (Oral)  Resp 18  Ht 1.829 m (6' 0.01\")  Wt 74.2 kg (163 lb 8 oz)  SpO2 96%  BMI 22.17 kg/m2  ECOG performance status of 1.  GENERAL: thin man, no acute distress  HEENT: clear oropharynx with thick, white secretions and minimal " saliva  NECK: right neck tender to palpation precluding adequate exam for lymphadenopathy  LUNGS: clear bilaterally, no wheezes, good diaphragmatic excursion  CARDIOVASCULAR: Regular rate and rhythm, no murmurs, gallops or rubs  ABDOMEN: Soft, nontender and nondistended, bowel sounds present.  EXTREMITIES: No cyanosis, no clubbing, no edema  NEUROLOGIC: No focal deficits; alert and oriented to self, place, time    LABORATORY DATA:  Recent Labs   Lab Test  10/12/18   0740  09/21/18   1100  09/19/18   0815   WBC  1.4*  2.1*  1.6*   RBC  4.04*  4.05*  4.16*   HGB  12.2*  12.0*  12.7*   HCT  36.7*  36.5*  37.5*   MCV  91  90  90   MCH  30.2  29.6  30.5   MCHC  33.2  32.9  33.9   RDW  12.9  12.7  12.8   PLT  176  207  211   NEUTROPHIL  51.7  54.2  51.3     Recent Labs   Lab Test  10/12/18   0740  09/19/18   0815  08/28/18   0848   NA  137  140  141   POTASSIUM  4.0  4.1  4.3   CHLORIDE  104  107  109   CO2  24  25  24   ANIONGAP  10  8  7   GLC  141*  126*  102*   BUN  18  14  14   CR  0.93  0.85  0.83   KM  9.2  9.0  8.9     Recent Labs   Lab Test  10/12/18   0740  09/19/18   0815  08/28/18   0848   PROTTOTAL  7.2  6.8  6.7*   ALBUMIN  3.5  3.6  3.4   BILITOTAL  0.7  0.5  0.5   ALKPHOS  92  89  86   AST  17  18  17   ALT  19  20  20     Recent Labs   Lab Test  08/28/18   0848   TSH  1.24   T4  1.14     IMAGING STUDIES:  No new imaging    ASSESSMENT AND PLAN: A 67-year-old gentleman with stage I (T1N1M0), 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      HNSCC  Chemo-induced CTCAE grade 2 neutropenia:  - Completed treatment at this time, but last cisplatin on 10/12/2018 was reduced to 40 mg/m2 due to severe nausea, vomiting, thick oral secretions and neutropenia.  - Has no evidence of infection at this time.   - Symptoms now improving since completing treatment, but continues to use occasional lorazepam for nausea, which is most  pronounced at night.  - Understands that treatment efficacy may be compromised somewhat due to dose reduction of third dose of cisplatin.   - CT neck with contrast ordered by Dr. Garay for mid November.   - He will return to clinic in one month (2018) for follow up.     All questions were answered, and patient and family were in complete agreement with this plan.      Patient seen and staffed with Dr. Rolon.    Rema Joyner MD  Hematology-Oncology-Transplant Fellow    ATTENDING ATTESTATION NOTE  I, Pillo Rolon, personally examined and evaluated this patient. I personally reviewed vital signs, medications and labs. I discussed the patient with the fellow, Dr. Joyner, and agree with the assessment and plan of care as documented in this clinic note from today. Note was modified to reflect my assessment and discussion with patient.   BILLIN.  Pillo Rolon M.D.  . Professor of Medicine  Genitourinary Oncology  Division of Hematology, Oncology & Transplantation  [facility]

## 2018-10-30 NOTE — MR AVS SNAPSHOT
After Visit Summary   10/30/2018    Lazaro Porter    MRN: [id number]           Patient Information     Date Of Birth          1950        Visit Information        Provider Department      10/30/2018 3:30 PM Geraldine Elliott APRN CNP Radiation Oncology Clinic        Today's Diagnoses     Malignant neoplasm of base of tongue (H)    -  1       Follow-ups after your visit        Your next 10 appointments already scheduled     Nov 19, 2018 10:45 AM CST   LAB with  LAB   [facility] ([facility])    [address] [place] [address]  1st Floor  [facility] [id number] [phone]           Please do not eat 10-12 hours before your appointment if you are coming in fasting for labs on lipids, cholesterol, or glucose (sugar). This does not apply to pregnant women. Water, hot tea and black coffee (with nothing added) are okay. Do not drink other fluids, diet soda or chew gum.            Nov 19, 2018 11:15 AM CST   (Arrive by 11:00 AM)   Return Visit with Pillo Rolon MD   [facility] ([facility])    [address] [place] [address]  [address] [place] [id number] [phone]            Nov 19, 2018 12:00 PM CST   CT SOFT TISSUE NECK W CONTRAST with UUCT1   [facility], [place], [place] ([facility] [place])    [address] [facility] [id number] [phone]           How do I prepare for my exam? (Food and drink instructions) **You will have contrast for this exam.** Do not eat or drink for 2 hours before your exam. If you need to take medicine, you may take it with small sips of water. (We may ask you to take liquid medicine as well.)  The day before your exam, drink extra fluids at least six 8-ounce glasses (unless your doctor tells you to restrict your fluids).  How do I prepare for my exam? (Other instructions) Patients over 70 or patients with diabetes or kidney problems: If you haven t had a blood  test (creatinine test) within the last 30 days, the Cardiologist/Radiologist may require you to get this test prior to your exam.  What should I wear: Please wear loose clothing, such as a sweat suit or jogging clothes.  Avoid snaps, zippers and other metal. We may ask you to undress and put on a hospital gown.  How long does the exam take: Most scans take less than 20 minutes.  What should I bring: Please bring any scans or X-rays taken at other hospitals, if similar tests were done. Also bring a list of your medicines, including vitamins, minerals and over-the-counter drugs. It is safest to leave personal items at home.  Do I need a :  No  is needed.  What do I need to tell my doctor? Be sure to tell your doctor: * If you have any allergies. * If there s any chance you are pregnant. * If you are breastfeeding. * If you have diabetes as your medication may need to be adjusted for this exam.  What should I do after the exam: No restrictions, You may resume normal activities.  What is this test: A CT (computed tomography) scan is a series of pictures that allows us to look inside your body. The scanner creates images of the body in cross sections, much like slices of bread. This helps us see any problems more clearly. You may receive contrast (X-ray dye) before or during your scan. Contrast is given through an IV (small needle in your arm).  Who should I call with questions: If you have any questions, please call the Imaging Department where you will have your exam. Directions, parking instructions, and other information is available on our website, [email or web address].            Nov 19, 2018  1:00 PM CST   Return Visit with Nathan Garay MD   Radiation Oncology Clinic ([facility])    [facility]  1st Floor  [address] [facility] [id number] [phone]            Nov 20, 2018  2:00 PM CST   (Arrive by 1:45 PM)   Return Visit with Garcia Cuadra  MD Zaynab   [facility] ([facility] [place])    [address] [address] [phone]              Who to contact     Please call your clinic at [phone] to:    Ask questions about your health    Make or cancel appointments    Discuss your medicines    Learn about your test results    Speak to your doctor            Additional Information About Your Visit        MyChart Information     [employer] is an electronic gateway that provides easy, online access to your medical records. With [employer]Sent, you can request a clinic appointment, read your test results, renew a prescription or communicate with your care team.     To sign up for [employer] visit the website at [email or web address]   You will be asked to enter the access code listed below, as well as some personal information. Please follow the directions to create your username and password.     Your access code is: [email or web address]  Expires: 2018 10:28 AM     Your access code will  in 90 days. If you need help or a new code, please contact your [facility] or call [phone] for assistance.        Care EveryWhere ID     This is your Care EveryWhere ID. This could be used by other organizations to access your [facility] medical records  [id number]        Your Vitals Were     Pulse BMI (Body Mass Index)                92 22.24 kg/m2           Blood Pressure from Last 3 Encounters:   10/30/18 135/76   10/30/18 131/72   10/12/18 138/81    Weight from Last 3 Encounters:   10/30/18 74.4 kg (164 lb)   10/30/18 74.2 kg (163 lb 8 oz)   10/12/18 74.7 kg (164 lb 11.2 oz)              Today, you had the following     No orders found for display         Today's Medication Changes          These changes are accurate as of 10/30/18 11:59 PM.  If you have any questions, ask your nurse or doctor.               Stop taking these medicines if you haven't already. Please  contact your care team if you have questions.     levofloxacin 250 MG tablet   Commonly known as:  LEVAQUIN   Stopped by:  Pillo Rolon MD                    Primary Care Provider Office Phone # Fax #    Lalito Moctezuma [phone]       [facility] [address] [facility] [address]        Equal Access to Services     JOZEF CLEMENTS : Hadii aad ku hadasho Soomaali, waaxda luqadaha, qaybta kaalmada adeegyada, waxhunter cormierin hayaan ayleen hoangsurajoskar hardwick . So [facility] [phone].    ATENCIÓN: Si habla español, tiene a corona disposición servicios gratuitos de asistencia lingüística. Uche al [phone].    We comply with applicable federal civil rights laws and [place] laws. We do not discriminate on the basis of race, color, national origin, age, disability, sex, sexual orientation, or gender identity.            Thank you!     Thank you for choosing RADIATION ONCOLOGY CLINIC  for your care. Our goal is always to provide you with excellent care. Hearing back from our patients is one way we can continue to improve our services. Please take a few minutes to complete the written survey that you may receive in the mail after your visit with us. Thank you!             Your Updated Medication List - Protect others around you: Learn how to safely use, store and throw away your medicines at www.disposemymeds.org.          This list is accurate as of 10/30/18 11:59 PM.  Always use your most recent med list.                   Brand Name Dispense Instructions for use Diagnosis    aspirin 325 MG tablet      Take 325 mg by mouth        IBUPROFEN PO      Take 200 mg by mouth        LORazepam 0.5 MG tablet    ATIVAN    30 tablet    Take 1 tablet (0.5 mg) by mouth every 6 hours as needed for nausea    Nausea and vomiting, intractability of vomiting not specified, unspecified vomiting type       MULTIVITAMIN & MINERAL PO

## 2018-10-30 NOTE — LETTER
10/30/2018       RE: Lazaro Porter  [address] [facility] [address]     Dear Colleague,    Thank you for referring your patient, Lazaro Porter, to the RADIATION ONCOLOGY CLINIC. Please see a copy of my visit note below.    Radiation Oncology Follow-up Visit  2018    Lazaro Porter  MRN: [id number]   : 1950     DISEASE TREATED:   Squamous cell carcinoma of the right tongue base, +p16, (cT1 N1 M0)    RADIATION THERAPY DELIVERED:   6,996 cGy completed on 10/13/18 given with concurrent Cisplatin.     INTERVAL SINCE COMPLETION OF RADIATION THERAPY:   2 weeks    HPI:  Mr. 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 He initially presented with an enlarged right level II neck mass, which was biopsy-proven to be a p16-positive squamous cell carcinoma. Initial staging revealed that this node was encasing the right carotid artery, and had extranodal extension. Directly laryngoscopy ultimately determined that the primary tumor likely arose from the right base of tongue.         SUBJECTIVE:   Lazaro Porter is a 68 year old male who is here today for routine 2 week follow up after completing radiation therapy. He has seen improvement in his radiation dermatitis and continues to apply moisturizer.  He has stopped the silvadene.  He is having no pain.  He is not taking oxycodone or lidocaine for pain any longer.  Mucositis is improved. He continues to do salt and soda rinses.  Weight is down, but he feels like over the last couple of days he is eating more and weight is now stable.  Taste has been affected.    He is using fluoride toothpaste.  He had a lot of secretions when treatment was done but that is now decreasing.  He occasionally has a bit of nausea at night, but feels that is much better since the secretions are improved.   He is doing jaw and swallowing exercises.  Fatigue is slowly improving.  He feels like he is overall weaker and a bit  deconditioned.    He saw Dr. Rolon today.     ROS:  Complete review of systems is negative except for symptoms discussed in subjective above.    Current Outpatient Prescriptions   Medication     aspirin 325 MG tablet     dexamethasone (DECADRON) 4 MG tablet     guaiFENesin (ROBITUSSIN) 100 MG/5ML LIQD     IBUPROFEN PO     levofloxacin (LEVAQUIN) 250 MG tablet     lidocaine, viscous, (XYLOCAINE) 2 % solution     LORazepam (ATIVAN) 0.5 MG tablet     Multiple Vitamins-Minerals (MULTIVITAMIN & MINERAL PO)     nystatin (MYCOSTATIN) 148157 UNIT/ML suspension     OLANZapine (ZYPREXA) 5 MG tablet     ondansetron (ZOFRAN) 8 MG tablet     oxyCODONE (ROXICODONE) 5 MG/5ML solution     polyethylene glycol (MIRALAX/GLYCOLAX) Packet     prochlorperazine (COMPAZINE) 10 MG tablet     silver sulfADIAZINE (SILVADENE) 1 % cream     No current facility-administered medications for this visit.         No Known Allergies    Past Medical History:   Diagnosis Date     Head and neck cancer (H)          PHYSICAL EXAM:  Gen: Alert, in NAD.  Delightful.  Neck::  Supple.  No masses or lymphaednopathy palpated.  Radiation dermatitis almost completely healed.    Oral mucosa shows no sign of mucositis or thrush.  Pulm: No wheezing, stridor or respiratory distress  CV: Well-perfused, no cyanosis, no pedal edema  Psychiatric: Appropriate mood and affect      LABS AND IMAGING:  Reviewed.    IMPRESSION:   Mr. Porter is a 68 year old male with a SCC of right tongue base s/p chemoradiation completed 10/13/18 with improving acute radiation side effects.    PLAN:   Patient is recovering nicely from acute side effects of radiation therapy.  Continue with skin and oral cares.  Continue with fluoride and exercises.  Stop ativan when nausea has resolved.  Continue to push calories to maintain weight.  Discussed sun protection.  Patient has follow up scheduled with Dr. Garay and Dr. Rolon on 11/19, Dr. Worthy on 11/20 and a CT on 11/19.      Geraldine Elliott  NP  Radiation Oncology  [facility]    CC:  Patient Care Team:  Lalito Moctezuma as PCP - General (Family Practice)  Pillo Rolon MD as MD (Internal Medicine-Hematology & Oncology)  Meghann Buck RN as Nurse Coordinator (Oncology)  Nathan Garay MD as MD (Radiation Oncology)

## 2018-10-30 NOTE — NURSING NOTE
"Oncology Rooming Note    October 30, 2018 2:35 PM   Lazaro Porter is a 68 year old male who presents for:    Chief Complaint   Patient presents with     Blood Draw     No lab orders; [facility] labs drawn and sent via venipuncture     Oncology Clinic Visit     Return visit related to H&N Cancer.     Initial Vitals: /72 (BP Location: Left arm, Patient Position: Chair, Cuff Size: Adult Large)  Pulse 78  Temp 98  F (36.7  C) (Oral)  Resp 18  Ht 1.829 m (6' 0.01\")  Wt 74.2 kg (163 lb 8 oz)  SpO2 96%  BMI 22.17 kg/m2 Estimated body mass index is 22.17 kg/(m^2) as calculated from the following:    Height as of this encounter: 1.829 m (6' 0.01\").    Weight as of this encounter: 74.2 kg (163 lb 8 oz). Body surface area is 1.94 meters squared.  No Pain (0) Comment: Data Unavailable   No LMP for male patient.  Allergies reviewed: Yes  Medications reviewed: Yes    Medications: Medication refills not needed today.  Pharmacy name entered into StreetÂ LibraryÂ Network: [facility] [address] [place] [address] - [facility]    Clinical concerns: No new concerns. Provider was notified.    10 minutes for nursing intake (face to face time)     Thania Umana LPN            "

## 2018-10-30 NOTE — PROGRESS NOTES
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     INTERVAL SINCE COMPLETION OF RADIATION THERAPY:   2 weeks    HPI:  Mr. 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 He initially presented with an enlarged right level II neck mass, which was biopsy-proven to be a p16-positive squamous cell carcinoma. Initial staging revealed that this node was encasing the right carotid artery, and had extranodal extension. Directly laryngoscopy ultimately determined that the primary tumor likely arose from the right base of tongue.         SUBJECTIVE:   Lazaro Porter is a 68 year old male who is here today for routine 2 week follow up after completing radiation therapy. He has seen improvement in his radiation dermatitis and continues to apply moisturizer.  He has stopped the silvadene.  He is having no pain.  He is not taking oxycodone or lidocaine for pain any longer.  Mucositis is improved. He continues to do salt and soda rinses.  Weight is down, but he feels like over the last couple of days he is eating more and weight is now stable.  Taste has been affected.    He is using fluoride toothpaste.  He had a lot of secretions when treatment was done but that is now decreasing.  He occasionally has a bit of nausea at night, but feels that is much better since the secretions are improved.   He is doing jaw and swallowing exercises.  Fatigue is slowly improving.  He feels like he is overall weaker and a bit deconditioned.    He saw Dr. Rolon today.     ROS:  Complete review of systems is negative except for symptoms discussed in subjective above.    Current Outpatient Prescriptions   Medication     aspirin 325 MG tablet     dexamethasone  (DECADRON) 4 MG tablet     guaiFENesin (ROBITUSSIN) 100 MG/5ML LIQD     IBUPROFEN PO     levofloxacin (LEVAQUIN) 250 MG tablet     lidocaine, viscous, (XYLOCAINE) 2 % solution     LORazepam (ATIVAN) 0.5 MG tablet     Multiple Vitamins-Minerals (MULTIVITAMIN & MINERAL PO)     nystatin (MYCOSTATIN) 617109 UNIT/ML suspension     OLANZapine (ZYPREXA) 5 MG tablet     ondansetron (ZOFRAN) 8 MG tablet     oxyCODONE (ROXICODONE) 5 MG/5ML solution     polyethylene glycol (MIRALAX/GLYCOLAX) Packet     prochlorperazine (COMPAZINE) 10 MG tablet     silver sulfADIAZINE (SILVADENE) 1 % cream     No current facility-administered medications for this visit.         No Known Allergies    Past Medical History:   Diagnosis Date     Head and neck cancer (H)          PHYSICAL EXAM:  Gen: Alert, in NAD.  Delightful.  Neck::  Supple.  No masses or lymphaednopathy palpated.  Radiation dermatitis almost completely healed.    Oral mucosa shows no sign of mucositis or thrush.  Pulm: No wheezing, stridor or respiratory distress  CV: Well-perfused, no cyanosis, no pedal edema  Psychiatric: Appropriate mood and affect      LABS AND IMAGING:  Reviewed.    IMPRESSION:   Mr. Porter is a 68 year old male with a SCC of right tongue base s/p chemoradiation completed 10/13/18 with improving acute radiation side effects.    PLAN:   Patient is recovering nicely from acute side effects of radiation therapy.  Continue with skin and oral cares.  Continue with fluoride and exercises.  Stop ativan when nausea has resolved.  Continue to push calories to maintain weight.  Discussed sun protection.  Patient has follow up scheduled with Dr. Garay and Dr. Rolon on 11/19, Dr. Worthy on 11/20 and a CT on 11/19.      Geraldine Elliott NP  Radiation Oncology  [facility]    CC:  Patient Care Team:  Lalito Moctezuma as PCP - General (Family Practice)  Pillo Rolon MD as MD (Internal Medicine-Hematology & Oncology)  Meghann Buck, KARRIE as Nurse  Coordinator (Oncology)  Nathan Garay MD as MD (Radiation Oncology)

## 2018-10-30 NOTE — LETTER
"10/30/2018       RE: Lazaro Porter  [address] [facility] [address]     Dear Colleague,    Thank you for referring your patient, Lazaro Porter, to the [facility]. Please see a copy of my visit note below.    MEDICAL ONCOLOGY CLINIC NOTE    REFERRING PROVIDER: Garcia Worthy MD from [facility]  RADIATION ONCOLOGIST: Nathan Garay MD from [facility]iation [facility]cology [facility]    REASON FOR CURRENT VISIT: Evaluation after concurrent cisplatin plus radiation therapy for p16+ stage I squamous cell carcinoma of the right base of the tongue.    HISTORY OF PRESENT ILLNESS: Mr. 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  His oncologic history is as under.     Mr. Porter returns to clinic today with his son-in-law and 3-month-old granddaughter.  He is feeling better since the end of his chemoradiation, though still having a lot of nausea.  This is most pronounced in the evenings when he tries to brush his teeth and he has thick oral secretions.  He says that food still has no taste.  Has vomited a couple of times, but is overall keeping a lot more food down.  He lost 2-3 pounds with his last cycle of cisplatin, but weight is now stable and he is eating 3 times per day.      ONCOLOGIC HISTORY:  1. 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   - Noticed enlarging neck mass in March 2018. In 5/2018 this was reassessed and prompted imaging and ultimately a biopsy of the mass at [facility] ([place], [place]) which demonstrated squamous cell carcinoma.   - PET/CT 7/10/18: \"3.5 x 2.2 cm hypermetabolic right level 2 metastatic lymphadenopathy. There is imaging findings suggestive of extranodal extension. The mass is inseparable from the right SCM and abuts the right internal " "carotid artery about 180 degrees. No abnormal focal FDG uptake along the mucosal spaces to suggest a primary squamous cell cancer focus. Several subcentimeter pulmonary nodules with no increased FDG uptake. Attention on follow up is recommended. Multiple, too small to characterize hypodensities in the liver. A 9 mm nodule in the right adrenal gland, without increased FDG uptake. A 7 mm hypodensity in the pancreatic tail with no increased FDG uptake, may be a side branch IPMN. Tiny lucencies in the left iliac bone without increased FDG uptake, possibly focal osteoporotic areas.\"  - Seen by ENT and underwent DL with directed biopsies on 7/18/18 with pathology showing no evidence of malignancy within the mucosal sites.   - A Robotic demucosalization of the right tongue base on 8/3/18 was performed and pathology from this pathology demonstrated p16 positive nonkeratinizing squamous cell carcinoma in the level node lymph node and in the right tongue base.   - Started concurrent cisplatin plus radiation therapy based on tumor board recommendations. Cisplatin 100mg/m2 cycle 1: 8/28/18. Cycle 2 - delayed by 2 days due to  and given on 9/21/18. Cycle 3 - unable to tolerate 100mg/m2 due to myelosuppression and patient preference. Ultimately received cisplatin 40mg/m2 x1 on 10/12/18. RT - 6996 cGy over 33 fractions - 8/29/18 to 10/13/18.     REVIEW OF SYSTEMS:  14 point ROS negative other than the symptoms noted above in the HPI.    PAST MEDICAL HISTORY:   Past Medical History:   Diagnosis Date     Head and neck cancer (H)      PAST SURGICAL HISTORY:   Past Surgical History:   Procedure Laterality Date     DAVINCI RESECT TUMOR TRANSORAL Bilateral 8/3/2018    Procedure: DAVINCI RESECT TUMOR TRANSORAL;  Transoral Robotic Resection of the Base of Tongue,   IR Guided Core Needle Biopsy of Right Neck Mass;  Surgeon: Blas Calvillo MD;  Location: [facility] OR     FINE NEEDLE ASPIRATION WITH IMAGING GUIDANCE Right 8/3/2018    " Procedure: FINE NEEDLE ASPIRATION WITH IMAGING GUIDANCE;  Ultrasound guided Core Biopsy of a Right Neck Mass;  Surgeon: Denis Monson MD;  Location: [facility] OR     HEAD & NECK SURGERY       LARYNGOSCOPY WITH BIOPSY(IES) Bilateral 2018    Procedure: LARYNGOSCOPY WITH BIOPSY(IES);  Direct Laryngoscopy with Direct Biopsies;  Surgeon: Garcia Worthy MD;  [facility]: [facility] OR     TONSILLECTOMY     In addition:  1. Irritated sweat gland removed   2. Fatty tumor on his back removed    3. Cataract surgery     SOCIAL HISTORY:   Social History     Social History     Marital status:      Spouse name: N/A     Number of children: N/A     Years of education: N/A     Occupational History     Not on file.     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     FAMILY HISTORY:   Brother with clotting issue  Mother, unknown primary and    Maternal aunt with colon cancer  Maternal grandfather with head/neck cancer  Maternal cousins with cancers unknown types    ALLERGIES:  No Known Allergies    CURRENT MEDICATIONS:   Current Outpatient Prescriptions:      LORazepam (ATIVAN) 0.5 MG tablet, Take 1 tablet (0.5 mg) by mouth every 6 hours as needed for nausea, Disp: 30 tablet, Rfl: 0     aspirin 325 MG tablet, Take 325 mg by mouth, Disp: , Rfl:      IBUPROFEN PO, Take 200 mg by mouth, Disp: , Rfl:      Multiple Vitamins-Minerals (MULTIVITAMIN & MINERAL PO), , Disp: , Rfl:     PHYSICAL EXAMINATION:  Vital signs: /72 (BP Location: Left arm, Patient Position: Chair, Cuff Size: Adult Large)  Pulse 78  Temp 98  F (36.7  C) (Oral)   "Resp 18  Ht 1.829 m (6' 0.01\")  Wt 74.2 kg (163 lb 8 oz)  SpO2 96%  BMI 22.17 kg/m2  ECOG performance status of 1.  GENERAL: thin man, no acute distress  HEENT: clear oropharynx with thick, white secretions and minimal saliva  NECK: right neck tender to palpation precluding adequate exam for lymphadenopathy  LUNGS: clear bilaterally, no wheezes, good diaphragmatic excursion  CARDIOVASCULAR: Regular rate and rhythm, no murmurs, gallops or rubs  ABDOMEN: Soft, nontender and nondistended, bowel sounds present.  EXTREMITIES: No cyanosis, no clubbing, no edema  NEUROLOGIC: No focal deficits; alert and oriented to self, place, time    LABORATORY DATA:  Recent Labs   Lab Test  10/12/18   0740  09/21/18   1100  09/19/18   0815   WBC  1.4*  2.1*  1.6*   RBC  4.04*  4.05*  4.16*   HGB  12.2*  12.0*  12.7*   HCT  36.7*  36.5*  37.5*   MCV  91  90  90   MCH  30.2  29.6  30.5   MCHC  33.2  32.9  33.9   RDW  12.9  12.7  12.8   PLT  176  207  211   NEUTROPHIL  51.7  54.2  51.3     Recent Labs   Lab Test  10/12/18   0740  09/19/18   0815  08/28/18   0848   NA  137  140  141   POTASSIUM  4.0  4.1  4.3   CHLORIDE  104  107  109   CO2  24  25  24   ANIONGAP  10  8  7   GLC  141*  126*  102*   BUN  18  14  14   CR  0.93  0.85  0.83   KM  9.2  9.0  8.9     Recent Labs   Lab Test  10/12/18   0740  09/19/18   0815  08/28/18   0848   PROTTOTAL  7.2  6.8  6.7*   ALBUMIN  3.5  3.6  3.4   BILITOTAL  0.7  0.5  0.5   ALKPHOS  92  89  86   AST  17  18  17   ALT  19  20  20     Recent Labs   Lab Test  08/28/18   0848   TSH  1.24   T4  1.14     IMAGING STUDIES:  No new imaging    ASSESSMENT AND PLAN: A 67-year-old gentleman with stage I (T1N1M0), 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      HNSCC  Chemo-induced CTCAE grade 2 neutropenia:  - Completed treatment at this time, but last cisplatin on 10/12/2018 was reduced to 40 mg/m2 due to severe nausea, " vomiting, thick oral secretions and neutropenia.  - Has no evidence of infection at this time.   - Symptoms now improving since completing treatment, but continues to use occasional lorazepam for nausea, which is most pronounced at night.  - Understands that treatment efficacy may be compromised somewhat due to dose reduction of third dose of cisplatin.   - CT neck with contrast ordered by Dr. Garay for mid November.   - He will return to clinic in one month (2018) for follow up.     All questions were answered, and patient and family were in complete agreement with this plan.      Patient seen and staffed with Dr. Rolon.    Rema Joyner MD  Hematology-Oncology-Transplant Fellow    ATTENDING ATTESTATION NOTE  I, Pillo Rolon, personally examined and evaluated this patient. I personally reviewed vital signs, medications and labs. I discussed the patient with the fellow, Dr. Joyner, and agree with the assessment and plan of care as documented in this clinic note from today. Note was modified to reflect my assessment and discussion with patient.   BILLIN.  Pillo Rolon M.D.  . Professor of Medicine  Genitourinary Oncology  Division of Hematology, Oncology & Transplantation  [facility]

## 2018-10-30 NOTE — NURSING NOTE
Chief Complaint   Patient presents with     Blood Draw     No lab orders; [facility] labs drawn and sent via venipuncture     /72 (BP Location: Left arm, Patient Position: Chair, Cuff Size: Adult Large)  Pulse 78  Temp 98  F (36.7  C) (Oral)  Wt 74.2 kg (163 lb 8 oz)  SpO2 96%  BMI 22.17 kg/m2    Vitals taken. No lab orders. Provider Notified.  J[facility] Labs collected and sent from right antecubital venipuncture. Pt tolerated well. Pt checked in for next appointment.    Letitia Randolph RN

## 2018-11-19 ENCOUNTER — HOSPITAL ENCOUNTER (OUTPATIENT)
Dept: CT IMAGING | Facility: CLINIC | Age: 68
Discharge: HOME | End: 2018-11-19
Attending: RADIOLOGY | Admitting: RADIOLOGY
Payer: MEDICARE

## 2018-11-19 ENCOUNTER — ONCOLOGY VISIT (OUTPATIENT)
Dept: ONCOLOGY | Facility: CLINIC | Age: 68
End: 2018-11-19
Attending: INTERNAL MEDICINE
Payer: MEDICARE

## 2018-11-19 ENCOUNTER — APPOINTMENT (OUTPATIENT)
Dept: LAB | Facility: CLINIC | Age: 68
End: 2018-11-19
Attending: RADIOLOGY
Payer: MEDICARE

## 2018-11-19 ENCOUNTER — OFFICE VISIT (OUTPATIENT)
Dept: RADIATION ONCOLOGY | Facility: CLINIC | Age: 68
End: 2018-11-19
Attending: RADIOLOGY
Payer: MEDICARE

## 2018-11-19 VITALS
TEMPERATURE: 98 F | DIASTOLIC BLOOD PRESSURE: 76 MMHG | SYSTOLIC BLOOD PRESSURE: 137 MMHG | OXYGEN SATURATION: 97 % | WEIGHT: 171.3 LBS | BODY MASS INDEX: 23.23 KG/M2 | HEART RATE: 73 BPM

## 2018-11-19 VITALS — DIASTOLIC BLOOD PRESSURE: 74 MMHG | WEIGHT: 171 LBS | SYSTOLIC BLOOD PRESSURE: 132 MMHG | BODY MASS INDEX: 23.19 KG/M2

## 2018-11-19 DIAGNOSIS — C01 MALIGNANT NEOPLASM OF BASE OF TONGUE (H): Primary | ICD-10-CM

## 2018-11-19 DIAGNOSIS — E03.9 HYPOTHYROIDISM, UNSPECIFIED TYPE: ICD-10-CM

## 2018-11-19 DIAGNOSIS — T66.XXXA ADVERSE EFFECT OF RADIATION, INITIAL ENCOUNTER: ICD-10-CM

## 2018-11-19 DIAGNOSIS — C01 MALIGNANT NEOPLASM OF BASE OF TONGUE (H): ICD-10-CM

## 2018-11-19 LAB
ALBUMIN SERPL-MCNC: 3.4 G/DL (ref 3.4–5)
ALP SERPL-CCNC: 96 U/L (ref 40–150)
ALT SERPL W P-5'-P-CCNC: 17 U/L (ref 0–70)
ANION GAP SERPL CALCULATED.3IONS-SCNC: 8 MMOL/L (ref 3–14)
AST SERPL W P-5'-P-CCNC: 21 U/L (ref 0–45)
BASOPHILS # BLD AUTO: 0 10E9/L (ref 0–0.2)
BASOPHILS NFR BLD AUTO: 0.4 %
BILIRUB SERPL-MCNC: 0.4 MG/DL (ref 0.2–1.3)
BUN SERPL-MCNC: 14 MG/DL (ref 7–30)
CALCIUM SERPL-MCNC: 8.4 MG/DL (ref 8.5–10.1)
CHLORIDE SERPL-SCNC: 108 MMOL/L (ref 94–109)
CO2 SERPL-SCNC: 25 MMOL/L (ref 20–32)
CREAT SERPL-MCNC: 0.96 MG/DL (ref 0.66–1.25)
DIFFERENTIAL METHOD BLD: ABNORMAL
EOSINOPHIL # BLD AUTO: 0.1 10E9/L (ref 0–0.7)
EOSINOPHIL NFR BLD AUTO: 1.2 %
ERYTHROCYTE [DISTWIDTH] IN BLOOD BY AUTOMATED COUNT: 15.9 % (ref 10–15)
GFR SERPL CREATININE-BSD FRML MDRD: 78 ML/MIN/1.7M2
GLUCOSE SERPL-MCNC: 95 MG/DL (ref 70–99)
HCT VFR BLD AUTO: 32.1 % (ref 40–53)
HGB BLD-MCNC: 10.5 G/DL (ref 13.3–17.7)
IMM GRANULOCYTES # BLD: 0 10E9/L (ref 0–0.4)
IMM GRANULOCYTES NFR BLD: 0.6 %
LYMPHOCYTES # BLD AUTO: 0.6 10E9/L (ref 0.8–5.3)
LYMPHOCYTES NFR BLD AUTO: 12.9 %
MCH RBC QN AUTO: 31 PG (ref 26.5–33)
MCHC RBC AUTO-ENTMCNC: 32.7 G/DL (ref 31.5–36.5)
MCV RBC AUTO: 95 FL (ref 78–100)
MONOCYTES # BLD AUTO: 0.4 10E9/L (ref 0–1.3)
MONOCYTES NFR BLD AUTO: 8.9 %
NEUTROPHILS # BLD AUTO: 3.8 10E9/L (ref 1.6–8.3)
NEUTROPHILS NFR BLD AUTO: 76 %
NRBC # BLD AUTO: 0 10*3/UL
NRBC BLD AUTO-RTO: 0 /100
PLATELET # BLD AUTO: 171 10E9/L (ref 150–450)
POTASSIUM SERPL-SCNC: 4.1 MMOL/L (ref 3.4–5.3)
PROT SERPL-MCNC: 6.3 G/DL (ref 6.8–8.8)
RBC # BLD AUTO: 3.39 10E12/L (ref 4.4–5.9)
SODIUM SERPL-SCNC: 141 MMOL/L (ref 133–144)
TSH SERPL DL<=0.005 MIU/L-ACNC: 0.5 MU/L (ref 0.4–4)
WBC # BLD AUTO: 5 10E9/L (ref 4–11)

## 2018-11-19 PROCEDURE — 36415 COLL VENOUS BLD VENIPUNCTURE: CPT

## 2018-11-19 PROCEDURE — 85025 COMPLETE CBC W/AUTO DIFF WBC: CPT | Performed by: RADIOLOGY

## 2018-11-19 PROCEDURE — G0463 HOSPITAL OUTPT CLINIC VISIT: HCPCS | Mod: 25,27 | Performed by: RADIOLOGY

## 2018-11-19 PROCEDURE — G0463 HOSPITAL OUTPT CLINIC VISIT: HCPCS | Mod: ZF

## 2018-11-19 PROCEDURE — 25000128 H RX IP 250 OP 636: Performed by: STUDENT IN AN ORGANIZED HEALTH CARE EDUCATION/TRAINING PROGRAM

## 2018-11-19 PROCEDURE — 84443 ASSAY THYROID STIM HORMONE: CPT | Performed by: RADIOLOGY

## 2018-11-19 PROCEDURE — 80053 COMPREHEN METABOLIC PANEL: CPT | Performed by: RADIOLOGY

## 2018-11-19 PROCEDURE — 70491 CT SOFT TISSUE NECK W/DYE: CPT

## 2018-11-19 PROCEDURE — 99214 OFFICE O/P EST MOD 30 MIN: CPT | Mod: ZP | Performed by: INTERNAL MEDICINE

## 2018-11-19 RX ORDER — IOPAMIDOL 755 MG/ML
100 INJECTION, SOLUTION INTRAVASCULAR ONCE
Status: COMPLETED | OUTPATIENT
Start: 2018-11-19 | End: 2018-11-19

## 2018-11-19 RX ADMIN — IOPAMIDOL 100 ML: 755 INJECTION, SOLUTION INTRAVENOUS at 12:37

## 2018-11-19 ASSESSMENT — PAIN SCALES - GENERAL: PAINLEVEL: NO PAIN (0)

## 2018-11-19 NOTE — PROGRESS NOTES
FOLLOW-UP VISIT    Patient Name: Lazaro Porter      : 1950     Age: 68 year old        ______________________________________________________________________________     Chief Complaint   Patient presents with     Cancer     Pt is here for a follow up:Base of Tongue Cancer: Radiation dose 6996 cGy completed 10/13/18     /74  Wt 77.6 kg (171 lb)  BMI 23.19 kg/m2       Pain  Denies    Labs  Other Labs: Yes: today    Imaging  CT: today      Dental:   Most Recent Dental Visit: yes      Speech/Swallowing:   Most Recent evaluation or testing: 10/02/18  Swallowing Restrictions: No difficulties with swallowing    Trismus/Jaw Exercises: Yes    Nutrition:  Weight:   Wt Readings from Last 3 Encounters:   18 77.6 kg (171 lb)   18 77.7 kg (171 lb 4.8 oz)   10/30/18 74.4 kg (164 lb)         Oral Symptoms:   Xerostomia:1- Symptomatic without significant dietary alteration; unstimulated saliva flow >0.2 ml/min  Dysphagia: 1- Symtomatic, able to eat regulat diet  Mucositis Oral Symptoms: 0-None  Mucositis: 0- None  Esophagitis:0- None    Other Appointments:     MD Name: Dr Worthy Appointment Date: 18   MD Name: Appointment Date:   MD Name: Appointment Date:   Other Appointment Notes:     Residual Radiation side effect: Taste changes, dry mouth     Additional Instructions:     Nurse face-to-face time: Level 3:  10 min face to face time

## 2018-11-19 NOTE — LETTER
"11/19/2018       RE: Lazaro Porter  [address] [facility] [address]     Dear Colleague,    Thank you for referring your patient, Lazaro Porter, to the [facility]. Please see a copy of my visit note below.    MEDICAL ONCOLOGY CLINIC NOTE    REFERRING PROVIDER: Garcia Worthy MD from [facility]  RADIATION ONCOLOGIST: Nathan Garay MD from [facility] Radiation [facility]cology [facility]    REASON FOR CURRENT VISIT: Evaluation after concurrent cisplatin plus radiation therapy for p16+ stage I squamous cell carcinoma of the right base of the tongue.    HISTORY OF PRESENT ILLNESS: Mr. 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 His oncologic history is as under.     Mr. Porter has recovered very well from chemoRT and his main issues currently are - moderate xerostomia, minimal fatigue and odynophagia. RT rash has resolved. Recently, he had noted a lump in right neck and right lower jaw tooth pain. Both issues have resolved after a complicated root canal treatment that was completed on 11/15/18. Occasional dull headache in right temporal region without CNS/eye symptoms. PO intake is better with 3-4 liters of fluids and 60-80% of expected diet. No G-tube or port.     ONCOLOGIC HISTORY:  1. 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   - Noticed enlarging neck mass in March 2018. In 5/2018 this was reassessed and prompted imaging and ultimately a biopsy of the mass at [facility] ([place], [place]) which demonstrated squamous cell carcinoma.   - PET/CT 7/10/18: \"3.5 x 2.2 cm hypermetabolic right level 2 metastatic lymphadenopathy. There is imaging findings suggestive of extranodal extension. The mass is inseparable from the right SCM and abuts the right internal carotid " "artery about 180 degrees. No abnormal focal FDG uptake along the mucosal spaces to suggest a primary squamous cell cancer focus. Several subcentimeter pulmonary nodules with no increased FDG uptake. Attention on follow up is recommended. Multiple, too small to characterize hypodensities in the liver. A 9 mm nodule in the right adrenal gland, without increased FDG uptake. A 7 mm hypodensity in the pancreatic tail with no increased FDG uptake, may be a side branch IPMN. Tiny lucencies in the left iliac bone without increased FDG uptake, possibly focal osteoporotic areas.\"  - Seen by ENT and underwent DL with directed biopsies on 7/18/18 with pathology showing no evidence of malignancy within the mucosal sites.   - A Robotic demucosalization of the right tongue base on 8/3/18 was performed and pathology from this pathology demonstrated p16 positive nonkeratinizing squamous cell carcinoma in the level node lymph node and in the right tongue base.   - Started concurrent cisplatin plus radiation therapy based on tumor board recommendations. Cisplatin 100mg/m2 cycle 1: 8/28/18. Cycle 2 - delayed by 2 days due to  and given on 9/21/18. Cycle 3 - unable to tolerate 100mg/m2 due to myelosuppression and patient preference. Ultimately received cisplatin 40mg/m2 x1 on 10/12/18. RT - 6996 cGy over 33 fractions - 8/29/18 to 10/13/18.     REVIEW OF SYSTEMS:  14 point ROS negative other than the symptoms noted above in the HPI.    PAST MEDICAL HISTORY:   Past Medical History:   Diagnosis Date     Head and neck cancer (H)      PAST SURGICAL HISTORY:   Past Surgical History:   Procedure Laterality Date     DAVINCI RESECT TUMOR TRANSORAL Bilateral 8/3/2018    Procedure: DAVINCI RESECT TUMOR TRANSORAL;  Transoral Robotic Resection of the Base of Tongue,   IR Guided Core Needle Biopsy of Right Neck Mass;  Surgeon: Blas Calvillo MD;  Location: [facility] OR     FINE NEEDLE ASPIRATION WITH IMAGING GUIDANCE Right 8/3/2018    Procedure: " FINE NEEDLE ASPIRATION WITH IMAGING GUIDANCE;  Ultrasound guided Core Biopsy of a Right Neck Mass;  Surgeon: Denis Monson MD;  Location: [facility] OR     HEAD & NECK SURGERY       LARYNGOSCOPY WITH BIOPSY(IES) Bilateral 2018    Procedure: LARYNGOSCOPY WITH BIOPSY(IES);  Direct Laryngoscopy with Direct Biopsies;  Surgeon: Garcia Worthy MD;  Location: [facility] OR     TONSILLECTOMY     In addition:  1. Irritated sweat gland removed   2. Fatty tumor on his back removed    3. Cataract surgery     SOCIAL HISTORY:   Social History     Social History     Marital status:      Spouse name: N/A     Number of children: N/A     Years of education: N/A     Occupational History     Not on file.     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     FAMILY HISTORY:   Brother with clotting issue  Mother, unknown primary and    Maternal aunt with colon cancer  Maternal grandfather with head/neck cancer  Maternal cousins with cancers unknown types    ALLERGIES:  No Known Allergies    CURRENT MEDICATIONS:   Current Outpatient Prescriptions:      IBUPROFEN PO, Take 200 mg by mouth, Disp: , Rfl:      Multiple Vitamins-Minerals (MULTIVITAMIN & MINERAL PO), , Disp: , Rfl:      aspirin 325 MG tablet, Take 325 mg by mouth, Disp: , Rfl:      LORazepam (ATIVAN) 0.5 MG tablet, Take 1 tablet (0.5 mg) by mouth every 6 hours as needed for nausea (Patient not taking: Reported on 2018), Disp: 30 tablet, Rfl: 0  No current facility-administered medications for this visit.     PHYSICAL EXAMINATION:  Vital signs: /76 (BP Location: Left  arm, Patient Position: Chair, Cuff Size: Adult Large)  Pulse 73  Temp 98  F (36.7  C) (Oral)  Wt 77.7 kg (171 lb 4.8 oz)  SpO2 97%  BMI 23.23 kg/m2  ECOG performance status of 0.  GENERAL: Thin man, no acute distress  HEENT: Clear oropharynx with thick, white secretions and minimal saliva. No tongue mass. Poor dentition.  NECK: No JVD/LAD.  LUNGS: clear bilaterally, no wheezes, good diaphragmatic excursion  CARDIOVASCULAR: Regular rate and rhythm, no murmurs, gallops or rubs  ABDOMEN: Soft, nontender and nondistended, bowel sounds present.  EXTREMITIES: No cyanosis, no clubbing, no edema  NEUROLOGIC: No focal deficits; alert and oriented to self, place, time    LABORATORY DATA:   Recent Labs   Lab Test  11/19/18   1112  10/12/18   0740  09/21/18   1100   WBC  5.0  1.4*  2.1*   RBC  3.39*  4.04*  4.05*   HGB  10.5*  12.2*  12.0*   HCT  32.1*  36.7*  36.5*   MCV  95  91  90   MCH  31.0  30.2  29.6   MCHC  32.7  33.2  32.9   RDW  15.9*  12.9  12.7   PLT  171  176  207   NEUTROPHIL  76.0  51.7  54.2     Recent Labs   Lab Test  11/19/18   1112  10/12/18   0740  09/19/18   0815   NA  141  137  140   POTASSIUM  4.1  4.0  4.1   CHLORIDE  108  104  107   CO2  25  24  25   ANIONGAP  8  10  8   GLC  95  141*  126*   BUN  14  18  14   CR  0.96  0.93  0.85   KM  8.4*  9.2  9.0     Recent Labs   Lab Test  11/19/18   1112  10/12/18   0740  09/19/18   0815   PROTTOTAL  6.3*  7.2  6.8   ALBUMIN  3.4  3.5  3.6   BILITOTAL  0.4  0.7  0.5   ALKPHOS  96  92  89   AST  21  17  18   ALT  17  19  20     Recent Labs   Lab Test  11/19/18   1112  08/28/18   0848   TSH  0.50  1.24   T4   --   1.14     IMAGING STUDIES:  No new imaging.    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      HNSCC  Chemo-induced CTCAE grade 2 neutropenia:  - Now ~6 weeks out from chemoRT and recovering  "very well overall. Chemo associated complications have resolved.  - No clinical evidence of disease recurrence. Right neck \"lump\" noted by patient was no longer palpable on my exam and could likely be due to dental infection.  - Understands that treatment efficacy may be compromised somewhat due to dose reduction of third dose of cisplatin.   - CT neck with contrast ordered by Dr. Garay for later today. Anticipate that he'll follow-up on results and discuss with the patient.   - PET/CT at 3 months gorge.  - Return to clinic in one month for follow up.     All questions were answered, and patient and family were in complete agreement with this plan.     BILLIN.    Pillo Rolon M.D.  [place]. Professor of Medicine  Division of Hematology, Oncology & Transplantation  [facility]        "

## 2018-11-19 NOTE — MR AVS SNAPSHOT
After Visit Summary   11/19/2018    Lazaro Porter    MRN: [id number]           Patient Information     Date Of Birth          1950        Visit Information        Provider Department      11/19/2018 1:00 PM Nathan Garay MD Rad[facility]        Today's Diagnoses     Malignant neoplasm of base of tongue (H)    -  1    Hypothyroidism, unspecified type           Follow-ups after your visit        Additional Services     LYMPHEDEMA THERAPY REFERRAL       If you have not heard from the scheduling office within 2 business days, please call [phone] for all locations, with the exception of [place], please call [phone] and [place], please call [phone].    Please be aware that coverage of these services is subject to the terms and limitations of your health insurance plan.  Call member services at your health plan with any benefit or coverage questions.                  Your next 10 appointments already scheduled     Nov 20, 2018  2:00 PM CST   (Arrive by 1:45 PM)   Return Visit with Garcia Worthy MD   [facility] ([facility])    [address] [place] [id number] [phone]            Nov 20, 2018  2:00 PM CST   (Arrive by 1:45 PM)   Swallow Treatment with JANETTE Verduzco   [facility] ([facility])    [address] [place] [address] [phone]              Future tests that were ordered for you today     Open Future Orders        Priority Expected Expires Ordered    LYMPHEDEMA THERAPY REFERRAL Routine  11/19/2019 11/19/2018    PET Oncology Whole Body Routine 12/28/2018 11/19/2019 11/19/2018    CBC with platelets differential Routine  12/10/2018 11/19/2018    Comprehensive metabolic panel Routine  12/10/2018 11/19/2018    TSH with free T4 reflex Routine  11/19/2019 11/19/2018            Who to contact     Please call your  clinic at [phone] to:    Ask questions about your health    Make or cancel appointments    Discuss your medicines    Learn about your test results    Speak to your doctor            Additional Information About Your Visit        MyChart Information     [employer] is an electronic gateway that provides easy, online access to your medical records. With [employer], you can request a clinic appointment, read your test results, renew a prescription or communicate with your care team.     To sign up for [employer] visit the website at [email or web address]   You will be asked to enter the access code listed below, as well as some personal information. Please follow the directions to create your username and password.     Your access code is: [email or web address]  Expires: 2018  9:28 AM     Your access code will  in 90 days. If you need help or a new code, please contact your [facility] or call [phone] for assistance.        Care EveryWhere ID     This is your Care EveryWhere ID. This could be used by other organizations to access your [place] medical records  [id number]        Your Vitals Were     BMI (Body Mass Index)                   23.19 kg/m2            Blood Pressure from Last 3 Encounters:   18 132/74   18 137/76   10/30/18 135/76    Weight from Last 3 Encounters:   18 77.6 kg (171 lb)   18 77.7 kg (171 lb 4.8 oz)   10/30/18 74.4 kg (164 lb)               Primary Care Provider Office Phone # Fax #    Lalito Moctezuma [phone]       [address] [address]        Equal Access to Services     JOZEF CLEMENTS AH: Hadii ivet patel hadashtheresa Sowilliam, waaxda luqadaha, qaybta kaalmada talon coy. So [facility] [phone].    ATENCIÓN: Si habla español, tiene a corona disposición servicios gratuitos de asistencia lingüística. Llame al [phone].    We comply with applicable  federal civil rights laws and [place] laws. We do not discriminate on the basis of race, color, national origin, age, disability, sex, sexual orientation, or gender identity.            Thank you!     Thank you for choosing [facility]  for your care. Our goal is always to provide you with excellent care. Hearing back from our patients is one way we can continue to improve our services. Please take a few minutes to complete the written survey that you may receive in the mail after your visit with us. Thank you!             Your Updated Medication List - Protect others around you: Learn how to safely use, store and throw away your medicines at www.disposemymeds.org.          This list is accurate as of 11/19/18  3:23 PM.  Always use your most recent med list.                   Brand Name Dispense Instructions for use Diagnosis    aspirin 325 MG tablet      Take 325 mg by mouth        IBUPROFEN PO      Take 200 mg by mouth        LORazepam 0.5 MG tablet    ATIVAN    30 tablet    Take 1 tablet (0.5 mg) by mouth every 6 hours as needed for nausea    Nausea and vomiting, intractability of vomiting not specified, unspecified vomiting type       MULTIVITAMIN & MINERAL PO

## 2018-11-19 NOTE — PROGRESS NOTES
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    RADIATION THERAPY DELIVERED:   6996 cGy delivered in 33 daily fractions, from 2018 - 10/13/2018    SYSTEMIC THERAPY:  Cisplatin 100 mg/m  (initially began with high-dose therapy every 3 weeks with his second infusion delayed 2 days secondary to neutropenia and his final cycle switch to a low-dose 40 mg/m  secondary to ongoing cytopenias)    INTERVAL SINCE COMPLETION OF RADIATION THERAPY:   Approximately 1 month    SUBJECTIVE:   Lazaro Porter is a 68 year old male with a PMH significant for a 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 He underwent concurrent chemoradiotherapy with curative intent as described above, tolerating treatment reasonably well with the anticipated acute nausea, mucositis and dermatitis.  Systemic therapy was slightly altered with his second course of high-dose cisplatin delayed 2 days secondary to neutropenia and his final infusion switch to a low-dose (40 mg/m ) regimen due to persistent cytopenias.  He returns to radiation oncology clinic today for a routine post-treatment surveillance visit.    On exam, Mr. Porter reports that he is doing reasonably well and has no pressing concerns or complaints. He has noted a marked reduction in his oropharyngeal pain over the past 2-3 weeks, currently rating his symptoms as a 1/10 in severity. He reports that he is back to eating a roughly normal diet without any significant odynophagia or dysphasia and has recently begun to eat spicy foods without causing significant oropharyngeal pain.  He continues to have decreased taste as well as mild to moderate xerostomia symptoms with a xerostomia questionnaire score of 32/80. His remaining ROS are otherwise unremarkable.    PHYSICAL EXAM:  Weight: 77.6 kg  BP: 132/74    Gen: Alert, in NAD  Eyes: PERRL, EOMI, sclera anicteric  HENT     Head: NC/AT     Nose/sinus: No rhinorrhea or epistaxis     Oral Cavity/Oropharynx: Mildly dry mucous membranes. No visible oral cavity or oropharyngeal lesions. No evidence of thrush or residual mucositis. Exam demonstrating post-radiotherapy changes involving the tongue base without any visible evidence of residual masses/nodularity.  Palpation of the oral and base of tongue also demonstrates mild fibrosis within the right tongue base but no discrete masses concerning for residual disease.  Neck: Mild submental lymphedema. Full range of motion. No palpable cervical adenopathy.  Pulm: No wheezing, stridor or respiratory distress  CV: Well-perfused, no cyanosis, no pedal edema  Musculoskeletal: Normal bulk and tone   Skin: Mild scattered hyperpigmentation of the bilateral neck (right > left). No evidence of desquamation or ulceration.  Neurologic: A/Ox3, CN II-XII intact    LABS AND IMAGIN2018 CT neck:  Official radiology read is pending at the time of this follow-up visit, however review in clinic demonstrates post-treatment changes without readily visible evidence of recurrent/residual disease.    2018 labs:  TSH: 0.5  WBC: 5.0   Hemoglobin: 10.5  Platelets: 171  Electrolytes WNL with exception of calcium (8.4)    IMPRESSION:   Mr. 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 He is currently just over a month out from the completion of concurrent chemoradiotherapy and is doing very well with a marked recovery from his acute radiation-induced toxicities. He has no clinical or radiographic evidence concerning for residual  disease.    PLAN:   1. Follow-up in radiation oncology clinic in 6 weeks with a restaging PET/CT  2. Referral to lymphedema therapy for evaluation and treatment of submental lymphedema  3. Continue to follow-up with medical oncology (Dr. Rolon) and head and neck surgery (Dr. Worthy) as scheduled for ongoing disease surveillance    Nathan Garay MD/PhD    Department of Radiation Oncology  [facility]

## 2018-11-19 NOTE — PROGRESS NOTES
"MEDICAL ONCOLOGY CLINIC NOTE    REFERRING PROVIDER: Garcia Worthy MD from [facility]  RADIATION ONCOLOGIST: Nathan Garay MD from [facility] Radiation [facility]cology [facility]    REASON FOR CURRENT VISIT: Evaluation after concurrent cisplatin plus radiation therapy for p16+ stage I squamous cell carcinoma of the right base of the tongue.    HISTORY OF PRESENT ILLNESS: Mr. 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 His oncologic history is as under.     Mr. Porter has recovered very well from chemoRT and his main issues currently are - moderate xerostomia, minimal fatigue and odynophagia. RT rash has resolved. Recently, he had noted a lump in right neck and right lower jaw tooth pain. Both issues have resolved after a complicated root canal treatment that was completed on 11/15/18. Occasional dull headache in right temporal region without CNS/eye symptoms. PO intake is better with 3-4 liters of fluids and 60-80% of expected diet. No G-tube or port.     ONCOLOGIC HISTORY:  1. 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   - Noticed enlarging neck mass in March 2018. In 5/2018 this was reassessed and prompted imaging and ultimately a biopsy of the mass at [facility] ([place], [place]) which demonstrated squamous cell carcinoma.   - PET/CT 7/10/18: \"3.5 x 2.2 cm hypermetabolic right level 2 metastatic lymphadenopathy. There is imaging findings suggestive of extranodal extension. The mass is inseparable from the right SCM and abuts the right internal carotid artery about 180 degrees. No abnormal focal FDG uptake along the mucosal spaces to suggest a primary squamous cell cancer focus. Several subcentimeter pulmonary nodules with no increased FDG uptake. Attention on follow up is recommended. " "Multiple, too small to characterize hypodensities in the liver. A 9 mm nodule in the right adrenal gland, without increased FDG uptake. A 7 mm hypodensity in the pancreatic tail with no increased FDG uptake, may be a side branch IPMN. Tiny lucencies in the left iliac bone without increased FDG uptake, possibly focal osteoporotic areas.\"  - Seen by ENT and underwent DL with directed biopsies on 7/18/18 with pathology showing no evidence of malignancy within the mucosal sites.   - A Robotic demucosalization of the right tongue base on 8/3/18 was performed and pathology from this pathology demonstrated p16 positive nonkeratinizing squamous cell carcinoma in the level node lymph node and in the right tongue base.   - Started concurrent cisplatin plus radiation therapy based on tumor board recommendations. Cisplatin 100mg/m2 cycle 1: 8/28/18. Cycle 2 - delayed by 2 days due to  and given on 9/21/18. Cycle 3 - unable to tolerate 100mg/m2 due to myelosuppression and patient preference. Ultimately received cisplatin 40mg/m2 x1 on 10/12/18. RT - 6996 cGy over 33 fractions - 8/29/18 to 10/13/18.     REVIEW OF SYSTEMS:  14 point ROS negative other than the symptoms noted above in the HPI.    PAST MEDICAL HISTORY:   Past Medical History:   Diagnosis Date     Head and neck cancer (H)      PAST SURGICAL HISTORY:   Past Surgical History:   Procedure Laterality Date     DAVINCI RESECT TUMOR TRANSORAL Bilateral 8/3/2018    Procedure: DAVINCI RESECT TUMOR TRANSORAL;  Transoral Robotic Resection of the Base of Tongue,   IR Guided Core Needle Biopsy of Right Neck Mass;  Surgeon: Blas Calvillo MD;  Location: [facility] OR     FINE NEEDLE ASPIRATION WITH IMAGING GUIDANCE Right 8/3/2018    Procedure: FINE NEEDLE ASPIRATION WITH IMAGING GUIDANCE;  Ultrasound guided Core Biopsy of a Right Neck Mass;  Surgeon: Denis Monson MD;  Location: [facility] OR     HEAD & NECK SURGERY       LARYNGOSCOPY WITH BIOPSY(IES) Bilateral 7/18/2018    " Procedure: LARYNGOSCOPY WITH BIOPSY(IES);  Direct Laryngoscopy with Direct Biopsies;  Surgeon: Garcia Worthy MD;  Location: [facility] OR     TONSILLECTOMY     In addition:  1. Irritated sweat gland removed   2. Fatty tumor on his back removed    3. Cataract surgery     SOCIAL HISTORY:   Social History     Social History     Marital status:      Spouse name: N/A     Number of children: N/A     Years of education: N/A     Occupational History     Not on file.     Social History Main Topics     Smoking status: Never Smoker     Smokeless tobacco: Never Used     Alcohol use Yes      Comment: occasionally     Drug use: No     Sexual activity: Yes     Partners: Female     Birth control/ protection: Other     Other Topics Concern     Not on file     Social History Narrative   Patient is from [place], [place]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     FAMILY HISTORY:   Brother with clotting issue  Mother, unknown primary and    Maternal aunt with colon cancer  Maternal grandfather with head/neck cancer  Maternal cousins with cancers unknown types    ALLERGIES:  No Known Allergies    CURRENT MEDICATIONS:   Current Outpatient Prescriptions:      IBUPROFEN PO, Take 200 mg by mouth, Disp: , Rfl:      Multiple Vitamins-Minerals (MULTIVITAMIN & MINERAL PO), , Disp: , Rfl:      aspirin 325 MG tablet, Take 325 mg by mouth, Disp: , Rfl:      LORazepam (ATIVAN) 0.5 MG tablet, Take 1 tablet (0.5 mg) by mouth every 6 hours as needed for nausea (Patient not taking: Reported on 2018), Disp: 30 tablet, Rfl: 0  No current facility-administered medications for this visit.     PHYSICAL EXAMINATION:  Vital signs: /76 (BP Location: Left arm, Patient Position: Chair, Cuff Size: Adult Large)  Pulse 73  Temp 98  F (36.7  C) (Oral)  Wt 77.7 kg (171 lb 4.8 oz)  SpO2 97%  BMI 23.23 kg/m2  ECOG performance status of 0.  GENERAL: Thin man, no acute distress  HEENT:  "Clear oropharynx with thick, white secretions and minimal saliva. No tongue mass. Poor dentition.  NECK: No JVD/LAD.  LUNGS: clear bilaterally, no wheezes, good diaphragmatic excursion  CARDIOVASCULAR: Regular rate and rhythm, no murmurs, gallops or rubs  ABDOMEN: Soft, nontender and nondistended, bowel sounds present.  EXTREMITIES: No cyanosis, no clubbing, no edema  NEUROLOGIC: No focal deficits; alert and oriented to self, place, time    LABORATORY DATA:   Recent Labs   Lab Test  11/19/18   1112  10/12/18   0740  09/21/18   1100   WBC  5.0  1.4*  2.1*   RBC  3.39*  4.04*  4.05*   HGB  10.5*  12.2*  12.0*   HCT  32.1*  36.7*  36.5*   MCV  95  91  90   MCH  31.0  30.2  29.6   MCHC  32.7  33.2  32.9   RDW  15.9*  12.9  12.7   PLT  171  176  207   NEUTROPHIL  76.0  51.7  54.2     Recent Labs   Lab Test  11/19/18   1112  10/12/18   0740  09/19/18   0815   NA  141  137  140   POTASSIUM  4.1  4.0  4.1   CHLORIDE  108  104  107   CO2  25  24  25   ANIONGAP  8  10  8   GLC  95  141*  126*   BUN  14  18  14   CR  0.96  0.93  0.85   KM  8.4*  9.2  9.0     Recent Labs   Lab Test  11/19/18   1112  10/12/18   0740  09/19/18   0815   PROTTOTAL  6.3*  7.2  6.8   ALBUMIN  3.4  3.5  3.6   BILITOTAL  0.4  0.7  0.5   ALKPHOS  96  92  89   AST  21  17  18   ALT  17  19  20     Recent Labs   Lab Test  11/19/18   1112  08/28/18   0848   TSH  0.50  1.24   T4   --   1.14     IMAGING STUDIES:  No new imaging.    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      HNSCC  Chemo-induced CTCAE grade 2 neutropenia:  - Now ~6 weeks out from chemoRT and recovering very well overall. Chemo associated complications have resolved.  - No clinical evidence of disease recurrence. Right neck \"lump\" noted by patient was no longer palpable on my exam and could likely be due to dental infection.  - " Understands that treatment efficacy may be compromised somewhat due to dose reduction of third dose of cisplatin.   - CT neck with contrast ordered by Dr. Garay for later today. Anticipate that he'll follow-up on results and discuss with the patient.   - PET/CT at 3 months gorge.  - Return to clinic in one month for follow up.     All questions were answered, and patient and family were in complete agreement with this plan.     BILLIN.    Pillo Rolon M.D.  . Professor of Medicine  Division of Hematology, Oncology & Transplantation  [facility]

## 2018-11-19 NOTE — NURSING NOTE
Chief Complaint   Patient presents with     Blood Draw     labs drawn via venipuncture by RN     /76 (BP Location: Left arm, Patient Position: Chair, Cuff Size: Adult Large)  Pulse 73  Temp 98  F (36.7  C) (Oral)  Wt 77.7 kg (171 lb 4.8 oz)  SpO2 97%  BMI 23.23 kg/m2    Vitals taken.  Labs collected and sent from right antecubital venipuncture. Pt tolerated well. Pt checked in for next appointment.    Letitia Randolph RN

## 2018-11-19 NOTE — Clinical Note
Follow-up with me in 6 weeks with pre-staging PET/CT. Please coordinate with medical oncology (Dr. Rolon) and ENT (Dr. Worthy) if able.

## 2018-11-19 NOTE — LETTER
2018      RE: Lazaro Porter  [address] [facility] [address]       FOLLOW-UP VISIT    Patient Name: Lazaro Porter      : 1950     Age: 68 year old        ______________________________________________________________________________     Chief Complaint   Patient presents with     Cancer     Pt is here for a follow up:Base of Tongue Cancer: Radiation dose 6996 cGy completed 10/13/18     /74  Wt 77.6 kg (171 lb)  BMI 23.19 kg/m2       Pain  Denies    Labs  Other Labs: Yes: today    Imaging  CT: today      Dental:   Most Recent Dental Visit: yes      Speech/Swallowing:   Most Recent evaluation or testing: 10/02/18  Swallowing Restrictions: No difficulties with swallowing    Trismus/Jaw Exercises: Yes    Nutrition:  Weight:   Wt Readings from Last 3 Encounters:   18 77.6 kg (171 lb)   18 77.7 kg (171 lb 4.8 oz)   10/30/18 74.4 kg (164 lb)         Oral Symptoms:   Xerostomia:1- Symptomatic without significant dietary alteration; unstimulated saliva flow >0.2 ml/min  Dysphagia: 1- Symtomatic, able to eat regulat diet  Mucositis Oral Symptoms: 0-None  Mucositis: 0- None  Esophagitis:0- None    Other Appointments:     MD Name: Dr Worthy Appointment Date: 18   MD Name: Appointment Date:   MD Name: Appointment Date:   Other Appointment Notes:     Residual Radiation side effect: Taste changes, dry mouth     Additional Instructions:     Nurse face-to-face time: Level 3:  10 min face to face time             Department of Radiation Oncology  [facility]  [address] [place] [address] [phone]       Radiation Oncology Follow-up Visit  2018      Lazaro Porter  MRN: [id number]   : 1950     DISEASE TREATED:   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    RADIATION THERAPY DELIVERED:   6996 cGy delivered in 33 daily fractions, from 2018 - 10/13/2018    SYSTEMIC THERAPY:  Cisplatin 100  mg/m  (initially began with high-dose therapy every 3 weeks with his second infusion delayed 2 days secondary to neutropenia and his final cycle switch to a low-dose 40 mg/m  secondary to ongoing cytopenias)    INTERVAL SINCE COMPLETION OF RADIATION THERAPY:   Approximately 1 month    SUBJECTIVE:   Lazaro Porter is a 68 year old male with a PMH significant for a 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 He underwent concurrent chemoradiotherapy with curative intent as described above, tolerating treatment reasonably well with the anticipated acute nausea, mucositis and dermatitis.  Systemic therapy was slightly altered with his second course of high-dose cisplatin delayed 2 days secondary to neutropenia and his final infusion switch to a low-dose (40 mg/m ) regimen due to persistent cytopenias.  He returns to radiation oncology clinic today for a routine post-treatment surveillance visit.    On exam, Mr. Porter reports that he is doing reasonably well and has no pressing concerns or complaints. He has noted a marked reduction in his oropharyngeal pain over the past 2-3 weeks, currently rating his symptoms as a 1/10 in severity. He reports that he is back to eating a roughly normal diet without any significant odynophagia or dysphasia and has recently begun to eat spicy foods without causing significant oropharyngeal pain. He continues to have decreased taste as well as mild to moderate xerostomia symptoms with a xerostomia questionnaire score of 32/80. His remaining ROS are otherwise unremarkable.    PHYSICAL EXAM:  Weight: 77.6 kg  BP: 132/74    Gen: Alert, in NAD  Eyes: PERRL, EOMI, sclera anicteric  HENT     Head: NC/AT     Nose/sinus: No rhinorrhea or epistaxis     Oral Cavity/Oropharynx: Mildly dry mucous membranes. No visible oral cavity or oropharyngeal lesions. No evidence of thrush or  residual mucositis. Exam demonstrating post-radiotherapy changes involving the tongue base without any visible evidence of residual masses/nodularity.  Palpation of the oral and base of tongue also demonstrates mild fibrosis within the right tongue base but no discrete masses concerning for residual disease.  Neck: Mild submental lymphedema. Full range of motion. No palpable cervical adenopathy.  Pulm: No wheezing, stridor or respiratory distress  CV: Well-perfused, no cyanosis, no pedal edema  Musculoskeletal: Normal bulk and tone   Skin: Mild scattered hyperpigmentation of the bilateral neck (right > left). No evidence of desquamation or ulceration.  Neurologic: A/Ox3, CN II-XII intact    LABS AND IMAGIN2018 CT neck:  Official radiology read is pending at the time of this follow-up visit, however review in clinic demonstrates post-treatment changes without readily visible evidence of recurrent/residual disease.    2018 labs:  TSH: 0.5  WBC: 5.0   Hemoglobin: 10.5  Platelets: 171  Electrolytes WNL with exception of calcium (8.4)    IMPRESSION:   Mr. 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 He is currently just over a month out from the completion of concurrent chemoradiotherapy and is doing very well with a marked recovery from his acute radiation-induced toxicities. He has no clinical or radiographic evidence concerning for residual disease.    PLAN:   1. Follow-up in radiation oncology clinic in 6 weeks with a restaging PET/CT  2. Referral to lymphedema therapy for evaluation and treatment of submental lymphedema  3. Continue to follow-up with medical oncology (Dr. Rolon) and head and neck surgery (Dr. Worthy) as scheduled for ongoing disease surveillance    Nathan Garay MD/PhD    Department of Radiation Oncology  [facility]

## 2018-11-19 NOTE — MR AVS SNAPSHOT
After Visit Summary   11/19/2018    Lazaro Porter    MRN: [id number]           Patient Information     Date Of Birth          1950        Visit Information        Provider Department      11/19/2018 11:15 AM Pillo Rolon MD [facility]        Today's Diagnoses     Malignant neoplasm of base of tongue (H)        Hypothyroidism, unspecified type        Adverse effect of radiation, initial encounter            Follow-ups after your visit        Your next 10 appointments already scheduled     Nov 20, 2018  2:00 PM CST   (Arrive by 1:45 PM)   Return Visit with Garcia Worthy MD   [facility] ([facility])    [address] [address] [phone]            Nov 20, 2018  2:00 PM CST   (Arrive by 1:45 PM)   Swallow Treatment with JANETTE Verduzco   [facility] ([facility])    [address] [place] [address] [phone]              Future tests that were ordered for you today     Open Future Orders        Priority Expected Expires Ordered    LYMPHEDEMA THERAPY REFERRAL Routine  11/19/2019 11/19/2018    PET Oncology Whole Body Routine 12/28/2018 11/19/2019 11/19/2018    CBC with platelets differential Routine  12/10/2018 11/19/2018    Comprehensive metabolic panel Routine  12/10/2018 11/19/2018    TSH with free T4 reflex Routine  11/19/2019 11/19/2018            Who to contact     If you have questions or need follow up information about today's clinic visit or your schedule please contact [facility] directly at [phone].  Normal or non-critical lab and imaging results will be communicated to you by MyChart, letter or phone within 4 business days after the clinic has received the results. If you do not hear from us within 7 days, please contact the clinic through MyChart or phone. If you have a critical or abnormal lab  "result, we will notify you by phone as soon as possible.  Submit refill requests through [employer] or call your pharmacy and they will forward the refill request to us. Please allow 3 business days for your refill to be completed.          Additional Information About Your Visit        [employer] Information     [employer] lets you send messages to your doctor, view your test results, renew your prescriptions, schedule appointments and more. To sign up, go to [email or web address] . Click on \"Log in\" on the left side of the screen, which will take you to the Welcome page. Then click on \"Sign up Now\" on the right side of the page.     You will be asked to enter the access code listed below, as well as some personal information. Please follow the directions to create your username and password.     Your access code is: [email or web address]  Expires: 2018  9:28 AM     Your access code will  in 90 days. If you need help or a new code, please call your [place] clinic or [phone].        Care EveryWhere ID     This is your Care EveryWhere ID. This could be used by other organizations to access your [place] medical records  [id number]        Your Vitals Were     Pulse Temperature Pulse Oximetry BMI (Body Mass Index)          73 98  F (36.7  C) (Oral) 97% 23.23 kg/m2         Blood Pressure from Last 3 Encounters:   18 132/74   18 137/76   10/30/18 135/76    Weight from Last 3 Encounters:   18 77.6 kg (171 lb)   18 77.7 kg (171 lb 4.8 oz)   10/30/18 74.4 kg (164 lb)              We Performed the Following     CBC with platelets differential     Comprehensive metabolic panel     TSH with free T4 reflex        Primary Care Provider Office Phone # Fax #    Lalito Moctezuma [phone]       [facility] [address] [facility] [address]        Equal Access to Services     JOZEF CLEMENTS AH: Romario Karimi, renita card, talon hunt " annabelle veraroberto carlos natalyavenkata lajillianjayde ah. So [facility] [phone].    ATENCIÓN: Si habla miguel a, tiene a corona disposición servicios gratuitos de asistencia lingüística. Uche al [phone].    We comply with applicable federal civil rights laws and [place] laws. We do not discriminate on the basis of race, color, national origin, age, disability, sex, sexual orientation, or gender identity.            Thank you!     Thank you for choosing [facility]  for your care. Our goal is always to provide you with excellent care. Hearing back from our patients is one way we can continue to improve our services. Please take a few minutes to complete the written survey that you may receive in the mail after your visit with us. Thank you!             Your Updated Medication List - Protect others around you: Learn how to safely use, store and throw away your medicines at www.disposemymeds.org.          This list is accurate as of 11/19/18  3:35 PM.  Always use your most recent med list.                   Brand Name Dispense Instructions for use Diagnosis    aspirin 325 MG tablet      Take 325 mg by mouth        IBUPROFEN PO      Take 200 mg by mouth        LORazepam 0.5 MG tablet    ATIVAN    30 tablet    Take 1 tablet (0.5 mg) by mouth every 6 hours as needed for nausea    Nausea and vomiting, intractability of vomiting not specified, unspecified vomiting type       MULTIVITAMIN & MINERAL PO

## 2018-11-20 ENCOUNTER — OFFICE VISIT (OUTPATIENT)
Dept: OTOLARYNGOLOGY | Facility: CLINIC | Age: 68
End: 2018-11-20
Payer: MEDICARE

## 2018-11-20 ENCOUNTER — THERAPY VISIT (OUTPATIENT)
Dept: SPEECH THERAPY | Facility: CLINIC | Age: 68
End: 2018-11-20
Payer: MEDICARE

## 2018-11-20 VITALS — BODY MASS INDEX: 22.92 KG/M2 | WEIGHT: 169 LBS

## 2018-11-20 DIAGNOSIS — C01 MALIGNANT NEOPLASM OF BASE OF TONGUE (H): ICD-10-CM

## 2018-11-20 DIAGNOSIS — R13.12 OROPHARYNGEAL DYSPHAGIA: Primary | ICD-10-CM

## 2018-11-20 DIAGNOSIS — C01 MALIGNANT NEOPLASM OF BASE OF TONGUE (H): Primary | ICD-10-CM

## 2018-11-20 ASSESSMENT — PAIN SCALES - GENERAL: PAINLEVEL: NO PAIN (0)

## 2018-11-20 NOTE — MR AVS SNAPSHOT
After Visit Summary   11/20/2018    Lazaro Porter    MRN: [id number]           Patient Information     Date Of Birth          1950        Visit Information        Provider Department      11/20/2018 2:00 PM Garcia Worthy MD [facility]        Today's Diagnoses     Malignant neoplasm of base of tongue (H)    -  1       Follow-ups after your visit        Your next 10 appointments already scheduled     Jan 07, 2019 10:30 AM CST   (Arrive by 10:00 AM)   PET ONCOLOGY WHOLE BODY with UUPET1   [facility] [place] [facility] ([facility] [place] [facility])    [address] [facility] [address] [phone]           How do I prepare for my exam? (Food and drink instructions) Patients should eat a low carb, high protein meal 7 hours (or the last meal you eat) before the scan. Low carb, high protein meals consist of lean meats, seafood, beans, soy, low-fat dairy, eggs, nuts & seeds.  6 hours before your scan: Stop all food and liquids (except water). Do not chew gum or suck on mints. If you need to take medicine with food, you may take it with a few crackers.  How do I prepare for my exam? (Other instructions) If you have diabetes: Have your exam early in the morning. Your blood glucose will go up as the day goes by. Your glucose level must be 180 or less at the start of the exam. Please take any oral diabetic medication you need to ensure this blood glucose level is below 180, but no insulin 4 hours prior to the exam.  * If you are taking insulin in the morning take with breakfast by 6 am and schedule procedure between 12 and 2:15 pm. * If you are taking insulin at night take nightly dose, fast overnight, schedule procedure before 10 am. * If you take insulin both morning and night take morning dose by 6am and schedule procedure between 12 and 2:15 pm.  24 hours before your scan: Don t do any heavy exercise. (No jogging, aerobics or other workouts.)  Exercise will make your pictures less accurate.  What should I wear? Please wear comfortable clothes.  How long does the exam take?  Most scans will take between 2-3 hours.  What should I bring: Please bring a list of your medicines (including vitamins, minerals and over-the-counter drugs). Leave your valuables at home.  Do I need a :  No  is needed.  What do I need to tell my doctor? Tell your doctor: * If there is any chance you may be pregnant or if you are breastfeeding. * If you have problems lying in small spaces (claustrophobia). If you do, your doctor may give you medicine to help you relax.  What should I do after the exam: No restrictions, You may resume normal activities.  What is this test: Your doctor has ordered a PET scan (positron emission tomography). This will take pictures of the cells and organs in your body. Your doctor may have also ordered a CT scan (computed tomography). This is another way to take pictures of your cells and organs. It is done at the same time as the PET scan. The pictures will help us understand your problem so we can make a treatment plan that fits your needs.  Who should I call with questions: Please call your Imaging Department at your exam site with any questions. Directions, parking instructions, and other information is available on our website, [email or web address].            Jan 07, 2019  1:30 PM CST   Return Visit with Nathan Garay MD   Radiation Oncology Clinic ([facility])    [facility]  1st Floor  [address]  [facility] [address]   [phone]            Jan 08, 2019  2:30 PM CST   [facility] Lab Draw with  [employer] [facility] LAB DRAW   [facility] Lab Draw ([facility])    [address]  [address]  [facility] [address] [phone]            Jan 08, 2019  3:00 PM CST   (Arrive by 2:45 PM)   Return Visit with Pillo Rolon MD   [facility] (  [facility])    [address] [facility]  [address]  [facility] [place] [facility] [id number] [phone]              Who to contact     Please call your clinic at [phone] to:    Ask questions about your health    Make or cancel appointments    Discuss your medicines    Learn about your test results    Speak to your doctor            Additional Information About Your Visit        Care EveryWhere ID     This is your Care EveryWhere ID. This could be used by other organizations to access your [place] medical records  [id number]        Your Vitals Were     BMI (Body Mass Index)                   22.92 kg/m2            Blood Pressure from Last 3 Encounters:   11/19/18 132/74   11/19/18 137/76   10/30/18 135/76    Weight from Last 3 Encounters:   11/20/18 76.7 kg (169 lb)   11/19/18 77.6 kg (171 lb)   11/19/18 77.7 kg (171 lb 4.8 oz)              We Performed the Following     LARYNGOSCOPY FLEX FIBEROPTIC, DIAGNOSTIC     LARYNGOSCOPY FLEX FIBEROPTIC, DIAGNOSTIC        Primary Care Provider Office Phone # Fax #    Lalito Moctezuma [phone]       [facility] [address] [facility] [address]        Equal Access to Services     JOZEF CLEMENTS : Hadii aad ku hadasho Soomaali, waaxda luqadaha, qaybta kaalmada adeegyada, waxay geniain haykeshavn ayleen fernandez. So [facility] [phone].    ATENCIÓN: Si habla español, tiene a corona disposición servicios gratuitos de asistencia lingüística. Uche al [phone].    We comply with applicable federal civil rights laws and [place] laws. We do not discriminate on the basis of race, color, national origin, age, disability, sex, sexual orientation, or gender identity.            Thank you!     Thank you for choosing [facility]SE [facility] TH[facility]AT  for your care. Our goal is always to provide you with excellent care. Hearing back from our patients is one way we can continue to improve our services. Please take a few minutes to complete the written  survey that you may receive in the mail after your visit with us. Thank you!             Your Updated Medication List - Protect others around you: Learn how to safely use, store and throw away your medicines at www.disposemymeds.org.          This list is accurate as of 11/20/18 11:59 PM.  Always use your most recent med list.                   Brand Name Dispense Instructions for use Diagnosis    aspirin 325 MG tablet    ASA     Take 325 mg by mouth        IBUPROFEN PO      Take 200 mg by mouth        LORazepam 0.5 MG tablet    ATIVAN    30 tablet    Take 1 tablet (0.5 mg) by mouth every 6 hours as needed for nausea    Nausea and vomiting, intractability of vomiting not specified, unspecified vomiting type       MULTIVITAMIN & MINERAL PO

## 2018-11-20 NOTE — NURSING NOTE
Chief Complaint   Patient presents with     RECHECK     follow up     Weight 76.7 kg (169 lb).    Wally Schmid LPN

## 2018-11-20 NOTE — LETTER
11/20/2018       RE: Lazaro Porter  [address] [facility] [address]     Dear Colleague,    Thank you for referring your patient, Lazaro Porter, to the [facility] E[facility] at [facility]. Please see a copy of my visit note below.    HISTORY OF PRESENT ILLNESS:  Mr. Porter is here for follow-up today.  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  He underwent chemoradiotherapy for this.  He lost about 30 pounds total.  He is doing better at the present time today.  He is not quite back to normal.  Food does not taste quite right, still a lot like cardboard.  No other current complaints.      PHYSICAL EXAMINATION:  The patient is alert, oriented x3 and pleasant.  Skin of the face, lips, and neck on him is otherwise quite normal.  Neck exam shows no masses, adenopathy or thyromegaly.  Oral cavity and oropharynx is otherwise clear.      PROCEDURE:  I did a flexible direct scope exam today as well after topical anesthesia.  The nasal cavity, nasopharynx, oral cavity, oropharynx and hypopharynx are all clear.  Tongue base is well-healed.  There are no mucositis changes.  There is excellent true vocal mobility.  There is no pooling of secretions or anything else of this nature.      ASSESSMENT:  Patient status post oropharyngeal malignancy and treatment.        PLAN:  He is going to come back and see us again in about five or six weeks to see how he is doing.       Garcia Worthy MD

## 2018-11-20 NOTE — PROGRESS NOTES
HISTORY OF PRESENT ILLNESS:  Mr. Porter is here for follow-up today.  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  He underwent chemoradiotherapy for this.  He lost about 30 pounds total.  He is doing better at the present time today.  He is not quite back to normal.  Food does not taste quite right, still a lot like cardboard.  No other current complaints.      PHYSICAL EXAMINATION:  The patient is alert, oriented x3 and pleasant.  Skin of the face, lips, and neck on him is otherwise quite normal.  Neck exam shows no masses, adenopathy or thyromegaly.  Oral cavity and oropharynx is otherwise clear.      PROCEDURE:  I did a flexible direct scope exam today as well after topical anesthesia.  The nasal cavity, nasopharynx, oral cavity, oropharynx and hypopharynx are all clear.  Tongue base is well-healed.  There are no mucositis changes.  There is excellent true vocal mobility.  There is no pooling of secretions or anything else of this nature.      ASSESSMENT:  Patient status post oropharyngeal malignancy and treatment.        PLAN:  He is going to come back and see us again in about five or six weeks to see how he is doing.      FO/ms

## 2018-11-28 ENCOUNTER — CARE COORDINATION (OUTPATIENT)
Dept: ONCOLOGY | Facility: CLINIC | Age: 68
End: 2018-11-28

## 2018-11-28 NOTE — PROGRESS NOTES
TC from pt requesting to postpone his follow-up appt with Dr. Rolon from 12/18 to 01/08 as [place] two [place] app[place] on 01/07, as well as has plans to see Dr. Worthy on 01/08. Message sent to Dr. Rolon with pt's request. Await response.

## 2018-11-29 NOTE — PROGRESS NOTES
TC to pt per Dr. Rolon:    RE: Patient Request  Received: Yesterday       Pillo Rolon MD Jankovich, Diana, KARRIE                   Fine with me. Move it to 1/8/19. AR       Pt stated understanding. Told pt to expect a call from scheduling team once appt is made. Message sent to [facility] Desk pool to schedule pt's appt.

## 2019-01-07 ENCOUNTER — HOSPITAL ENCOUNTER (OUTPATIENT)
Dept: PET IMAGING | Facility: CLINIC | Age: 69
End: 2019-01-07
Attending: RADIOLOGY
Payer: MEDICARE

## 2019-01-07 ENCOUNTER — OFFICE VISIT (OUTPATIENT)
Dept: RADIATION ONCOLOGY | Facility: CLINIC | Age: 69
End: 2019-01-07
Attending: RADIOLOGY
Payer: MEDICARE

## 2019-01-07 ENCOUNTER — HOSPITAL ENCOUNTER (OUTPATIENT)
Dept: PET IMAGING | Facility: CLINIC | Age: 69
Discharge: HOME | End: 2019-01-07
Attending: RADIOLOGY | Admitting: RADIOLOGY
Payer: MEDICARE

## 2019-01-07 VITALS
SYSTOLIC BLOOD PRESSURE: 166 MMHG | DIASTOLIC BLOOD PRESSURE: 91 MMHG | WEIGHT: 160 LBS | BODY MASS INDEX: 21.7 KG/M2 | HEART RATE: 78 BPM

## 2019-01-07 DIAGNOSIS — C01 MALIGNANT NEOPLASM OF BASE OF TONGUE (H): ICD-10-CM

## 2019-01-07 DIAGNOSIS — C01 MALIGNANT NEOPLASM OF BASE OF TONGUE (H): Primary | ICD-10-CM

## 2019-01-07 LAB — GLUCOSE BLDC GLUCOMTR-MCNC: 97 MG/DL (ref 70–99)

## 2019-01-07 PROCEDURE — 82962 GLUCOSE BLOOD TEST: CPT

## 2019-01-07 PROCEDURE — G0463 HOSPITAL OUTPT CLINIC VISIT: HCPCS | Mod: 25 | Performed by: RADIOLOGY

## 2019-01-07 PROCEDURE — 31231 NASAL ENDOSCOPY DX: CPT | Performed by: RADIOLOGY

## 2019-01-07 PROCEDURE — 70491 CT SOFT TISSUE NECK W/DYE: CPT

## 2019-01-07 PROCEDURE — 34300033 ZZH RX 343: Performed by: RADIOLOGY

## 2019-01-07 PROCEDURE — 25000128 H RX IP 250 OP 636: Performed by: RADIOLOGY

## 2019-01-07 PROCEDURE — 71260 CT THORAX DX C+: CPT

## 2019-01-07 PROCEDURE — A9552 F18 FDG: HCPCS | Performed by: RADIOLOGY

## 2019-01-07 PROCEDURE — 78816 PET IMAGE W/CT FULL BODY: CPT | Mod: PS

## 2019-01-07 RX ORDER — IOPAMIDOL 755 MG/ML
45-135 INJECTION, SOLUTION INTRAVASCULAR ONCE
Status: COMPLETED | OUTPATIENT
Start: 2019-01-07 | End: 2019-01-07

## 2019-01-07 RX ADMIN — IOPAMIDOL 104 ML: 755 INJECTION, SOLUTION INTRAVENOUS at 11:13

## 2019-01-07 RX ADMIN — FLUDEOXYGLUCOSE F-18 10.61 MCI.: 500 INJECTION, SOLUTION INTRAVENOUS at 10:00

## 2019-01-07 NOTE — LETTER
2019      RE: Lazaro Porter  [address] [facility] [address]       FOLLOW-UP VISIT    Patient Name: Lazaro Porter      : 1950     Age: 68 year old        ______________________________________________________________________________     Chief Complaint   Patient presents with     Cancer     Pt is here for a follow up:Base of Tongue Cancer: Radiation dose 6996 cGy completed 10/13/18     BP (!) 166/91   Pulse 78   Wt 72.6 kg (160 lb)   BMI 21.70 kg/m            Pain  Denies    Labs  Other Labs: No    Imaging  PET: today      Dental:   Most Recent Dental Visit: Yes    Speech/Swallowing:   Most Recent evaluation or testing: No  Swallowing Restrictions: No difficulties with swallowing    Trismus/Jaw Exercises: No    Nutrition:    Weight:   Wt Readings from Last 3 Encounters:   19 72.6 kg (160 lb)   18 76.7 kg (169 lb)   18 77.6 kg (171 lb)         Oral Symptoms:   Xerostomia:1- Symptomatic without significant dietary alteration; unstimulated saliva flow >0.2 ml/min  Dysphagia: 1- Symtomatic, able to eat regulat diet  Mucositis Oral Symptoms: 0-None  Mucositis: 0- None  Esophagitis:0- None    Other Appointments:     MD Name: Dr Rolon Appointment Date: tomorrow   MD Name: Appointment Date:   MD Name: Appointment Date:   Other Appointment Notes:     Residual Radiation side effect: Some dysphagia, dry mouth   Additional Instructions:     Nurse face-to-face time: Level 3:  10 min face to face time  2019   12:14 PM  Scope used today  #2 Pentax Nasolaryngoscope FNL 10P2 Serial number [id number]        Attending addendum:   I saw and examined the patient with the resident and agree with the documented plan of care.    Mr. Porter is doing well on examination and continues to make a steady recovery in his acute radiation-induced toxicities. Review of his 3-month restaging PET/CT demonstrates no evidence of residual disease. I will see him back in clinic in 3 months for a routine surveillance  visit. In the interim, I will refer him to lymphedema therapy for evaluation and treatment of his submental lymphedema.    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    RADIATION THERAPY DELIVERED:   6996 cGy delivered in 33 daily fractions, from 2018 - 10/13/2018    SYSTEMIC THERAPY:  Cisplatin 100 mg/m  (initially began with high-dose therapy every 3 weeks with his second infusion delayed 2 days secondary to neutropenia and his final cycle switch to a low-dose 40 mg/m  secondary to ongoing cytopenias)    INTERVAL SINCE COMPLETION OF RADIATION THERAPY:   Approximately 3 months    SUBJECTIVE:   Lazaro Porter is a 68 year old male with a PMH significant for a 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 He underwent concurrent chemoradiotherapy with curative intent as described above, tolerating treatment reasonably well with the anticipated acute nausea, mucositis and dermatitis. Systemic therapy was slightly altered with his second course of high-dose cisplatin delayed 2 days secondary to neutropenia and his final infusion switch to a low-dose (40 mg/m )  infusion due to persistent cytopenias. He returns to radiation oncology clinic today for a routine post-treatment surveillance visit.    On exam, Mr. Porter reports that he is doing reasonably well and has no pressing concerns or complaints. He continues to recover well from his acute RT-related toxicities. He is back to eating a normal diet  without any significant odynophagia or dysphagia; very spicy foods are still bothersome, but this has consistently improved over time. He continues to have diminished taste, but feels this is improving as well. His main frustration is that he would like to regain some weight, but its has been stable over the last 2 months. His energy level and stamina are not quite back to baseline but have significantly improved. He denies problems with hearing, headaches, visual changes, or epistaxis. He tries to do ROM exercises for the neck, but he does feel that he never had great range of motion. His remaining ROS are otherwise unremarkable.    PHYSICAL EXAM:  Vitals: BP (!) 166/91   Pulse 78   Wt 72.6 kg (160 lb)   BMI 21.70 kg/m     Gen: Alert, in NAD  Eyes: PERRL, EOMI, sclera anicteric  HENT     Head: NC/AT     Nose/sinus: No rhinorrhea or epistaxis     Oral Cavity/Oropharynx: Mildly dry mucous membranes. No visible oral cavity or oropharyngeal lesions. No evidence of thrush or mucositis. Exam demonstrating post-radiotherapy changes involving the tongue base without any visible evidence of residual masses/nodularity. Palpation of the oral and base of tongue demonstrates mild fibrosis diffusely without discrete masses concerning for residual disease.   Neck: Mild submental lymphedema. Full range of motion. No palpable cervical adenopathy.  Pulm: No wheezing, stridor or respiratory distress  CV: Well-perfused, no cyanosis, no pedal edema  Musculoskeletal: Normal bulk and tone   Skin: Mild scattered hyperpigmentation of the bilateral neck (right > left). No evidence of desquamation or ulceration.  Neurologic: A/Ox3, CN II-XII intact    Flexible Fiberoptic Nasopharyngoscopy:  Consent for fiberoptic laryngoscopy was obtained, and we confirmed correctness of procedure and identity of patient. Fiberoptic endoscope was indicated due to post-treatment surveillance. The nose was topically decongested and anesthetized through the  "left and right nares. The fiberoptic endoscope was passed sequentially through the right and then the left naris under endoscopic vision. The right nares appeared normal but with difficult passage into nasopharynx. After switching to the left naris, passage into the nasopharynx was performed. The nasopharynx, tonsils, and base of tongue demonstrated mild fibrosis with a small well healed defect in the right base of tongue secondary to prior lingual tonsillectomy. There was mild to moderate edema of the epiglottis and bilateral arytenoids. The cords were mobile bilaterally and there was no pooling of secretions within the hypopharynx. The scope was then withdrawn without incident.    LABS AND IMAGING:  PET/CT 1/7/19: Official read pending at time of visit but we informed Mr. Porter that by our review, the FDG avidity in the right level II node appears to have completely resolved. There are no new lesions or enlarged lymph nodes in the neck.    Final radiologic impression:  \"1. Interval resolution of hypermetabolic right level II lymph node. No  residual radiotracer activity in this location and no other  lymphadenopathy.  2. No evidence of mucosal, ivana, or soft tissue abnormality on  contrast enhanced neck CT.  3. Please refer to the whole body PET CT performed as a separate  report, for the findings of the remainder of the body.\"       IMPRESSION:   Mr. 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 He is approximately 3 months out from the completion of concurrent chemoradiotherapy and is doing very well, with continued recovery from his acute radiation-induced toxicities. He has no clinical or radiographic evidence concerning for residual disease with excellent PET response on today's scan.    PLAN:   1. Follow-up in radiation oncology clinic in 3 months with a restaging PET/CT  2. Referral to lymphedema therapy for evaluation and " treatment of submental lymphedema placed today.  3. Continue to follow-up with medical oncology (Dr. Rolon) and head and neck surgery (Dr. Worthy) as scheduled for ongoing disease surveillance; we did explain the importance of ENT follow-up for clinical surveillance for recurrence.    Dale Silverman MD PGY-5  Radiation Oncology Resident, [facility]  Pager: [phone]    Patient was seen and evaluated with staff, Dr. Garay.    Nathan Garay MD

## 2019-01-07 NOTE — Clinical Note
2019       RE: Lazaro Porter  [address] [facility] [address]     Dear Colleague,    Thank you for referring your patient, Lazaro Porter, to the RADIATION ONCOLOGY CLINIC. Please see a copy of my visit note below.    FOLLOW-UP VISIT    Patient Name: Lazaro Porter      : 1950     Age: 68 year old        ______________________________________________________________________________     Chief Complaint   Patient presents with     Cancer     Pt is here for a follow up:Base of Tongue Cancer: Radiation dose 6996 cGy completed 10/13/18     BP (!) 166/91   Pulse 78   Wt 72.6 kg (160 lb)   BMI 21.70 kg/m            Pain  Denies    Labs  Other Labs: No    Imaging  PET: today      Dental:   Most Recent Dental Visit: Yes    Speech/Swallowing:   Most Recent evaluation or testing: No  Swallowing Restrictions: No difficulties with swallowing    Trismus/Jaw Exercises: No    Nutrition:    Weight:   Wt Readings from Last 3 Encounters:   19 72.6 kg (160 lb)   18 76.7 kg (169 lb)   18 77.6 kg (171 lb)         Oral Symptoms:   Xerostomia:1- Symptomatic without significant dietary alteration; unstimulated saliva flow >0.2 ml/min  Dysphagia: 1- Symtomatic, able to eat regulat diet  Mucositis Oral Symptoms: 0-None  Mucositis: 0- None  Esophagitis:0- None    Other Appointments:     MD Name: Dr Rolon Appointment Date: tomorrow   MD Name: Appointment Date:   MD Name: Appointment Date:   Other Appointment Notes:     Residual Radiation side effect: Some dysphagia, dry mouth   Additional Instructions:     Nurse face-to-face time: Level 3:  10 min face to face time  2019   12:14 PM  Scope used today  #2 Pentax Nasolaryngoscope FNL 10P2 Serial number [id number]        Again, thank you for allowing me to participate in the care of your patient.      Sincerely,    Nathan Garay MD

## 2019-01-07 NOTE — PROGRESS NOTES
FOLLOW-UP VISIT    Patient Name: Lazaro Porter      : 1950     Age: 68 year old        ______________________________________________________________________________     Chief Complaint   Patient presents with     Cancer     Pt is here for a follow up:Base of Tongue Cancer: Radiation dose 6996 cGy completed 10/13/18     BP (!) 166/91   Pulse 78   Wt 72.6 kg (160 lb)   BMI 21.70 kg/m           Pain  Denies    Labs  Other Labs: No    Imaging  PET: today      Dental:   Most Recent Dental Visit: Yes    Speech/Swallowing:   Most Recent evaluation or testing: No  Swallowing Restrictions: No difficulties with swallowing    Trismus/Jaw Exercises: No    Nutrition:    Weight:   Wt Readings from Last 3 Encounters:   19 72.6 kg (160 lb)   18 76.7 kg (169 lb)   18 77.6 kg (171 lb)         Oral Symptoms:   Xerostomia:1- Symptomatic without significant dietary alteration; unstimulated saliva flow >0.2 ml/min  Dysphagia: 1- Symtomatic, able to eat regulat diet  Mucositis Oral Symptoms: 0-None  Mucositis: 0- None  Esophagitis:0- None    Other Appointments:     MD Name: Dr Rolon Appointment Date: tomorrow   MD Name: Appointment Date:   MD Name: Appointment Date:   Other Appointment Notes:     Residual Radiation side effect: Some dysphagia, dry mouth   Additional Instructions:     Nurse face-to-face time: Level 3:  10 min face to face time  2019   12:14 PM  Scope used today  #2 Pentax Nasolaryngoscope FNL 10P2 Serial number [id number]

## 2019-01-08 ENCOUNTER — ONCOLOGY VISIT (OUTPATIENT)
Dept: ONCOLOGY | Facility: CLINIC | Age: 69
End: 2019-01-08
Attending: INTERNAL MEDICINE
Payer: MEDICARE

## 2019-01-08 ENCOUNTER — APPOINTMENT (OUTPATIENT)
Dept: LAB | Facility: CLINIC | Age: 69
End: 2019-01-08
Attending: INTERNAL MEDICINE
Payer: MEDICARE

## 2019-01-08 VITALS
SYSTOLIC BLOOD PRESSURE: 149 MMHG | DIASTOLIC BLOOD PRESSURE: 90 MMHG | WEIGHT: 161.8 LBS | TEMPERATURE: 97.6 F | HEART RATE: 63 BPM | OXYGEN SATURATION: 97 % | RESPIRATION RATE: 16 BRPM | BODY MASS INDEX: 21.94 KG/M2

## 2019-01-08 DIAGNOSIS — E03.9 HYPOTHYROIDISM, UNSPECIFIED TYPE: ICD-10-CM

## 2019-01-08 DIAGNOSIS — C01 MALIGNANT NEOPLASM OF BASE OF TONGUE (H): Primary | ICD-10-CM

## 2019-01-08 DIAGNOSIS — Z23 NEED FOR IMMUNIZATION AGAINST INFLUENZA: ICD-10-CM

## 2019-01-08 DIAGNOSIS — T66.XXXA ADVERSE EFFECT OF RADIATION, INITIAL ENCOUNTER: ICD-10-CM

## 2019-01-08 LAB
ALBUMIN SERPL-MCNC: 3.7 G/DL (ref 3.4–5)
ALP SERPL-CCNC: 86 U/L (ref 40–150)
ALT SERPL W P-5'-P-CCNC: 22 U/L (ref 0–70)
ANION GAP SERPL CALCULATED.3IONS-SCNC: 6 MMOL/L (ref 3–14)
AST SERPL W P-5'-P-CCNC: 18 U/L (ref 0–45)
BASOPHILS # BLD AUTO: 0 10E9/L (ref 0–0.2)
BASOPHILS NFR BLD AUTO: 0.5 %
BILIRUB SERPL-MCNC: 0.4 MG/DL (ref 0.2–1.3)
BUN SERPL-MCNC: 15 MG/DL (ref 7–30)
CALCIUM SERPL-MCNC: 8.7 MG/DL (ref 8.5–10.1)
CHLORIDE SERPL-SCNC: 107 MMOL/L (ref 94–109)
CO2 SERPL-SCNC: 28 MMOL/L (ref 20–32)
CREAT SERPL-MCNC: 1 MG/DL (ref 0.66–1.25)
DIFFERENTIAL METHOD BLD: ABNORMAL
EOSINOPHIL # BLD AUTO: 0.1 10E9/L (ref 0–0.7)
EOSINOPHIL NFR BLD AUTO: 2.5 %
ERYTHROCYTE [DISTWIDTH] IN BLOOD BY AUTOMATED COUNT: 12.7 % (ref 10–15)
GFR SERPL CREATININE-BSD FRML MDRD: 77 ML/MIN/{1.73_M2}
GLUCOSE SERPL-MCNC: 97 MG/DL (ref 70–99)
HCT VFR BLD AUTO: 39.4 % (ref 40–53)
HGB BLD-MCNC: 12.1 G/DL (ref 13.3–17.7)
IMM GRANULOCYTES # BLD: 0 10E9/L (ref 0–0.4)
IMM GRANULOCYTES NFR BLD: 0.2 %
LYMPHOCYTES # BLD AUTO: 0.7 10E9/L (ref 0.8–5.3)
LYMPHOCYTES NFR BLD AUTO: 15.9 %
MCH RBC QN AUTO: 29.7 PG (ref 26.5–33)
MCHC RBC AUTO-ENTMCNC: 30.7 G/DL (ref 31.5–36.5)
MCV RBC AUTO: 97 FL (ref 78–100)
MONOCYTES # BLD AUTO: 0.5 10E9/L (ref 0–1.3)
MONOCYTES NFR BLD AUTO: 10.4 %
NEUTROPHILS # BLD AUTO: 3.1 10E9/L (ref 1.6–8.3)
NEUTROPHILS NFR BLD AUTO: 70.5 %
NRBC # BLD AUTO: 0 10*3/UL
NRBC BLD AUTO-RTO: 0 /100
PHOSPHATE SERPL-MCNC: 3.8 MG/DL (ref 2.5–4.5)
PLATELET # BLD AUTO: 189 10E9/L (ref 150–450)
POTASSIUM SERPL-SCNC: 4.1 MMOL/L (ref 3.4–5.3)
PROT SERPL-MCNC: 6.6 G/DL (ref 6.8–8.8)
RBC # BLD AUTO: 4.07 10E12/L (ref 4.4–5.9)
SODIUM SERPL-SCNC: 141 MMOL/L (ref 133–144)
TSH SERPL DL<=0.005 MIU/L-ACNC: 1.39 MU/L (ref 0.4–4)
WBC # BLD AUTO: 4.3 10E9/L (ref 4–11)

## 2019-01-08 PROCEDURE — 90662 IIV NO PRSV INCREASED AG IM: CPT | Mod: ZF | Performed by: INTERNAL MEDICINE

## 2019-01-08 PROCEDURE — 85025 COMPLETE CBC W/AUTO DIFF WBC: CPT | Performed by: INTERNAL MEDICINE

## 2019-01-08 PROCEDURE — 84100 ASSAY OF PHOSPHORUS: CPT | Performed by: INTERNAL MEDICINE

## 2019-01-08 PROCEDURE — 84443 ASSAY THYROID STIM HORMONE: CPT | Performed by: INTERNAL MEDICINE

## 2019-01-08 PROCEDURE — G0008 ADMIN INFLUENZA VIRUS VAC: HCPCS

## 2019-01-08 PROCEDURE — G0463 HOSPITAL OUTPT CLINIC VISIT: HCPCS | Mod: 25

## 2019-01-08 PROCEDURE — 25000128 H RX IP 250 OP 636: Mod: ZF | Performed by: INTERNAL MEDICINE

## 2019-01-08 PROCEDURE — 99213 OFFICE O/P EST LOW 20 MIN: CPT | Mod: GC | Performed by: INTERNAL MEDICINE

## 2019-01-08 PROCEDURE — 36415 COLL VENOUS BLD VENIPUNCTURE: CPT

## 2019-01-08 PROCEDURE — 80053 COMPREHEN METABOLIC PANEL: CPT | Performed by: INTERNAL MEDICINE

## 2019-01-08 RX ADMIN — INFLUENZA A VIRUS A/MICHIGAN/45/2015 X-275 (H1N1) ANTIGEN (FORMALDEHYDE INACTIVATED), INFLUENZA A VIRUS A/SINGAPORE/INFIMH-16-0019/2016 IVR-186 (H3N2) ANTIGEN (FORMALDEHYDE INACTIVATED), AND INFLUENZA B VIRUS B/MARYLAND/15/2016 BX-69A (A B/COLORADO/6/2017-LIKE VIRUS) ANTIGEN (FORMALDEHYDE INACTIVATED) 0.5 ML: 60; 60; 60 INJECTION, SUSPENSION INTRAMUSCULAR at 16:08

## 2019-01-08 ASSESSMENT — PAIN SCALES - GENERAL: PAINLEVEL: NO PAIN (0)

## 2019-01-08 NOTE — NURSING NOTE
Chief Complaint   Patient presents with     Blood Draw     Labs drawn via  by RN, VS taken     Cris Brooke RN

## 2019-01-08 NOTE — PROGRESS NOTES
"MEDICAL ONCOLOGY CLINIC NOTE    REFERRING PROVIDER: Garcia Worthy MD from [facility]  RADIATION ONCOLOGIST: Nathan Garay MD from [facility]    REASON FOR CURRENT VISIT: Evaluation after concurrent cisplatin plus radiation therapy for p16+ stage I squamous cell carcinoma of the right base of the tongue.    HISTORY OF PRESENT ILLNESS: Mr. 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 His oncologic history is as under.     INTERVAL HISTORY:   Doing well overall since last visit.  Gaining some weight.  Still notes sore tongue with swallowing, but this is not limiting in terms of what or how much he can eat.  Food still tastes somewhat like cardboard, but he is eating and drinking well.  Activity is increasing as strength comes back.  No fevers, chills, night sweats, nausea, vomiting, CP, or SOB.  No dysphagia or choking with eating.  Overall feels he is recovering well and feels blair for cares he received and for getting them early.  Notes swelling of neck still.  No rashes or wounds.  No oral ulcers.  Denies other complaints at this time.      ONCOLOGIC HISTORY:  1. 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   - Noticed enlarging neck mass in March 2018. In 5/2018 this was reassessed and prompted imaging and ultimately a biopsy of the mass at [facility] ([place], [place]) which demonstrated squamous cell carcinoma.   - PET/CT 7/10/18: \"3.5 x 2.2 cm hypermetabolic right level 2 metastatic lymphadenopathy. There is imaging findings suggestive of extranodal extension. The mass is inseparable from the right SCM and abuts the right internal carotid artery about 180 degrees. No abnormal focal FDG uptake along the mucosal spaces to suggest a primary squamous " "cell cancer focus. Several subcentimeter pulmonary nodules with no increased FDG uptake. Attention on follow up is recommended. Multiple, too small to characterize hypodensities in the liver. A 9 mm nodule in the right adrenal gland, without increased FDG uptake. A 7 mm hypodensity in the pancreatic tail with no increased FDG uptake, may be a side branch IPMN. Tiny lucencies in the left iliac bone without increased FDG uptake, possibly focal osteoporotic areas.\"  - Seen by ENT and underwent DL with directed biopsies on 7/18/18 with pathology showing no evidence of malignancy within the mucosal sites.   - A Robotic demucosalization of the right tongue base on 8/3/18 was performed and pathology from this pathology demonstrated p16 positive nonkeratinizing squamous cell carcinoma in the level node lymph node and in the right tongue base.   - Started concurrent cisplatin plus radiation therapy based on tumor board recommendations. Cisplatin 100mg/m2 cycle 1: 8/28/18. Cycle 2 - delayed by 2 days due to  and given on 9/21/18. Cycle 3 - unable to tolerate 100mg/m2 due to myelosuppression and patient preference. Ultimately received cisplatin 40mg/m2 x1 on 10/12/18. RT - 6996 cGy over 33 fractions - 8/29/18 to 10/13/18.   -PET CT 1/7/19 \"1. Interval resolution of hypermetabolic right level II lymph node. No residual radiotracer activity in this location and no other  Lymphadenopathy. No evidence of mucosal, ivana, or soft tissue abnormality on contrast enhanced neck CT.\" No metastatic disease in chest, abd, or pelvis.      REVIEW OF SYSTEMS:  14 point ROS negative other than the symptoms noted above in the HPI.    PAST MEDICAL HISTORY:   Past Medical History:   Diagnosis Date     Head and neck cancer (H)      PAST SURGICAL HISTORY:   Past Surgical History:   Procedure Laterality Date     DAVINCI RESECT TUMOR TRANSORAL Bilateral 8/3/2018    Procedure: DAVINCI RESECT TUMOR TRANSORAL;  Transoral Robotic Resection of the " Base of Tongue,   IR Guided Core Needle Biopsy of Right Neck Mass;  Surgeon: Blas Calvillo MD;  Location: [facility] OR     FINE NEEDLE ASPIRATION WITH IMAGING GUIDANCE Right 8/3/2018    Procedure: FINE NEEDLE ASPIRATION WITH IMAGING GUIDANCE;  Ultrasound guided Core Biopsy of a Right Neck Mass;  Surgeon: Denis Monson MD;  Location: [facility] OR     HEAD & NECK SURGERY       LARYNGOSCOPY WITH BIOPSY(IES) Bilateral 2018    Procedure: LARYNGOSCOPY WITH BIOPSY(IES);  Direct Laryngoscopy with Direct Biopsies;  Surgeon: Garcia Worthy MD;  Location: [facility] OR     TONSILLECTOMY     In addition:  1. Irritated sweat gland removed   2. Fatty tumor on his back removed    3. Cataract surgery     SOCIAL HISTORY:   Social History     Socioeconomic History     Marital status:      Spouse name: Not on file     Number of children: Not on file     Years of education: Not on file     Highest education level: Not on file   Social Needs     Financial resource strain: Not on file     Food insecurity - worry: Not on file     Food insecurity - inability: Not on file     Transportation needs - medical: Not on file     Transportation needs - non-medical: Not on file   Occupational History     Not on file   Tobacco Use     Smoking status: Never Smoker     Smokeless tobacco: Never Used   Substance and Sexual Activity     Alcohol use: Yes     Comment: occasionally     Drug use: No     Sexual activity: Yes     Partners: Female     Birth control/protection: Other   Other Topics Concern     Not on file   Social History Narrative     Not on file   Patient is from [place], [place]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     FAMILY HISTORY:   Brother with clotting issue  Mother, unknown primary and    Maternal aunt with colon cancer  Maternal grandfather with head/neck cancer  Maternal cousins with cancers unknown types    ALLERGIES:  No Known Allergies    CURRENT MEDICATIONS:    Current Outpatient Medications:      aspirin 325 MG tablet, Take 325 mg by mouth, Disp: , Rfl:      CANNABIDIOL, HEMP OIL/EXTRACT, OR CBD PRODUCT OTHER THAN MEDICAL CANNABIS,, Take by mouth daily 2 drops under tongue once a day, Disp: , Rfl:      Multiple Vitamins-Minerals (MULTIVITAMIN & MINERAL PO), , Disp: , Rfl:      UNABLE TO FIND, Take by mouth daily MEDICATION NAME: CDB capsule once daily, Disp: , Rfl:      UNABLE TO FIND, Take by mouth daily MEDICATION NAME: Unknown oil in a capsule that is supposed to promote healing, Disp: , Rfl:      IBUPROFEN PO, Take 200 mg by mouth, Disp: , Rfl:      LORazepam (ATIVAN) 0.5 MG tablet, Take 1 tablet (0.5 mg) by mouth every 6 hours as needed for nausea (Patient not taking: Reported on 1/8/2019), Disp: 30 tablet, Rfl: 0  No current facility-administered medications for this visit.     Facility-Administered Medications Ordered in Other Visits:      lidocaine 3%, phenylephrine 0.25% solution for irrigation, 10 mL, Irrigation, Once, Nathan Garay MD    PHYSICAL EXAMINATION:  Vital signs: /90 (BP Location: Right arm, Patient Position: Sitting, Cuff Size: Adult Regular)   Pulse 63   Temp 97.6  F (36.4  C) (Oral)   Resp 16   Wt 73.4 kg (161 lb 12.8 oz)   SpO2 97%   BMI 21.94 kg/m    ECOG performance status of 0.  GENERAL: Thin man, appears stated age, NAD   HEENT: Clear oropharynx, OP clear, no oral ulcerations. No tongue mass appreciated.   NECK: No cervical/supraclavicular LAD,   LUNGS: CTAB, normal WOB on RA  CARDIOVASCULAR: Regular rate and rhythm, no murmurs, gallops or rubs, 2+ radial pulses b/l   ABDOMEN: Soft, nontender and nondistended, bowel sounds present.  EXTREMITIES: WWPx4, no edema  NEUROLOGIC: CNII-XII grossly intact, normal gait, linear thought process    LABORATORY DATA:   Tprotein 6.6   Remainder CMP WNL  TSH 1.39    WBC 4.3  Hgb 12.1  Plt 189    IMAGING STUDIES:  PET CT 1/7/19-No evidence of mets in chest, abd, or pelvis.    CT neck  with resolution of level II LN, no LAD, no other mucosal, ivana or soft tissue abnormality.      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 Chemo associated complications have resolved and is gaining weight and strenght  - No clinical evidence of disease recurrence on physical exam and PET CT completed   - Return to clinic in 3 months for follow up. Can coordinate with radiation oncology. Will continue surveillance at this time.       All questions were answered, and patient and family were in complete agreement with this plan.     Patient seen and discussed with Dr. Rolon.     Robert Saldana MD, PhD  Hematology/Oncology Fellow  Pager: [phone]      ATTENDING ATTESTATION NOTE  I, Pillo Rolon, personally examined and evaluated this patient. I personally reviewed vital signs, medications, labs and imaging. I discussed the patient with the fellow, Dr. Saldana, and agree with the assessment and plan of care as documented in this progress note from today.   Key findings: Reviewed the 3-months restaging PET/CT findings at length with patient. It is encouraging for no evidence of residual disease. Spent considerable time counseling on mgmt of side effects (lymphedema, speech, swallow) and active surveillance, including the signs/symptoms to monitor for disease recurrence and his role in monitoring for recurrence. He's agreeable. Return to see me in 3 months.   BILLIN.  Pillo Rolon  Oncology Attending

## 2019-01-08 NOTE — NURSING NOTE
"Oncology Rooming Note    January 8, 2019 2:42 PM   Lazaro Porter is a 68 year old male who presents for:    Chief Complaint   Patient presents with     Blood Draw     Labs drawn via  by RN, VS taken     Oncology Clinic Visit     Return; Tongue CA     Initial Vitals: /90 (BP Location: Right arm, Patient Position: Sitting, Cuff Size: Adult Regular)   Pulse 63   Temp 97.6  F (36.4  C) (Oral)   Resp 16   Wt 73.4 kg (161 lb 12.8 oz)   SpO2 97%   BMI 21.94 kg/m   Estimated body mass index is 21.94 kg/m  as calculated from the following:    Height as of 10/30/18: 1.829 m (6' 0.01\").    Weight as of this encounter: 73.4 kg (161 lb 12.8 oz). Body surface area is 1.93 meters squared.  No Pain (0) Comment: Data Unavailable   No LMP for male patient.  Allergies reviewed: Yes  Medications reviewed: Yes    Medications: Medication refills not needed today.  Pharmacy name entered into Common [facility]ulum: [facility] - [place] [address] - [address]    Clinical concerns: No Concerns. Rolon was not notified.    8 minutes for nursing intake (face to face time)     Jenna Cui MA              "

## 2019-01-08 NOTE — LETTER
"1/8/2019       RE: Lazaro Porter  [address] [facility] [address]     Dear Colleague,    Thank you for referring your patient, Lazaro Porter, to the [facility]. Please see a copy of my visit note below.    MEDICAL ONCOLOGY CLINIC NOTE    REFERRING PROVIDER: Garcia Worthy MD from [facility]  RADIATION ONCOLOGIST: Nathan Garay MD from [facility]iation [facility]    REASON FOR CURRENT VISIT: Evaluation after concurrent cisplatin plus radiation therapy for p16+ stage I squamous cell carcinoma of the right base of the tongue.    HISTORY OF PRESENT ILLNESS: Mr. 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 His oncologic history is as under.     INTERVAL HISTORY:   Doing well overall since last visit.  Gaining some weight.  Still notes sore tongue with swallowing, but this is not limiting in terms of what or how much he can eat.  Food still tastes somewhat like cardboard, but he is eating and drinking well.  Activity is increasing as strength comes back.  No fevers, chills, night sweats, nausea, vomiting, CP, or SOB.  No dysphagia or choking with eating.  Overall feels he is recovering well and feels blair for cares he received and for getting them early.  Notes swelling of neck still.  No rashes or wounds.  No oral ulcers.  Denies other complaints at this time.      ONCOLOGIC HISTORY:  1. 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   - Noticed enlarging neck mass in March 2018. In 5/2018 this was reassessed and prompted imaging and ultimately a biopsy of the mass at [facility] ([place], [place]) which demonstrated squamous cell carcinoma.   - PET/CT 7/10/18: \"3.5 x 2.2 cm hypermetabolic right level 2 metastatic lymphadenopathy. There is imaging findings " "suggestive of extranodal extension. The mass is inseparable from the right SCM and abuts the right internal carotid artery about 180 degrees. No abnormal focal FDG uptake along the mucosal spaces to suggest a primary squamous cell cancer focus. Several subcentimeter pulmonary nodules with no increased FDG uptake. Attention on follow up is recommended. Multiple, too small to characterize hypodensities in the liver. A 9 mm nodule in the right adrenal gland, without increased FDG uptake. A 7 mm hypodensity in the pancreatic tail with no increased FDG uptake, may be a side branch IPMN. Tiny lucencies in the left iliac bone without increased FDG uptake, possibly focal osteoporotic areas.\"  - Seen by ENT and underwent DL with directed biopsies on 7/18/18 with pathology showing no evidence of malignancy within the mucosal sites.   - A Robotic demucosalization of the right tongue base on 8/3/18 was performed and pathology from this pathology demonstrated p16 positive nonkeratinizing squamous cell carcinoma in the level node lymph node and in the right tongue base.   - Started concurrent cisplatin plus radiation therapy based on tumor board recommendations. Cisplatin 100mg/m2 cycle 1: 8/28/18. Cycle 2 - delayed by 2 days due to  and given on 9/21/18. Cycle 3 - unable to tolerate 100mg/m2 due to myelosuppression and patient preference. Ultimately received cisplatin 40mg/m2 x1 on 10/12/18. RT - 6996 cGy over 33 fractions - 8/29/18 to 10/13/18.   -PET CT 1/7/19 \"1. Interval resolution of hypermetabolic right level II lymph node. No residual radiotracer activity in this location and no other  Lymphadenopathy. No evidence of mucosal, ivana, or soft tissue abnormality on contrast enhanced neck CT.\" No metastatic disease in chest, abd, or pelvis.      REVIEW OF SYSTEMS:  14 point ROS negative other than the symptoms noted above in the HPI.    PAST MEDICAL HISTORY:   Past Medical History:   Diagnosis Date     Head and neck " cancer (H)      PAST SURGICAL HISTORY:   Past Surgical History:   Procedure Laterality Date     DAVINCI RESECT TUMOR TRANSORAL Bilateral 8/3/2018    Procedure: DAVINCI RESECT TUMOR TRANSORAL;  Transoral Robotic Resection of the Base of Tongue,   IR Guided Core Needle Biopsy of Right Neck Mass;  Surgeon: Blas Calvillo MD;  Location: [facility] OR     FINE NEEDLE ASPIRATION WITH IMAGING GUIDANCE Right 8/3/2018    Procedure: FINE NEEDLE ASPIRATION WITH IMAGING GUIDANCE;  Ultrasound guided Core Biopsy of a Right Neck Mass;  Surgeon: Denis Monson MD;  Location: [facility] OR     HEAD & NECK SURGERY       LARYNGOSCOPY WITH BIOPSY(IES) Bilateral 7/18/2018    Procedure: LARYNGOSCOPY WITH BIOPSY(IES);  Direct Laryngoscopy with Direct Biopsies;  Surgeon: Garcia Worthy MD;  Location: [facility] OR     TONSILLECTOMY     In addition:  1. Irritated sweat gland removed   2. Fatty tumor on his back removed    3. Cataract surgery     SOCIAL HISTORY:   Social History     Socioeconomic History     Marital status:      Spouse name: Not on file     Number of children: Not on file     Years of education: Not on file     Highest education level: Not on file   Social Needs     Financial resource strain: Not on file     Food insecurity - worry: Not on file     Food insecurity - inability: Not on file     Transportation needs - medical: Not on file     Transportation needs - non-medical: Not on file   Occupational History     Not on file   Tobacco Use     Smoking status: Never Smoker     Smokeless tobacco: Never Used   Substance and Sexual Activity     Alcohol use: Yes     Comment: occasionally     Drug use: No     Sexual activity: Yes     Partners: Female     Birth control/protection: Other   Other Topics Concern     Not on file   Social History Narrative     Not on file   Patient is from [place], [place]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     FAMILY HISTORY:   Brother with  clotting issue  Mother, unknown primary and    Maternal aunt with colon cancer  Maternal grandfather with head/neck cancer  Maternal cousins with cancers unknown types    ALLERGIES:  No Known Allergies    CURRENT MEDICATIONS:   Current Outpatient Medications:      aspirin 325 MG tablet, Take 325 mg by mouth, Disp: , Rfl:      CANNABIDIOL, HEMP OIL/EXTRACT, OR CBD PRODUCT OTHER THAN MEDICAL CANNABIS,, Take by mouth daily 2 drops under tongue once a day, Disp: , Rfl:      Multiple Vitamins-Minerals (MULTIVITAMIN & MINERAL PO), , Disp: , Rfl:      UNABLE TO FIND, Take by mouth daily MEDICATION NAME: CDB capsule once daily, Disp: , Rfl:      UNABLE TO FIND, Take by mouth daily MEDICATION NAME: Unknown oil in a capsule that is supposed to promote healing, Disp: , Rfl:      IBUPROFEN PO, Take 200 mg by mouth, Disp: , Rfl:      LORazepam (ATIVAN) 0.5 MG tablet, Take 1 tablet (0.5 mg) by mouth every 6 hours as needed for nausea (Patient not taking: Reported on 2019), Disp: 30 tablet, Rfl: 0  No current facility-administered medications for this visit.     Facility-Administered Medications Ordered in Other Visits:      lidocaine 3%, phenylephrine 0.25% solution for irrigation, 10 mL, Irrigation, Once, Nathan Garay MD    PHYSICAL EXAMINATION:  Vital signs: /90 (BP Location: Right arm, Patient Position: Sitting, Cuff Size: Adult Regular)   Pulse 63   Temp 97.6  F (36.4  C) (Oral)   Resp 16   Wt 73.4 kg (161 lb 12.8 oz)   SpO2 97%   BMI 21.94 kg/m     ECOG performance status of 0.  GENERAL: Thin man, appears stated age, NAD   HEENT: Clear oropharynx, OP clear, no oral ulcerations. No tongue mass appreciated.   NECK: No cervical/supraclavicular LAD,   LUNGS: CTAB, normal WOB on RA  CARDIOVASCULAR: Regular rate and rhythm, no murmurs, gallops or rubs, 2+ radial pulses b/l   ABDOMEN: Soft, nontender and nondistended, bowel sounds present.  EXTREMITIES: WWPx4, no edema  NEUROLOGIC: CNII-XII grossly  intact, normal gait, linear thought process    LABORATORY DATA:   Tprotein 6.6   Remainder CMP WNL  TSH 1.39    WBC 4.3  Hgb 12.1  Plt 189    IMAGING STUDIES:  PET CT 1/7/19-No evidence of mets in chest, abd, or pelvis.    CT neck with resolution of level II LN, no LAD, no other mucosal, ivana or soft tissue abnormality.      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 Chemo associated complications have resolved and is gaining weight and strenght  - No clinical evidence of disease recurrence on physical exam and PET CT completed 1/71/9  - Return to clinic in 3 months for follow up. Can coordinate with radiation oncology. Will continue surveillance at this time.       All questions were answered, and patient and family were in complete agreement with this plan.     Patient seen and discussed with Dr. Rolon.     Robert Saldana MD, PhD  Hematology/Oncology Fellow  Pager: [phone]      ATTENDING ATTESTATION NOTE  I, Pillo Rolon, personally examined and evaluated this patient. I personally reviewed vital signs, medications, labs and imaging. I discussed the patient with the fellow, Dr. Saldana, and agree with the assessment and plan of care as documented in this progress note from today.   Key findings: Reviewed the 3-months restaging PET/CT findings at length with patient. It is encouraging for no evidence of residual disease. Spent considerable time counseling on mgmt of side effects (lymphedema, speech, swallow) and active surveillance, including the signs/symptoms to monitor for disease recurrence and his role in monitoring for recurrence. He's agreeable. Return to see me in 3 months.    BILLIN.  Pillo Rolon  Oncology Attending

## 2019-01-08 NOTE — NURSING NOTE
Injectable Influenza Immunization Documentation    1.  Has the patient received the information for the injectable influenza vaccine? YES     2. Is the patient 6 months of age or older? YES     3. Does the patient have any of the following contraindications?         Severe allergy to eggs? No     Severe allergic reaction to previous influenza vaccines? No   Severe allergy to latex? No       History of Guillain-Guys syndrome? No     Currently have a temperature greater than 100.4F? No      Patient received flu vaccine in his right deltoid.   Patient tolerated injection with no complications.   Please see MAR.     Vaccination given by Emerson Barbosa CMA  (AAMA)

## 2019-01-09 NOTE — PROGRESS NOTES
Attending addendum:   I saw and examined the patient with the resident and agree with the documented plan of care.    Mr. Porter is doing well on examination and continues to make a steady recovery in his acute radiation-induced toxicities. Review of his 3-month restaging PET/CT demonstrates no evidence of residual disease. I will see him back in clinic in 3 months for a routine surveillance visit. In the interim, I will refer him to lymphedema therapy for evaluation and treatment of his submental lymphedema.    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    RADIATION THERAPY DELIVERED:   6996 cGy delivered in 33 daily fractions, from 2018 - 10/13/2018    SYSTEMIC THERAPY:  Cisplatin 100 mg/m  (initially began with high-dose therapy every 3 weeks with his second infusion delayed 2 days secondary to neutropenia and his final cycle switch to a low-dose 40 mg/m  secondary to ongoing cytopenias)    INTERVAL SINCE COMPLETION OF RADIATION THERAPY:   Approximately 3 months    SUBJECTIVE:   Lazaro Porter is a 68 year old male with a PMH significant for a 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 He underwent concurrent chemoradiotherapy with curative intent as described above, tolerating treatment reasonably well with the anticipated acute nausea, mucositis and dermatitis. Systemic therapy was slightly altered with his second course of high-dose cisplatin delayed 2 days secondary to neutropenia and his  final infusion switch to a low-dose (40 mg/m ) infusion due to persistent cytopenias. He returns to radiation oncology clinic today for a routine post-treatment surveillance visit.    On exam, Mr. Porter reports that he is doing reasonably well and has no pressing concerns or complaints. He continues to recover well from his acute RT-related toxicities. He is back to eating a normal diet without any significant odynophagia or dysphagia; very spicy foods are still bothersome, but this has consistently improved over time. He continues to have diminished taste, but feels this is improving as well. His main frustration is that he would like to regain some weight, but its has been stable over the last 2 months. His energy level and stamina are not quite back to baseline but have significantly improved. He denies problems with hearing, headaches, visual changes, or epistaxis. He tries to do ROM exercises for the neck, but he does feel that he never had great range of motion. His remaining ROS are otherwise unremarkable.    PHYSICAL EXAM:  Vitals: BP (!) 166/91   Pulse 78   Wt 72.6 kg (160 lb)   BMI 21.70 kg/m    Gen: Alert, in NAD  Eyes: PERRL, EOMI, sclera anicteric  HENT     Head: NC/AT     Nose/sinus: No rhinorrhea or epistaxis     Oral Cavity/Oropharynx: Mildly dry mucous membranes. No visible oral cavity or oropharyngeal lesions. No evidence of thrush or mucositis. Exam demonstrating post-radiotherapy changes involving the tongue base without any visible evidence of residual masses/nodularity. Palpation of the oral and base of tongue demonstrates mild fibrosis diffusely without discrete masses concerning for residual disease.   Neck: Mild submental lymphedema. Full range of motion. No palpable cervical adenopathy.  Pulm: No wheezing, stridor or respiratory distress  CV: Well-perfused, no cyanosis, no pedal edema  Musculoskeletal: Normal bulk and tone   Skin: Mild scattered hyperpigmentation of the bilateral neck  "(right > left). No evidence of desquamation or ulceration.  Neurologic: A/Ox3, CN II-XII intact    Flexible Fiberoptic Nasopharyngoscopy:  Consent for fiberoptic laryngoscopy was obtained, and we confirmed correctness of procedure and identity of patient. Fiberoptic endoscope was indicated due to post-treatment surveillance. The nose was topically decongested and anesthetized through the left and right nares. The fiberoptic endoscope was passed sequentially through the right and then the left naris under endoscopic vision. The right nares appeared normal but with difficult passage into nasopharynx. After switching to the left naris, passage into the nasopharynx was performed. The nasopharynx, tonsils, and base of tongue demonstrated mild fibrosis with a small well healed defect in the right base of tongue secondary to prior lingual tonsillectomy. There was mild to moderate edema of the epiglottis and bilateral arytenoids. The cords were mobile bilaterally and there was no pooling of secretions within the hypopharynx. The scope was then withdrawn without incident.    LABS AND IMAGING:  PET/CT 1/7/19: Official read pending at time of visit but we informed Mr. Porter that by our review, the FDG avidity in the right level II node appears to have completely resolved. There are no new lesions or enlarged lymph nodes in the neck.    Final radiologic impression:  \"1. Interval resolution of hypermetabolic right level II lymph node. No  residual radiotracer activity in this location and no other  lymphadenopathy.  2. No evidence of mucosal, ivana, or soft tissue abnormality on  contrast enhanced neck CT.  3. Please refer to the whole body PET CT performed as a separate  report, for the findings of the remainder of the body.\"       IMPRESSION:   Mr. 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 He is approximately 3 months out from the completion of " concurrent chemoradiotherapy and is doing very well, with continued recovery from his acute radiation-induced toxicities. He has no clinical or radiographic evidence concerning for residual disease with excellent PET response on today's scan.    PLAN:   1. Follow-up in radiation oncology clinic in 3 months with a restaging PET/CT  2. Referral to lymphedema therapy for evaluation and treatment of submental lymphedema placed today.  3. Continue to follow-up with medical oncology (Dr. Rolon) and head and neck surgery (Dr. Worthy) as scheduled for ongoing disease surveillance; we did explain the importance of ENT follow-up for clinical surveillance for recurrence.    Dale Silverman MD PGY-5  Radiation Oncology Resident, [facility]  Pager: [phone]    Patient was seen and evaluated with staff, Dr. Garay.

## 2019-02-11 PROBLEM — C01 MALIGNANT NEOPLASM OF BASE OF TONGUE (H): Status: ACTIVE | Noted: 2018-06-28

## 2019-02-24 ENCOUNTER — DOCUMENTATION ONLY (OUTPATIENT)
Dept: CARE COORDINATION | Facility: CLINIC | Age: 69
End: 2019-02-24

## 2019-03-14 ENCOUNTER — TRANSFERRED RECORDS (OUTPATIENT)
Dept: HEALTH INFORMATION MANAGEMENT | Facility: CLINIC | Age: 69
End: 2019-03-14

## 2019-04-08 ENCOUNTER — OFFICE VISIT (OUTPATIENT)
Dept: RADIATION ONCOLOGY | Facility: CLINIC | Age: 69
End: 2019-04-08
Attending: RADIOLOGY
Payer: MEDICARE

## 2019-04-08 VITALS — SYSTOLIC BLOOD PRESSURE: 131 MMHG | DIASTOLIC BLOOD PRESSURE: 75 MMHG | WEIGHT: 163.7 LBS | BODY MASS INDEX: 22.2 KG/M2

## 2019-04-08 DIAGNOSIS — C01 MALIGNANT NEOPLASM OF BASE OF TONGUE (H): Primary | ICD-10-CM

## 2019-04-08 PROCEDURE — G0463 HOSPITAL OUTPT CLINIC VISIT: HCPCS | Mod: 25 | Performed by: RADIOLOGY

## 2019-04-08 PROCEDURE — 31231 NASAL ENDOSCOPY DX: CPT | Performed by: RADIOLOGY

## 2019-04-08 NOTE — LETTER
2019      RE: Lazaro Porter  [address] [facility] [address]       Attending addendum:   I saw and examined the patient with the resident and agree with the documented plan of care.    Mr. Porter is doing well on examination with no clinical evidence of recurrent disease. He continues to have residual radiation-induced xerostomia and dysgeusia which are slowly improving since completion of therapy. I will have him return to clinic in 3 months for a routine surveillance visit with our NP and with me in 6 months. He will otherwise continue to follow-up with head and neck surgery (Dr. Worthy) and medical oncology (Dr. Rolon) as scheduled.    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     RADIATION THERAPY DELIVERED:   6996 cGy delivered in 33 daily fractions, from 2018 - 10/13/2018     SYSTEMIC THERAPY:  Cisplatin 100 mg/m  (initially began with high-dose therapy every 3 weeks with his second infusion delayed 2 days secondary to neutropenia and his final cycle switch to a low-dose 40 mg/m   infusion secondary to ongoing cytopenias)     INTERVAL SINCE COMPLETION OF RADIATION THERAPY:   Approximately  6 months    SUBJECTIVE:   Lazaro Porter is a 68 year old man with a history of 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 He was diagnosed after he initially presented with an enlarged right level 2 cervical lymph node. He tolerated treatment reasonably well with the anticipated acute nausea, mucositis and dermatitis. Systemic therapy was  slightly altered with his second course of high-dose cisplatin delayed 2 days secondary to neutropenia and his final infusion switch to a low-dose (40 mg/m ) infusion due to persistent cytopenias. He returns to radiation oncology clinic today for a routine post-treatment surveillance visit. He was last seen in radiation oncology on 1/7/19 and was recovering well at that time.     Since last visit, Mr. Lam reports that overall he is doing well.  He feels that the most important part of his recovery is getting over the emotions of living with cancer. He endorses a good mood and says that his biggest problem right now is xerostomia. He brings water with him at all times and is not interested in pharmacologic treatments. He also complains of decreased taste but thinks this has improved mildly since completion of radiotherapy. He has not seen lymphadema therapy but does do some physical therapy exercises.    PHYSICAL EXAM:  Weight: 74.3 kg  BP: 131/75    Gen: Alert, in NAD  Eyes: PERRL, EOMI, sclera anicteric  HENT     Head: NC/AT     Ears: No external auricular lesions     Nose/sinus: No rhinorrhea or epistaxis     Oral Cavity/Oropharynx: Mildly dry mucous membranes. No visible oral cavity lesions or thrush. Floor of mouth, oral tongue and bilateral tongue base are soft to palpation with no induration, masses or nodularity.  Neck: Mild fibrosis of the bilateral neck (right > left). No palpable cervical adenopathy.  Pulm: No wheezing, stridor or respiratory distress  CV: Well-perfused, no cyanosis  Musculoskeletal: Normal bulk and tone   Skin: Normal color and turgor  Neurologic: A/Ox3, CN II-XII intact  Psychiatric: Appropriate mood and affect    Flexible Fiberoptic Nasopharyngoscopy:  Consent for fiberoptic laryngoscopy was obtained, and we confirmed correctness of procedure and identity of patient. Fiberoptic endoscope was indicated due to post-treatment surveillance. The nose was topically decongested and  anesthetized through the left and right nares. The fiberoptic endoscope was passed sequentially through the right and then the left naris under endoscopic vision. The right nares appeared normal but with difficult passage into nasopharynx. After switching to the left naris, passage into the nasopharynx was performed. The nasopharynx, tonsils, and base of tongue demonstrated no concerning changes. There was mild erythema of the epiglottis and bilateral arytenoids. The cords were mobile bilaterally. The scope was then withdrawn without incident.    LABS AND IMAGING:  No recent labs or imaging.    IMPRESSION:   Mr. 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 He is approximately  6 months out from the completion of concurrent chemoradiotherapy and is doing very well, with continued recovery from his acute radiation-induced toxicities. He has no clinical or radiographic evidence concerning for residual disease with excellent PET response on his 3-month scan and no evidence of disease on flexible laryngoscopy today.    The patient does have residual side effects of xerostomia and dysgeusia.  We counseled him that the dysgeusia could improve with additional time.  He was not interested in pharmacologic management of his xerostomia.     PLAN:   1. Follow-up in radiation oncology clinic with BRIDGET Elliott in 3 months and Dr. Garay in 6 months  2. Continue to follow-up with medical oncology (Dr. Rolon) and head and neck surgery (Dr. Worthy) as scheduled for ongoing disease surveillance    The patient was seen and examined with Dr. Garay, who agrees with the above assessment and plan.    Dk Mann MD PGY-2   Radiation Oncology, [facility]  [phone] clinic  Pager [phone]            FOLLOW-UP VISIT    Patient Name: Lazaro Porter      : 1950     Age: 68 year old         ______________________________________________________________________________     Chief Complaint   Patient presents with     Cancer     Pt is here for a follow up:Base of Tongue Cancer: Radiation dose 6996 cGy completed 10/13/18     /75   Wt 74.3 kg (163 lb 11.2 oz)   BMI 22.20 kg/m            Pain  Denies    Labs  Other Labs: No    Imaging  None      Dental:   Most Recent Dental Visit: Yes      Speech/Swallowing:   Most Recent evaluation or testing: tomorrow  Swallowing Restrictions: No difficulties with swallowing    Trismus/Jaw Exercises: Yes    Nutrition:    Weight:   Wt Readings from Last 3 Encounters:   04/08/19 74.3 kg (163 lb 11.2 oz)   01/08/19 73.4 kg (161 lb 12.8 oz)   01/07/19 72.6 kg (160 lb)         Oral Symptoms:   Xerostomia:1- Symptomatic without significant dietary alteration; unstimulated saliva flow >0.2 ml/min  Dysphagia: 0-None  Mucositis Oral Symptoms: 0-None  Mucositis: 0- None  Esophagitis:0- None    Other Appointments:     MD Name: Dr Worthy Appointment Date: 04/09/19   MD Name: Appointment Date:   MD Name: Appointment Date:   Other Appointment Notes:     Residual Radiation side effect: Dry mouth, no taste     Additional Instructions:     Nurse face-to-face time: Level 3:  10 min face to face time\  4/8/2019   11:38 AM  Scope used today  #2 Pentax Nasolaryngoscope FNL 10P2 Serial number [id number]          Nathan Garay MD

## 2019-04-08 NOTE — PROGRESS NOTES
FOLLOW-UP VISIT    Patient Name: Lazaro Porter      : 1950     Age: 68 year old        ______________________________________________________________________________     Chief Complaint   Patient presents with     Cancer     Pt is here for a follow up:Base of Tongue Cancer: Radiation dose 6996 cGy completed 10/13/18     /75   Wt 74.3 kg (163 lb 11.2 oz)   BMI 22.20 kg/m           Pain  Denies    Labs  Other Labs: No    Imaging  None      Dental:   Most Recent Dental Visit: Yes      Speech/Swallowing:   Most Recent evaluation or testing: tomorrow  Swallowing Restrictions: No difficulties with swallowing    Trismus/Jaw Exercises: Yes    Nutrition:    Weight:   Wt Readings from Last 3 Encounters:   19 74.3 kg (163 lb 11.2 oz)   19 73.4 kg (161 lb 12.8 oz)   19 72.6 kg (160 lb)         Oral Symptoms:   Xerostomia:1- Symptomatic without significant dietary alteration; unstimulated saliva flow >0.2 ml/min  Dysphagia: 0-None  Mucositis Oral Symptoms: 0-None  Mucositis: 0- None  Esophagitis:0- None    Other Appointments:     MD Name: Dr Worthy Appointment Date: 19   MD Name: Appointment Date:   MD Name: Appointment Date:   Other Appointment Notes:     Residual Radiation side effect: Dry mouth, no taste     Additional Instructions:     Nurse face-to-face time: Level 3:  10 min face to face time\  2019   11:38 AM  Scope used today  #2 Pentax Nasolaryngoscope FNL 10P2 Serial number [id number]

## 2019-04-09 ENCOUNTER — OFFICE VISIT (OUTPATIENT)
Dept: OTOLARYNGOLOGY | Facility: CLINIC | Age: 69
End: 2019-04-09
Payer: MEDICARE

## 2019-04-09 ENCOUNTER — ONCOLOGY VISIT (OUTPATIENT)
Dept: ONCOLOGY | Facility: CLINIC | Age: 69
End: 2019-04-09
Attending: INTERNAL MEDICINE
Payer: MEDICARE

## 2019-04-09 ENCOUNTER — APPOINTMENT (OUTPATIENT)
Dept: LAB | Facility: CLINIC | Age: 69
End: 2019-04-09
Attending: INTERNAL MEDICINE
Payer: MEDICARE

## 2019-04-09 ENCOUNTER — TELEPHONE (OUTPATIENT)
Dept: OTOLARYNGOLOGY | Facility: CLINIC | Age: 69
End: 2019-04-09

## 2019-04-09 VITALS
WEIGHT: 169 LBS | BODY MASS INDEX: 22.89 KG/M2 | RESPIRATION RATE: 16 BRPM | TEMPERATURE: 97.4 F | HEIGHT: 72 IN | SYSTOLIC BLOOD PRESSURE: 131 MMHG | HEART RATE: 64 BPM | OXYGEN SATURATION: 97 % | DIASTOLIC BLOOD PRESSURE: 70 MMHG

## 2019-04-09 VITALS — WEIGHT: 165 LBS | BODY MASS INDEX: 22.35 KG/M2 | HEIGHT: 72 IN

## 2019-04-09 DIAGNOSIS — T66.XXXA ADVERSE EFFECT OF RADIATION, INITIAL ENCOUNTER: ICD-10-CM

## 2019-04-09 DIAGNOSIS — C01 MALIGNANT NEOPLASM OF BASE OF TONGUE (H): Primary | ICD-10-CM

## 2019-04-09 DIAGNOSIS — C01 MALIGNANT NEOPLASM OF BASE OF TONGUE (H): ICD-10-CM

## 2019-04-09 LAB
ALBUMIN SERPL-MCNC: 3.5 G/DL (ref 3.4–5)
ALP SERPL-CCNC: 70 U/L (ref 40–150)
ALT SERPL W P-5'-P-CCNC: 26 U/L (ref 0–70)
ANION GAP SERPL CALCULATED.3IONS-SCNC: 5 MMOL/L (ref 3–14)
AST SERPL W P-5'-P-CCNC: 18 U/L (ref 0–45)
BASOPHILS # BLD AUTO: 0 10E9/L (ref 0–0.2)
BASOPHILS NFR BLD AUTO: 0.4 %
BILIRUB SERPL-MCNC: 0.4 MG/DL (ref 0.2–1.3)
BUN SERPL-MCNC: 21 MG/DL (ref 7–30)
CALCIUM SERPL-MCNC: 8.8 MG/DL (ref 8.5–10.1)
CHLORIDE SERPL-SCNC: 108 MMOL/L (ref 94–109)
CO2 SERPL-SCNC: 27 MMOL/L (ref 20–32)
CREAT SERPL-MCNC: 0.96 MG/DL (ref 0.66–1.25)
DIFFERENTIAL METHOD BLD: ABNORMAL
EOSINOPHIL # BLD AUTO: 0.1 10E9/L (ref 0–0.7)
EOSINOPHIL NFR BLD AUTO: 1.3 %
ERYTHROCYTE [DISTWIDTH] IN BLOOD BY AUTOMATED COUNT: 13.8 % (ref 10–15)
GFR SERPL CREATININE-BSD FRML MDRD: 80 ML/MIN/{1.73_M2}
GLUCOSE SERPL-MCNC: 118 MG/DL (ref 70–99)
HCT VFR BLD AUTO: 36.8 % (ref 40–53)
HGB BLD-MCNC: 11.8 G/DL (ref 13.3–17.7)
IMM GRANULOCYTES # BLD: 0 10E9/L (ref 0–0.4)
IMM GRANULOCYTES NFR BLD: 0.4 %
LYMPHOCYTES # BLD AUTO: 0.7 10E9/L (ref 0.8–5.3)
LYMPHOCYTES NFR BLD AUTO: 13.1 %
MCH RBC QN AUTO: 30 PG (ref 26.5–33)
MCHC RBC AUTO-ENTMCNC: 32.1 G/DL (ref 31.5–36.5)
MCV RBC AUTO: 94 FL (ref 78–100)
MONOCYTES # BLD AUTO: 0.4 10E9/L (ref 0–1.3)
MONOCYTES NFR BLD AUTO: 8 %
NEUTROPHILS # BLD AUTO: 4 10E9/L (ref 1.6–8.3)
NEUTROPHILS NFR BLD AUTO: 76.8 %
NRBC # BLD AUTO: 0 10*3/UL
NRBC BLD AUTO-RTO: 0 /100
PLATELET # BLD AUTO: 163 10E9/L (ref 150–450)
POTASSIUM SERPL-SCNC: 3.8 MMOL/L (ref 3.4–5.3)
PROT SERPL-MCNC: 6.1 G/DL (ref 6.8–8.8)
RBC # BLD AUTO: 3.93 10E12/L (ref 4.4–5.9)
SODIUM SERPL-SCNC: 140 MMOL/L (ref 133–144)
TSH SERPL DL<=0.005 MIU/L-ACNC: 1.52 MU/L (ref 0.4–4)
WBC # BLD AUTO: 5.3 10E9/L (ref 4–11)

## 2019-04-09 PROCEDURE — 80053 COMPREHEN METABOLIC PANEL: CPT | Performed by: INTERNAL MEDICINE

## 2019-04-09 PROCEDURE — 36415 COLL VENOUS BLD VENIPUNCTURE: CPT

## 2019-04-09 PROCEDURE — 99213 OFFICE O/P EST LOW 20 MIN: CPT | Mod: ZP | Performed by: INTERNAL MEDICINE

## 2019-04-09 PROCEDURE — G0463 HOSPITAL OUTPT CLINIC VISIT: HCPCS | Mod: ZF

## 2019-04-09 PROCEDURE — 85025 COMPLETE CBC W/AUTO DIFF WBC: CPT | Performed by: INTERNAL MEDICINE

## 2019-04-09 PROCEDURE — 84443 ASSAY THYROID STIM HORMONE: CPT | Performed by: INTERNAL MEDICINE

## 2019-04-09 RX ORDER — CYANOCOBALAMIN (VITAMIN B-12) 500 MCG
400 LOZENGE ORAL DAILY
COMMUNITY

## 2019-04-09 ASSESSMENT — PAIN SCALES - GENERAL
PAINLEVEL: NO PAIN (0)
PAINLEVEL: NO PAIN (0)

## 2019-04-09 ASSESSMENT — MIFFLIN-ST. JEOR
SCORE: 1551.44
SCORE: 1569.71

## 2019-04-09 NOTE — LETTER
"4/9/2019       RE: Lazaro Porter  [address] [facility] [address]     Dear Colleague,    Thank you for referring your patient, Lazaro Porter, to the [facility]. Please see a copy of my visit note below.    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    HISTORY OF PRESENT ILLNESS: Mr. 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 His oncologic history is as under.     Mr. Porter has recovered very well from chemoRT and his main issues currently are - moderate xerostomia and dysguesia. He saw Dr. Worthy and Dr. Garay within this week and an endoscopic exam was negative for recurrence. No neck masses. Excellent PO intake and no G-tube or port.     No signs/symptoms of distant recurrence either. In good spirits today.    ONCOLOGIC HISTORY:  1. 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   - Noticed enlarging neck mass in March 2018. In 5/2018 this was reassessed and prompted imaging and ultimately a biopsy of the mass at [facility] ([place], [place]) which demonstrated squamous cell carcinoma.   - PET/CT 7/10/18: \"3.5 x 2.2 cm hypermetabolic right level 2 metastatic lymphadenopathy. There is imaging findings suggestive of extranodal extension. The mass is inseparable from the right SCM and abuts the right internal carotid artery about 180 degrees. No abnormal focal FDG uptake along the mucosal spaces to suggest a primary squamous cell cancer " "focus. Several subcentimeter pulmonary nodules with no increased FDG uptake. Attention on follow up is recommended. Multiple, too small to characterize hypodensities in the liver. A 9 mm nodule in the right adrenal gland, without increased FDG uptake. A 7 mm hypodensity in the pancreatic tail with no increased FDG uptake, may be a side branch IPMN. Tiny lucencies in the left iliac bone without increased FDG uptake, possibly focal osteoporotic areas.\"  - Seen by ENT and underwent DL with directed biopsies on 7/18/18 with pathology showing no evidence of malignancy within the mucosal sites.   - A Robotic demucosalization of the right tongue base on 8/3/18 was performed and pathology from this pathology demonstrated p16 positive nonkeratinizing squamous cell carcinoma in the level node lymph node and in the right tongue base.   - Started concurrent cisplatin plus radiation therapy based on tumor board recommendations. Cisplatin 100mg/m2 cycle 1: 8/28/18. Cycle 2 - delayed by 2 days due to  and given on 9/21/18. Cycle 3 - unable to tolerate 100mg/m2 due to myelosuppression and patient preference. Ultimately received cisplatin 40mg/m2 x1 on 10/12/18. RT - 6996 cGy over 33 fractions - 8/29/18 to 10/13/18.   - PET/CT with CT soft tissue neck with contrast 1/9/19: \"1. Interval resolution of hypermetabolic right level II lymph node. No residual radiotracer activity in this location and no other lymphadenopathy. 2. No evidence of mucosal, ivana, or soft tissue abnormality on contrast enhanced neck CT. 1. No evidence of metastatic disease in the chest, abdomen, or pelvis.\"    REVIEW OF SYSTEMS:  14 point ROS negative other than the symptoms noted above in the HPI.    PAST MEDICAL HISTORY:   Past Medical History:   Diagnosis Date     Head and neck cancer (H)      PAST SURGICAL HISTORY:   Past Surgical History:   Procedure Laterality Date     DAVINCI RESECT TUMOR TRANSORAL Bilateral 8/3/2018    Procedure: DAVINCI RESECT " TUMOR TRANSORAL;  Transoral Robotic Resection of the Base of Tongue,   IR Guided Core Needle Biopsy of Right Neck Mass;  Surgeon: Blas Calvillo MD;  Location: [facility] OR     FINE NEEDLE ASPIRATION WITH IMAGING GUIDANCE Right 8/3/2018    Procedure: FINE NEEDLE ASPIRATION WITH IMAGING GUIDANCE;  Ultrasound guided Core Biopsy of a Right Neck Mass;  Surgeon: Denis Monson MD;  Location: [facility] OR     HEAD & NECK SURGERY       LARYNGOSCOPY WITH BIOPSY(IES) Bilateral 7/18/2018    Procedure: LARYNGOSCOPY WITH BIOPSY(IES);  Direct Laryngoscopy with Direct Biopsies;  Surgeon: Garcia Worthy MD;  Location: [facility] OR     TONSILLECTOMY     In addition:  1. Irritated sweat gland removed   2. Fatty tumor on his back removed    3. Cataract surgery     SOCIAL HISTORY:   Social History     Socioeconomic History     Marital status:      Spouse name: Not on file     Number of children: Not on file     Years of education: Not on file     Highest education level: Not on file   Occupational History     Not on file   Social Needs     Financial resource strain: Not on file     Food insecurity:     Worry: Not on file     Inability: Not on file     Transportation needs:     Medical: Not on file     Non-medical: Not on file   Tobacco Use     Smoking status: Never Smoker     Smokeless tobacco: Never Used   Substance and Sexual Activity     Alcohol use: Yes     Comment: occasionally     Drug use: No     Sexual activity: Yes     Partners: Female     Birth control/protection: Other   Lifestyle     Physical activity:     Days per week: Not on file     Minutes per session: Not on file     Stress: Not on file   Relationships     Social connections:     Talks on phone: Not on file     Gets together: Not on file     Attends Christianity service: Not on file     Active member of club or organization: Not on file     Attends meetings of clubs or organizations: Not on file     Relationship status: Not on file     Intimate partner violence:      "Fear of current or ex partner: Not on file     Emotionally abused: Not on file     Physically abused: Not on file     Forced sexual activity: Not on file   Other Topics Concern     Not on file   Social History Narrative     Not on file   Patient is from [place], [place]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     FAMILY HISTORY:   Brother with clotting issue  Mother, unknown primary and    Maternal aunt with colon cancer  Maternal grandfather with head/neck cancer  Maternal cousins with cancers unknown types    ALLERGIES:  No Known Allergies    CURRENT MEDICATIONS:   Current Outpatient Medications:      aspirin 325 MG tablet, Take 325 mg by mouth, Disp: , Rfl:      Multiple Vitamins-Minerals (MULTIVITAMIN & MINERAL PO), , Disp: , Rfl:      vitamin E 400 units TABS, Take 400 Units by mouth daily, Disp: , Rfl:      CANNABIDIOL, HEMP OIL/EXTRACT, OR CBD PRODUCT OTHER THAN MEDICAL CANNABIS,, Take by mouth daily 2 drops under tongue once a day, Disp: , Rfl:      IBUPROFEN PO, Take 200 mg by mouth, Disp: , Rfl:      LORazepam (ATIVAN) 0.5 MG tablet, Take 1 tablet (0.5 mg) by mouth every 6 hours as needed for nausea (Patient not taking: Reported on 2019), Disp: 30 tablet, Rfl: 0     UNABLE TO FIND, Take by mouth daily MEDICATION NAME: CDB capsule once daily, Disp: , Rfl:      UNABLE TO FIND, Take by mouth daily MEDICATION NAME: Unknown oil in a capsule that is supposed to promote healing, Disp: , Rfl:   No current facility-administered medications for this visit.     Facility-Administered Medications Ordered in Other Visits:      lidocaine 3%, phenylephrine 0.25% solution for irrigation, 5 mL, Irrigation, Once, Nathan Garay MD    PHYSICAL EXAMINATION:  Vital signs: /70 (BP Location: Right arm, Patient Position: Sitting, Cuff Size: Adult Regular)   Pulse 64   Temp 97.4  F (36.3  C) (Oral)   Resp 16   Ht 1.829 m (6' 0.01\")   Wt 76.7 kg " (169 lb)   SpO2 97%   BMI 22.92 kg/m     ECOG performance status of 0.  GENERAL: Thin man, no acute distress  HEENT: Clear oropharynx with thick, white secretions and minimal saliva. No tongue mass.   NECK: No JVD/LAD.  LUNGS: clear bilaterally, no wheezes, good diaphragmatic excursion  CARDIOVASCULAR: Regular rate and rhythm, no murmurs, gallops or rubs  ABDOMEN: Soft, nontender and nondistended, bowel sounds present.  EXTREMITIES: No cyanosis, no clubbing, no edema  NEUROLOGIC: No focal deficits; alert and oriented to self, place, time    LABORATORY DATA:   Lab Test 04/09/19  1451 01/08/19  1417 11/19/18  1112   WBC 5.3 4.3 5.0   RBC 3.93* 4.07* 3.39*   HGB 11.8* 12.1* 10.5*   HCT 36.8* 39.4* 32.1*   MCV 94 97 95   MCH 30.0 29.7 31.0   MCHC 32.1 30.7* 32.7   RDW 13.8 12.7 15.9*    189 171   NEUTROPHIL 76.8 70.5 76.0    141 141   POTASSIUM 3.8 4.1 4.1   CHLORIDE 108 107 108   CO2 27 28 25   ANIONGAP 5 6 8   * 97 95   BUN 21 15 14   CR 0.96 1.00 0.96   KM 8.8 8.7 8.4*   PROTTOTAL 6.1* 6.6* 6.3*   ALBUMIN 3.5 3.7 3.4   BILITOTAL 0.4 0.4 0.4   ALKPHOS 70 86 96   AST 18 18 21   ALT 26 22 17     Lab Test 04/09/19  1451 01/08/19  1417 11/19/18  1112 08/28/18  0848   TSH 1.52 1.39 0.50 1.24   T4  --   --   --  1.14     IMAGING STUDIES:  As above.    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      HNSCC:  - Had a complete response on 3 month-post treatment PET/CT.   - Has recovered well from concurrent cis+RT and is now approximately 6 months out with no clinical or endoscopic evidence of disease recurrence.  - Understands that treatment efficacy may be compromised somewhat due to dose reduction of third dose of cisplatin.   - Repeat restaging only as clinically indicated per NCCN guidelines.  - Will continue active surveillance: Reviewed the  signs/symptoms of recurrence such as palpable or visible abnormal masses in H&N, pathologic weight loss, cough, SOA, CP, bone pain, CNS symptoms etc; and his role in ensuring close follow-ups for next 5 years at length. He's agreeable.  - Continue follow-ups with Dr. Garay and Dr. Worthy.    Chemo-induced CTCAE grade 2 neutropenia:  - Resolved. Recheck labs every 3 months.    Chemo-related anemia:  - Stable. Encouraged continued health-balanced diet.  - Labs as above.    Return to clinic in 3 months for follow up.     All questions were answered, and patient was in complete agreement with this plan.     BILLIN.    Pillo Rolon M.D.  . Professor of Medicine  Division of Hematology, Oncology & Transplantation  [facility]

## 2019-04-09 NOTE — PATIENT INSTRUCTIONS
Thank you for choosing  [facility].  Please follow up with Dr. Worthy in approximately 3 months.    [phone] appointment scheduling option 1 and nurse advice option 3.

## 2019-04-09 NOTE — LETTER
4/9/2019       RE: Lazaro Porter  [address] [facility] [id number]     Dear Colleague,    Thank you for referring your patient, Lazaro Porter, to the [facility] EAR NOSE AND THROAT at [facility]. Please see a copy of my visit note below.    HISTORY OF PRESENT ILLNESS: Lazaro Porter is a 68 year old male with a history of HISTORY OF PRESENT ILLNESS:  T1 N1 squamous cell carcinoma of the tongue base.  He got chemoradiotherapy for this as well as resection.  He has no other current complaints at the present time today.  He is otherwise getting by reasonably well.  He is eating, drinking, breathing, and swallowing and his weight is stable, but it is lower than it was before.  He is getting good activity from work as well.      PHYSICAL EXAMINATION:  The patient is alert, oriented x3 and pleasant.  Skin of the face, lips, and neck on him is quite normal.  Neck exam shows no masses, adenopathy or thyromegaly.      A    Last 2 Scores for Patient-Answered VHI Questionnaire  No flowsheet data found.    Last 2 Scores for Patient-Answered CSI Questionnaire  No flowsheet data found.      Last 2 Scores for Patient-Answered EAT Questionnaire  No flowsheet data found.        PAST MEDICAL HISTORY:   Past Medical History:   Diagnosis Date     Head and neck cancer (H)        PAST SURGICAL HISTORY:   Past Surgical History:   Procedure Laterality Date     DAVINCI RESECT TUMOR TRANSORAL Bilateral 8/3/2018    Procedure: DAVINCI RESECT TUMOR TRANSORAL;  Transoral Robotic Resection of the Base of Tongue,   IR Guided Core Needle Biopsy of Right Neck Mass;  Surgeon: Blas Calvillo MD;  [facility]: [facility] OR     FINE NEEDLE ASPIRATION WITH IMAGING GUIDANCE Right 8/3/2018    Procedure: FINE NEEDLE ASPIRATION WITH IMAGING GUIDANCE;  Ultrasound guided Core Biopsy of a Right Neck Mass;  Surgeon: Denis Monson MD;  [facility] OR     HEAD & NECK SURGERY       LARYNGOSCOPY WITH BIOPSY(IES) Bilateral  7/18/2018    Procedure: LARYNGOSCOPY WITH BIOPSY(IES);  Direct Laryngoscopy with Direct Biopsies;  Surgeon: Garcia Worthy MD;  [facility]     TONSILLECTOMY         FAMILY HISTORY:   Family History   Problem Relation Age of Onset     Cancer Mother         History of cancer in family     Cancer Maternal Grandfather      Cancer Cousin        SOCIAL HISTORY:   Social History     Tobacco Use     Smoking status: Never Smoker     Smokeless tobacco: Never Used   Substance Use Topics     Alcohol use: Yes     Comment: occasionally       REVIEW OF SYSTEMS: Ten point review of systems was performed and is negative except for:   [facility] 11/20/2018   Constitutional Appetite change   Neurology Headache   Ears, Nose, Throat Nasal congestion or drainage, Sore throat   Gastrointestinal/Genitourinary Heartburn/indigestion, Constipation        ALLERGIES: Patient has no known allergies.    MEDICATIONS:   Current Outpatient Medications   Medication Sig Dispense Refill     aspirin 325 MG tablet Take 325 mg by mouth       CANNABIDIOL, HEMP OIL/EXTRACT, OR CBD PRODUCT OTHER THAN MEDICAL CANNABIS, Take by mouth daily 2 drops under tongue once a day       IBUPROFEN PO Take 200 mg by mouth       LORazepam (ATIVAN) 0.5 MG tablet Take 1 tablet (0.5 mg) by mouth every 6 hours as needed for nausea (Patient not taking: Reported on 4/9/2019) 30 tablet 0     Multiple Vitamins-Minerals (MULTIVITAMIN & MINERAL PO)        UNABLE TO FIND Take by mouth daily MEDICATION NAME: CDB capsule once daily       UNABLE TO FIND Take by mouth daily MEDICATION NAME: Unknown oil in a capsule that is supposed to promote healing       vitamin E 400 units TABS Take 400 Units by mouth daily           PHYSICAL EXAMINATION:  He  is awake, alert and in no apparent distress.    His tympanic membranes are clear and intact bilaterally. External auditory canals are clear.  Nasal exam shows a mild septal deviation without obstruction.  Examination of the oral  cavity shows no suspicious lesions.  There is symmetric movement of the tongue and soft palate.    The oropharynx is clear.  His neck is supple without significant adenopathy.  Pulse is regular.  Upper airway is clear.  Cranial nerves II-XII are grossly intact.       PROCEDURE: A flexible laryngoscopy  was performed.  Informed consent was obtained and a time out was performed. 3% lidocaine and 0.25% phenylephrine was sprayed into the nasal cavity and allowed 3 to 5 minutes for effect. The scope was passed through the right sided nostril. Examination showed the vocal folds to be mobile and meet in the midline.  No nodules, polyps or ulcerations are seen.  There is no inflammation or erythema of the supraglottic or glottic larynx.  With phonation there is no contraction of the supraglottic larynx.  The hypopharynx is otherwise clear as is the subglottis.     IMPRESSION/PLAN:      SSESSMENT:  Patient who is status post resection and chemoradiotherapy for squamous cell carcinoma, HPV positive of the tongue base.  He is doing well presently.        PLAN:  We will see how he is doing again over the course of the next 8-12 weeks.      FO/ms     Sincerely,    Garcia Worthy MD

## 2019-04-09 NOTE — PROGRESS NOTES
HISTORY OF PRESENT ILLNESS: Lazaro Porter is a 68 year old male with a history of HISTORY OF PRESENT ILLNESS:  T1 N1 squamous cell carcinoma of the tongue base.  He got chemoradiotherapy for this as well as resection.  He has no other current complaints at the present time today.  He is otherwise getting by reasonably well.  He is eating, drinking, breathing, and swallowing and his weight is stable, but it is lower than it was before.  He is getting good activity from work as well.      PHYSICAL EXAMINATION:  The patient is alert, oriented x3 and pleasant.  Skin of the face, lips, and neck on him is quite normal.  Neck exam shows no masses, adenopathy or thyromegaly.      A    Last 2 Scores for Patient-Answered VHI Questionnaire  No flowsheet data found.    Last 2 Scores for Patient-Answered CSI Questionnaire  No flowsheet data found.      Last 2 Scores for Patient-Answered EAT Questionnaire  No flowsheet data found.        PAST MEDICAL HISTORY:   Past Medical History:   Diagnosis Date     Head and neck cancer (H)        PAST SURGICAL HISTORY:   Past Surgical History:   Procedure Laterality Date     DAVINCI RESECT TUMOR TRANSORAL Bilateral 8/3/2018    Procedure: DAVINCI RESECT TUMOR TRANSORAL;  Transoral Robotic Resection of the Base of Tongue,   IR Guided Core Needle Biopsy of Right Neck Mass;  Surgeon: Blas Calvillo MD;  [facility] OR     FINE NEEDLE ASPIRATION WITH IMAGING GUIDANCE Right 8/3/2018    Procedure: FINE NEEDLE ASPIRATION WITH IMAGING GUIDANCE;  Ultrasound guided Core Biopsy of a Right Neck Mass;  Surgeon: Denis Monson MD;  [facility]     HEAD & NECK SURGERY       LARYNGOSCOPY WITH BIOPSY(IES) Bilateral 7/18/2018    Procedure: LARYNGOSCOPY WITH BIOPSY(IES);  Direct Laryngoscopy with Direct Biopsies;  Surgeon: Garcia Worthy MD;  Location:  OR     TONSILLECTOMY         FAMILY HISTORY:   Family History   Problem Relation Age of Onset     Cancer Mother         History of cancer  in family     Cancer Maternal Grandfather      Cancer Cousin        SOCIAL HISTORY:   Social History     Tobacco Use     Smoking status: Never Smoker     Smokeless tobacco: Never Used   Substance Use Topics     Alcohol use: Yes     Comment: occasionally       REVIEW OF SYSTEMS: Ten point review of systems was performed and is negative except for:   [facility] 11/20/2018   Constitutional Appetite change   Neurology Headache   Ears, Nose, Throat Nasal congestion or drainage, Sore throat   Gastrointestinal/Genitourinary Heartburn/indigestion, Constipation        ALLERGIES: Patient has no known allergies.    MEDICATIONS:   Current Outpatient Medications   Medication Sig Dispense Refill     aspirin 325 MG tablet Take 325 mg by mouth       CANNABIDIOL, HEMP OIL/EXTRACT, OR CBD PRODUCT OTHER THAN MEDICAL CANNABIS, Take by mouth daily 2 drops under tongue once a day       IBUPROFEN PO Take 200 mg by mouth       LORazepam (ATIVAN) 0.5 MG tablet Take 1 tablet (0.5 mg) by mouth every 6 hours as needed for nausea (Patient not taking: Reported on 4/9/2019) 30 tablet 0     Multiple Vitamins-Minerals (MULTIVITAMIN & MINERAL PO)        UNABLE TO FIND Take by mouth daily MEDICATION NAME: CDB capsule once daily       UNABLE TO FIND Take by mouth daily MEDICATION NAME: Unknown oil in a capsule that is supposed to promote healing       vitamin E 400 units TABS Take 400 Units by mouth daily           PHYSICAL EXAMINATION:  He  is awake, alert and in no apparent distress.    His tympanic membranes are clear and intact bilaterally. External auditory canals are clear.  Nasal exam shows a mild septal deviation without obstruction.  Examination of the oral cavity shows no suspicious lesions.  There is symmetric movement of the tongue and soft palate.    The oropharynx is clear.  His neck is supple without significant adenopathy.  Pulse is regular.  Upper airway is clear.  Cranial nerves II-XII are grossly intact.       PROCEDURE: A flexible  laryngoscopy  was performed.  Informed consent was obtained and a time out was performed. 3% lidocaine and 0.25% phenylephrine was sprayed into the nasal cavity and allowed 3 to 5 minutes for effect. The scope was passed through the right sided nostril. Examination showed the vocal folds to be mobile and meet in the midline.  No nodules, polyps or ulcerations are seen.  There is no inflammation or erythema of the supraglottic or glottic larynx.  With phonation there is no contraction of the supraglottic larynx.  The hypopharynx is otherwise clear as is the subglottis.     IMPRESSION/PLAN:      SSESSMENT:  Patient who is status post resection and chemoradiotherapy for squamous cell carcinoma, HPV positive of the tongue base.  He is doing well presently.        PLAN:  We will see how he is doing again over the course of the next 8-12 weeks.      FO/ms

## 2019-04-09 NOTE — NURSING NOTE
Chief Complaint   Patient presents with     Oncology Clinic Visit     Survivorship visit     Blood Draw     Labs drawn via  in lab by RN. VS taken. Patient checked into next appointment.     Labs collected from venipuncture by RN. Vitals taken. Checked in for appointment.    Jillian Watt RN

## 2019-04-10 NOTE — PROGRESS NOTES
"MEDICAL ONCOLOGY CLINIC NOTE    REFERRING PROVIDER: Garcia Worthy MD from [facility]  RADIATION ONCOLOGIST: Nathan Garay MD from 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    HISTORY OF PRESENT ILLNESS: Mr. 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 His oncologic history is as under.     Mr. Porter has recovered very well from chemoRT and his main issues currently are - moderate xerostomia and dysguesia. He saw Dr. Worthy and Dr. Garay within this week and an endoscopic exam was negative for recurrence. No neck masses. Excellent PO intake and no G-tube or port.     No signs/symptoms of distant recurrence either. In good spirits today.    ONCOLOGIC HISTORY:  1. 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   - Noticed enlarging neck mass in March 2018. In 5/2018 this was reassessed and prompted imaging and ultimately a biopsy of the mass at [facility] ([place], [place]) which demonstrated squamous cell carcinoma.   - PET/CT 7/10/18: \"3.5 x 2.2 cm hypermetabolic right level 2 metastatic lymphadenopathy. There is imaging findings suggestive of extranodal extension. The mass is inseparable from the right SCM and abuts the right internal carotid artery about 180 degrees. No abnormal focal FDG uptake along the mucosal spaces to suggest a primary squamous cell cancer focus. Several subcentimeter pulmonary nodules with no increased FDG uptake. Attention on follow up is recommended. Multiple, too small to characterize hypodensities in the liver. A 9 mm nodule in the right adrenal gland, without " "increased FDG uptake. A 7 mm hypodensity in the pancreatic tail with no increased FDG uptake, may be a side branch IPMN. Tiny lucencies in the left iliac bone without increased FDG uptake, possibly focal osteoporotic areas.\"  - Seen by ENT and underwent DL with directed biopsies on 7/18/18 with pathology showing no evidence of malignancy within the mucosal sites.   - A Robotic demucosalization of the right tongue base on 8/3/18 was performed and pathology from this pathology demonstrated p16 positive nonkeratinizing squamous cell carcinoma in the level node lymph node and in the right tongue base.   - Started concurrent cisplatin plus radiation therapy based on tumor board recommendations. Cisplatin 100mg/m2 cycle 1: 8/28/18. Cycle 2 - delayed by 2 days due to  and given on 9/21/18. Cycle 3 - unable to tolerate 100mg/m2 due to myelosuppression and patient preference. Ultimately received cisplatin 40mg/m2 x1 on 10/12/18. RT - 6996 cGy over 33 fractions - 8/29/18 to 10/13/18.   - PET/CT with CT soft tissue neck with contrast 1/9/19: \"1. Interval resolution of hypermetabolic right level II lymph node. No residual radiotracer activity in this location and no other lymphadenopathy. 2. No evidence of mucosal, ivana, or soft tissue abnormality on contrast enhanced neck CT. 1. No evidence of metastatic disease in the chest, abdomen, or pelvis.\"    REVIEW OF SYSTEMS:  14 point ROS negative other than the symptoms noted above in the HPI.    PAST MEDICAL HISTORY:   Past Medical History:   Diagnosis Date     Head and neck cancer (H)      PAST SURGICAL HISTORY:   Past Surgical History:   Procedure Laterality Date     DAVINCI RESECT TUMOR TRANSORAL Bilateral 8/3/2018    Procedure: DAVINCI RESECT TUMOR TRANSORAL;  Transoral Robotic Resection of the Base of Tongue,   IR Guided Core Needle Biopsy of Right Neck Mass;  Surgeon: Blas Calvillo MD;  [facility] OR     FINE NEEDLE ASPIRATION WITH IMAGING GUIDANCE Right " 8/3/2018    Procedure: FINE NEEDLE ASPIRATION WITH IMAGING GUIDANCE;  Ultrasound guided Core Biopsy of a Right Neck Mass;  Surgeon: Denis Monson MD;  [facility] OR     HEAD & NECK SURGERY       LARYNGOSCOPY WITH BIOPSY(IES) Bilateral 7/18/2018    Procedure: LARYNGOSCOPY WITH BIOPSY(IES);  Direct Laryngoscopy with Direct Biopsies;  Surgeon: Garcia Worthy MD;  [facility]     TONSILLECTOMY     In addition:  1. Irritated sweat gland removed   2. Fatty tumor on his back removed    3. Cataract surgery     SOCIAL HISTORY:   Social History     Socioeconomic History     Marital status:      Spouse name: Not on file     Number of children: Not on file     Years of education: Not on file     Highest education level: Not on file   Occupational History     Not on file   Social Needs     Financial resource strain: Not on file     Food insecurity:     Worry: Not on file     Inability: Not on file     Transportation needs:     Medical: Not on file     Non-medical: Not on file   Tobacco Use     Smoking status: Never Smoker     Smokeless tobacco: Never Used   Substance and Sexual Activity     Alcohol use: Yes     Comment: occasionally     Drug use: No     Sexual activity: Yes     Partners: Female     Birth control/protection: Other   Lifestyle     Physical activity:     Days per week: Not on file     Minutes per session: Not on file     Stress: Not on file   Relationships     Social connections:     Talks on phone: Not on file     Gets together: Not on file     Attends Gnosticist service: Not on file     Active member of club or organization: Not on file     Attends meetings of clubs or organizations: Not on file     Relationship status: Not on file     Intimate partner violence:     Fear of current or ex partner: Not on file     Emotionally abused: Not on file     Physically abused: Not on file     Forced sexual activity: Not on file   Other Topics Concern     Not on file   Social History Narrative     Not  "on file   Patient is from [place], [place]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     FAMILY HISTORY:   Brother with clotting issue  Mother, unknown primary and    Maternal aunt with colon cancer  Maternal grandfather with head/neck cancer  Maternal cousins with cancers unknown types    ALLERGIES:  No Known Allergies    CURRENT MEDICATIONS:   Current Outpatient Medications:      aspirin 325 MG tablet, Take 325 mg by mouth, Disp: , Rfl:      Multiple Vitamins-Minerals (MULTIVITAMIN & MINERAL PO), , Disp: , Rfl:      vitamin E 400 units TABS, Take 400 Units by mouth daily, Disp: , Rfl:      CANNABIDIOL, HEMP OIL/EXTRACT, OR CBD PRODUCT OTHER THAN MEDICAL CANNABIS,, Take by mouth daily 2 drops under tongue once a day, Disp: , Rfl:      IBUPROFEN PO, Take 200 mg by mouth, Disp: , Rfl:      LORazepam (ATIVAN) 0.5 MG tablet, Take 1 tablet (0.5 mg) by mouth every 6 hours as needed for nausea (Patient not taking: Reported on 2019), Disp: 30 tablet, Rfl: 0     UNABLE TO FIND, Take by mouth daily MEDICATION NAME: CDB capsule once daily, Disp: , Rfl:      UNABLE TO FIND, Take by mouth daily MEDICATION NAME: Unknown oil in a capsule that is supposed to promote healing, Disp: , Rfl:   No current facility-administered medications for this visit.     Facility-Administered Medications Ordered in Other Visits:      lidocaine 3%, phenylephrine 0.25% solution for irrigation, 5 mL, Irrigation, Once, Nathan Garay MD    PHYSICAL EXAMINATION:  Vital signs: /70 (BP Location: Right arm, Patient Position: Sitting, Cuff Size: Adult Regular)   Pulse 64   Temp 97.4  F (36.3  C) (Oral)   Resp 16   Ht 1.829 m (6' 0.01\")   Wt 76.7 kg (169 lb)   SpO2 97%   BMI 22.92 kg/m    ECOG performance status of 0.  GENERAL: Thin man, no acute distress  HEENT: Clear oropharynx with thick, white secretions and minimal saliva. No tongue mass.   NECK: No JVD/LAD.  LUNGS: " clear bilaterally, no wheezes, good diaphragmatic excursion  CARDIOVASCULAR: Regular rate and rhythm, no murmurs, gallops or rubs  ABDOMEN: Soft, nontender and nondistended, bowel sounds present.  EXTREMITIES: No cyanosis, no clubbing, no edema  NEUROLOGIC: No focal deficits; alert and oriented to self, place, time    LABORATORY DATA:   Lab Test 04/09/19  1451 01/08/19  1417 11/19/18  1112   WBC 5.3 4.3 5.0   RBC 3.93* 4.07* 3.39*   HGB 11.8* 12.1* 10.5*   HCT 36.8* 39.4* 32.1*   MCV 94 97 95   MCH 30.0 29.7 31.0   MCHC 32.1 30.7* 32.7   RDW 13.8 12.7 15.9*    189 171   NEUTROPHIL 76.8 70.5 76.0    141 141   POTASSIUM 3.8 4.1 4.1   CHLORIDE 108 107 108   CO2 27 28 25   ANIONGAP 5 6 8   * 97 95   BUN 21 15 14   CR 0.96 1.00 0.96   KM 8.8 8.7 8.4*   PROTTOTAL 6.1* 6.6* 6.3*   ALBUMIN 3.5 3.7 3.4   BILITOTAL 0.4 0.4 0.4   ALKPHOS 70 86 96   AST 18 18 21   ALT 26 22 17     Lab Test 04/09/19  1451 01/08/19  1417 11/19/18  1112 08/28/18  0848   TSH 1.52 1.39 0.50 1.24   T4  --   --   --  1.14     IMAGING STUDIES:  As above.    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      HNSCC:  - Had a complete response on 3 month-post treatment PET/CT.   - Has recovered well from concurrent cis+RT and is now approximately 6 months out with no clinical or endoscopic evidence of disease recurrence.  - Understands that treatment efficacy may be compromised somewhat due to dose reduction of third dose of cisplatin.   - Repeat restaging only as clinically indicated per NCCN guidelines.  - Will continue active surveillance: Reviewed the signs/symptoms of recurrence such as palpable or visible abnormal masses in H&N, pathologic weight loss, cough, SOA, CP, bone pain, CNS symptoms etc; and his role in ensuring close follow-ups for next 5 years at length. He's  agreeable.  - Continue follow-ups with Dr. Garay and Dr. Worthy.    Chemo-induced CTCAE grade 2 neutropenia:  - Resolved. Recheck labs every 3 months.    Chemo-related anemia:  - Stable. Encouraged continued health-balanced diet.  - Labs as above.    Return to clinic in 3 months for follow up.     All questions were answered, and patient was in complete agreement with this plan.     BILLIN.    Pillo Rolon M.D.  . Professor of Medicine  Division of Hematology, Oncology & Transplantation  [facility]

## 2019-05-20 ENCOUNTER — TELEPHONE (OUTPATIENT)
Dept: ONCOLOGY | Facility: CLINIC | Age: 69
End: 2019-05-20

## 2019-05-20 NOTE — TELEPHONE ENCOUNTER
FINNM to notify patient that he is to reschedule his SURV visit with Stephanie Nicholson, at his convenience. This cannot be on same day as Dr. Rolon in August due to billing, and we recommend rescheduling now, as he his due for the visit.     [facility]: [phone] - Option 5, then 2 to call with questions or scheduling requests.

## 2019-08-05 ENCOUNTER — OFFICE VISIT (OUTPATIENT)
Dept: RADIATION ONCOLOGY | Facility: CLINIC | Age: 69
End: 2019-08-05
Attending: NURSE PRACTITIONER
Payer: MEDICARE

## 2019-08-05 VITALS — HEART RATE: 80 BPM | BODY MASS INDEX: 23.7 KG/M2 | RESPIRATION RATE: 16 BRPM | WEIGHT: 174.8 LBS

## 2019-08-05 NOTE — PROGRESS NOTES
Department of Radiation Oncology  [facility]  [address] [place] [address]  [phone]       Radiation Oncology Follow-up  2019      Lazaro Porter  MRN: [id number]   : 1950     DISEASE TREATED:   cT1 N1 M0 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     RADIATION THERAPY DELIVERED:   6996 cGy delivered in 33 daily fractions, from 2018 - 10/13/2018     SYSTEMIC THERAPY:  Cisplatin 100 mg/m  (initially began with high-dose therapy every 3 weeks with his second infusion delayed 2 days secondary to neutropenia and his final cycle switch to a low-dose 40 mg/m  infusion secondary to ongoing cytopenias)     INTERVAL SINCE COMPLETION OF RADIATION THERAPY:   Approximately 9 months    SUBJECTIVE:   Lazaro Porter is a 68 year old man with a history of 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 He was diagnosed after he initially presented with an enlarged right level 2 cervical lymph node. He tolerated treatment reasonably well with the anticipated acute nausea, mucositis and dermatitis. Systemic therapy was slightly altered with his second course of high-dose cisplatin delayed 2 days secondary to neutropenia and his final infusion switch to a low-dose (40 mg/m ) infusion due to persistent cytopenias. He returns to radiation oncology clinic today for a routine post-treatment surveillance visit. He was last seen in radiation oncology on 19 and was recovering well at that time.     Since last visit, Mr. Lam reports that overall he is doing well.  He has noticed improvement in his taste, although it is not back to normal.  Still has xerostomia and drinks a lot of water to help.  He does see dental every 6 months, but doesn't do additional fluoride.  We did discuss that this is recommended.  He uses sunscreen occasionally.  He does do lymphedema therapy,  swallowing exercises and neck exercises.  He feels his muscle mass is decreased.  He is exercising.  Energy level is almost back to normal.  Last TSH was normal.  He complains that his right ear canal feels itchy.  No pain or change in hearing.      PHYSICAL EXAM:  Pulse 80   Resp 16   Wt 79.3 kg (174 lb 12.8 oz)   BMI 23.70 kg/m        Gen: Alert, in NAD  Eyes: PERRL, EOMI, sclera anicteric  HENT     Head: NC/AT     Ears: No external auricular lesions.  Bilateral canals are clear without infection.  TM are clear.     Nose/sinus: No rhinorrhea or epistaxis     Oral Cavity/Oropharynx: Mildly dry mucous membranes. No visible oral cavity lesions or thrush. Floor of mouth, oral tongue and bilateral tongue base are soft to palpation with no induration, masses or nodularity.  Neck: Mild fibrosis of the bilateral neck (right > left). No palpable cervical adenopathy.  Pulm: No wheezing, stridor or respiratory distress  CV: Well-perfused, no cyanosis  Musculoskeletal: Normal bulk and tone   Skin: Normal color and turgor  Psychiatric: Appropriate mood and affect      LABS AND IMAGING:  No recent labs or imaging.    IMPRESSION:   Mr. 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 He is approximately 6 months out from the completion of concurrent chemoradiotherapy and is doing very well, with continued recovery from his acute radiation-induced toxicities. He has no clinical or radiographic evidence concerning for residual disease with excellent PET response on his 3-month scan.     The patient does have residual side effects of xerostomia and mild dysgeusia.      PLAN:   1. Follow-up in radiation oncology clinic in 3 months and Dr. Garay   2. Continue to follow-up with medical oncology (Dr. Rolon) and head and neck surgery (Dr. Worthy) as scheduled for ongoing disease surveillance    Geraldine Elliott NP  Radiation Oncology

## 2019-08-05 NOTE — LETTER
2019       RE: Lazaro Porter  [address] [facility] [id number]     Dear Colleague,    Thank you for referring your patient, Lazaro Porter, to the RADIATION ONCOLOGY CLINIC. Please see a copy of my visit note below.             Department of Radiation Oncology  [facility]  [address] [place] [address]  [phone]       Radiation Oncology Follow-up  2019      Lazaro Porter  MRN: [id number]   : 1950     DISEASE TREATED:   cT1 N1 M0 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     RADIATION THERAPY DELIVERED:   6996 cGy delivered in 33 daily fractions, from 2018 - 10/13/2018     SYSTEMIC THERAPY:  Cisplatin 100 mg/m  (initially began with high-dose therapy every 3 weeks with his second infusion delayed 2 days secondary to neutropenia and his final cycle switch to a low-dose 40 mg/m  infusion secondary to ongoing cytopenias)     INTERVAL SINCE COMPLETION OF RADIATION THERAPY:   Approximately  9 months    SUBJECTIVE:   Lazaro Porter is a 68 year old man with a history of 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 He was diagnosed after he initially presented with an enlarged right level 2 cervical lymph node. He tolerated treatment reasonably well with the anticipated acute nausea, mucositis and dermatitis. Systemic therapy was slightly altered with his second course of high-dose cisplatin delayed 2 days secondary to neutropenia and his final infusion switch to a low-dose (40 mg/m ) infusion due to persistent cytopenias. He returns to radiation oncology clinic today for a routine post-treatment surveillance visit. He was last seen in radiation oncology on 19 and was recovering well at that time.     Since last visit, Mr. Lam reports that overall he is doing well.  He has noticed improvement in his taste, although it is not back to normal.  Still has  xerostomia and drinks a lot of water to help.  He does see dental every 6 months, but doesn't do additional fluoride.  We did discuss that this is recommended.  He uses sunscreen occasionally.  He does do lymphedema therapy, swallowing exercises and neck exercises.  He feels his muscle mass is decreased.  He is exercising.  Energy level is almost back to normal.  Last TSH was normal.  He complains that his right ear canal feels itchy.  No pain or change in hearing.      PHYSICAL EXAM:  Pulse 80   Resp 16   Wt 79.3 kg (174 lb 12.8 oz)   BMI 23.70 kg/m         Gen: Alert, in NAD  Eyes: PERRL, EOMI, sclera anicteric  HENT     Head: NC/AT     Ears: No external auricular lesions.  Bilateral canals are clear without infection.  TM are clear.     Nose/sinus: No rhinorrhea or epistaxis     Oral Cavity/Oropharynx: Mildly dry mucous membranes. No visible oral cavity lesions or thrush. Floor of mouth, oral tongue and bilateral tongue base are soft to palpation with no induration, masses or nodularity.  Neck: Mild fibrosis of the bilateral neck (right > left). No palpable cervical adenopathy.  Pulm: No wheezing, stridor or respiratory distress  CV: Well-perfused, no cyanosis  Musculoskeletal: Normal bulk and tone   Skin: Normal color and turgor  Psychiatric: Appropriate mood and affect      LABS AND IMAGING:  No recent labs or imaging.    IMPRESSION:   Mr. 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 He is approximately 6 months out from the completion of concurrent chemoradiotherapy and is doing very well, with continued recovery from his acute radiation-induced toxicities. He has no clinical or radiographic evidence concerning for residual disease with excellent PET response on his 3-month scan.     The patient does have residual side effects of xerostomia and mild dysgeusia.      PLAN:   1. Follow-up in radiation oncology clinic in 3 months and  Dr. Garay   2. Continue to follow-up with medical oncology (Dr. Rolon) and head and neck surgery (Dr. Worthy) as scheduled for ongoing disease surveillance    Geraldine Elliott NP  Radiation Oncology

## 2019-08-06 ENCOUNTER — THERAPY VISIT (OUTPATIENT)
Dept: SPEECH THERAPY | Facility: CLINIC | Age: 69
End: 2019-08-06
Payer: MEDICARE

## 2019-08-06 ENCOUNTER — APPOINTMENT (OUTPATIENT)
Dept: LAB | Facility: CLINIC | Age: 69
End: 2019-08-06
Attending: INTERNAL MEDICINE
Payer: MEDICARE

## 2019-08-06 ENCOUNTER — OFFICE VISIT (OUTPATIENT)
Dept: OTOLARYNGOLOGY | Facility: CLINIC | Age: 69
End: 2019-08-06
Payer: MEDICARE

## 2019-08-06 ENCOUNTER — ONCOLOGY VISIT (OUTPATIENT)
Dept: ONCOLOGY | Facility: CLINIC | Age: 69
End: 2019-08-06
Attending: INTERNAL MEDICINE
Payer: MEDICARE

## 2019-08-06 VITALS
WEIGHT: 175.27 LBS | DIASTOLIC BLOOD PRESSURE: 86 MMHG | SYSTOLIC BLOOD PRESSURE: 149 MMHG | OXYGEN SATURATION: 100 % | HEART RATE: 58 BPM | BODY MASS INDEX: 23.77 KG/M2 | RESPIRATION RATE: 14 BRPM | TEMPERATURE: 98 F

## 2019-08-06 VITALS
BODY MASS INDEX: 23.7 KG/M2 | HEIGHT: 72 IN | SYSTOLIC BLOOD PRESSURE: 156 MMHG | DIASTOLIC BLOOD PRESSURE: 81 MMHG | WEIGHT: 175 LBS | HEART RATE: 64 BPM | RESPIRATION RATE: 15 BRPM

## 2019-08-06 DIAGNOSIS — R13.12 OROPHARYNGEAL DYSPHAGIA: Primary | ICD-10-CM

## 2019-08-06 DIAGNOSIS — C01 MALIGNANT NEOPLASM OF BASE OF TONGUE (H): ICD-10-CM

## 2019-08-06 DIAGNOSIS — R13.11 ORAL PHASE DYSPHAGIA: Primary | ICD-10-CM

## 2019-08-06 DIAGNOSIS — T66.XXXA ADVERSE EFFECT OF RADIATION, INITIAL ENCOUNTER: ICD-10-CM

## 2019-08-06 LAB
ALBUMIN SERPL-MCNC: 3.7 G/DL (ref 3.4–5)
ALP SERPL-CCNC: 57 U/L (ref 40–150)
ALT SERPL W P-5'-P-CCNC: 24 U/L (ref 0–70)
ANION GAP SERPL CALCULATED.3IONS-SCNC: 4 MMOL/L (ref 3–14)
AST SERPL W P-5'-P-CCNC: 17 U/L (ref 0–45)
BASOPHILS # BLD AUTO: 0 10E9/L (ref 0–0.2)
BASOPHILS NFR BLD AUTO: 0.3 %
BILIRUB SERPL-MCNC: 0.6 MG/DL (ref 0.2–1.3)
BUN SERPL-MCNC: 19 MG/DL (ref 7–30)
CALCIUM SERPL-MCNC: 8.5 MG/DL (ref 8.5–10.1)
CHLORIDE SERPL-SCNC: 110 MMOL/L (ref 94–109)
CO2 SERPL-SCNC: 27 MMOL/L (ref 20–32)
CREAT SERPL-MCNC: 0.98 MG/DL (ref 0.66–1.25)
DIFFERENTIAL METHOD BLD: ABNORMAL
EOSINOPHIL # BLD AUTO: 0.1 10E9/L (ref 0–0.7)
EOSINOPHIL NFR BLD AUTO: 1.3 %
ERYTHROCYTE [DISTWIDTH] IN BLOOD BY AUTOMATED COUNT: 13.7 % (ref 10–15)
GFR SERPL CREATININE-BSD FRML MDRD: 77 ML/MIN/{1.73_M2}
GLUCOSE SERPL-MCNC: 100 MG/DL (ref 70–99)
HCT VFR BLD AUTO: 39.2 % (ref 40–53)
HGB BLD-MCNC: 12.9 G/DL (ref 13.3–17.7)
IMM GRANULOCYTES # BLD: 0 10E9/L (ref 0–0.4)
IMM GRANULOCYTES NFR BLD: 0.2 %
LYMPHOCYTES # BLD AUTO: 0.6 10E9/L (ref 0.8–5.3)
LYMPHOCYTES NFR BLD AUTO: 9.4 %
MCH RBC QN AUTO: 31.5 PG (ref 26.5–33)
MCHC RBC AUTO-ENTMCNC: 32.9 G/DL (ref 31.5–36.5)
MCV RBC AUTO: 96 FL (ref 78–100)
MONOCYTES # BLD AUTO: 0.6 10E9/L (ref 0–1.3)
MONOCYTES NFR BLD AUTO: 8.9 %
NEUTROPHILS # BLD AUTO: 5 10E9/L (ref 1.6–8.3)
NEUTROPHILS NFR BLD AUTO: 79.9 %
NRBC # BLD AUTO: 0 10*3/UL
NRBC BLD AUTO-RTO: 0 /100
PLATELET # BLD AUTO: 166 10E9/L (ref 150–450)
POTASSIUM SERPL-SCNC: 4.1 MMOL/L (ref 3.4–5.3)
PROT SERPL-MCNC: 6.3 G/DL (ref 6.8–8.8)
RBC # BLD AUTO: 4.1 10E12/L (ref 4.4–5.9)
SODIUM SERPL-SCNC: 141 MMOL/L (ref 133–144)
TSH SERPL DL<=0.005 MIU/L-ACNC: 1.56 MU/L (ref 0.4–4)
WBC # BLD AUTO: 6.3 10E9/L (ref 4–11)

## 2019-08-06 PROCEDURE — 36415 COLL VENOUS BLD VENIPUNCTURE: CPT

## 2019-08-06 PROCEDURE — 84443 ASSAY THYROID STIM HORMONE: CPT | Performed by: INTERNAL MEDICINE

## 2019-08-06 PROCEDURE — G0463 HOSPITAL OUTPT CLINIC VISIT: HCPCS | Mod: ZF

## 2019-08-06 PROCEDURE — 85025 COMPLETE CBC W/AUTO DIFF WBC: CPT | Performed by: INTERNAL MEDICINE

## 2019-08-06 PROCEDURE — 99214 OFFICE O/P EST MOD 30 MIN: CPT | Mod: ZP | Performed by: INTERNAL MEDICINE

## 2019-08-06 PROCEDURE — 80053 COMPREHEN METABOLIC PANEL: CPT | Performed by: INTERNAL MEDICINE

## 2019-08-06 ASSESSMENT — MIFFLIN-ST. JEOR: SCORE: 1596.79

## 2019-08-06 ASSESSMENT — PAIN SCALES - GENERAL
PAINLEVEL: NO PAIN (0)
PAINLEVEL: NO PAIN (0)

## 2019-08-06 NOTE — NURSING NOTE
Chief Complaint   Patient presents with     Blood Draw     Labs drawn via VPT by RN in lab. VS taken. Pt checked in for next appt     Labs collected from venipuncture by RN.Vitals taken. Checked in for appointment(s).    Mariposa BLAS RN PHN BSN  BMT/Oncology Lab

## 2019-08-06 NOTE — PROGRESS NOTES
"MEDICAL ONCOLOGY CLINIC NOTE    REFERRING PROVIDER: Garcia Worthy MD from [facility]  RADIATION ONCOLOGIST: Nathan Garay MD from [facility]    REASON FOR CURRENT VISIT: Evaluation while on active surveillance for p16+ stage I squamous cell carcinoma of the right base of the tongue, status post concurrent cisplatin plus radiation therapy.    HISTORY OF PRESENT ILLNESS: Mr. 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 His oncologic history is as under.     Mr. Porter has recovered very well from chemoRT and his main issues currently are - moderate xerostomia and tongue sensitivity. Using saliva but not very adherent with fluoride Rx. Doing well with lymphedema and voice therapy. He saw Dr. Worthy from ENT and Geraldine Elliott NP from Rad Onc within this week and an endoscopic exam was negative for recurrence. No neck masses. Excellent PO intake and no G-tube or port.     No signs/symptoms of distant recurrence either. In good spirits today.    ONCOLOGIC HISTORY:  1. 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   - Noticed enlarging neck mass in March 2018. In 5/2018 this was reassessed and prompted imaging and ultimately a biopsy of the mass at [facility] ([place], [place]) which demonstrated squamous cell carcinoma.   - PET/CT 7/10/18: \"3.5 x 2.2 cm hypermetabolic right level 2 metastatic lymphadenopathy. There is imaging findings suggestive of extranodal extension. The mass is inseparable from the right SCM and abuts the right internal carotid artery about 180 degrees. No abnormal focal FDG uptake along the mucosal spaces to suggest a primary squamous cell cancer focus. Several subcentimeter pulmonary nodules with no increased FDG uptake. Attention on " "follow up is recommended. Multiple, too small to characterize hypodensities in the liver. A 9 mm nodule in the right adrenal gland, without increased FDG uptake. A 7 mm hypodensity in the pancreatic tail with no increased FDG uptake, may be a side branch IPMN. Tiny lucencies in the left iliac bone without increased FDG uptake, possibly focal osteoporotic areas.\"  - Seen by ENT and underwent DL with directed biopsies on 7/18/18 with pathology showing no evidence of malignancy within the mucosal sites.   - A Robotic demucosalization of the right tongue base on 8/3/18 was performed and pathology from this pathology demonstrated p16 positive nonkeratinizing squamous cell carcinoma in the level node lymph node and in the right tongue base.   - Started concurrent cisplatin plus radiation therapy based on tumor board recommendations. Cisplatin 100mg/m2 cycle 1: 8/28/18. Cycle 2 - delayed by 2 days due to  and given on 9/21/18. Cycle 3 - unable to tolerate 100mg/m2 due to myelosuppression and patient preference. Ultimately received cisplatin 40mg/m2 x1 on 10/12/18. RT - 6996 cGy over 33 fractions - 8/29/18 to 10/13/18.   - PET/CT with CT soft tissue neck with contrast 1/9/19: \"1. Interval resolution of hypermetabolic right level II lymph node. No residual radiotracer activity in this location and no other lymphadenopathy. 2. No evidence of mucosal, ivana, or soft tissue abnormality on contrast enhanced neck CT. 1. No evidence of metastatic disease in the chest, abdomen, or pelvis.\"  - Visual endoscopy - 08/06/19 - negative.    REVIEW OF SYSTEMS:  14 point ROS negative other than the symptoms noted above in the HPI.    PAST MEDICAL HISTORY:   Past Medical History:   Diagnosis Date     Head and neck cancer (H)      PAST SURGICAL HISTORY:   Past Surgical History:   Procedure Laterality Date     DAVINCI RESECT TUMOR TRANSORAL Bilateral 8/3/2018    Procedure: DAVINCI RESECT TUMOR TRANSORAL;  Transoral Robotic Resection of " the Base of Tongue,   IR Guided Core Needle Biopsy of Right Neck Mass;  Surgeon: Blas Calvillo MD;  Location: [facility] OR     FINE NEEDLE ASPIRATION WITH IMAGING GUIDANCE Right 8/3/2018    Procedure: FINE NEEDLE ASPIRATION WITH IMAGING GUIDANCE;  Ultrasound guided Core Biopsy of a Right Neck Mass;  Surgeon: Denis Monson MD;  Location: [facility] OR     HEAD & NECK SURGERY       LARYNGOSCOPY WITH BIOPSY(IES) Bilateral 7/18/2018    Procedure: LARYNGOSCOPY WITH BIOPSY(IES);  Direct Laryngoscopy with Direct Biopsies;  Surgeon: Garcia Worthy MD;  Location: [facility] OR     TONSILLECTOMY     In addition:  1. Irritated sweat gland removed   2. Fatty tumor on his back removed    3. Cataract surgery     SOCIAL HISTORY:   Social History     Socioeconomic History     Marital status:      Spouse name: Not on file     Number of children: Not on file     Years of education: Not on file     Highest education level: Not on file   Occupational History     Not on file   Social Needs     Financial resource strain: Not on file     Food insecurity:     Worry: Not on file     Inability: Not on file     Transportation needs:     Medical: Not on file     Non-medical: Not on file   Tobacco Use     Smoking status: Never Smoker     Smokeless tobacco: Never Used   Substance and Sexual Activity     Alcohol use: Yes     Comment: occasionally     Drug use: No     Sexual activity: Yes     Partners: Female     Birth control/protection: Other   Lifestyle     Physical activity:     Days per week: Not on file     Minutes per session: Not on file     Stress: Not on file   Relationships     Social connections:     Talks on phone: Not on file     Gets together: Not on file     Attends [employer]ism service: Not on file     Active member of club or organization: Not on file     Attends meetings of clubs or organizations: Not on file     Relationship status: Not on file     Intimate partner violence:     Fear of current or ex partner: Not on file      Emotionally abused: Not on file     Physically abused: Not on file     Forced sexual activity: Not on file   Other Topics Concern     Not on file   Social History Narrative     Not on file   Patient is from [place], [place]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     FAMILY HISTORY:   Brother with clotting issue  Mother, unknown primary and    Maternal aunt with colon cancer  Maternal grandfather with head/neck cancer  Maternal cousins with cancers unknown types    ALLERGIES:  No Known Allergies    CURRENT MEDICATIONS:   Current Outpatient Medications:      aspirin 325 MG tablet, Take 325 mg by mouth, Disp: , Rfl:      CANNABIDIOL, HEMP OIL/EXTRACT, OR CBD PRODUCT OTHER THAN MEDICAL CANNABIS,, Take by mouth daily 2 drops under tongue once a day, Disp: , Rfl:      IBUPROFEN PO, Take 200 mg by mouth, Disp: , Rfl:      LORazepam (ATIVAN) 0.5 MG tablet, Take 1 tablet (0.5 mg) by mouth every 6 hours as needed for nausea, Disp: 30 tablet, Rfl: 0     Multiple Vitamins-Minerals (MULTIVITAMIN & MINERAL PO), , Disp: , Rfl:      UNABLE TO FIND, Take by mouth daily MEDICATION NAME: CDB capsule once daily, Disp: , Rfl:      UNABLE TO FIND, Take by mouth daily MEDICATION NAME: Unknown oil in a capsule that is supposed to promote healing, Disp: , Rfl:      vitamin E 400 units TABS, Take 400 Units by mouth daily, Disp: , Rfl:     PHYSICAL EXAMINATION:  Vital signs: BP (!) 149/86   Pulse 58   Temp 98  F (36.7  C) (Oral)   Resp 14   Wt 79.5 kg (175 lb 4.3 oz)   SpO2 100%   BMI 23.77 kg/m    ECOG performance status of 0.  GENERAL: Thin man, no acute distress  HEENT: Clear oropharynx with thick, white secretions and minimal saliva. No tongue mass.   NECK: No JVD/LAD.  LUNGS: clear bilaterally, no wheezes, good diaphragmatic excursion  CARDIOVASCULAR: Regular rate and rhythm, no murmurs, gallops or rubs  ABDOMEN: Soft, nontender and nondistended, bowel sounds  present.  EXTREMITIES: No cyanosis, no clubbing, no edema  NEUROLOGIC: No focal deficits; alert and oriented to self, place, time    LABORATORY DATA:   Lab Test 08/06/19  1217 04/09/19  1451 01/08/19  1417 11/19/18  1112   WBC 6.3 5.3 4.3 5.0   RBC 4.10* 3.93* 4.07* 3.39*   HGB 12.9* 11.8* 12.1* 10.5*   HCT 39.2* 36.8* 39.4* 32.1*   MCV 96 94 97 95   MCH 31.5 30.0 29.7 31.0   MCHC 32.9 32.1 30.7* 32.7   RDW 13.7 13.8 12.7 15.9*    163 189 171   NEUTROPHIL 79.9 76.8 70.5 76.0   NA  --  140 141 141   POTASSIUM  --  3.8 4.1 4.1   CHLORIDE  --  108 107 108   CO2  --  27 28 25   ANIONGAP  --  5 6 8   GLC  --  118* 97 95   BUN  --  21 15 14   CR  --  0.96 1.00 0.96   KM  --  8.8 8.7 8.4*   PROTTOTAL  --  6.1* 6.6* 6.3*   ALBUMIN  --  3.5 3.7 3.4   BILITOTAL  --  0.4 0.4 0.4   ALKPHOS  --  70 86 96   AST  --  18 18 21   ALT  --  26 22 17   CMP pending  Lab Test 04/09/19  1451 01/08/19  1417 11/19/18  1112 08/28/18  0848   TSH 1.52 1.39 0.50 1.24   T4  --   --   --  1.14     IMAGING STUDIES:  As above.    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      HNSCC:  - Had a complete response on 3 month-post treatment PET/CT and has recovered well from concurrent cis+RT and is now approximately 9 months out with no clinical or endoscopic evidence of disease recurrence.  - Understands that treatment efficacy may be compromised somewhat due to dose reduction of third dose of cisplatin.   - Repeat restaging only as clinically indicated per NCCN guidelines.  - Will continue active surveillance: Reviewed the signs/symptoms of recurrence such as palpable or visible abnormal masses in H&N, pathologic weight loss, cough, SOA, CP, bone pain, CNS symptoms etc; and his role in ensuring close follow-ups for next 5 years at length. He's agreeable.  - Continue follow-ups with   Tanisha and Dr. Worthy.    RT-induced xerostomia:  - Mgmt as above. Encouraged to keep using fluoride as directed.    Survivorship visit:  - Survivorship care summary provided.    Chemo-related anemia:  - Improving. Encouraged continued health-balanced diet.  - Labs as above.    Return to clinic in 3 months for follow up.     All questions were answered, and patient was in complete agreement with this plan.     BILLIN.    Pillo Rolon M.D.  . Professor of Medicine  Division of Hematology, Oncology & Transplantation  [facility]

## 2019-08-06 NOTE — LETTER
"8/6/2019       RE: Lazaro Porter  [address] [facility] [address]     Dear Colleague,    Thank you for referring your patient, Lazaro Porter, to the [facility] CANCER [facility]. Please see a copy of my visit note below.    MEDICAL ONCOLOGY CLINIC NOTE    REFERRING PROVIDER: Garcia Worthy MD from [facility] ENT Clinic  RADIATION ONCOLOGIST: Nathan Garay MD from [facility] Radiation Oncology Clinic    REASON FOR CURRENT VISIT: Evaluation while on active surveillance for p16+ stage I squamous cell carcinoma of the right base of the tongue, status post concurrent cisplatin plus radiation therapy.    HISTORY OF PRESENT ILLNESS: Mr. 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 His oncologic history is as under.     Mr. Porter has recovered very well from chemoRT and his main issues currently are - moderate xerostomia and tongue sensitivity. Using saliva but not very adherent with fluoride Rx. Doing well with lymphedema and voice therapy. He saw Dr. Worthy from ENT and Geraldine Elliott NP from Rad Onc within this week and an endoscopic exam was negative for recurrence. No neck masses. Excellent PO intake and no G-tube or port.     No signs/symptoms of distant recurrence either. In good spirits today.    ONCOLOGIC HISTORY:  1. 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   - Noticed enlarging neck mass in March 2018. In 5/2018 this was reassessed and prompted imaging and ultimately a biopsy of the mass at [facility] ([place], [place]) which demonstrated squamous cell carcinoma.   - PET/CT 7/10/18: \"3.5 x 2.2 cm hypermetabolic right level 2 metastatic lymphadenopathy. There is imaging findings suggestive of extranodal extension. The mass is inseparable from the right SCM and abuts the right " "internal carotid artery about 180 degrees. No abnormal focal FDG uptake along the mucosal spaces to suggest a primary squamous cell cancer focus. Several subcentimeter pulmonary nodules with no increased FDG uptake. Attention on follow up is recommended. Multiple, too small to characterize hypodensities in the liver. A 9 mm nodule in the right adrenal gland, without increased FDG uptake. A 7 mm hypodensity in the pancreatic tail with no increased FDG uptake, may be a side branch IPMN. Tiny lucencies in the left iliac bone without increased FDG uptake, possibly focal osteoporotic areas.\"  - Seen by ENT and underwent DL with directed biopsies on 7/18/18 with pathology showing no evidence of malignancy within the mucosal sites.   - A Robotic demucosalization of the right tongue base on 8/3/18 was performed and pathology from this pathology demonstrated p16 positive nonkeratinizing squamous cell carcinoma in the level node lymph node and in the right tongue base.   - Started concurrent cisplatin plus radiation therapy based on tumor board recommendations. Cisplatin 100mg/m2 cycle 1: 8/28/18. Cycle 2 - delayed by 2 days due to  and given on 9/21/18. Cycle 3 - unable to tolerate 100mg/m2 due to myelosuppression and patient preference. Ultimately received cisplatin 40mg/m2 x1 on 10/12/18. RT - 6996 cGy over 33 fractions - 8/29/18 to 10/13/18.   - PET/CT with CT soft tissue neck with contrast 1/9/19: \"1. Interval resolution of hypermetabolic right level II lymph node. No residual radiotracer activity in this location and no other lymphadenopathy. 2. No evidence of mucosal, ivana, or soft tissue abnormality on contrast enhanced neck CT. 1. No evidence of metastatic disease in the chest, abdomen, or pelvis.\"  - Visual endoscopy - 08/06/19 - negative.    REVIEW OF SYSTEMS:  14 point ROS negative other than the symptoms noted above in the HPI.    PAST MEDICAL HISTORY:   Past Medical History:   Diagnosis Date     Head and " neck cancer (H)      PAST SURGICAL HISTORY:   Past Surgical History:   Procedure Laterality Date     DAVINCI RESECT TUMOR TRANSORAL Bilateral 8/3/2018    Procedure: DAVINCI RESECT TUMOR TRANSORAL;  Transoral Robotic Resection of the Base of Tongue,   IR Guided Core Needle Biopsy of Right Neck Mass;  Surgeon: Blas Calvillo MD;  Location: [facility] OR     FINE NEEDLE ASPIRATION WITH IMAGING GUIDANCE Right 8/3/2018    Procedure: FINE NEEDLE ASPIRATION WITH IMAGING GUIDANCE;  Ultrasound guided Core Biopsy of a Right Neck Mass;  Surgeon: Denis Monson MD;  Location: [facility] OR     HEAD & NECK SURGERY       LARYNGOSCOPY WITH BIOPSY(IES) Bilateral 7/18/2018    Procedure: LARYNGOSCOPY WITH BIOPSY(IES);  Direct Laryngoscopy with Direct Biopsies;  Surgeon: Garcia Worthy MD;  Location: [facility] OR     TONSILLECTOMY     In addition:  1. Irritated sweat gland removed   2. Fatty tumor on his back removed    3. Cataract surgery     SOCIAL HISTORY:   Social History     Socioeconomic History     Marital status:      Spouse name: Not on file     Number of children: Not on file     Years of education: Not on file     Highest education level: Not on file   Occupational History     Not on file   Social Needs     Financial resource strain: Not on file     Food insecurity:     Worry: Not on file     Inability: Not on file     Transportation needs:     Medical: Not on file     Non-medical: Not on file   Tobacco Use     Smoking status: Never Smoker     Smokeless tobacco: Never Used   Substance and Sexual Activity     Alcohol use: Yes     Comment: occasionally     Drug use: No     Sexual activity: Yes     Partners: Female     Birth control/protection: Other   Lifestyle     Physical activity:     Days per week: Not on file     Minutes per session: Not on file     Stress: Not on file   Relationships     Social connections:     Talks on phone: Not on file     Gets together: Not on file     Attends Rastafarian service: Not on file      Active member of club or organization: Not on file     Attends meetings of clubs or organizations: Not on file     Relationship status: Not on file     Intimate partner violence:     Fear of current or ex partner: Not on file     Emotionally abused: Not on file     Physically abused: Not on file     Forced sexual activity: Not on file   Other Topics Concern     Not on file   Social History Narrative     Not on file   Patient is from [place], [place]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     FAMILY HISTORY:   Brother with clotting issue  Mother, unknown primary and    Maternal aunt with colon cancer  Maternal grandfather with head/neck cancer  Maternal cousins with cancers unknown types    ALLERGIES:  No Known Allergies    CURRENT MEDICATIONS:   Current Outpatient Medications:      aspirin 325 MG tablet, Take 325 mg by mouth, Disp: , Rfl:      CANNABIDIOL, HEMP OIL/EXTRACT, OR CBD PRODUCT OTHER THAN MEDICAL CANNABIS,, Take by mouth daily 2 drops under tongue once a day, Disp: , Rfl:      IBUPROFEN PO, Take 200 mg by mouth, Disp: , Rfl:      LORazepam (ATIVAN) 0.5 MG tablet, Take 1 tablet (0.5 mg) by mouth every 6 hours as needed for nausea, Disp: 30 tablet, Rfl: 0     Multiple Vitamins-Minerals (MULTIVITAMIN & MINERAL PO), , Disp: , Rfl:      UNABLE TO FIND, Take by mouth daily MEDICATION NAME: CDB capsule once daily, Disp: , Rfl:      UNABLE TO FIND, Take by mouth daily MEDICATION NAME: Unknown oil in a capsule that is supposed to promote healing, Disp: , Rfl:      vitamin E 400 units TABS, Take 400 Units by mouth daily, Disp: , Rfl:     PHYSICAL EXAMINATION:  Vital signs: BP (!) 149/86   Pulse 58   Temp 98  F (36.7  C) (Oral)   Resp 14   Wt 79.5 kg (175 lb 4.3 oz)   SpO2 100%   BMI 23.77 kg/m     ECOG performance status of 0.  GENERAL: Thin man, no acute distress  HEENT: Clear oropharynx with thick, white secretions and minimal saliva. No tongue  mass.   NECK: No JVD/LAD.  LUNGS: clear bilaterally, no wheezes, good diaphragmatic excursion  CARDIOVASCULAR: Regular rate and rhythm, no murmurs, gallops or rubs  ABDOMEN: Soft, nontender and nondistended, bowel sounds present.  EXTREMITIES: No cyanosis, no clubbing, no edema  NEUROLOGIC: No focal deficits; alert and oriented to self, place, time    LABORATORY DATA:   Lab Test 08/06/19  1217 04/09/19  1451 01/08/19  1417 11/19/18  1112   WBC 6.3 5.3 4.3 5.0   RBC 4.10* 3.93* 4.07* 3.39*   HGB 12.9* 11.8* 12.1* 10.5*   HCT 39.2* 36.8* 39.4* 32.1*   MCV 96 94 97 95   MCH 31.5 30.0 29.7 31.0   MCHC 32.9 32.1 30.7* 32.7   RDW 13.7 13.8 12.7 15.9*    163 189 171   NEUTROPHIL 79.9 76.8 70.5 76.0   NA  --  140 141 141   POTASSIUM  --  3.8 4.1 4.1   CHLORIDE  --  108 107 108   CO2  --  27 28 25   ANIONGAP  --  5 6 8   GLC  --  118* 97 95   BUN  --  21 15 14   CR  --  0.96 1.00 0.96   KM  --  8.8 8.7 8.4*   PROTTOTAL  --  6.1* 6.6* 6.3*   ALBUMIN  --  3.5 3.7 3.4   BILITOTAL  --  0.4 0.4 0.4   ALKPHOS  --  70 86 96   AST  --  18 18 21   ALT  --  26 22 17   CMP pending  Lab Test 04/09/19  1451 01/08/19  1417 11/19/18  1112 08/28/18  0848   TSH 1.52 1.39 0.50 1.24   T4  --   --   --  1.14     IMAGING STUDIES:  As above.    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      HNSCC:  - Had a complete response on 3 month-post treatment PET/CT and has recovered well from concurrent cis+RT and is now approximately 9 months out with no clinical or endoscopic evidence of disease recurrence.  - Understands that treatment efficacy may be compromised somewhat due to dose reduction of third dose of cisplatin.   - Repeat restaging only as clinically indicated per NCCN guidelines.  - Will continue active surveillance: Reviewed the signs/symptoms of recurrence such as palpable or  visible abnormal masses in H&N, pathologic weight loss, cough, SOA, CP, bone pain, CNS symptoms etc; and his role in ensuring close follow-ups for next 5 years at length. He's agreeable.  - Continue follow-ups with Dr. Garay and Dr. Worthy.    RT-induced xerostomia:  - Mgmt as above. Encouraged to keep using fluoride as directed.    Survivorship visit:  - Survivorship care summary provided.    Chemo-related anemia:  - Improving. Encouraged continued health-balanced diet.  - Labs as above.    Return to clinic in 3 months for follow up.     All questions were answered, and patient was in complete agreement with this plan.     BILLIN.    Pillo Rolon M.D.  . Professor of Medicine  Division of Hematology, Oncology & Transplantation  [facility]

## 2019-08-06 NOTE — PATIENT INSTRUCTIONS
1. You were seen in the ENT Clinic today by Dr. Worthy.  If you have any questions or concerns after your appointment, please call   - Option 1: ENT Clinic: [phone]  - Option 2: [facility] (Dr. Worthy's Nurse): [phone]    2. Plan to return to clinic in 2-3 months     Natice Schwab, RN   [facility]- Otolaryngology  [phone]

## 2019-08-06 NOTE — LETTER
2019       RE: Lazaro Porter  [address] [facility] [address]     Dear Colleague,    Thank you for referring your patient, Lazaro Porter, to the [facility] EAR NOSE AND THROAT at [facility]. Please see a copy of my visit note below.      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 I last saw the patient on 19 and he was feeling well. Today, he reports he feels well, eating, drinking, breathing and swallowing normally. His taste has improved as well. No new complaints.     Review of Systems:   Pertinent items are noted in HPI or as in patient entered ROS below, remainder of complete ROS is negative.     Physical Exam:   BP (!) 156/81 (BP Location: Right arm, Patient Position: Sitting, Cuff Size: Adult Regular)   Pulse 64   Resp 15   Ht 1.829 m (6')   Wt 79.4 kg (175 lb)   BMI 23.73 kg/m        PHYSICAL EXAMINATION:    Constitutional:  The patient was unaccompanied, well-groomed, and in no acute distress.    Skin:  Warm and pink.    Neurologic:  Alert and oriented x 3.  CN's III-XII within normal limits.  Voice normal.   Psychiatric:  The patient's affect was calm, cooperative, and appropriate.    Respiratory:  Breathing comfortably without stridor or exertion of accessory muscles.    Eyes: Extraocular movement intact.    Head:  Normocephalic and atraumatic.  No lesions or scars.    Ears:  Pinnae and tragus non-tender.  EAC's and TM's were clear.      OC/OP:  Normal tongue, floor of mouth, buccal mucosa, and palate.  No lesions or masses on inspection or palpation.  No abnormal lymph tissue in the oropharynx.  The pterygoid region is non-tender.    Neck:  Supple with normal laryngeal and tracheal  landmarks.  The parotid beds were without masses.  No palpable thyroid.  Lymphatic:  There is no palpable lymphadenopathy in the neck.       Fiberoptic Endoscopy:  Consent for fiberoptic laryngoscopy was obtained, and we confirmed correctness of procedure and identity of patient.  Fiberoptic laryngoscopy was indicated due to history of cancer.  The nose was topically decongested and anesthetized.  The fiberoptic laryngoscope was passed under endoscopic vision.  The turbinates were normal.  The inferior and middle meati were clear bilaterally without purulence, masses, or polyps.  The nasopharynx was clear.  The Eustachian tubes were clear.  The soft palate appeared normal with good mobility.  The epiglottis was sharp and the visualized portion of the vallecula was clear.  The larynx was clear with mobile cords.  The arytenoids were clear and there was no pooling in the hypopharynx.          Assessment and Plan:  There are no diagnoses linked to this encounter.     Patient with history of tongue base cancer presents for follow up. He is feeling well, no new concerns. He has been staying active with walks as well. No concerning findings on exam.     Follow-up: Return in 2-3 months for recheck.         Scribe Disclosure:  IDenise, am serving as a scribe to document services personally performed by Garcia Worthy MD at this visit, based upon the provider's statements to me. All documentation has been reviewed by the aforementioned provider prior to being entered into the official medical record.    Again, thank you for allowing me to participate in the care of your patient.      Sincerely,    Garcia Worthy MD

## 2019-08-06 NOTE — NURSING NOTE
Oncology Rooming Note    August 6, 2019 12:34 PM   Lazaro Porter is a 68 year old male who presents for:    Chief Complaint   Patient presents with     Blood Draw     Labs drawn via VPT by RN in lab. VS taken. Pt checked in for next appt     Oncology Clinic Visit     Neoplasm of Tongue      Initial Vitals: BP (!) 149/86   Pulse 58   Temp 98  F (36.7  C) (Oral)   Resp 14   Wt 79.5 kg (175 lb 4.3 oz)   SpO2 100%   BMI 23.77 kg/m   Estimated body mass index is 23.77 kg/m  as calculated from the following:    Height as of an earlier encounter on 8/6/19: 1.829 m (6').    Weight as of this encounter: 79.5 kg (175 lb 4.3 oz). Body surface area is 2.01 meters squared.  No Pain (0) Comment: Data Unavailable   No LMP for male patient.  Allergies reviewed: Yes  Medications reviewed: Yes    Medications: Medication refills not needed today.  Pharmacy name entered into [employer]: [id number] - [place], [place] - [address]    Clinical concerns: no Rolon was notified.      Margie Suarez MA

## 2019-08-06 NOTE — NURSING NOTE
Chief Complaint   Patient presents with     RECHECK     Malignant neoplasm of base of tongue      BP (!) 156/81 (BP Location: Right arm, Patient Position: Sitting, Cuff Size: Adult Regular)   Pulse 64   Resp 15   Ht 1.829 m (6')   Wt 79.4 kg (175 lb)   BMI 23.73 kg/m      Amrit Freeman CMA

## 2019-08-06 NOTE — PROGRESS NOTES
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 I last saw the patient on 19 and he was feeling well. Today, he reports he feels well, eating, drinking, breathing and swallowing normally. His taste has improved as well. No new complaints.     Review of Systems:   Pertinent items are noted in HPI or as in patient entered ROS below, remainder of complete ROS is negative.     Physical Exam:   BP (!) 156/81 (BP Location: Right arm, Patient Position: Sitting, Cuff Size: Adult Regular)   Pulse 64   Resp 15   Ht 1.829 m (6')   Wt 79.4 kg (175 lb)   BMI 23.73 kg/m       PHYSICAL EXAMINATION:    Constitutional:  The patient was unaccompanied, well-groomed, and in no acute distress.    Skin:  Warm and pink.    Neurologic:  Alert and oriented x 3.  CN's III-XII within normal limits.  Voice normal.   Psychiatric:  The patient's affect was calm, cooperative, and appropriate.    Respiratory:  Breathing comfortably without stridor or exertion of accessory muscles.    Eyes: Extraocular movement intact.    Head:  Normocephalic and atraumatic.  No lesions or scars.    Ears:  Pinnae and tragus non-tender.  EAC's and TM's were clear.      OC/OP:  Normal tongue, floor of mouth, buccal mucosa, and palate.  No lesions or masses on inspection or palpation.  No abnormal lymph tissue in the oropharynx.  The pterygoid region is non-tender.    Neck:  Supple with normal laryngeal and tracheal landmarks.  The parotid beds were without masses.  No palpable thyroid.  Lymphatic:  There is no palpable lymphadenopathy in the neck.       Fiberoptic Endoscopy:  Consent for fiberoptic laryngoscopy was obtained, and we confirmed correctness of procedure and identity of patient.   Fiberoptic laryngoscopy was indicated due to history of cancer.  The nose was topically decongested and anesthetized.  The fiberoptic laryngoscope was passed under endoscopic vision.  The turbinates were normal.  The inferior and middle meati were clear bilaterally without purulence, masses, or polyps.  The nasopharynx was clear.  The Eustachian tubes were clear.  The soft palate appeared normal with good mobility.  The epiglottis was sharp and the visualized portion of the vallecula was clear.  The larynx was clear with mobile cords.  The arytenoids were clear and there was no pooling in the hypopharynx.          Assessment and Plan:  There are no diagnoses linked to this encounter.     Patient with history of tongue base cancer presents for follow up. He is feeling well, no new concerns. He has been staying active with walks as well. No concerning findings on exam.     Follow-up: Return in 2-3 months for recheck.         Scribe Disclosure:  IDenise, am serving as a scribe to document services personally performed by Garcia Worthy MD at this visit, based upon the provider's statements to me. All documentation has been reviewed by the aforementioned provider prior to being entered into the official medical record.

## 2019-10-01 PROBLEM — C44.92 SQUAMOUS CELL CARCINOMA OF SKIN, UNSPECIFIED: Status: ACTIVE | Noted: 2018-07-17

## 2019-10-10 ENCOUNTER — DOCUMENTATION ONLY (OUTPATIENT)
Dept: CARE COORDINATION | Facility: CLINIC | Age: 69
End: 2019-10-10

## 2019-11-07 ENCOUNTER — TELEPHONE (OUTPATIENT)
Dept: OTOLARYNGOLOGY | Facility: CLINIC | Age: 69
End: 2019-11-07

## 2019-11-07 DIAGNOSIS — C01 MALIGNANT NEOPLASM OF BASE OF TONGUE (H): Primary | ICD-10-CM

## 2019-11-12 ENCOUNTER — THERAPY VISIT (OUTPATIENT)
Dept: SPEECH THERAPY | Facility: CLINIC | Age: 69
End: 2019-11-12
Payer: MEDICARE

## 2019-11-12 ENCOUNTER — OFFICE VISIT (OUTPATIENT)
Dept: OTOLARYNGOLOGY | Facility: CLINIC | Age: 69
End: 2019-11-12
Payer: MEDICARE

## 2019-11-12 VITALS — HEIGHT: 72 IN | WEIGHT: 181 LBS | BODY MASS INDEX: 24.52 KG/M2

## 2019-11-12 DIAGNOSIS — C01 MALIGNANT NEOPLASM OF BASE OF TONGUE (H): Primary | ICD-10-CM

## 2019-11-12 DIAGNOSIS — R13.12 OROPHARYNGEAL DYSPHAGIA: Primary | ICD-10-CM

## 2019-11-12 DIAGNOSIS — C01 MALIGNANT NEOPLASM OF BASE OF TONGUE (H): ICD-10-CM

## 2019-11-12 ASSESSMENT — PAIN SCALES - GENERAL: PAINLEVEL: NO PAIN (0)

## 2019-11-12 ASSESSMENT — MIFFLIN-ST. JEOR: SCORE: 1619.01

## 2019-11-12 NOTE — LETTER
2019       RE: Lazaro Porter  [address] [facility] [address]     Dear Colleague,    Thank you for referring your patient, Lazaro Porter, to the [facility] EAR NOSE AND THROAT at [facility]. Please see a copy of my visit note below.      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 Last seen on 2019 at which time he was doing well. Here the patient that he is tolerating PO intake but still has a diminished appetite. He has been trying to use olive oil to help salivate his mouth when he eats. He has not tried Pilocarpine for his saliva changes. Unable to tolerate spicy foods.      Review of Systems:   Pertinent items are noted in HPI or as in patient entered ROS below, remainder of complete ROS is negative.   [facility] ROS 2018   Constitutional Appetite change   Neurology Headache   Ears, Nose, Throat Nasal congestion or drainage, Sore throat   Gastrointestinal/Genitourinary Heartburn/indigestion, Constipation      Physical Exam:   Ht 1.829 m (6')   Wt 82.1 kg (181 lb)   BMI 24.55 kg/m        PHYSICAL EXAMINATION:    Constitutional:  The patient was unaccompanied, well-groomed, and in no acute distress.    Skin:  Warm and pink.    Neurologic:  Alert and oriented x 3.  CN's III-XII within normal limits.  Voice normal.   Psychiatric:  The patient's affect was calm, cooperative, and appropriate.    Respiratory:  Breathing comfortably without stridor or exertion of accessory muscles.    Eyes: Extraocular movement intact.    Head:  Normocephalic and atraumatic.  No lesions or scars.    Ears:  Pinnae and tragus non-tender.  EAC's and TM's were clear.     Nose:  Sinuses were  non-tender.  Anterior rhinoscopy revealed midline septum and absence of purulence or polyps.    OC/OP:  Normal tongue, floor of mouth, buccal mucosa, and palate.  No lesions or masses on inspection or palpation.  No abnormal lymph tissue in the oropharynx.  The pterygoid region is non-tender.    Neck:  Supple with normal laryngeal and tracheal landmarks.  The parotid beds were without masses.  No palpable thyroid.  Lymphatic:  There is no palpable lymphadenopathy in the neck.     Fiberoptic Endoscopy:  Consent for fiberoptic laryngoscopy was obtained, and we confirmed correctness of procedure and identity of patient.  Fiberoptic laryngoscopy was indicated due to follow up for tongue base SCC.  The nose was topically decongested and anesthetized.  The fiberoptic laryngoscope was passed under endoscopic vision. The turbinates were normal.  The inferior and middle meati were clear bilaterally without purulence, masses, or polyps. The nasopharynx was clear. The Eustachian tubes were clear.  The soft palate appeared normal with good mobility.  The epiglottis was sharp and the visualized portion of the vallecula was clear.  The larynx was clear with mobile cords.  The arytenoids were clear and there was no pooling in the hypopharynx.       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 The patient is still having difficulty with his taste, and I notified him that his taste could as long as 18 months, if not more, to return. Scheduled to have a CT neck and chest tomorrow. Follow up in 3 months.        Scribe Disclosure:  I, Atif Cesar, am serving as a scribe to document services personally performed by Garcia Worthy MD at this visit, based upon the provider's statements to me. All documentation has been reviewed by the aforementioned provider prior to being entered into the official medical record.     Again, thank you for  allowing me to participate in the care of your patient.      Sincerely,    Garcia Worthy MD

## 2019-11-12 NOTE — PROGRESS NOTES
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 Last seen on 2019 at which time he was doing well. Here the patient that he is tolerating PO intake but still has a diminished appetite. He has been trying to use olive oil to help salivate his mouth when he eats. He has not tried Pilocarpine for his saliva changes. Unable to tolerate spicy foods.      Review of Systems:   Pertinent items are noted in HPI or as in patient entered ROS below, remainder of complete ROS is negative.   [facility] ROS 2018   Constitutional Appetite change   Neurology Headache   Ears, Nose, Throat Nasal congestion or drainage, Sore throat   Gastrointestinal/Genitourinary Heartburn/indigestion, Constipation      Physical Exam:   Ht 1.829 m (6')   Wt 82.1 kg (181 lb)   BMI 24.55 kg/m       PHYSICAL EXAMINATION:    Constitutional:  The patient was unaccompanied, well-groomed, and in no acute distress.    Skin:  Warm and pink.    Neurologic:  Alert and oriented x 3.  CN's III-XII within normal limits.  Voice normal.   Psychiatric:  The patient's affect was calm, cooperative, and appropriate.    Respiratory:  Breathing comfortably without stridor or exertion of accessory muscles.    Eyes: Extraocular movement intact.    Head:  Normocephalic and atraumatic.  No lesions or scars.    Ears:  Pinnae and tragus non-tender.  EAC's and TM's were clear.     Nose:  Sinuses were non-tender.  Anterior rhinoscopy revealed midline septum and absence of purulence or polyps.    OC/OP:  Normal tongue, floor of mouth, buccal mucosa, and palate.  No lesions or masses on inspection or palpation.  No abnormal lymph tissue in the oropharynx.  The pterygoid region is  non-tender.    Neck:  Supple with normal laryngeal and tracheal landmarks.  The parotid beds were without masses.  No palpable thyroid.  Lymphatic:  There is no palpable lymphadenopathy in the neck.     Fiberoptic Endoscopy:  Consent for fiberoptic laryngoscopy was obtained, and we confirmed correctness of procedure and identity of patient.  Fiberoptic laryngoscopy was indicated due to follow up for tongue base SCC.  The nose was topically decongested and anesthetized.  The fiberoptic laryngoscope was passed under endoscopic vision. The turbinates were normal.  The inferior and middle meati were clear bilaterally without purulence, masses, or polyps. The nasopharynx was clear. The Eustachian tubes were clear.  The soft palate appeared normal with good mobility.  The epiglottis was sharp and the visualized portion of the vallecula was clear.  The larynx was clear with mobile cords.  The arytenoids were clear and there was no pooling in the hypopharynx.       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 The patient is still having difficulty with his taste, and I notified him that his taste could as long as 18 months, if not more, to return. Scheduled to have a CT neck and chest tomorrow. Follow up in 3 months.        Scribe Disclosure:  I, Atif Cesar, am serving as a scribe to document services personally performed by Garcia Worthy MD at this visit, based upon the provider's statements to me. All documentation has been reviewed by the aforementioned provider prior to being entered into the official medical record.

## 2019-11-12 NOTE — PATIENT INSTRUCTIONS
1. You were seen in the ENT Clinic today by Dr. Worthy.  If you have any questions or concerns after your appointment, please call   - Option 1: ENT Clinic: [phone]  - Option 2: [facility] (Dr. Worthy's Nurse): [phone]    2.   Plan to return to clinic in 3 months; 2/4 at 2pm    Natice Schwab, RN  [facility]- Otolaryngology  [phone]

## 2019-11-12 NOTE — PROGRESS NOTES
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    RADIATION THERAPY DELIVERED:   6996 cGy delivered in 33 daily fractions, from 2018 - 10/13/2018    SYSTEMIC THERAPY:  Cisplatin 100 mg/m  (initially began with high-dose therapy every 3 weeks with his second infusion delayed 2 days secondary to neutropenia and his final cycle switched to a low dose 40 mg/m  infusion secondary to ongoing cytopenias)    INTERVAL SINCE COMPLETION OF RADIATION THERAPY:   13 months    SUBJECTIVE:   Lazaro Porter is a 69 year old male with a PMH significant for a 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 He was initially diagnosed after he presented with a palpable right neck mass with staging imaging demonstrating a small primary lesion within the right tongue base with an enlarged right level II lymph node encasing the right carotid artery and concerning for extranodal extension. He received definitive-intent chemoradiation therapy as described above, receiving a total dose of 70 Gy in 33 fractions with concurrent high-dose cisplatin which was dose-reduced due to treatment-related cytopenias.    Mr. Porter returns to radiation oncology clinic today for routine posttreatment disease surveillance visit. On examination, he reports that he is doing very well and he has no pressing concerns or complaints. He denies any oral cavity or oropharyngeal pain and is eating a normal diet with the exception of very dry foods due to his persistent mild treatment-induced xerostomia. His only other residual side effect from treatment is related to mild persistent alterations in his taste  which have not yet returned to his pre-treatment baseline. His remaining ROS are otherwise negative.    PHYSICAL EXAM:  Weight: 82.1 kg  BP: 148/84    Physical Exam  Constitutional:       Appearance: Normal appearance.   HENT:      Head: Normocephalic and atraumatic.      Nose: No rhinorrhea.      Mouth/Throat:      Pharynx: Oropharynx is clear. No posterior oropharyngeal erythema.      Comments: Mildly dry mucous membranes.  No visible oral cavity lesions. Palpation of the floor mouth, oral tongue, bilateral base of tongue, vallecula and bilateral tonsillar fossae reveals smooth mucosa with no induration, nodularity or masses.  Eyes:      Extraocular Movements: Extraocular movements intact.      Pupils: Pupils are equal, round, and reactive to light.   Neck:      Musculoskeletal: Normal range of motion.   Pulmonary:      Effort: Pulmonary effort is normal. No respiratory distress.      Breath sounds: No stridor.   Lymphadenopathy:      Cervical: No cervical adenopathy.   Skin:     General: Skin is warm and dry.      Comments: Mild hyperpigmentation within the bilateral neck.   Neurological:      General: No focal deficit present.      Mental Status: He is alert and oriented to person, place, and time.        Flexible Fiberoptic Nasopharyngoscopy:  Consent for fiberoptic laryngoscopy was obtained and I confirmed correctness of procedure and identity of patient. Fiberoptic laryngoscopy was indicated due to post-treatment disease surveillance. The nose was topically decongested and anesthetized. The fiberoptic laryngoscope was passed through the right naris under endoscopic vision. The turbinates were normal. The inferior and middle meati were clear without purulence, masses, or polyps. The nasopharynx was clear. The Eustachian tubes were clear. The soft palate appeared normal with good mobility. Advancement of the scope into the oropharynx revealed mildly bland-appearing mucosa with an otherwise normal-appearing base  of tongue, vallecula and epiglottis. The remainder of the supraglottic structures were normal in appearance and the cords were mobile bilaterally. The piriform sinuses were clear and there was no pooling of secretions within the hypopharynx.    LABS AND IMAGIN2019 TSH: 1.56    IMPRESSION:   Mr. 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 He is just over 1 year out from the completion of treatment and remains clinically RICHARD.    PLAN:   1. Follow-up in radiation oncology clinic in 3 months with NP and in 6 months with MD  2. Follow-up results from CT neck and chest performed later this afternoon  3. Continue follow-up with ENT (Dr. Worthy) and medical oncology (Dr. Rolon) as scheduled  4. Repeat thyroid function testing due in 2020    Nathan Garay MD/PhD    Department of Radiation Oncology  [facility]

## 2019-11-12 NOTE — PROGRESS NOTES
Outpatient Speech Language Pathology Discharge Note     Patient: Lazaro Porter  : 1950    Beginning/End Dates of Reporting Period:  2018 to 2019    Referring Provider: Dr. Worthy; Dr. Garay    Therapy Diagnosis: Minimal oropharyngeal dysphagia     Client Self Report: Pt with history of a cT1 N1 M0 (Stage I) p16+ 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 Pt completed definitive chemoradiation on 10/13/18. He was seen in conjunction with ENT clinic today. Reported he is doing well overall.      Objective Measurements: n/a     Goals:  Goal Identifier Diet   Goal Description 1. Pt will tolerate regular textures and thin liquids without overt clinical s/sx of aspiration/penetration during two consecutive sessions after completion of radiation therapy as judged by clinician assessment and/or pt/caregiver report.    Target Date 19   Date Met  19   Progress: See daily documentation for details.      Goal Identifier Exercises   Goal Description 2. Pt will improve ROM and strength of tongue, BOT, pharynx and jaw by completing 10 repetitions of 6/6 exercises 3 times daily with minimal written or verbal cues.   Target Date 19   Date Met  19(partially met)   Progress: See daily documentation for details.      Progress Toward Goals:   Progress this reporting period: Patient has made good progress during this reporting period. Goals are met or partially met. Patient comple definitive radiation therapy for squamous cell carcinoma of the base of tongue.  He completed treatment with the use of the feeding tube.  Today patient reported swallowing is going well.  Stated he is able to eat and drink regular textures and thin liquids with minimal difficulty.  Endorsed ongoing side effects of treatment such as dysgeusia and xerostomia.  Patient reported he continues to complete his swallowing and lymphedema exercises 1-2 times per day.  Patient is appropriate to be completing  maintenance program for swallowing exercises of at least one time per day.  Patient verbalizes rationale for ongoing completion of swallowing exercises and continued p.o. intake in order to prevent late effects of radiation such as radiation-induced fibrosis.     Plan:  Discharge from therapy.    Discharge:    Reason for Discharge: Patient has met/partially met all goals.  Patient chooses to discontinue therapy.    Equipment Issued: n/a    Discharge Plan: Patient to continue home program of daily PO of regular textures and thin liquids with general swallowing strategies. Patient to continue swallowing exercises maintenance program per radiation protocol including 10 reps per exercise at least 1x/day of the following exercises: effortful swallow, mendelsohn, veronica, jaw stretch, supraglottic swallow. Also recommend continue neck ROM exercises. Patient verbalizes understanding and agreement with home programming.       Thank you for the referral of Lazaro Porter. If you have any questions about this report, please contact me using the information below.     Shonda Rodriguez MA, CCC-SLP  Speech-Language Pathologist   [facility]  Department of Otolaryngology/D&T - 4th floor  Pager: [phone]  Phone: [phone]  Email: [email or web address]

## 2019-11-12 NOTE — NURSING NOTE
Chief Complaint   Patient presents with     RECHECK     Follow-up     Height 1.829 m (6'), weight 82.1 kg (181 lb).    Loretta Mccarthy EMT

## 2019-11-13 ENCOUNTER — ANCILLARY PROCEDURE (OUTPATIENT)
Dept: CT IMAGING | Facility: CLINIC | Age: 69
End: 2019-11-13
Attending: OTOLARYNGOLOGY
Payer: MEDICARE

## 2019-11-13 ENCOUNTER — ONCOLOGY VISIT (OUTPATIENT)
Dept: ONCOLOGY | Facility: CLINIC | Age: 69
End: 2019-11-13
Attending: INTERNAL MEDICINE
Payer: MEDICARE

## 2019-11-13 ENCOUNTER — APPOINTMENT (OUTPATIENT)
Dept: LAB | Facility: CLINIC | Age: 69
End: 2019-11-13
Attending: INTERNAL MEDICINE
Payer: MEDICARE

## 2019-11-13 ENCOUNTER — OFFICE VISIT (OUTPATIENT)
Dept: RADIATION ONCOLOGY | Facility: CLINIC | Age: 69
End: 2019-11-13
Attending: RADIOLOGY
Payer: MEDICARE

## 2019-11-13 VITALS
WEIGHT: 181.6 LBS | BODY MASS INDEX: 24.63 KG/M2 | SYSTOLIC BLOOD PRESSURE: 138 MMHG | TEMPERATURE: 97.9 F | RESPIRATION RATE: 16 BRPM | HEART RATE: 62 BPM | DIASTOLIC BLOOD PRESSURE: 81 MMHG | OXYGEN SATURATION: 99 %

## 2019-11-13 VITALS — SYSTOLIC BLOOD PRESSURE: 148 MMHG | WEIGHT: 181 LBS | DIASTOLIC BLOOD PRESSURE: 84 MMHG | BODY MASS INDEX: 24.55 KG/M2

## 2019-11-13 DIAGNOSIS — T66.XXXA ADVERSE EFFECT OF RADIATION, INITIAL ENCOUNTER: ICD-10-CM

## 2019-11-13 DIAGNOSIS — C01 MALIGNANT NEOPLASM OF BASE OF TONGUE (H): ICD-10-CM

## 2019-11-13 DIAGNOSIS — C01 MALIGNANT NEOPLASM OF BASE OF TONGUE (H): Primary | ICD-10-CM

## 2019-11-13 LAB
ALBUMIN SERPL-MCNC: 3.9 G/DL (ref 3.4–5)
ALP SERPL-CCNC: 67 U/L (ref 40–150)
ALT SERPL W P-5'-P-CCNC: 27 U/L (ref 0–70)
ANION GAP SERPL CALCULATED.3IONS-SCNC: 6 MMOL/L (ref 3–14)
AST SERPL W P-5'-P-CCNC: 19 U/L (ref 0–45)
BASOPHILS # BLD AUTO: 0 10E9/L (ref 0–0.2)
BASOPHILS NFR BLD AUTO: 0.8 %
BILIRUB SERPL-MCNC: 0.5 MG/DL (ref 0.2–1.3)
BUN SERPL-MCNC: 22 MG/DL (ref 7–30)
CALCIUM SERPL-MCNC: 8.9 MG/DL (ref 8.5–10.1)
CHLORIDE SERPL-SCNC: 109 MMOL/L (ref 94–109)
CO2 SERPL-SCNC: 26 MMOL/L (ref 20–32)
CREAT BLD-MCNC: 1.1 MG/DL (ref 0.66–1.25)
CREAT SERPL-MCNC: 1.05 MG/DL (ref 0.66–1.25)
DIFFERENTIAL METHOD BLD: NORMAL
EOSINOPHIL # BLD AUTO: 0.1 10E9/L (ref 0–0.7)
EOSINOPHIL NFR BLD AUTO: 1.6 %
ERYTHROCYTE [DISTWIDTH] IN BLOOD BY AUTOMATED COUNT: 12.9 % (ref 10–15)
GFR SERPL CREATININE-BSD FRML MDRD: 66 ML/MIN/{1.73_M2}
GFR SERPL CREATININE-BSD FRML MDRD: 72 ML/MIN/{1.73_M2}
GLUCOSE SERPL-MCNC: 89 MG/DL (ref 70–99)
HCT VFR BLD AUTO: 41.5 % (ref 40–53)
HGB BLD-MCNC: 13.6 G/DL (ref 13.3–17.7)
IMM GRANULOCYTES # BLD: 0 10E9/L (ref 0–0.4)
IMM GRANULOCYTES NFR BLD: 0.2 %
LYMPHOCYTES # BLD AUTO: 0.9 10E9/L (ref 0.8–5.3)
LYMPHOCYTES NFR BLD AUTO: 16.6 %
MCH RBC QN AUTO: 30.9 PG (ref 26.5–33)
MCHC RBC AUTO-ENTMCNC: 32.8 G/DL (ref 31.5–36.5)
MCV RBC AUTO: 94 FL (ref 78–100)
MONOCYTES # BLD AUTO: 0.5 10E9/L (ref 0–1.3)
MONOCYTES NFR BLD AUTO: 9.4 %
NEUTROPHILS # BLD AUTO: 3.7 10E9/L (ref 1.6–8.3)
NEUTROPHILS NFR BLD AUTO: 71.4 %
NRBC # BLD AUTO: 0 10*3/UL
NRBC BLD AUTO-RTO: 0 /100
PLATELET # BLD AUTO: 205 10E9/L (ref 150–450)
POTASSIUM SERPL-SCNC: 4 MMOL/L (ref 3.4–5.3)
PROT SERPL-MCNC: 6.8 G/DL (ref 6.8–8.8)
RADIOLOGIST FLAGS: NORMAL
RBC # BLD AUTO: 4.4 10E12/L (ref 4.4–5.9)
SODIUM SERPL-SCNC: 141 MMOL/L (ref 133–144)
TSH SERPL DL<=0.005 MIU/L-ACNC: 1.93 MU/L (ref 0.4–4)
WBC # BLD AUTO: 5.1 10E9/L (ref 4–11)

## 2019-11-13 PROCEDURE — 84443 ASSAY THYROID STIM HORMONE: CPT | Performed by: INTERNAL MEDICINE

## 2019-11-13 PROCEDURE — G0463 HOSPITAL OUTPT CLINIC VISIT: HCPCS | Mod: 25 | Performed by: RADIOLOGY

## 2019-11-13 PROCEDURE — G0463 HOSPITAL OUTPT CLINIC VISIT: HCPCS | Mod: 25,27

## 2019-11-13 PROCEDURE — 85025 COMPLETE CBC W/AUTO DIFF WBC: CPT | Performed by: INTERNAL MEDICINE

## 2019-11-13 PROCEDURE — 31231 NASAL ENDOSCOPY DX: CPT | Performed by: RADIOLOGY

## 2019-11-13 PROCEDURE — 36415 COLL VENOUS BLD VENIPUNCTURE: CPT

## 2019-11-13 PROCEDURE — 99214 OFFICE O/P EST MOD 30 MIN: CPT | Mod: ZP | Performed by: INTERNAL MEDICINE

## 2019-11-13 PROCEDURE — 80053 COMPREHEN METABOLIC PANEL: CPT | Performed by: INTERNAL MEDICINE

## 2019-11-13 RX ORDER — IOPAMIDOL 755 MG/ML
89 INJECTION, SOLUTION INTRAVASCULAR ONCE
Status: COMPLETED | OUTPATIENT
Start: 2019-11-13 | End: 2019-11-13

## 2019-11-13 RX ADMIN — IOPAMIDOL 89 ML: 755 INJECTION, SOLUTION INTRAVASCULAR at 15:09

## 2019-11-13 ASSESSMENT — PAIN SCALES - GENERAL: PAINLEVEL: NO PAIN (0)

## 2019-11-13 NOTE — NURSING NOTE
Vitals done, labs drawn by piv venipuncture.  See doc flow sheets for details.  Charo Almazan, JENA November 13, 2019

## 2019-11-13 NOTE — Clinical Note
2019       RE: Lazaro Porter  [address] [facility] [address]     Dear Colleague,    Thank you for referring your patient, Lazaro Porter, to the RADIATION ONCOLOGY CLINIC. Please see a copy of my visit note below.       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    RADIATION THERAPY DELIVERED:   6996 cGy delivered in 33 daily fractions, from 2018 - 10/13/2018    SYSTEMIC THERAPY:  Cisplatin 100 mg/m  (initially began with high-dose therapy every 3 weeks with his second infusion delayed 2 days secondary to neutropenia and his final cycle switched to a low dose 40 mg/m  infusion secondary to ongoing cytopenias)    INTERVAL SINCE COMPLETION OF RADIATION THERAPY:   13 months    SUBJECTIVE:   Lazaro Porter is a 69 year old male with a PMH significant for a 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 He was initially diagnosed after he presented with a palpable right neck mass with staging imaging demonstrating a small primary lesion within the right tongue base with an enlarged right level II lymph node encasing the right carotid artery and concerning for extranodal extension. He received definitive-intent chemoradiation therapy as described above, receiving a total dose of 70 Gy in 33 fractions with concurrent high-dose cisplatin which was dose-reduced due to treatment-related cytopenias.    Mr. Porter returns to radiation oncology clinic today for routine posttreatment disease surveillance visit. On examination, he  ***     PHYSICAL EXAM:  Weight: *** kg  BP: ***  Pulse: ***  Temp: ***     Physical Exam     LABS AND IMAGIN2019 TSH:  1.56    IMPRESSION:   Mr. 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 He is just over 1 year out from the completion of treatment and ***.    PLAN:   1. Follow-up in radiation oncology clinic ***   2. Repeat thyroid function testing due in 2020    Nathan Garay MD/PhD    Department of Radiation Oncology  [facility]      FOLLOW-UP VISIT    Patient Name: Lazaro Porter      : 1950     Age: 69 year old        ______________________________________________________________________________     Chief Complaint   Patient presents with     Cancer     Pt is here for a follow up:Base of Tongue Cancer: Radiation dose 6996 cGy completed 10/13/18     BP (!) 148/84   Wt 82.1 kg (181 lb)   BMI 24.55 kg/m        Pain  Denies    Labs  Other Labs: Yes: today    Imaging  CT:19      Dental:   Most Recent Dental Visit: Yes      Speech/Swallowing:   Most Recent evaluation or testin19  Swallowing Restrictions: No difficulties with swallowing    Trismus/Jaw Exercises: Yes    Nutrition:    Weight:   Wt Readings from Last 3 Encounters:   19 82.1 kg (181 lb)   19 82.1 kg (181 lb)   19 79.5 kg (175 lb 4.3 oz)         Oral Symptoms:   Xerostomia:1- Symptomatic without significant dietary alteration; unstimulated saliva flow >0.2 ml/min  Dysphagia: 0-None  Mucositis Oral Symptoms: 0-None  Mucositis: 0- None  Esophagitis:0- None    Other Appointments:     MD Name: Dr Worthy Appointment Date: 19   MD Name: Appointment Date:   MD Name: Appointment Date:   Other Appointment Notes:      Residual Radiation side effect: Dry mouth     Additional Instructions:     Nurse face-to-face time: Level 3:  10 min face to face time  2019   12:55 PM  Scope used today  #1 Pentax Nasolaryngoscope [id number] Serial number [id number] (video)          Again, thank you for allowing me to participate in the care of  your patient.      Sincerely,    Nathan Garay MD

## 2019-11-13 NOTE — NURSING NOTE
Oncology Rooming Note    November 13, 2019 2:30 PM   Lazaro Porter is a 69 year old male who presents for:    Chief Complaint   Patient presents with     Blood Draw     piv  blood draw and vitals     Oncology Clinic Visit     Return Tongue Ca     Initial Vitals: /81 (BP Location: Left arm, Patient Position: Sitting, Cuff Size: Adult Regular)   Pulse 62   Temp 97.9  F (36.6  C) (Oral)   Resp 16   Wt 82.4 kg (181 lb 9.6 oz)   SpO2 99%   BMI 24.63 kg/m   Estimated body mass index is 24.63 kg/m  as calculated from the following:    Height as of 11/12/19: 1.829 m (6').    Weight as of this encounter: 82.4 kg (181 lb 9.6 oz). Body surface area is 2.05 meters squared.  No Pain (0) Comment: Data Unavailable   No LMP for male patient.  Allergies reviewed: Yes  Medications reviewed: Yes    Medications: Medication refills not needed today.  Pharmacy name entered into [facility]: [id number] - [place], [place] - [address]    Clinical concerns: Adjusted to the new normal;        Piedad Watkins, CMA

## 2019-11-13 NOTE — LETTER
"11/13/2019       RE: Lazaro Porter  [address] [facility] [id number]     Dear Colleague,    Thank you for referring your patient, Lazaro Porter, to the [facility]. Please see a copy of my visit note below.    MEDICAL ONCOLOGY CLINIC NOTE    REFERRING PROVIDER: Garcia Worthy MD from [facility]  RADIATION ONCOLOGIST: Nathan Garay MD from [facility]    REASON FOR CURRENT VISIT: Evaluation while on active surveillance for p16+ stage I squamous cell carcinoma of the right base of the tongue, status post concurrent cisplatin plus radiation therapy.    HISTORY OF PRESENT ILLNESS: Mr. 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 His oncologic history is as under.     Mr. Porter has recovered very well from chemoRT and his main issues currently are - moderate xerostomia and tongue sensitivity. Using artificial saliva and fluoride Rx. Doing well with lymphedema and voice therapy. He saw Dr. Worthy from ENT and Dhruv Garay MD from Rad Onc earlier today and 2x endoscopies negative for malignancy. No neck masses. Excellent PO intake and no G-tube or port.     No signs/symptoms of distant recurrence either. In good spirits today.    ONCOLOGIC HISTORY:  1. 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   - Noticed enlarging neck mass in March 2018. In 5/2018 this was reassessed and prompted imaging and ultimately a biopsy of the mass at [facility] ([place], [place]) which demonstrated squamous cell carcinoma.   - PET/CT 7/10/18: \"3.5 x 2.2 cm hypermetabolic right level 2 metastatic lymphadenopathy. There is imaging findings suggestive of extranodal extension. The mass is inseparable from the right SCM and abuts the right internal carotid artery " "about 180 degrees. No abnormal focal FDG uptake along the mucosal spaces to suggest a primary squamous cell cancer focus. Several subcentimeter pulmonary nodules with no increased FDG uptake. Attention on follow up is recommended. Multiple, too small to characterize hypodensities in the liver. A 9 mm nodule in the right adrenal gland, without increased FDG uptake. A 7 mm hypodensity in the pancreatic tail with no increased FDG uptake, may be a side branch IPMN. Tiny lucencies in the left iliac bone without increased FDG uptake, possibly focal osteoporotic areas.\"  - Seen by ENT and underwent DL with directed biopsies on 7/18/18 with pathology showing no evidence of malignancy within the mucosal sites.   - A Robotic demucosalization of the right tongue base on 8/3/18 was performed and pathology from this pathology demonstrated p16 positive nonkeratinizing squamous cell carcinoma in the level node lymph node and in the right tongue base.   - Started concurrent cisplatin plus radiation therapy based on tumor board recommendations. Cisplatin 100mg/m2 cycle 1: 8/28/18. Cycle 2 - delayed by 2 days due to  and given on 9/21/18. Cycle 3 - unable to tolerate 100mg/m2 due to myelosuppression and patient preference. Ultimately received cisplatin 40mg/m2 x1 on 10/12/18. RT - 6996 cGy over 33 fractions - 8/29/18 to 10/13/18.   - PET/CT with CT soft tissue neck with contrast 1/9/19: \"1. Interval resolution of hypermetabolic right level II lymph node. No residual radiotracer activity in this location and no other lymphadenopathy. 2. No evidence of mucosal, ivana, or soft tissue abnormality on contrast enhanced neck CT. 1. No evidence of metastatic disease in the chest, abdomen, or pelvis.\"  - Visual endoscopy - 08/06/19, 11/13/19 - negative.  - CT chest and soft tissue neck with contrast planned for 11/14/19.    REVIEW OF SYSTEMS:  14 point ROS negative other than the symptoms noted above in the HPI.    PAST MEDICAL " HISTORY:   Past Medical History:   Diagnosis Date     Head and neck cancer (H)      PAST SURGICAL HISTORY:   Past Surgical History:   Procedure Laterality Date     DAVINCI RESECT TUMOR TRANSORAL Bilateral 8/3/2018    Procedure: DAVINCI RESECT TUMOR TRANSORAL;  Transoral Robotic Resection of the Base of Tongue,   IR Guided Core Needle Biopsy of Right Neck Mass;  Surgeon: Blas Calvillo MD;  Location: [facility] OR     FINE NEEDLE ASPIRATION WITH IMAGING GUIDANCE Right 8/3/2018    Procedure: FINE NEEDLE ASPIRATION WITH IMAGING GUIDANCE;  Ultrasound guided Core Biopsy of a Right Neck Mass;  Surgeon: Denis Monson MD;  Location: [facility] OR     HEAD & NECK SURGERY       LARYNGOSCOPY WITH BIOPSY(IES) Bilateral 7/18/2018    Procedure: LARYNGOSCOPY WITH BIOPSY(IES);  Direct Laryngoscopy with Direct Biopsies;  Surgeon: Garcia Worthy MD;  Location: [facility]     TONSILLECTOMY     In addition:  1. Irritated sweat gland removed   2. Fatty tumor on his back removed    3. Cataract surgery     SOCIAL HISTORY:   Social History     Socioeconomic History     Marital status:      Spouse name: Not on file     Number of children: Not on file     Years of education: Not on file     Highest education level: Not on file   Occupational History     Not on file   Social Needs     Financial resource strain: Not on file     Food insecurity:     Worry: Not on file     Inability: Not on file     Transportation needs:     Medical: Not on file     Non-medical: Not on file   Tobacco Use     Smoking status: Never Smoker     Smokeless tobacco: Never Used   Substance and Sexual Activity     Alcohol use: Yes     Comment: occasionally     Drug use: No     Sexual activity: Yes     Partners: Female     Birth control/protection: Other   Lifestyle     Physical activity:     Days per week: Not on file     Minutes per session: Not on file     Stress: Not on file   Relationships     Social connections:     Talks on phone: Not on file     Gets together:  Not on file     Attends Jehovah's witness service: Not on file     Active member of club or organization: Not on file     Attends meetings of clubs or organizations: Not on file     Relationship status: Not on file     Intimate partner violence:     Fear of current or ex partner: Not on file     Emotionally abused: Not on file     Physically abused: Not on file     Forced sexual activity: Not on file   Other Topics Concern     Not on file   Social History Narrative     Not on file   Patient is from [place], [place]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     FAMILY HISTORY:   Brother with clotting issue  Mother, unknown primary and    Maternal aunt with colon cancer  Maternal grandfather with head/neck cancer  Maternal cousins with cancers unknown types    ALLERGIES:  No Known Allergies    CURRENT MEDICATIONS:   Current Outpatient Medications:      aspirin 325 MG tablet, Take 325 mg by mouth, Disp: , Rfl:      Multiple Vitamins-Minerals (MULTIVITAMIN & MINERAL PO), , Disp: , Rfl:      vitamin E 400 units TABS, Take 400 Units by mouth daily, Disp: , Rfl:      CANNABIDIOL, HEMP OIL/EXTRACT, OR CBD PRODUCT OTHER THAN MEDICAL CANNABIS,, Take by mouth daily 2 drops under tongue once a day, Disp: , Rfl:      IBUPROFEN PO, Take 200 mg by mouth, Disp: , Rfl:   No current facility-administered medications for this visit.     Facility-Administered Medications Ordered in Other Visits:      lidocaine 3%, phenylephrine 0.25% solution for irrigation, 5 mL, Irrigation, Once, Nathan Garay MD    PHYSICAL EXAMINATION:  Vital signs: /81 (BP Location: Left arm, Patient Position: Sitting, Cuff Size: Adult Regular)   Pulse 62   Temp 97.9  F (36.6  C) (Oral)   Resp 16   Wt 82.4 kg (181 lb 9.6 oz)   SpO2 99%   BMI 24.63 kg/m     ECOG performance status of 0.  GENERAL: Thin man, no acute distress  HEENT: Clear oropharynx with thick, white secretions and minimal  saliva. No tongue mass.   NECK: No JVD/LAD.  LUNGS: clear bilaterally, no wheezes, good diaphragmatic excursion  CARDIOVASCULAR: Regular rate and rhythm, no murmurs, gallops or rubs  ABDOMEN: Soft, nontender and nondistended, bowel sounds present.  EXTREMITIES: No cyanosis, no clubbing, no edema  NEUROLOGIC: No focal deficits; alert and oriented to self, place, time    LABORATORY DATA:   Lab Test 11/13/19  1418 08/06/19  1217 04/09/19  1451   WBC 5.1 6.3 5.3   RBC 4.40 4.10* 3.93*   HGB 13.6 12.9* 11.8*   HCT 41.5 39.2* 36.8*   MCV 94 96 94   MCH 30.9 31.5 30.0   MCHC 32.8 32.9 32.1   RDW 12.9 13.7 13.8    166 163   NEUTROPHIL 71.4 79.9 76.8    141 140   POTASSIUM 4.0 4.1 3.8   CHLORIDE 109 110* 108   CO2 26 27 27   ANIONGAP 6 4 5   GLC 89 100* 118*   BUN 22 19 21   CR 1.05 0.98 0.96   KM 8.9 8.5 8.8   PROTTOTAL 6.8 6.3* 6.1*   ALBUMIN 3.9 3.7 3.5   BILITOTAL 0.5 0.6 0.4   ALKPHOS 67 57 70   AST 19 17 18   ALT 27 24 26     Lab Test 11/13/19  1418 08/06/19  1217 04/09/19  1451 01/08/19  1417 11/19/18  1112 08/28/18  0848   TSH 1.93 1.56 1.52 1.39 0.50 1.24   T4  --   --   --   --   --  1.14     IMAGING STUDIES:  As above.    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      HNSCC:  - Had a complete response on 3 month-post treatment PET/CT and has recovered well from concurrent cis+RT and is now approximately a year out with no clinical or endoscopic evidence of disease recurrence.  - Understands that treatment efficacy may be compromised somewhat due to dose reduction of third dose of cisplatin.   - Repeat restaging due tomorrow - will follow-up and attempt to inform patient (no MyChart access).   - Will continue active surveillance: Reviewed the signs/symptoms of recurrence such as palpable or visible abnormal masses in H&N, pathologic weight loss,  cough, SOA, CP, bone pain, CNS symptoms etc; and his role in ensuring close follow-ups for next 5 years at length. Every 3 monthly eval for year 2 and then every 4-6 months for year 3. He's agreeable.  - Continue follow-ups with Dr. Garay and Dr. Worthy.    RT-induced xerostomia:  - Mgmt as above. Encouraged to keep using fluoride as directed.    Chemo-related anemia:  - Improving. Encouraged continued health-balanced diet.  - Labs as above.    Return to clinic in 3 months for follow up.     All questions were answered, and patient was in complete agreement with this plan.     BILLIN    Pillo Rolon M.D.  [place]annet. Professor of Medicine  Division of Hematology, Oncology & Transplantation  [facility]

## 2019-11-13 NOTE — PROGRESS NOTES
FOLLOW-UP VISIT    Patient Name: Lazaro Porter      : 1950     Age: 69 year old        ______________________________________________________________________________     Chief Complaint   Patient presents with     Cancer     Pt is here for a follow up:Base of Tongue Cancer: Radiation dose 6996 cGy completed 10/13/18     BP (!) 148/84   Wt 82.1 kg (181 lb)   BMI 24.55 kg/m       Pain  Denies    Labs  Other Labs: Yes: today    Imaging  CT:19      Dental:   Most Recent Dental Visit: Yes      Speech/Swallowing:   Most Recent evaluation or testin19  Swallowing Restrictions: No difficulties with swallowing    Trismus/Jaw Exercises: Yes    Nutrition:    Weight:   Wt Readings from Last 3 Encounters:   19 82.1 kg (181 lb)   19 82.1 kg (181 lb)   19 79.5 kg (175 lb 4.3 oz)         Oral Symptoms:   Xerostomia:1- Symptomatic without significant dietary alteration; unstimulated saliva flow >0.2 ml/min  Dysphagia: 0-None  Mucositis Oral Symptoms: 0-None  Mucositis: 0- None  Esophagitis:0- None    Other Appointments:     MD Name: Dr Worthy Appointment Date: 19   MD Name: Appointment Date:   MD Name: Appointment Date:   Other Appointment Notes:      Residual Radiation side effect: Dry mouth     Additional Instructions:     Nurse face-to-face time: Level 3:  10 min face to face time  2019   12:55 PM  Scope used today  #1 Pentax Nasolaryngoscope [id number] Serial number [id number] (video)

## 2019-11-13 NOTE — PROGRESS NOTES
"MEDICAL ONCOLOGY CLINIC NOTE    REFERRING PROVIDER: Garcia Worthy MD from [facility]  RADIATION ONCOLOGIST: Nathan Garay MD from [facility]    REASON FOR CURRENT VISIT: Evaluation while on active surveillance for p16+ stage I squamous cell carcinoma of the right base of the tongue, status post concurrent cisplatin plus radiation therapy.    HISTORY OF PRESENT ILLNESS: Mr. 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 His oncologic history is as under.     Mr. Porter has recovered very well from chemoRT and his main issues currently are - moderate xerostomia and tongue sensitivity. Using artificial saliva and fluoride Rx. Doing well with lymphedema and voice therapy. He saw Dr. Worthy from ENT and Dhruv Garay MD from Rad Onc earlier today and 2x endoscopies negative for malignancy. No neck masses. Excellent PO intake and no G-tube or port.     No signs/symptoms of distant recurrence either. In good spirits today.    ONCOLOGIC HISTORY:  1. 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   - Noticed enlarging neck mass in March 2018. In 5/2018 this was reassessed and prompted imaging and ultimately a biopsy of the mass at [facility] ([place], [place]) which demonstrated squamous cell carcinoma.   - PET/CT 7/10/18: \"3.5 x 2.2 cm hypermetabolic right level 2 metastatic lymphadenopathy. There is imaging findings suggestive of extranodal extension. The mass is inseparable from the right SCM and abuts the right internal carotid artery about 180 degrees. No abnormal focal FDG uptake along the mucosal spaces to suggest a primary squamous cell cancer focus. Several subcentimeter pulmonary nodules with no increased FDG uptake. Attention on follow up is recommended. " "Multiple, too small to characterize hypodensities in the liver. A 9 mm nodule in the right adrenal gland, without increased FDG uptake. A 7 mm hypodensity in the pancreatic tail with no increased FDG uptake, may be a side branch IPMN. Tiny lucencies in the left iliac bone without increased FDG uptake, possibly focal osteoporotic areas.\"  - Seen by ENT and underwent DL with directed biopsies on 7/18/18 with pathology showing no evidence of malignancy within the mucosal sites.   - A Robotic demucosalization of the right tongue base on 8/3/18 was performed and pathology from this pathology demonstrated p16 positive nonkeratinizing squamous cell carcinoma in the level node lymph node and in the right tongue base.   - Started concurrent cisplatin plus radiation therapy based on tumor board recommendations. Cisplatin 100mg/m2 cycle 1: 8/28/18. Cycle 2 - delayed by 2 days due to  and given on 9/21/18. Cycle 3 - unable to tolerate 100mg/m2 due to myelosuppression and patient preference. Ultimately received cisplatin 40mg/m2 x1 on 10/12/18. RT - 6996 cGy over 33 fractions - 8/29/18 to 10/13/18.   - PET/CT with CT soft tissue neck with contrast 1/9/19: \"1. Interval resolution of hypermetabolic right level II lymph node. No residual radiotracer activity in this location and no other lymphadenopathy. 2. No evidence of mucosal, ivana, or soft tissue abnormality on contrast enhanced neck CT. 1. No evidence of metastatic disease in the chest, abdomen, or pelvis.\"  - Visual endoscopy - 08/06/19, 11/13/19 - negative.  - CT chest and soft tissue neck with contrast planned for 11/14/19.    REVIEW OF SYSTEMS:  14 point ROS negative other than the symptoms noted above in the HPI.    PAST MEDICAL HISTORY:   Past Medical History:   Diagnosis Date     Head and neck cancer (H)      PAST SURGICAL HISTORY:   Past Surgical History:   Procedure Laterality Date     DAVINCI RESECT TUMOR TRANSORAL Bilateral 8/3/2018    Procedure: JAYNEI " RESECT TUMOR TRANSORAL;  Transoral Robotic Resection of the Base of Tongue,   IR Guided Core Needle Biopsy of Right Neck Mass;  Surgeon: Blas Calvillo MD;  Location: [facility] OR     FINE NEEDLE ASPIRATION WITH IMAGING GUIDANCE Right 8/3/2018    Procedure: FINE NEEDLE ASPIRATION WITH IMAGING GUIDANCE;  Ultrasound guided Core Biopsy of a Right Neck Mass;  Surgeon: Denis Monson MD;  Location: [facility] OR     HEAD & NECK SURGERY       LARYNGOSCOPY WITH BIOPSY(IES) Bilateral 7/18/2018    Procedure: LARYNGOSCOPY WITH BIOPSY(IES);  Direct Laryngoscopy with Direct Biopsies;  Surgeon: Garcia Worthy MD;  Location: [facility] OR     TONSILLECTOMY     In addition:  1. Irritated sweat gland removed   2. Fatty tumor on his back removed    3. Cataract surgery     SOCIAL HISTORY:   Social History     Socioeconomic History     Marital status:      Spouse name: Not on file     Number of children: Not on file     Years of education: Not on file     Highest education level: Not on file   Occupational History     Not on file   Social Needs     Financial resource strain: Not on file     Food insecurity:     Worry: Not on file     Inability: Not on file     Transportation needs:     Medical: Not on file     Non-medical: Not on file   Tobacco Use     Smoking status: Never Smoker     Smokeless tobacco: Never Used   Substance and Sexual Activity     Alcohol use: Yes     Comment: occasionally     Drug use: No     Sexual activity: Yes     Partners: Female     Birth control/protection: Other   Lifestyle     Physical activity:     Days per week: Not on file     Minutes per session: Not on file     Stress: Not on file   Relationships     Social connections:     Talks on phone: Not on file     Gets together: Not on file     Attends Mandaen service: Not on file     Active member of club or organization: Not on file     Attends meetings of clubs or organizations: Not on file     Relationship status: Not on file     Intimate partner  violence:     Fear of current or ex partner: Not on file     Emotionally abused: Not on file     Physically abused: Not on file     Forced sexual activity: Not on file   Other Topics Concern     Not on file   Social History Narrative     Not on file   Patient is from [place], [place]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     FAMILY HISTORY:   Brother with clotting issue  Mother, unknown primary and    Maternal aunt with colon cancer  Maternal grandfather with head/neck cancer  Maternal cousins with cancers unknown types    ALLERGIES:  No Known Allergies    CURRENT MEDICATIONS:   Current Outpatient Medications:      aspirin 325 MG tablet, Take 325 mg by mouth, Disp: , Rfl:      Multiple Vitamins-Minerals (MULTIVITAMIN & MINERAL PO), , Disp: , Rfl:      vitamin E 400 units TABS, Take 400 Units by mouth daily, Disp: , Rfl:      CANNABIDIOL, HEMP OIL/EXTRACT, OR CBD PRODUCT OTHER THAN MEDICAL CANNABIS,, Take by mouth daily 2 drops under tongue once a day, Disp: , Rfl:      IBUPROFEN PO, Take 200 mg by mouth, Disp: , Rfl:   No current facility-administered medications for this visit.     Facility-Administered Medications Ordered in Other Visits:      lidocaine 3%, phenylephrine 0.25% solution for irrigation, 5 mL, Irrigation, Once, Nathan Garay MD    PHYSICAL EXAMINATION:  Vital signs: /81 (BP Location: Left arm, Patient Position: Sitting, Cuff Size: Adult Regular)   Pulse 62   Temp 97.9  F (36.6  C) (Oral)   Resp 16   Wt 82.4 kg (181 lb 9.6 oz)   SpO2 99%   BMI 24.63 kg/m    ECOG performance status of 0.  GENERAL: Thin man, no acute distress  HEENT: Clear oropharynx with thick, white secretions and minimal saliva. No tongue mass.   NECK: No JVD/LAD.  LUNGS: clear bilaterally, no wheezes, good diaphragmatic excursion  CARDIOVASCULAR: Regular rate and rhythm, no murmurs, gallops or rubs  ABDOMEN: Soft, nontender and nondistended, bowel  sounds present.  EXTREMITIES: No cyanosis, no clubbing, no edema  NEUROLOGIC: No focal deficits; alert and oriented to self, place, time    LABORATORY DATA:   Lab Test 11/13/19  1418 08/06/19  1217 04/09/19  1451   WBC 5.1 6.3 5.3   RBC 4.40 4.10* 3.93*   HGB 13.6 12.9* 11.8*   HCT 41.5 39.2* 36.8*   MCV 94 96 94   MCH 30.9 31.5 30.0   MCHC 32.8 32.9 32.1   RDW 12.9 13.7 13.8    166 163   NEUTROPHIL 71.4 79.9 76.8    141 140   POTASSIUM 4.0 4.1 3.8   CHLORIDE 109 110* 108   CO2 26 27 27   ANIONGAP 6 4 5   GLC 89 100* 118*   BUN 22 19 21   CR 1.05 0.98 0.96   KM 8.9 8.5 8.8   PROTTOTAL 6.8 6.3* 6.1*   ALBUMIN 3.9 3.7 3.5   BILITOTAL 0.5 0.6 0.4   ALKPHOS 67 57 70   AST 19 17 18   ALT 27 24 26     Lab Test 11/13/19  1418 08/06/19  1217 04/09/19  1451 01/08/19  1417 11/19/18  1112 08/28/18  0848   TSH 1.93 1.56 1.52 1.39 0.50 1.24   T4  --   --   --   --   --  1.14     IMAGING STUDIES:  As above.    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      HNSCC:  - Had a complete response on 3 month-post treatment PET/CT and has recovered well from concurrent cis+RT and is now approximately a year out with no clinical or endoscopic evidence of disease recurrence.  - Understands that treatment efficacy may be compromised somewhat due to dose reduction of third dose of cisplatin.   - Repeat restaging due tomorrow - will follow-up and attempt to inform patient (no MyChart access).   - Will continue active surveillance: Reviewed the signs/symptoms of recurrence such as palpable or visible abnormal masses in H&N, pathologic weight loss, cough, SOA, CP, bone pain, CNS symptoms etc; and his role in ensuring close follow-ups for next 5 years at length. Every 3 monthly eval for year 2 and then every 4-6 months for year 3. He's agreeable.  - Continue follow-ups with  Dr. Garay and Dr. Worthy.    RT-induced xerostomia:  - Mgmt as above. Encouraged to keep using fluoride as directed.    Chemo-related anemia:  - Improving. Encouraged continued health-balanced diet.  - Labs as above.    Return to clinic in 3 months for follow up.     All questions were answered, and patient was in complete agreement with this plan.     BILLIN    Pillo Rolon M.D.  . Professor of Medicine  Division of Hematology, Oncology & Transplantation  [facility]

## 2019-11-14 ENCOUNTER — TELEPHONE (OUTPATIENT)
Dept: OTOLARYNGOLOGY | Facility: CLINIC | Age: 69
End: 2019-11-14

## 2019-11-14 DIAGNOSIS — Q45.3 PANCREATIC ABNORMALITY: Primary | ICD-10-CM

## 2019-11-14 NOTE — TELEPHONE ENCOUNTER
Pt called with recent CT results     CT NECK  Impression:  Postradiation changes in the neck with no evidence of local or  metastatic cancer recurrence. No evident mass or adenopathy within the  neck.     CT CHEST  IMPRESSION:   1. No evidence of metastatic disease in the chest.  2. Diffuse hepatic steatosis.  3. Incidental 8 mm hypodense cystic focus in the the pancreas tail,  possibly a sidebranch IPMN. Consider further characterization with MRI  with contrast.    Explained that no cancer recurrence noted in either scan. There was an incidental 8mm focus noted to the pancreatic tail. Recommendation is that he have a pancrease MRI to further assess.     Farhad wishes to have this completed closer to home. Order Faxed to [facility] with the instructions to call pt to schedule. I've asked the patient to reach out to me and update me on when scan is scheduled so I can make sure to have images pulled into our system to review.     Direct line given for additional questions/concerns.     Natice Schwab, RN BSN

## 2019-11-19 ENCOUNTER — TELEPHONE (OUTPATIENT)
Dept: OTOLARYNGOLOGY | Facility: CLINIC | Age: 69
End: 2019-11-19

## 2019-11-19 NOTE — TELEPHONE ENCOUNTER
Order signed by Dr. Worthy and faxed to the number provieded 11/19 at 0066    Natice Schwab, RN BSN

## 2019-11-19 NOTE — TELEPHONE ENCOUNTER
[facility] Call Center    Phone Message    May a detailed message be left on voicemail: yes    Reason for Call: Other: Elsy is caling from [facility] in [place]. She stated that she is needing an MRI order signed. It came over not signed. Please reach out or fax to numbers provided. Thank you      Action Taken: Message routed to:  [facility] ([facility] ENT

## 2019-12-06 NOTE — PROGRESS NOTES
OUTPATIENT SWALLOW  EVALUATION  PLAN OF TREATMENT FOR OUTPATIENT REHABILITATION  (COMPLETE FOR INITIAL CLAIMS ONLY)  Patient's Last Name, First Name, M.I.  YOB: 1950  Lazaro Porter        Provider's Name   JANETTE Florence   Medical Record No.  [id number]     Start of Care Date:   8/21/18   Onset Date:   8/3/18   Type:     ___PT   ____OT  ___X_SLP Medical Diagnosis:   Dysphagia; malignant neoplasm of base of tongue     Treatment Diagnosis:  Moderate oropharyngeal dysphagia, likely to become severe during treatment Visits from SOC:  5     _________________________________________________________________________________  Plan of Treatment/Functional Goals:                           Goals   1. Goal Identifier: Diet       Goal Description: 1. Pt will tolerate regular textures and thin liquids without overt clinical s/sx of aspiration/penetration during two consecutive sessions after completion of radiation therapy as judged by clinician assessment and/or pt/caregiver report.        Target Date: 11/04/19           2. Goal Identifier: Exercises       Goal Description: 2. Pt will improve ROM and strength of tongue, BOT, pharynx and jaw by completing 10 repetitions of 6/6 exercises 3 times daily with minimal written or verbal cues.       Target Date: 11/04/19           3.                             4.                             5.                            6.                              7.                              8.                            Therapy Frequency: 1x/month x 6 months            JANETTE Florence       I CERTIFY THE NEED FOR THESE SERVICES FURNISHED UNDER        THIS PLAN OF TREATMENT AND WHILE UNDER MY CARE     (Physician attestation of this document indicates review and certification of the therapy plan).              Certification date from: 8/6/19  Certification date to:  11/4/19                                                                                                                                                                    Referring Physician: Dr. Garcia Worthy                    Initial Assessment        See Epic Evaluation Start Of Care Date: 8/21/18

## 2019-12-16 ENCOUNTER — TRANSFERRED RECORDS (OUTPATIENT)
Dept: HEALTH INFORMATION MANAGEMENT | Facility: CLINIC | Age: 69
End: 2019-12-16

## 2019-12-17 ENCOUNTER — TELEPHONE (OUTPATIENT)
Dept: OTOLARYNGOLOGY | Facility: CLINIC | Age: 69
End: 2019-12-17

## 2019-12-17 NOTE — TELEPHONE ENCOUNTER
Records Requested      Facility  [facility]    [address] [place] [address]    Ph. [phone]    fx for images: [id number]   Outcome 2019I MRI ABDOMEN WITHOUT AND WITH CONTRAST  report in  Care Everywhere. They are unable to push images, a fax has been sent to images to be mailed out.   *trackin       19 11:43AM images received and sent to upload - Amay

## 2019-12-31 ENCOUNTER — TELEPHONE (OUTPATIENT)
Dept: OTOLARYNGOLOGY | Facility: CLINIC | Age: 69
End: 2019-12-31

## 2019-12-31 NOTE — TELEPHONE ENCOUNTER
"Pt called with results of recent MRI. No answer. Results left on voicemail: \"nonenhancing cystic lesion within the tail of the pancrease. No suspicious features are present\". We would like to continue to following with another MRI in about 2 years.     Direct line given for additional questions/concerns.     Natice Schwab, RN BSN    "

## 2020-02-04 ENCOUNTER — OFFICE VISIT (OUTPATIENT)
Dept: RADIATION ONCOLOGY | Facility: CLINIC | Age: 70
End: 2020-02-04
Attending: RADIOLOGY
Payer: MEDICARE

## 2020-02-04 ENCOUNTER — OFFICE VISIT (OUTPATIENT)
Dept: OTOLARYNGOLOGY | Facility: CLINIC | Age: 70
End: 2020-02-04
Payer: MEDICARE

## 2020-02-04 ENCOUNTER — APPOINTMENT (OUTPATIENT)
Dept: LAB | Facility: CLINIC | Age: 70
End: 2020-02-04
Attending: INTERNAL MEDICINE
Payer: MEDICARE

## 2020-02-04 ENCOUNTER — ONCOLOGY VISIT (OUTPATIENT)
Dept: ONCOLOGY | Facility: CLINIC | Age: 70
End: 2020-02-04
Attending: INTERNAL MEDICINE
Payer: MEDICARE

## 2020-02-04 VITALS — BODY MASS INDEX: 25.5 KG/M2 | SYSTOLIC BLOOD PRESSURE: 167 MMHG | DIASTOLIC BLOOD PRESSURE: 91 MMHG | WEIGHT: 188 LBS

## 2020-02-04 VITALS
RESPIRATION RATE: 20 BRPM | OXYGEN SATURATION: 97 % | WEIGHT: 188.3 LBS | HEIGHT: 72 IN | TEMPERATURE: 98.8 F | HEART RATE: 70 BPM | SYSTOLIC BLOOD PRESSURE: 163 MMHG | BODY MASS INDEX: 25.5 KG/M2 | DIASTOLIC BLOOD PRESSURE: 75 MMHG

## 2020-02-04 VITALS — HEIGHT: 72 IN | WEIGHT: 188 LBS | BODY MASS INDEX: 25.47 KG/M2

## 2020-02-04 DIAGNOSIS — C01 MALIGNANT NEOPLASM OF BASE OF TONGUE (H): ICD-10-CM

## 2020-02-04 DIAGNOSIS — T66.XXXA ADVERSE EFFECT OF RADIATION, INITIAL ENCOUNTER: ICD-10-CM

## 2020-02-04 DIAGNOSIS — C01 MALIGNANT NEOPLASM OF BASE OF TONGUE (H): Primary | ICD-10-CM

## 2020-02-04 DIAGNOSIS — C44.92 SQUAMOUS CELL CARCINOMA OF SKIN, UNSPECIFIED: Primary | ICD-10-CM

## 2020-02-04 LAB
ALBUMIN SERPL-MCNC: 3.8 G/DL (ref 3.4–5)
ALP SERPL-CCNC: 65 U/L (ref 40–150)
ALT SERPL W P-5'-P-CCNC: 32 U/L (ref 0–70)
ANION GAP SERPL CALCULATED.3IONS-SCNC: 5 MMOL/L (ref 3–14)
AST SERPL W P-5'-P-CCNC: 26 U/L (ref 0–45)
BASOPHILS # BLD AUTO: 0 10E9/L (ref 0–0.2)
BASOPHILS NFR BLD AUTO: 0.6 %
BILIRUB SERPL-MCNC: 0.7 MG/DL (ref 0.2–1.3)
BUN SERPL-MCNC: 22 MG/DL (ref 7–30)
CALCIUM SERPL-MCNC: 9.1 MG/DL (ref 8.5–10.1)
CHLORIDE SERPL-SCNC: 111 MMOL/L (ref 94–109)
CO2 SERPL-SCNC: 27 MMOL/L (ref 20–32)
CREAT SERPL-MCNC: 1.05 MG/DL (ref 0.66–1.25)
DIFFERENTIAL METHOD BLD: NORMAL
EOSINOPHIL # BLD AUTO: 0.1 10E9/L (ref 0–0.7)
EOSINOPHIL NFR BLD AUTO: 1.5 %
ERYTHROCYTE [DISTWIDTH] IN BLOOD BY AUTOMATED COUNT: 13.2 % (ref 10–15)
GFR SERPL CREATININE-BSD FRML MDRD: 71 ML/MIN/{1.73_M2}
GLUCOSE SERPL-MCNC: 95 MG/DL (ref 70–99)
HCT VFR BLD AUTO: 41.9 % (ref 40–53)
HGB BLD-MCNC: 13.7 G/DL (ref 13.3–17.7)
IMM GRANULOCYTES # BLD: 0 10E9/L (ref 0–0.4)
IMM GRANULOCYTES NFR BLD: 0.4 %
LYMPHOCYTES # BLD AUTO: 0.8 10E9/L (ref 0.8–5.3)
LYMPHOCYTES NFR BLD AUTO: 15.5 %
MCH RBC QN AUTO: 30.9 PG (ref 26.5–33)
MCHC RBC AUTO-ENTMCNC: 32.7 G/DL (ref 31.5–36.5)
MCV RBC AUTO: 94 FL (ref 78–100)
MONOCYTES # BLD AUTO: 0.5 10E9/L (ref 0–1.3)
MONOCYTES NFR BLD AUTO: 8.7 %
NEUTROPHILS # BLD AUTO: 4 10E9/L (ref 1.6–8.3)
NEUTROPHILS NFR BLD AUTO: 73.3 %
NRBC # BLD AUTO: 0 10*3/UL
NRBC BLD AUTO-RTO: 0 /100
PLATELET # BLD AUTO: 189 10E9/L (ref 150–450)
POTASSIUM SERPL-SCNC: 4.7 MMOL/L (ref 3.4–5.3)
PROT SERPL-MCNC: 6.6 G/DL (ref 6.8–8.8)
RBC # BLD AUTO: 4.44 10E12/L (ref 4.4–5.9)
SODIUM SERPL-SCNC: 142 MMOL/L (ref 133–144)
TSH SERPL DL<=0.005 MIU/L-ACNC: 2.35 MU/L (ref 0.4–4)
WBC # BLD AUTO: 5.4 10E9/L (ref 4–11)

## 2020-02-04 PROCEDURE — 99214 OFFICE O/P EST MOD 30 MIN: CPT | Mod: ZP | Performed by: INTERNAL MEDICINE

## 2020-02-04 PROCEDURE — G0463 HOSPITAL OUTPT CLINIC VISIT: HCPCS | Mod: ZF

## 2020-02-04 PROCEDURE — G0463 HOSPITAL OUTPT CLINIC VISIT: HCPCS | Mod: 25 | Performed by: NURSE PRACTITIONER

## 2020-02-04 PROCEDURE — 36415 COLL VENOUS BLD VENIPUNCTURE: CPT

## 2020-02-04 PROCEDURE — 85025 COMPLETE CBC W/AUTO DIFF WBC: CPT | Performed by: INTERNAL MEDICINE

## 2020-02-04 PROCEDURE — 80053 COMPREHEN METABOLIC PANEL: CPT | Performed by: INTERNAL MEDICINE

## 2020-02-04 PROCEDURE — 84443 ASSAY THYROID STIM HORMONE: CPT | Performed by: INTERNAL MEDICINE

## 2020-02-04 ASSESSMENT — PAIN SCALES - GENERAL
PAINLEVEL: NO PAIN (0)
PAINLEVEL: NO PAIN (0)

## 2020-02-04 ASSESSMENT — MIFFLIN-ST. JEOR
SCORE: 1652.12
SCORE: 1650.76

## 2020-02-04 NOTE — NURSING NOTE
Chief Complaint   Patient presents with     RECHECK     3 month follow-up     Height 1.829 m (6'), weight 85.3 kg (188 lb).    Loretta Mccarthy EMT

## 2020-02-04 NOTE — NURSING NOTE
Chief Complaint   Patient presents with     Blood Draw     Venipuncture labs collected by RN. Elevated b/p, provider notified.

## 2020-02-04 NOTE — LETTER
2020     RE: Lazaro Porter  [address] [place] [id number]     Dear Colleague,    Thank you for referring your patient, Lazaro Porter, to the RADIATION ONCOLOGY CLINIC. Please see a copy of my visit note below.    FOLLOW-UP VISIT    Patient Name: Lazaro Porter      : 1950     Age: 69 year old        ______________________________________________________________________________     Chief Complaint   Patient presents with     Cancer     Pt is here for a follow up:Base of Tongue Cancer: Radiation dose 6996 cGy completed 10/13/18     BP (!) 167/91   Wt 85.3 kg (188 lb)   BMI 25.50 kg/m          Pain  Denies    Labs  Other Labs: No    Imaging  None      Dental:   Most Recent Dental Visit: Yes      Speech/Swallowing:   Most Recent evaluation or testing: No  Swallowing Restrictions: No difficulties with swallowing    Trismus/Jaw Exercises: Yes    Nutrition:    Weight:   Wt Readings from Last 3 Encounters:   20 85.3 kg (188 lb)   19 82.4 kg (181 lb 9.6 oz)   19 82.1 kg (181 lb)         Oral Symptoms:   Xerostomia:1- Symptomatic without significant dietary alteration; unstimulated saliva flow >0.2 ml/min  Dysphagia: 0-None  Mucositis Oral Symptoms: 0-None  Mucositis: 0- None  Esophagitis:0- None    Other Appointments:     MD Name: Dr Worthy Appointment Date: today   MD Name: Appointment Date:   MD Name: Appointment Date:   Other Appointment Notes:     Residual Radiation side effect: Dry mouth     Additional Instructions:     Nurse face-to-face time: Level 3:  10 min face to face time                   Department of Radiation Oncology  [facility]  [address] [place] [address] [phone]       Radiation Oncology Follow-up  2020      Lazaro Porter  MRN: [id number]   : 1950     DISEASE TREATED:   cT1 N1 M0 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     RADIATION THERAPY DELIVERED:   6996 cGy delivered in 33  daily fractions, from 8/29/2018 - 10/13/2018     SYSTEMIC THERAPY:  Cisplatin 100 mg/m  (initially began with high-dose therapy every 3 weeks with his second infusion delayed 2 days secondary to neutropenia and his final cycle switch to a low-dose 40 mg/m  infusion secondary to ongoing cytopenias)     INTERVAL SINCE COMPLETION OF RADIATION THERAPY:   Approximately  16 months    SUBJECTIVE:   Lazaro Porter is a 68 year old man with a history of 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 He was diagnosed after he initially presented with an enlarged right level 2 cervical lymph node. He tolerated treatment reasonably well with the anticipated acute nausea, mucositis and dermatitis. Systemic therapy was slightly altered with his second course of high-dose cisplatin delayed 2 days secondary to neutropenia and his final infusion switch to a low-dose (40 mg/m ) infusion due to persistent cytopenias. He returns to radiation oncology clinic today for a routine post-treatment surveillance visit. He was last seen in radiation oncology on 1/7/19 and was recovering well at that time.     Since last visit, Mr. Lam reports that overall he is doing well.  He is always a goran to see.  He complains that his taste is still diminished and not even at 50% percent of what it was before treatment.  Still has xerostomia and drinks a lot of water to help.  He does see dental every 6 months, but doesn't do additional fluoride.  We did discuss that this is recommended.  He uses sunscreen occasionally.  He does do lymphedema therapy, swallowing exercises and neck exercises.   He is exercising.  Energy level is almost back to normal.  Last TSH was normal.       PHYSICAL EXAM:  BP (!) 167/91   Wt 85.3 kg (188 lb)   BMI 25.50 kg/m       Gen: Alert, in NAD  Eyes: PERRL, EOMI, sclera anicteric  HENT     Head: NC/AT     Ears: No external auricular lesions.  Bilateral  canals are clear without infection.  TM are clear.     Nose/sinus: No rhinorrhea or epistaxis     Oral Cavity/Oropharynx: Mildly dry mucous membranes. No visible oral cavity lesions or thrush. Floor of mouth, oral tongue and bilateral tongue base are soft to palpation with no induration, masses or nodularity.  Neck: Mild fibrosis of the bilateral neck (right > left). No palpable cervical adenopathy.  Very mild submental lymphedema.  Pulm: No wheezing, stridor or respiratory distress  CV: Well-perfused, no cyanosis  Musculoskeletal: Normal bulk and tone   Skin: Normal color and turgor  Psychiatric: Appropriate mood and affect      LABS AND IMAGING:  No recent labs or imaging.    IMPRESSION:   Mr. 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 He is approximately  14 months out from the completion of concurrent chemoradiotherapy and is doing very well, with continued recovery from his acute radiation-induced toxicities. He has no clinical or radiographic evidence concerning for residual disease with excellent PET response on his 3-month scan.     The patient does have residual side effects of xerostomia and mild dysgeusia.      PLAN:   1. Follow-up in radiation oncology clinic in 3 months and Dr. Garay   2. Continue to follow-up with medical oncology (Dr. Rolon) and head and neck surgery (Dr. Worthy) as scheduled for ongoing disease surveillance and he sees them today also.  3. Continue lymphedema, stretching, fluoride and sunscreen.    Geraldine Elliott NP  Radiation Oncology    Again, thank you for allowing me to participate in the care of your patient.      Sincerely,    LORIE Chisholm CNP

## 2020-02-04 NOTE — PROGRESS NOTES
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  He was last seen on 19, at which time he noted ongoing difficulty with diminished appetite and lack of saliva.     He has been working on a new [employer] ([employer], which is keeping him busy.    He had an MRI on 19. We reviewed the results which indicated pancreatic cyst, recommended follow-up in 2 years.      Review of Systems:   Pertinent items are noted in HPI or as in patient entered ROS below, remainder of complete ROS is negative.    ENT ROS 2020   Constitutional -   Neurology -   Ears, Nose, Throat -   Gastrointestinal/Genitourinary -   Endocrine Thirst        Physical Exam:   Ht 1.829 m (6')   Wt 85.3 kg (188 lb)   BMI 25.50 kg/m       PHYSICAL EXAMINATION:    Constitutional:  The patient was unaccompanied, well-groomed, and in no acute distress.    Skin:  Warm and pink.    Neurologic:  Alert and oriented x 3.  CN's III-XII within normal limits.  Voice normal.   Psychiatric:  The patient's affect was calm, cooperative, and appropriate.    Respiratory:  Breathing comfortably without stridor or exertion of accessory muscles.    Eyes: Extraocular movement intact.    Head:  Normocephalic and atraumatic.  No lesions or scars.    Ears:    EAC's and TM's were clear.     Nose:    Anterior rhinoscopy revealed midline septum and absence of purulence or polyps.    OC/OP:  Normal tongue, floor of mouth, buccal mucosa, and palate.  No lesions or masses on inspection or palpation.  No abnormal lymph tissue in the oropharynx.    Neck:  Supple with normal laryngeal and tracheal landmarks.  The parotid beds were without masses.  No palpable thyroid.  Lymphatic:  There is no  palpable lymphadenopathy in the neck.       Fiberoptic Endoscopy:  Consent for fiberoptic laryngoscopy was obtained, and we confirmed correctness of procedure and identity of patient.  Fiberoptic laryngoscopy was indicated due to squamous cell carcinoma of tongue base surveillance.  The nose was topically decongested and anesthetized.  The fiberoptic laryngoscope was passed under endoscopic vision.  The turbinates were normal.  The inferior and middle meati were clear bilaterally without purulence, masses, or polyps.  The nasopharynx was clear.  The Eustachian tubes were clear.  The soft palate appeared normal with good mobility.  The epiglottis was sharp and the visualized portion of the vallecula was clear.  The larynx was clear with mobile cords.  The arytenoids were clear and there was no pooling in the hypopharynx.       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 He is doing well today, no evidence for recurrence of malignancy. Reviewed recent imaging. Will follow-up in 3 months.     Scribe Disclosure:  IBrionna, am serving as a scribe to document services personally performed by Garcia Worthy MD at this visit, based upon the provider's statements to me. All documentation has been reviewed by the aforementioned provider prior to being entered into the official medical record.     Scribe Preparation Attestation:  Tiffanie VASQUEZ, a scribe, prepared the chart for today's encounter.

## 2020-02-04 NOTE — PROGRESS NOTES
FOLLOW-UP VISIT    Patient Name: Lazaro Porter      : 1950     Age: 69 year old        ______________________________________________________________________________     Chief Complaint   Patient presents with     Cancer     Pt is here for a follow up:Base of Tongue Cancer: Radiation dose 6996 cGy completed 10/13/18     BP (!) 167/91   Wt 85.3 kg (188 lb)   BMI 25.50 kg/m         Pain  Denies    Labs  Other Labs: No    Imaging  None      Dental:   Most Recent Dental Visit: Yes      Speech/Swallowing:   Most Recent evaluation or testing: No  Swallowing Restrictions: No difficulties with swallowing    Trismus/Jaw Exercises: Yes    Nutrition:    Weight:   Wt Readings from Last 3 Encounters:   20 85.3 kg (188 lb)   19 82.4 kg (181 lb 9.6 oz)   19 82.1 kg (181 lb)         Oral Symptoms:   Xerostomia:1- Symptomatic without significant dietary alteration; unstimulated saliva flow >0.2 ml/min  Dysphagia: 0-None  Mucositis Oral Symptoms: 0-None  Mucositis: 0- None  Esophagitis:0- None    Other Appointments:     MD Name: Dr Worthy Appointment Date: today   MD Name: Appointment Date:   MD Name: Appointment Date:   Other Appointment Notes:     Residual Radiation side effect: Dry mouth     Additional Instructions:     Nurse face-to-face time: Level 3:  10 min face to face time

## 2020-02-04 NOTE — LETTER
RE: Lazaro Porter  [address] [facility] [address]     Dear Colleague,    Thank you for referring your patient, Lazaro Porter, to the [facility] EAR NOSE AND THROAT at [facility]. Please see a copy of my visit note below.  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  He was last seen on 19, at which time he noted ongoing difficulty with diminished appetite and lack of saliva.     He has been working on a new [employer] ([employer]), which is keeping him busy.    He had an MRI on 19. We reviewed the results which indicated pancreatic cyst, recommended follow-up in 2 years.      Review of Systems:   Pertinent items are noted in HPI or as in patient entered ROS below, remainder of complete ROS is negative.    ENT ROS 2020   Constitutional -   Neurology -   Ears, Nose, Throat -   Gastrointestinal/Genitourinary -   Endocrine Thirst        Physical Exam:   Ht 1.829 m (6')   Wt 85.3 kg (188 lb)   BMI 25.50 kg/m        PHYSICAL EXAMINATION:    Constitutional:  The patient was unaccompanied, well-groomed, and in no acute distress.    Skin:  Warm and pink.    Neurologic:  Alert and oriented x 3.  CN's III-XII within normal limits.  Voice normal.   Psychiatric:  The patient's affect was calm, cooperative, and appropriate.    Respiratory:  Breathing comfortably without stridor or exertion of accessory muscles.    Eyes: Extraocular movement intact.    Head:  Normocephalic and atraumatic.  No lesions or scars.    Ears:    EAC's and TM's were clear.     Nose:    Anterior rhinoscopy revealed midline septum and absence of purulence or polyps.    OC/OP:  Normal tongue, floor of mouth, buccal  mucosa, and palate.  No lesions or masses on inspection or palpation.  No abnormal lymph tissue in the oropharynx.    Neck:  Supple with normal laryngeal and tracheal landmarks.  The parotid beds were without masses.  No palpable thyroid.  Lymphatic:  There is no palpable lymphadenopathy in the neck.       Fiberoptic Endoscopy:  Consent for fiberoptic laryngoscopy was obtained, and we confirmed correctness of procedure and identity of patient.  Fiberoptic laryngoscopy was indicated due to squamous cell carcinoma of tongue base surveillance.  The nose was topically decongested and anesthetized.  The fiberoptic laryngoscope was passed under endoscopic vision.  The turbinates were normal.  The inferior and middle meati were clear bilaterally without purulence, masses, or polyps.  The nasopharynx was clear.  The Eustachian tubes were clear.  The soft palate appeared normal with good mobility.  The epiglottis was sharp and the visualized portion of the vallecula was clear.  The larynx was clear with mobile cords.  The arytenoids were clear and there was no pooling in the hypopharynx.       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 He is doing well today, no evidence for recurrence of malignancy. Reviewed recent imaging. Will follow-up in 3 months.     Scribe Disclosure:  I, Brionna Mckoy, am serving as a scribe to document services personally performed by Garcia Worthy MD at this visit, based upon the provider's statements to me. All documentation has been reviewed by the aforementioned provider prior to being entered into the official medical record.     Scribe Preparation Attestation:  Tiffanie VASQUEZ, a scribe, prepared the chart for today's encounter.     Again, thank you for allowing me to participate in the care of your patient.      Sincerely,    Garcia Worthy MD

## 2020-02-04 NOTE — NURSING NOTE
Oncology Rooming Note    February 4, 2020 1:01 PM   Lazaro Porter is a 69 year old male who presents for:    Chief Complaint   Patient presents with     Blood Draw     Venipuncture labs collected by RN. Elevated b/p, provider notified.      Oncology Clinic Visit     RETURN VISIT; SQUAMOUS CELL CARCINOMA FOLLOW UP      Initial Vitals: BP (!) 163/75 (BP Location: Right arm, Patient Position: Sitting)   Pulse 70   Temp 98.8  F (37.1  C) (Oral)   Resp 20   Ht 1.829 m (6')   Wt 85.4 kg (188 lb 4.8 oz)   SpO2 97%   BMI 25.54 kg/m   Estimated body mass index is 25.54 kg/m  as calculated from the following:    Height as of this encounter: 1.829 m (6').    Weight as of this encounter: 85.4 kg (188 lb 4.8 oz). Body surface area is 2.08 meters squared.  No Pain (0) Comment: Data Unavailable   No LMP for male patient.  Allergies reviewed: Yes  Medications reviewed: Yes    Medications: Medication refills not needed today.  Pharmacy name entered into [facility]: [id number] - [place], [place] - [address]    Clinical concerns: No new concerns today  Dr Rolon was notified.      Erika Crowder

## 2020-02-04 NOTE — LETTER
2/4/2020       RE: Lazaro Porter  [address] [facility] [address]     Dear Colleague,    Thank you for referring your patient, Lazaro Porter, to the [facility]. Please see a copy of my visit note below.    MEDICAL ONCOLOGY CLINIC NOTE    REFERRING PROVIDER: Garcia Worthy MD from [facility]  RADIATION ONCOLOGIST: Nathan Garay MD from [facility]    REASON FOR CURRENT VISIT: Evaluation while on active surveillance for p16+ stage I squamous cell carcinoma of the right base of the tongue, status post concurrent cisplatin plus radiation therapy.    HISTORY OF PRESENT ILLNESS: Mr. 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 His oncologic history is as under.     Mr. Porter has recovered very well from chemoRT and his main issues currently are - moderate xerostomia, for which he's using artificial saliva and fluoride Rx; and mild lymphedema, which is well controlled with lymphedema therapy. Also doing well with voice therapy. Recent endoscopies with Dr. Worthy from ENT and Dhruv Garay MD from Rad Onc negative. No neck masses or clinical signs/symptoms of disease recurrence. Excellent PO intake and no G-tube or port.     Recently underwent an MRI abd to follow-up on incidental pancreatic lesion. This showed a likely pancreatic tail cyst, which is due to be followed up in 2 years. No symptoms. 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     ONCOLOGIC HISTORY:  1. 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   - Noticed enlarging neck mass in March 2018. In 5/2018 this was reassessed and prompted imaging and ultimately a biopsy of the mass at [facility] ([place],  "[place]) which demonstrated squamous cell carcinoma.   - PET/CT 7/10/18: \"3.5 x 2.2 cm hypermetabolic right level 2 metastatic lymphadenopathy. There is imaging findings suggestive of extranodal extension. The mass is inseparable from the right SCM and abuts the right internal carotid artery about 180 degrees. No abnormal focal FDG uptake along the mucosal spaces to suggest a primary squamous cell cancer focus. Several subcentimeter pulmonary nodules with no increased FDG uptake. Attention on follow up is recommended. Multiple, too small to characterize hypodensities in the liver. A 9 mm nodule in the right adrenal gland, without increased FDG uptake. A 7 mm hypodensity in the pancreatic tail with no increased FDG uptake, may be a side branch IPMN. Tiny lucencies in the left iliac bone without increased FDG uptake, possibly focal osteoporotic areas.\"  - Seen by ENT and underwent DL with directed biopsies on 7/18/18 with pathology showing no evidence of malignancy within the mucosal sites.   - A Robotic demucosalization of the right tongue base on 8/3/18 was performed and pathology from this pathology demonstrated p16 positive nonkeratinizing squamous cell carcinoma in the level node lymph node and in the right tongue base.   - Started concurrent cisplatin plus radiation therapy based on tumor board recommendations. Cisplatin 100mg/m2 cycle 1: 8/28/18. Cycle 2 - delayed by 2 days due to  and given on 9/21/18. Cycle 3 - unable to tolerate 100mg/m2 due to myelosuppression and patient preference. Ultimately received cisplatin 40mg/m2 x1 on 10/12/18. RT - 6996 cGy over 33 fractions - 8/29/18 to 10/13/18.   - PET/CT with CT soft tissue neck with contrast 1/9/19: \"1. Interval resolution of hypermetabolic right level II lymph node. No residual radiotracer activity in this location and no other lymphadenopathy. 2. No evidence of mucosal, ivana, or soft tissue abnormality on contrast enhanced neck CT. 1. No evidence of " "metastatic disease in the chest, abdomen, or pelvis.\"  - Visual endoscopy - 08/06/19, 11/13/19 - negative.  - CT chest and soft tissue neck with contrast 11/13/19 - RICHARD. Incidental 8mm hypodense pancreatic tail cyst, possibly a side branch IPMN.  - MRI abd with contrast (pancreatic mass eval) 12/16/19 - RICHARD.    REVIEW OF SYSTEMS:  14 point ROS negative other than the symptoms noted above in the HPI.    PAST MEDICAL HISTORY:   Past Medical History:   Diagnosis Date     Head and neck cancer (H)      PAST SURGICAL HISTORY:   Past Surgical History:   Procedure Laterality Date     DAVINCI RESECT TUMOR TRANSORAL Bilateral 8/3/2018    Procedure: DAVINCI RESECT TUMOR TRANSORAL;  Transoral Robotic Resection of the Base of Tongue,   IR Guided Core Needle Biopsy of Right Neck Mass;  Surgeon: Blas Calvillo MD;  Location: [facility] OR     FINE NEEDLE ASPIRATION WITH IMAGING GUIDANCE Right 8/3/2018    Procedure: FINE NEEDLE ASPIRATION WITH IMAGING GUIDANCE;  Ultrasound guided Core Biopsy of a Right Neck Mass;  Surgeon: Denis Monson MD;  Location: [facility] OR     HEAD & NECK SURGERY       LARYNGOSCOPY WITH BIOPSY(IES) Bilateral 7/18/2018    Procedure: LARYNGOSCOPY WITH BIOPSY(IES);  Direct Laryngoscopy with Direct Biopsies;  Surgeon: Garcia Worthy MD;  Location: [facility] OR     TONSILLECTOMY     In addition:  1. Irritated sweat gland removed   2. Fatty tumor on his back removed    3. Cataract surgery     SOCIAL HISTORY:   Social History     Socioeconomic History     Marital status:      Spouse name: Not on file     Number of children: Not on file     Years of education: Not on file     Highest education level: Not on file   Occupational History     Not on file   Social Needs     Financial resource strain: Not on file     Food insecurity:     Worry: Not on file     Inability: Not on file     Transportation needs:     Medical: Not on file     Non-medical: Not on file   Tobacco Use     Smoking status: Never Smoker     " Smokeless tobacco: Never Used   Substance and Sexual Activity     Alcohol use: Yes     Comment: occasionally     Drug use: No     Sexual activity: Yes     Partners: Female     Birth control/protection: Other   Lifestyle     Physical activity:     Days per week: Not on file     Minutes per session: Not on file     Stress: Not on file   Relationships     Social connections:     Talks on phone: Not on file     Gets together: Not on file     Attends Hoahaoism service: Not on file     Active member of club or organization: Not on file     Attends meetings of clubs or organizations: Not on file     Relationship status: Not on file     Intimate partner violence:     Fear of current or ex partner: Not on file     Emotionally abused: Not on file     Physically abused: Not on file     Forced sexual activity: Not on file   Other Topics Concern     Not on file   Social History Narrative     Not on file   Patient is from [place], [place]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     FAMILY HISTORY:   Brother with clotting issue  Mother, unknown primary and    Maternal aunt with colon cancer  Maternal grandfather with head/neck cancer  Maternal cousins with cancers unknown types    ALLERGIES:  No Known Allergies    CURRENT MEDICATIONS:   Current Outpatient Medications:      ASPIRIN 81 PO, Take 1 tablet by mouth daily, Disp: , Rfl:      CANNABIDIOL, HEMP OIL/EXTRACT, OR CBD PRODUCT OTHER THAN MEDICAL CANNABIS,, Take by mouth daily 2 drops under tongue once a day, Disp: , Rfl:      IBUPROFEN PO, Take 200 mg by mouth, Disp: , Rfl:      Multiple Vitamins-Minerals (MULTIVITAMIN & MINERAL PO), , Disp: , Rfl:      vitamin E 400 units TABS, Take 400 Units by mouth daily, Disp: , Rfl:     PHYSICAL EXAMINATION:  Vital signs: BP (!) 163/75 (BP Location: Right arm, Patient Position: Sitting)   Pulse 70   Temp 98.8  F (37.1  C) (Oral)   Resp 20   Ht 1.829 m (6')   Wt 85.4 kg (188 lb  4.8 oz)   SpO2 97%   BMI 25.54 kg/m     ECOG performance status of 0.  GENERAL: Thin man, no acute distress  HEENT: Clear oropharynx with thick, white secretions and minimal saliva. No tongue mass.   NECK: No JVD/LAD.  LUNGS: clear bilaterally, no wheezes, good diaphragmatic excursion  CARDIOVASCULAR: Regular rate and rhythm, no murmurs, gallops or rubs  ABDOMEN: Soft, nontender and nondistended, bowel sounds present.  EXTREMITIES: No cyanosis, no clubbing, no edema  NEUROLOGIC: No focal deficits; alert and oriented to self, place, time    LABORATORY DATA:    Lab Test 02/04/20  1240 11/13/19  1418 08/06/19  1217   WBC 5.4 5.1 6.3   RBC 4.44 4.40 4.10*   HGB 13.7 13.6 12.9*   HCT 41.9 41.5 39.2*   MCV 94 94 96   MCH 30.9 30.9 31.5   MCHC 32.7 32.8 32.9   RDW 13.2 12.9 13.7    205 166   NEUTROPHIL 73.3 71.4 79.9    141 141   POTASSIUM 4.7 4.0 4.1   CHLORIDE 111* 109 110*   CO2 27 26 27   ANIONGAP 5 6 4   GLC 95 89 100*   BUN 22 22 19   CR 1.05 1.05 0.98   KM 9.1 8.9 8.5   PROTTOTAL 6.6* 6.8 6.3*   ALBUMIN 3.8 3.9 3.7   BILITOTAL 0.7 0.5 0.6   ALKPHOS 65 67 57   AST 26 19 17   ALT 32 27 24     Lab Test 02/04/20  1240 11/13/19  1418 08/06/19  1217 04/09/19  1451 01/08/19  1417  08/28/18  0848   TSH 2.35 1.93 1.56 1.52 1.39   < > 1.24   T4  --   --   --   --   --   --  1.14    < > = values in this interval not displayed.     IMAGING STUDIES:  As above.    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      HNSCC:  - Had a complete response on 3 month-post treatment PET/CT and has recovered well from concurrent cis+RT (completed Oct 2018). No clinical or endoscopic evidence of disease recurrence.  - Reviewed CT chest and neck report from Nov 2019 that showed RICHARD.   - Repeat restaging due in Nov 2020 unless clinically indicated sooner.  - Will continue  active surveillance: Reviewed the signs/symptoms of recurrence such as palpable or visible abnormal masses in H&N, pathologic weight loss, cough, SOA, CP, bone pain, CNS symptoms etc; and his role in ensuring close follow-ups for next 5 years at length. Every 3 monthly eval for year 2 (fin Oct 2020) and then every 4-6 months for year 3. He's agreeable.  - Continue follow-ups with Dr. Garay and Dr. Worthy.    RT-induced xerostomia:  - Mgmt as above. Encouraged to keep using fluoride as directed.    Pancreatic cyst/IPMN:  - Reviewed MRI findings with the patient.   - While radiographic features are reassuring, MRI abd in a year would be reasonable. ENT team ordered the original MRI - will defer to them on this issue.     Return to clinic in 3 months for follow up.     All questions were answered, and patient was in complete agreement with this plan.     BILLIN    Pillo Rolon M.D.  . Professor of Medicine  Division of Hematology, Oncology & Transplantation  [facility]

## 2020-02-04 NOTE — PROGRESS NOTES
"MEDICAL ONCOLOGY CLINIC NOTE    REFERRING PROVIDER: Garcia Worthy MD from [facility]  RADIATION ONCOLOGIST: Nathan Garay MD from [facility]    REASON FOR CURRENT VISIT: Evaluation while on active surveillance for p16+ stage I squamous cell carcinoma of the right base of the tongue, status post concurrent cisplatin plus radiation therapy.    HISTORY OF PRESENT ILLNESS: Mr. 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 His oncologic history is as under.     Mr. Porter has recovered very well from chemoRT and his main issues currently are - moderate xerostomia, for which he's using artificial saliva and fluoride Rx; and mild lymphedema, which is well controlled with lymphedema therapy. Also doing well with voice therapy. Recent endoscopies with Dr. Worthy from ENT and Dhruv Garay MD from Rad Onc negative. No neck masses or clinical signs/symptoms of disease recurrence. Excellent PO intake and no G-tube or port.     Recently underwent an MRI abd to follow-up on incidental pancreatic lesion. This showed a likely pancreatic tail cyst, which is due to be followed up in 2 years. No symptoms. 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     ONCOLOGIC HISTORY:  1. 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   - Noticed enlarging neck mass in March 2018. In 5/2018 this was reassessed and prompted imaging and ultimately a biopsy of the mass at [facility] ([place], [place]) which demonstrated squamous cell carcinoma.   - PET/CT 7/10/18: \"3.5 x 2.2 cm hypermetabolic right level 2 metastatic lymphadenopathy. There is imaging findings suggestive of extranodal extension. The mass is inseparable from " "the right SCM and abuts the right internal carotid artery about 180 degrees. No abnormal focal FDG uptake along the mucosal spaces to suggest a primary squamous cell cancer focus. Several subcentimeter pulmonary nodules with no increased FDG uptake. Attention on follow up is recommended. Multiple, too small to characterize hypodensities in the liver. A 9 mm nodule in the right adrenal gland, without increased FDG uptake. A 7 mm hypodensity in the pancreatic tail with no increased FDG uptake, may be a side branch IPMN. Tiny lucencies in the left iliac bone without increased FDG uptake, possibly focal osteoporotic areas.\"  - Seen by ENT and underwent DL with directed biopsies on 7/18/18 with pathology showing no evidence of malignancy within the mucosal sites.   - A Robotic demucosalization of the right tongue base on 8/3/18 was performed and pathology from this pathology demonstrated p16 positive nonkeratinizing squamous cell carcinoma in the level node lymph node and in the right tongue base.   - Started concurrent cisplatin plus radiation therapy based on tumor board recommendations. Cisplatin 100mg/m2 cycle 1: 8/28/18. Cycle 2 - delayed by 2 days due to  and given on 9/21/18. Cycle 3 - unable to tolerate 100mg/m2 due to myelosuppression and patient preference. Ultimately received cisplatin 40mg/m2 x1 on 10/12/18. RT - 6996 cGy over 33 fractions - 8/29/18 to 10/13/18.   - PET/CT with CT soft tissue neck with contrast 1/9/19: \"1. Interval resolution of hypermetabolic right level II lymph node. No residual radiotracer activity in this location and no other lymphadenopathy. 2. No evidence of mucosal, ivana, or soft tissue abnormality on contrast enhanced neck CT. 1. No evidence of metastatic disease in the chest, abdomen, or pelvis.\"  - Visual endoscopy - 08/06/19, 11/13/19 - negative.  - CT chest and soft tissue neck with contrast 11/13/19 - RICHARD. Incidental 8mm hypodense pancreatic tail cyst, possibly a side " branch IPMN.  - MRI abd with contrast (pancreatic mass eval) 12/16/19 - RICHARD.    REVIEW OF SYSTEMS:  14 point ROS negative other than the symptoms noted above in the HPI.    PAST MEDICAL HISTORY:   Past Medical History:   Diagnosis Date     Head and neck cancer (H)      PAST SURGICAL HISTORY:   Past Surgical History:   Procedure Laterality Date     DAVINCI RESECT TUMOR TRANSORAL Bilateral 8/3/2018    Procedure: DAVINCI RESECT TUMOR TRANSORAL;  Transoral Robotic Resection of the Base of Tongue,   IR Guided Core Needle Biopsy of Right Neck Mass;  Surgeon: Blas Calvillo MD;  Location: [facility] OR     FINE NEEDLE ASPIRATION WITH IMAGING GUIDANCE Right 8/3/2018    Procedure: FINE NEEDLE ASPIRATION WITH IMAGING GUIDANCE;  Ultrasound guided Core Biopsy of a Right Neck Mass;  Surgeon: Denis Monson MD;  Location: [facility] OR     HEAD & NECK SURGERY       LARYNGOSCOPY WITH BIOPSY(IES) Bilateral 7/18/2018    Procedure: LARYNGOSCOPY WITH BIOPSY(IES);  Direct Laryngoscopy with Direct Biopsies;  Surgeon: Garcia Worthy MD;  Location: [facility] OR     TONSILLECTOMY     In addition:  1. Irritated sweat gland removed   2. Fatty tumor on his back removed    3. Cataract surgery     SOCIAL HISTORY:   Social History     Socioeconomic History     Marital status:      Spouse name: Not on file     Number of children: Not on file     Years of education: Not on file     Highest education level: Not on file   Occupational History     Not on file   Social Needs     Financial resource strain: Not on file     Food insecurity:     Worry: Not on file     Inability: Not on file     Transportation needs:     Medical: Not on file     Non-medical: Not on file   Tobacco Use     Smoking status: Never Smoker     Smokeless tobacco: Never Used   Substance and Sexual Activity     Alcohol use: Yes     Comment: occasionally     Drug use: No     Sexual activity: Yes     Partners: Female     Birth control/protection: Other   Lifestyle     Physical  activity:     Days per week: Not on file     Minutes per session: Not on file     Stress: Not on file   Relationships     Social connections:     Talks on phone: Not on file     Gets together: Not on file     Attends Sikhism service: Not on file     Active member of club or organization: Not on file     Attends meetings of clubs or organizations: Not on file     Relationship status: Not on file     Intimate partner violence:     Fear of current or ex partner: Not on file     Emotionally abused: Not on file     Physically abused: Not on file     Forced sexual activity: Not on file   Other Topics Concern     Not on file   Social History Narrative     Not on file   Patient is from [place], [place]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     FAMILY HISTORY:   Brother with clotting issue  Mother, unknown primary and    Maternal aunt with colon cancer  Maternal grandfather with head/neck cancer  Maternal cousins with cancers unknown types    ALLERGIES:  No Known Allergies    CURRENT MEDICATIONS:   Current Outpatient Medications:      ASPIRIN 81 PO, Take 1 tablet by mouth daily, Disp: , Rfl:      CANNABIDIOL, HEMP OIL/EXTRACT, OR CBD PRODUCT OTHER THAN MEDICAL CANNABIS,, Take by mouth daily 2 drops under tongue once a day, Disp: , Rfl:      IBUPROFEN PO, Take 200 mg by mouth, Disp: , Rfl:      Multiple Vitamins-Minerals (MULTIVITAMIN & MINERAL PO), , Disp: , Rfl:      vitamin E 400 units TABS, Take 400 Units by mouth daily, Disp: , Rfl:     PHYSICAL EXAMINATION:  Vital signs: BP (!) 163/75 (BP Location: Right arm, Patient Position: Sitting)   Pulse 70   Temp 98.8  F (37.1  C) (Oral)   Resp 20   Ht 1.829 m (6')   Wt 85.4 kg (188 lb 4.8 oz)   SpO2 97%   BMI 25.54 kg/m    ECOG performance status of 0.  GENERAL: Thin man, no acute distress  HEENT: Clear oropharynx with thick, white secretions and minimal saliva. No tongue mass.   NECK: No JVD/LAD.  LUNGS: clear  bilaterally, no wheezes, good diaphragmatic excursion  CARDIOVASCULAR: Regular rate and rhythm, no murmurs, gallops or rubs  ABDOMEN: Soft, nontender and nondistended, bowel sounds present.  EXTREMITIES: No cyanosis, no clubbing, no edema  NEUROLOGIC: No focal deficits; alert and oriented to self, place, time    LABORATORY DATA:    Lab Test 02/04/20  1240 11/13/19  1418 08/06/19  1217   WBC 5.4 5.1 6.3   RBC 4.44 4.40 4.10*   HGB 13.7 13.6 12.9*   HCT 41.9 41.5 39.2*   MCV 94 94 96   MCH 30.9 30.9 31.5   MCHC 32.7 32.8 32.9   RDW 13.2 12.9 13.7    205 166   NEUTROPHIL 73.3 71.4 79.9    141 141   POTASSIUM 4.7 4.0 4.1   CHLORIDE 111* 109 110*   CO2 27 26 27   ANIONGAP 5 6 4   GLC 95 89 100*   BUN 22 22 19   CR 1.05 1.05 0.98   KM 9.1 8.9 8.5   PROTTOTAL 6.6* 6.8 6.3*   ALBUMIN 3.8 3.9 3.7   BILITOTAL 0.7 0.5 0.6   ALKPHOS 65 67 57   AST 26 19 17   ALT 32 27 24     Lab Test 02/04/20  1240 11/13/19  1418 08/06/19  1217 04/09/19  1451 01/08/19  1417  08/28/18  0848   TSH 2.35 1.93 1.56 1.52 1.39   < > 1.24   T4  --   --   --   --   --   --  1.14    < > = values in this interval not displayed.     IMAGING STUDIES:  As above.    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      HNSCC:  - Had a complete response on 3 month-post treatment PET/CT and has recovered well from concurrent cis+RT (completed Oct 2018). No clinical or endoscopic evidence of disease recurrence.  - Reviewed CT chest and neck report from Nov 2019 that showed RICHARD.   - Repeat restaging due in Nov 2020 unless clinically indicated sooner.  - Will continue active surveillance: Reviewed the signs/symptoms of recurrence such as palpable or visible abnormal masses in H&N, pathologic weight loss, cough, SOA, CP, bone pain, CNS symptoms etc; and his role in ensuring close follow-ups for next 5  years at length. Every 3 monthly eval for year 2 (fin Oct 2020) and then every 4-6 months for year 3. He's agreeable.  - Continue follow-ups with Dr. Garay and Dr. Worthy.    RT-induced xerostomia:  - Mgmt as above. Encouraged to keep using fluoride as directed.    Pancreatic cyst/IPMN:  - Reviewed MRI findings with the patient.   - While radiographic features are reassuring, MRI abd in a year would be reasonable. ENT team ordered the original MRI - will defer to them on this issue.     Return to clinic in 3 months for follow up.     All questions were answered, and patient was in complete agreement with this plan.     BILLIN    Pillo Rolon M.D.  . Professor of Medicine  Division of Hematology, Oncology & Transplantation  [facility]

## 2020-02-04 NOTE — PATIENT INSTRUCTIONS
1. You were seen in the ENT Clinic today by Dr. Worthy.  If you have any questions or concerns after your appointment, please call   - Option 1: ENT Clinic: [phone]  - Option 2: [facility] (Dr. Worthy's Nurse): [phone]    2.   Plan to return to clinic in 3 months    Natice Schwab, RN  [facility] Otolaryng[facility]  [phone]

## 2020-02-04 NOTE — PROGRESS NOTES
Department of Radiation Oncology  [facility]  [address] [place] [address]  [phone]       Radiation Oncology Follow-up  2020      Lazaro Porter  MRN: [id number]   : 1950     DISEASE TREATED:   cT1 N1 M0 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     RADIATION THERAPY DELIVERED:   6996 cGy delivered in 33 daily fractions, from 2018 - 10/13/2018     SYSTEMIC THERAPY:  Cisplatin 100 mg/m  (initially began with high-dose therapy every 3 weeks with his second infusion delayed 2 days secondary to neutropenia and his final cycle switch to a low-dose 40 mg/m  infusion secondary to ongoing cytopenias)     INTERVAL SINCE COMPLETION OF RADIATION THERAPY:   Approximately 16 months    SUBJECTIVE:   Lazaro Porter is a 68 year old man with a history of 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 He was diagnosed after he initially presented with an enlarged right level 2 cervical lymph node. He tolerated treatment reasonably well with the anticipated acute nausea, mucositis and dermatitis. Systemic therapy was slightly altered with his second course of high-dose cisplatin delayed 2 days secondary to neutropenia and his final infusion switch to a low-dose (40 mg/m ) infusion due to persistent cytopenias. He returns to radiation oncology clinic today for a routine post-treatment surveillance visit. He was last seen in radiation oncology on 19 and was recovering well at that time.     Since last visit, Mr. Lam reports that overall he is doing well.  He is always a goran to see.  He complains that his taste is still diminished and not even at 50% percent of what it was before treatment.  Still has xerostomia and drinks a lot of water to help.  He does see dental every 6 months, but doesn't do additional fluoride.  We did discuss that this is recommended.  He  uses sunscreen occasionally.  He does do lymphedema therapy, swallowing exercises and neck exercises.   He is exercising.  Energy level is almost back to normal.  Last TSH was normal.       PHYSICAL EXAM:  BP (!) 167/91   Wt 85.3 kg (188 lb)   BMI 25.50 kg/m      Gen: Alert, in NAD  Eyes: PERRL, EOMI, sclera anicteric  HENT     Head: NC/AT     Ears: No external auricular lesions.  Bilateral canals are clear without infection.  TM are clear.     Nose/sinus: No rhinorrhea or epistaxis     Oral Cavity/Oropharynx: Mildly dry mucous membranes. No visible oral cavity lesions or thrush. Floor of mouth, oral tongue and bilateral tongue base are soft to palpation with no induration, masses or nodularity.  Neck: Mild fibrosis of the bilateral neck (right > left). No palpable cervical adenopathy.  Very mild submental lymphedema.  Pulm: No wheezing, stridor or respiratory distress  CV: Well-perfused, no cyanosis  Musculoskeletal: Normal bulk and tone   Skin: Normal color and turgor  Psychiatric: Appropriate mood and affect      LABS AND IMAGING:  No recent labs or imaging.    IMPRESSION:   Mr. 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 He is approximately 14 months out from the completion of concurrent chemoradiotherapy and is doing very well, with continued recovery from his acute radiation-induced toxicities. He has no clinical or radiographic evidence concerning for residual disease with excellent PET response on his 3-month scan.     The patient does have residual side effects of xerostomia and mild dysgeusia.      PLAN:   1. Follow-up in radiation oncology clinic in 3 months and Dr. Garay   2. Continue to follow-up with medical oncology (Dr. Rolon) and head and neck surgery (Dr. Worthy) as scheduled for ongoing disease surveillance and he sees them today also.  3. Continue lymphedema, stretching, fluoride and sunscreen.    Geraldine Elliott,  NP  Radiation Oncology

## 2020-06-19 DIAGNOSIS — Z08 ENCOUNTER FOR FOLLOW-UP EXAMINATION AFTER COMPLETED TREATMENT FOR MALIGNANT NEOPLASM: ICD-10-CM

## 2020-06-19 DIAGNOSIS — C01 MALIGNANT NEOPLASM OF BASE OF TONGUE (H): Primary | ICD-10-CM

## 2020-06-19 NOTE — PROGRESS NOTES
Date Received in Clinic: n/a  Name/Type of Form: Lab orders  Date Completed: 6/19/2020  Copy Mailed to patient: No  Disposition of Form: Faxed to [facility] @ [phone]    DAVID Lopes, RN  RN Care Coordinator  [facility]  Dr. Pillo Rolon

## 2020-06-22 ENCOUNTER — TELEPHONE (OUTPATIENT)
Dept: OTOLARYNGOLOGY | Facility: CLINIC | Age: 70
End: 2020-06-22

## 2020-06-22 NOTE — TELEPHONE ENCOUNTER
Writer changed in person visit to video visit per patient request. Writer reviewed video visit process with patient and he acknowledged.    Summer Hallman LPN

## 2020-06-22 NOTE — PROGRESS NOTES
"Lazaro Porter is a 69 year old male who is being evaluated via a billable telephone visit.      The patient has been notified of following:     \"This telephone visit will be conducted via a call between you and your physician/provider. We have found that certain health care needs can be provided without the need for a physical exam.  This service lets us provide the care you need with a short phone conversation.  If a prescription is necessary we can send it directly to your pharmacy.  If lab work is needed we can place an order for that and you can then stop by our lab to have the test done at a later time.    Telephone visits are billed at different rates depending on your insurance coverage. During this emergency period, for some insurers they may be billed the same as an in-person visit.  Please reach out to your insurance provider with any questions.    If during the course of the call the physician/provider feels a telephone visit is not appropriate, you will not be charged for this service.\"    Patient has given verbal consent for Telephone visit?  Yes    How would you like to obtain your AVS? Greg         Department of Radiation Oncology  [address] [address]  [phone]       Radiation Oncology Follow-up Visit  2020      Lazaro Porter  MRN: [id number]   : 1950     DISEASE TREATED:   cT1 N1 M0 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    RADIATION THERAPY DELIVERED:   6996 cGy delivered in 33 daily fractions, from 2018 - 10/13/2018    SYSTEMIC THERAPY:  Cisplatin 100 mg/m  (initially began with high-dose therapy every 3 weeks with his second infusion delayed 2 days secondary to neutropenia and his final cycle switched to a low dose 40 mg/m  infusion secondary to ongoing cytopenias)    INTERVAL SINCE COMPLETION OF RADIATION THERAPY:   20 months    SUBJECTIVE:   Lazaro Porter is a 69 year old male " with a PMH significant for a 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 He was initially diagnosed after he presented with a palpable right neck mass with staging imaging demonstrating a small primary lesion within the right tongue base with an enlarged right level II lymph node encasing the right carotid artery and concerning for extranodal extension. He received definitive-intent chemoradiotherapy as described above, receiving a total dose of 70 Gy in 33 fractions with concurrent high-dose cisplatin which was dose-reduced due to treatment-related cytopenias.    Mr. Porter is contacted by telephone call today for a routine post-treatment disease surveillance visit.  Overall, he reports he is doing very well and he has no pressing concerns or complaints.  He states that his swallowing is okay, not perfect, but without any significant dysphagia or odynophagia.  This mainly stems from his mild residual treatment-induced xerostomia which he treats with increased fluid intake with consumption of dry foods.  He reports that he is compliant with his lymphedema exercises and notices only trace submental lymphedema when he consistently performs these.  He has no reported oral cavity lesions, masses or cervical adenopathy and his remaining ROS are otherwise negative.    LABS AND IMAGIN2020 labs:  TSH: 2.35  Electrolytes: WNL  Hemoglobin: 13.7  WBC: 5.4  Platelets: 189    IMPRESSION:   Mr. 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 He is nearing the 2-year gorge from completion of therapy and is doing well on examination with minimal residual radiation-induced toxicities.    PLAN:   1. Follow-up in radiation oncology clinic in 3-4 months in coordination with repeat imaging and ENT (Dr. Worthy) follow-up  2. Repeat TSH with next follow-up visit    Nathan Garay MD/PhD  Assistant  Professor  Department of Radiation Oncology  [facility]    Telephone call start time: 1157  Telephone call end time: 1211  Telephone call duration: 14 minutes

## 2020-06-23 ENCOUNTER — VIRTUAL VISIT (OUTPATIENT)
Dept: ONCOLOGY | Facility: CLINIC | Age: 70
End: 2020-06-23
Attending: INTERNAL MEDICINE
Payer: MEDICARE

## 2020-06-23 ENCOUNTER — VIRTUAL VISIT (OUTPATIENT)
Dept: OTOLARYNGOLOGY | Facility: CLINIC | Age: 70
End: 2020-06-23
Payer: MEDICARE

## 2020-06-23 VITALS — HEIGHT: 73 IN | BODY MASS INDEX: 24.8 KG/M2

## 2020-06-23 VITALS — HEIGHT: 73 IN | WEIGHT: 188 LBS | BODY MASS INDEX: 24.92 KG/M2

## 2020-06-23 DIAGNOSIS — C01 MALIGNANT NEOPLASM OF BASE OF TONGUE (H): Primary | ICD-10-CM

## 2020-06-23 DIAGNOSIS — T66.XXXA ADVERSE EFFECT OF RADIATION, INITIAL ENCOUNTER: ICD-10-CM

## 2020-06-23 PROCEDURE — 99214 OFFICE O/P EST MOD 30 MIN: CPT | Mod: 95 | Performed by: INTERNAL MEDICINE

## 2020-06-23 PROCEDURE — 40001009 ZZH VIDEO/TELEPHONE VISIT; NO CHARGE

## 2020-06-23 ASSESSMENT — PAIN SCALES - GENERAL: PAINLEVEL: NO PAIN (0)

## 2020-06-23 ASSESSMENT — MIFFLIN-ST. JEOR: SCORE: 1658.7

## 2020-06-23 NOTE — PROGRESS NOTES
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    HISTORY OF PRESENT ILLNESS: Mr. 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 His oncologic history is as under.     Mr. Porter has recovered very well from chemoRT and his main issues currently are - moderate xerostomia, which has improved slowly and for which he's using artificial saliva and fluoride Rx; and mild lymphedema, which is well controlled with lymphedema therapy. Also doing well with voice therapy. Saw Dr. Worthy from ENT in Feb 2020 and the endoscopy was negative. No neck masses or clinical signs/symptoms of disease recurrence. Excellent PO intake and no G-tube or port. Lost a few pounds due to being physically active and exercising.    No reported symptoms from the pancreatic tail mass. 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     ONCOLOGIC HISTORY:  1. 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   - Noticed enlarging neck mass in March 2018. In 5/2018 this was reassessed and prompted imaging and ultimately a biopsy of the mass at [facility] ([place], [place]) which demonstrated squamous cell carcinoma.   - PET/CT 7/10/18: \"3.5 x 2.2 cm hypermetabolic right level 2 metastatic lymphadenopathy. There is imaging findings suggestive of extranodal extension. The mass is inseparable from the right SCM and abuts " "the right internal carotid artery about 180 degrees. No abnormal focal FDG uptake along the mucosal spaces to suggest a primary squamous cell cancer focus. Several subcentimeter pulmonary nodules with no increased FDG uptake. Attention on follow up is recommended. Multiple, too small to characterize hypodensities in the liver. A 9 mm nodule in the right adrenal gland, without increased FDG uptake. A 7 mm hypodensity in the pancreatic tail with no increased FDG uptake, may be a side branch IPMN. Tiny lucencies in the left iliac bone without increased FDG uptake, possibly focal osteoporotic areas.\"  - Seen by ENT and underwent DL with directed biopsies on 7/18/18 with pathology showing no evidence of malignancy within the mucosal sites.   - A Robotic demucosalization of the right tongue base on 8/3/18 was performed and pathology from this pathology demonstrated p16 positive nonkeratinizing squamous cell carcinoma in the level node lymph node and in the right tongue base.   - Started concurrent cisplatin plus radiation therapy based on tumor board recommendations. Cisplatin 100mg/m2 cycle 1: 8/28/18. Cycle 2 - delayed by 2 days due to  and given on 9/21/18. Cycle 3 - unable to tolerate 100mg/m2 due to myelosuppression and patient preference. Ultimately received cisplatin 40mg/m2 x1 on 10/12/18. RT - 6996 cGy over 33 fractions - 8/29/18 to 10/13/18.   - PET/CT with CT soft tissue neck with contrast 1/9/19: \"1. Interval resolution of hypermetabolic right level II lymph node. No residual radiotracer activity in this location and no other lymphadenopathy. 2. No evidence of mucosal, ivana, or soft tissue abnormality on contrast enhanced neck CT. 1. No evidence of metastatic disease in the chest, abdomen, or pelvis.\"  - Visual endoscopy - 08/06/19, 11/13/19, 2/4/2020 - negative.  - CT chest and soft tissue neck with contrast 11/13/19 - [facility]ARD. Incidental 8mm hypodense pancreatic tail cyst, possibly a side branch IPMN.  - " MRI abd with contrast (pancreatic mass eval) 12/16/19 - R[facility]ARD.    REVIEW OF SYSTEMS:  14 point ROS negative other than the symptoms noted above in the HPI.    PAST MEDICAL HISTORY:   Past Medical History:   Diagnosis Date     Head and neck cancer (H)      PAST SURGICAL HISTORY:   Past Surgical History:   Procedure Laterality Date     DAVINCI RESECT TUMOR TRANSORAL Bilateral 8/3/2018    Procedure: DAVINCI RESECT TUMOR TRANSORAL;  Transoral Robotic Resection of the Base of Tongue,   IR Guided Core Needle Biopsy of Right Neck Mass;  Surgeon: Blas Calvillo MD;  Location: [facility] OR     FINE NEEDLE ASPIRATION WITH IMAGING GUIDANCE Right 8/3/2018    Procedure: FINE NEEDLE ASPIRATION WITH IMAGING GUIDANCE;  Ultrasound guided Core Biopsy of a Right Neck Mass;  Surgeon: Denis Monson MD;  Location: [facility] OR     HEAD & NECK SURGERY       LARYNGOSCOPY WITH BIOPSY(IES) Bilateral 7/18/2018    Procedure: LARYNGOSCOPY WITH BIOPSY(IES);  Direct Laryngoscopy with Direct Biopsies;  Surgeon: Garcia Worthy MD;  Location: [facility] OR     TONSILLECTOMY     In addition:  1. Irritated sweat gland removed   2. Fatty tumor on his back removed    3. Cataract surgery     SOCIAL HISTORY:   Social History     Socioeconomic History     Marital status:      Spouse name: Not on file     Number of children: Not on file     Years of education: Not on file     Highest education level: Not on file   Occupational History     Not on file   Social Needs     Financial resource strain: Not on file     Food insecurity     Worry: Not on file     Inability: Not on file     Transportation needs     Medical: Not on file     Non-medical: Not on file   Tobacco Use     Smoking status: Never Smoker     Smokeless tobacco: Never Used   Substance and Sexual Activity     Alcohol use: Yes     Comment: occasionally     Drug use: No     Sexual activity: Yes     Partners: Female     Birth control/protection: Other   Lifestyle     Physical activity     Days per  "week: Not on file     Minutes per session: Not on file     Stress: Not on file   Relationships     Social connections     Talks on phone: Not on file     Gets together: Not on file     Attends Restorationism service: Not on file     Active member of club or organization: Not on file     Attends meetings of clubs or organizations: Not on file     Relationship status: Not on file     Intimate partner violence     Fear of current or ex partner: Not on file     Emotionally abused: Not on file     Physically abused: Not on file     Forced sexual activity: Not on file   Other Topics Concern     Not on file   Social History Narrative     Not on file   Patient is from [place], [place]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     FAMILY HISTORY:   Brother with clotting issue  Mother, unknown primary and    Maternal aunt with colon cancer  Maternal grandfather with head/neck cancer  Maternal cousins with cancers unknown types    ALLERGIES:  No Known Allergies    CURRENT MEDICATIONS:   Current Outpatient Medications:      ASPIRIN 81 PO, Take 1 tablet by mouth daily, Disp: , Rfl:      Glucos-MSM-C-Wb-Zeuyab-Ducnku (GLUCOSAMINE MSM COMPLEX) TABS tablet, Take by mouth daily, Disp: , Rfl:      IBUPROFEN PO, Take 200 mg by mouth, Disp: , Rfl:      Multiple Vitamins-Minerals (MULTIVITAMIN & MINERAL PO), , Disp: , Rfl:      vitamin E 400 units TABS, Take 400 Units by mouth daily, Disp: , Rfl:      CANNABIDIOL, HEMP OIL/EXTRACT, OR CBD PRODUCT OTHER THAN MEDICAL CANNABIS,, Take by mouth daily 2 drops under tongue once a day, Disp: , Rfl:     PHYSICAL EXAMINATION:  Vital signs: Ht 1.842 m (6' 0.5\")   Wt 85.3 kg (188 lb)   BMI 25.15 kg/m    ECOG performance status of 0.  GENERAL: Thin man, no acute distress  EXTREMITIES: No cyanosis, no clubbing, no edema  NEUROLOGIC: No focal deficits; alert and oriented to self, place, time    LABORATORY DATA:  Labs including CBC with differential, " CMP, TSH and FT4 reviewed from OSH on 06/22/20 - unremarkable except for slight decrease in WBC and Hb, which is clinically non-significant.  Lab Test 02/04/20  1240 11/13/19  1418 08/06/19  1217   WBC 5.4 5.1 6.3   RBC 4.44 4.40 4.10*   HGB 13.7 13.6 12.9*   HCT 41.9 41.5 39.2*   MCV 94 94 96   MCH 30.9 30.9 31.5   MCHC 32.7 32.8 32.9   RDW 13.2 12.9 13.7    205 166   NEUTROPHIL 73.3 71.4 79.9    141 141   POTASSIUM 4.7 4.0 4.1   CHLORIDE 111* 109 110*   CO2 27 26 27   ANIONGAP 5 6 4   GLC 95 89 100*   BUN 22 22 19   CR 1.05 1.05 0.98   KM 9.1 8.9 8.5   PROTTOTAL 6.6* 6.8 6.3*   ALBUMIN 3.8 3.9 3.7   BILITOTAL 0.7 0.5 0.6   ALKPHOS 65 67 57   AST 26 19 17   ALT 32 27 24     Lab Test 02/04/20  1240 11/13/19  1418 08/06/19  1217 04/09/19  1451 01/08/19  1417  08/28/18  0848   TSH 2.35 1.93 1.56 1.52 1.39   < > 1.24   T4  --   --   --   --   --   --  1.14    < > = values in this interval not displayed.     IMAGING STUDIES:  As above.    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      HNSCC:  - Had a complete response on 3 month-post treatment PET/CT and has recovered well from concurrent cis+RT (completed Oct 2018). No clinical or endoscopic evidence of disease recurrence.  - Previously reviewed CT chest and neck report from Nov 2019 that showed RICHARD.   - Repeat restaging due in ~Oct/Nov 2020 unless clinically indicated sooner.  - Will continue active surveillance: Reviewed the signs/symptoms of recurrence such as palpable or visible abnormal masses in H&N, pathologic weight loss, cough, SOA, CP, bone pain, CNS symptoms etc; and his role in ensuring close follow-ups for next 5 years at length.   - Clinic eval in ~4-5 months (delayed due to COVID) and then every 4-6 months for year 3. He's agreeable.  - Continue follow-ups with Dr. Garay and   Zaynab.    RT-induced xerostomia:  - Mgmt as above. Encouraged to keep using fluoride as directed.    Pancreatic cyst/IPMN:  - Asymptomatic currently.  - Dr. Worthy is planning to repeat an MRI abdomen in 2 years (Dec 2021) based on the recommendations of the radiologist.   - Have asked him to discuss this with PCP in an upcoming appt (2020) as well. Defer further mgmt to them.    Return to clinic in 4-5 months with CT and labs.     All questions were answered, and patient was in complete agreement with this plan.     BILLIN - 18 mins call; complex decision making    Pillo Rolon M.D.  Asst. Professor of Medicine  Division of Hematology, Oncology & Transplantation  [facility]

## 2020-06-23 NOTE — LETTER
"    6/23/2020         RE: Lazaro Porter  [address] [place] [id number]        Dear Colleague,    Thank you for referring your patient, Lazaro Porter, to the [facility]CER [facility]. Please see a copy of my visit note below.    Lazaro Porter is a 69 year old male who is being evaluated via a billable video visit.      The patient has been notified of following:     \"This video visit will be conducted via a call between you and your physician/provider. We have found that certain health care needs can be provided without the need for an in-person physical exam.  This service lets us provide the care you need with a video conversation.  If a prescription is necessary we can send it directly to your pharmacy.  If lab work is needed we can place an order for that and you can then stop by our lab to have the test done at a later time.    Video visits are billed at different rates depending on your insurance coverage.  Please reach out to your insurance provider with any questions.    If during the course of the call the physician/provider feels a video visit is not appropriate, you will not be charged for this service.\"    Patient has given verbal consent for Video visit? Yes    Will anyone else be joining your video visit? No      Sondra Easley CMA    Video-Visit Details  Type of service:  Video visit  Video duration: 18 mins  Originating Location (pt. Location): Home  Distant Location (provider location):  [facility]   Platform used for Video Visit: Kevin Rolon MD          MEDICAL ONCOLOGY CLINIC NOTE    VIDEO VISIT DUE TO COVID-19    REFERRING PROVIDER: Garcia Worthy MD from [facility]  RADIATION ONCOLOGIST: Nathan Garay MD from [facility] Radiation Oncology Clinic    REASON FOR CURRENT VISIT: Evaluation while on active surveillance for p16+ stage I squamous cell carcinoma of the right base of the tongue, status post concurrent " "cisplatin plus radiation therapy.    HISTORY OF PRESENT ILLNESS: Mr. 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 His oncologic history is as under.     Mr. Porter has recovered very well from chemoRT and his main issues currently are - moderate xerostomia, which has improved slowly and for which he's using artificial saliva and fluoride Rx; and mild lymphedema, which is well controlled with lymphedema therapy. Also doing well with voice therapy. Saw Dr. Worthy from ENT in Feb 2020 and the endoscopy was negative. No neck masses or clinical signs/symptoms of disease recurrence. Excellent PO intake and no G-tube or port. Lost a few pounds due to being physically active and exercising.    No reported symptoms from the pancreatic tail mass. 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     ONCOLOGIC HISTORY:  1. 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   - Noticed enlarging neck mass in March 2018. In 5/2018 this was reassessed and prompted imaging and ultimately a biopsy of the mass at [facility] ([place], [place]) which demonstrated squamous cell carcinoma.   - PET/CT 7/10/18: \"3.5 x 2.2 cm hypermetabolic right level 2 metastatic lymphadenopathy. There is imaging findings suggestive of extranodal extension. The mass is inseparable from the right SCM and abuts the right internal carotid artery about 180 degrees. No abnormal focal FDG uptake along the mucosal spaces to suggest a primary squamous cell cancer focus. Several subcentimeter pulmonary nodules with no increased FDG uptake. Attention on follow up is recommended. Multiple, too small to characterize hypodensities in the liver. A 9 mm nodule in the right adrenal gland, without increased FDG uptake. A 7 mm " "hypodensity in the pancreatic tail with no increased FDG uptake, may be a side branch IPMN. Tiny lucencies in the left iliac bone without increased FDG uptake, possibly focal osteoporotic areas.\"  - Seen by ENT and underwent DL with directed biopsies on 7/18/18 with pathology showing no evidence of malignancy within the mucosal sites.   - A Robotic demucosalization of the right tongue base on 8/3/18 was performed and pathology from this pathology demonstrated p16 positive nonkeratinizing squamous cell carcinoma in the level node lymph node and in the right tongue base.   - Started concurrent cisplatin plus radiation therapy based on tumor board recommendations. Cisplatin 100mg/m2 cycle 1: 8/28/18. Cycle 2 - delayed by 2 days due to  and given on 9/21/18. Cycle 3 - unable to tolerate 100mg/m2 due to myelosuppression and patient preference. Ultimately received cisplatin 40mg/m2 x1 on 10/12/18. RT - 6996 cGy over 33 fractions - 8/29/18 to 10/13/18.   - PET/CT with CT soft tissue neck with contrast 1/9/19: \"1. Interval resolution of hypermetabolic right level II lymph node. No residual radiotracer activity in this location and no other lymphadenopathy. 2. No evidence of mucosal, ivana, or soft tissue abnormality on contrast enhanced neck CT. 1. No evidence of metastatic disease in the chest, abdomen, or pelvis.\"  - Visual endoscopy - 08/06/19, 11/13/19, 2/4/2020 - negative.  - CT chest and soft tissue neck with contrast 11/13/19 - RICHARD. Incidental 8mm hypodense pancreatic tail cyst, possibly a side branch IPMN.  - MRI abd with contrast (pancreatic mass eval) 12/16/19 - RICHARD.    REVIEW OF SYSTEMS:  14 point ROS negative other than the symptoms noted above in the HPI.    PAST MEDICAL HISTORY:   Past Medical History:   Diagnosis Date     Head and neck cancer (H)      PAST SURGICAL HISTORY:   Past Surgical History:   Procedure Laterality Date     DAVINCI RESECT TUMOR TRANSORAL Bilateral 8/3/2018    Procedure: DAVINCI " RESECT TUMOR TRANSORAL;  Transoral Robotic Resection of the Base of Tongue,   IR Guided Core Needle Biopsy of Right Neck Mass;  Surgeon: Blas Calvillo MD;  Location: [facility] OR     FINE NEEDLE ASPIRATION WITH IMAGING GUIDANCE Right 8/3/2018    Procedure: FINE NEEDLE ASPIRATION WITH IMAGING GUIDANCE;  Ultrasound guided Core Biopsy of a Right Neck Mass;  Surgeon: Denis Monson MD;  Location: [facility] OR     HEAD & NECK SURGERY       LARYNGOSCOPY WITH BIOPSY(IES) Bilateral 7/18/2018    Procedure: LARYNGOSCOPY WITH BIOPSY(IES);  Direct Laryngoscopy with Direct Biopsies;  Surgeon: Garcia Worthy MD;  Location: [facility] OR     TONSILLECTOMY     In addition:  1. Irritated sweat gland removed   2. Fatty tumor on his back removed    3. Cataract surgery     SOCIAL HISTORY:   Social History     Socioeconomic History     Marital status:      Spouse name: Not on file     Number of children: Not on file     Years of education: Not on file     Highest education level: Not on file   Occupational History     Not on file   Social Needs     Financial resource strain: Not on file     Food insecurity     Worry: Not on file     Inability: Not on file     Transportation needs     Medical: Not on file     Non-medical: Not on file   Tobacco Use     Smoking status: Never Smoker     Smokeless tobacco: Never Used   Substance and Sexual Activity     Alcohol use: Yes     Comment: occasionally     Drug use: No     Sexual activity: Yes     Partners: Female     Birth control/protection: Other   Lifestyle     Physical activity     Days per week: Not on file     Minutes per session: Not on file     Stress: Not on file   Relationships     Social connections     Talks on phone: Not on file     Gets together: Not on file     Attends Latter-day service: Not on file     Active member of club or organization: Not on file     Attends meetings of clubs or organizations: Not on file     Relationship status: Not on file     Intimate partner violence      "Fear of current or ex partner: Not on file     Emotionally abused: Not on file     Physically abused: Not on file     Forced sexual activity: Not on file   Other Topics Concern     Not on file   Social History Narrative     Not on file   Patient is from [place], [place]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     FAMILY HISTORY:   Brother with clotting issue  Mother, unknown primary and    Maternal aunt with colon cancer  Maternal grandfather with head/neck cancer  Maternal cousins with cancers unknown types    ALLERGIES:  No Known Allergies    CURRENT MEDICATIONS:   Current Outpatient Medications:      ASPIRIN 81 PO, Take 1 tablet by mouth daily, Disp: , Rfl:      Glucos-MSM-C-Ra-Qpudsp-Wqtziu (GLUCOSAMINE MSM COMPLEX) TABS tablet, Take by mouth daily, Disp: , Rfl:      IBUPROFEN PO, Take 200 mg by mouth, Disp: , Rfl:      Multiple Vitamins-Minerals (MULTIVITAMIN & MINERAL PO), , Disp: , Rfl:      vitamin E 400 units TABS, Take 400 Units by mouth daily, Disp: , Rfl:      CANNABIDIOL, HEMP OIL/EXTRACT, OR CBD PRODUCT OTHER THAN MEDICAL CANNABIS,, Take by mouth daily 2 drops under tongue once a day, Disp: , Rfl:     PHYSICAL EXAMINATION:  Vital signs: Ht 1.842 m (6' 0.5\")   Wt 85.3 kg (188 lb)   BMI 25.15 kg/m    ECOG performance status of 0.  GENERAL: Thin man, no acute distress  EXTREMITIES: No cyanosis, no clubbing, no edema  NEUROLOGIC: No focal deficits; alert and oriented to self, place, time    LABORATORY DATA:  Labs including CBC with differential, CMP, TSH and FT4 reviewed from OSH on 20 - unremarkable except for slight decrease in WBC and Hb, which is clinically non-significant.  Lab Test 20  1240 19  1418 19  1217   WBC 5.4 5.1 6.3   RBC 4.44 4.40 4.10*   HGB 13.7 13.6 12.9*   HCT 41.9 41.5 39.2*   MCV 94 94 96   MCH 30.9 30.9 31.5   MCHC 32.7 32.8 32.9   RDW 13.2 12.9 13.7    205 166   NEUTROPHIL 73.3 71.4 79.9   NA " 142 141 141   POTASSIUM 4.7 4.0 4.1   CHLORIDE 111* 109 110*   CO2 27 26 27   ANIONGAP 5 6 4   GLC 95 89 100*   BUN 22 22 19   CR 1.05 1.05 0.98   KM 9.1 8.9 8.5   PROTTOTAL 6.6* 6.8 6.3*   ALBUMIN 3.8 3.9 3.7   BILITOTAL 0.7 0.5 0.6   ALKPHOS 65 67 57   AST 26 19 17   ALT 32 27 24     Lab Test 02/04/20  1240 11/13/19  1418 08/06/19  1217 04/09/19  1451 01/08/19  1417  08/28/18  0848   TSH 2.35 1.93 1.56 1.52 1.39   < > 1.24   T4  --   --   --   --   --   --  1.14    < > = values in this interval not displayed.     IMAGING STUDIES:  As above.    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      HNSCC:  - Had a complete response on 3 month-post treatment PET/CT and has recovered well from concurrent cis+RT (completed Oct 2018). No clinical or endoscopic evidence of disease recurrence.  - Previously reviewed CT chest and neck report from Nov 2019 that showed RICHARD.   - Repeat restaging due in ~Oct/Nov 2020 unless clinically indicated sooner.  - Will continue active surveillance: Reviewed the signs/symptoms of recurrence such as palpable or visible abnormal masses in H&N, pathologic weight loss, cough, SOA, CP, bone pain, CNS symptoms etc; and his role in ensuring close follow-ups for next 5 years at length.   - Clinic eval in ~4-5 months (delayed due to COVID) and then every 4-6 months for year 3. He's agreeable.  - Continue follow-ups with Dr. Garay and Dr. Worthy.    RT-induced xerostomia:  - Mgmt as above. Encouraged to keep using fluoride as directed.    Pancreatic cyst/IPMN:  - Asymptomatic currently.  - Dr. Worthy is planning to repeat an MRI abdomen in 2 years (Dec 2021) based on the recommendations of the radiologist.   - Have asked him to discuss this with PCP in an upcoming appt (August 2020) as well. Defer further mgmt to them.    Return to clinic in 4-5  months with CT and labs.     All questions were answered, and patient was in complete agreement with this plan.     BILLIN - 18 mins call; complex decision making    Pillo Rolon M.D.  . Professor of Medicine  Division of Hematology, Oncology & Transplantation  [facility]

## 2020-06-23 NOTE — PATIENT INSTRUCTIONS
1. You were seen in the ENT Clinic today by Dr. Worthy.  If you have any questions or concerns after your appointment, please call   - Option 1: ENT Clinic: [phone]  - Option 2: [facility] (Dr. Worthy's Nurse): [phone]    2.   Please call us when you have your scans scheduled in November. We will schedule a follow up after your scans.    Natice Schwab, RN  [facility]- Otolaryngology  [phone]

## 2020-06-23 NOTE — PROGRESS NOTES
"Lazaro Porter is a 69 year old male who is being evaluated via a billable video visit.      The patient has been notified of following:     \"This video visit will be conducted via a call between you and your physician/provider. We have found that certain health care needs can be provided without the need for an in-person physical exam.  This service lets us provide the care you need with a video conversation.  If a prescription is necessary we can send it directly to your pharmacy.  If lab work is needed we can place an order for that and you can then stop by our lab to have the test done at a later time.    Video visits are billed at different rates depending on your insurance coverage.  Please reach out to your insurance provider with any questions.    If during the course of the call the physician/provider feels a video visit is not appropriate, you will not be charged for this service.\"    Patient has given verbal consent for Video visit? Yes    Will anyone else be joining your video visit? No      Sondra Easley CMA    Video-Visit Details  Type of service:  Video visit  Video duration: 18 mins  Originating Location (pt. Location): Home  Distant Location (provider location):  [facility]   Platform used for Video Visit: Kevin Rolon MD        "

## 2020-06-23 NOTE — PROGRESS NOTES
"Lazaro Porter is a 69 year old male who is being evaluated via a billable video visit.      The patient has been notified of following:     \"This video visit will be conducted via a call between you and your physician/provider. We have found that certain health care needs can be provided without the need for an in-person physical exam.  This service lets us provide the care you need with a video conversation.  If a prescription is necessary we can send it directly to your pharmacy.  If lab work is needed we can place an order for that and you can then stop by our lab to have the test done at a later time.    Video visits are billed at different rates depending on your insurance coverage.  Please reach out to your insurance provider with any questions.    If during the course of the call the physician/provider feels a video visit is not appropriate, you will not be charged for this service.\"    Patient has given verbal consent for Video visit? Yes    Will anyone else be joining your video visit? No        Video-Visit Details    Type of service:  Video Visit    Video Start Time: 245  Video End Time: 300    Originating Location (pt. Location): Home    Distant Location (provider location):  [facility] EAR NOSE AND THROAT     Platform used for Video Visit: Kevin Worthy MD      HISTORY OF PRESENT ILLNESS:  This is a video visit with Lazaro Porter.  He is 69 years of age.  He lives in [place].  We felt we could do a video visit with him today because he has been doing very well after chemoradiotherapy for his oropharyngeal carcinoma.  He states he is eating, drinking, breathing and swallowing well.  He is working out.  He has good exercise tolerance.  His weight is within two or three pounds of where it normally is.  He has not had any significant weight loss or anything else of this nature.  He has also been able to work out a lot more lately.  He had some problems with some radiation stomatitis.  It is " not severe, but it does occur.      PHYSICAL EXAMINATION:  The patient is alert and oriented x3 and pleasant.  Cranial nerves are intact.  Oral cavity and oropharynx appears clear on a small exam today.  Neck exam is without any asymmetries.      ASSESSMENT AND PLAN:  We talked today for about 15 minutes regarding all of his symptoms.  We answered all of his questions.  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  We also gave him all the danger signs to worry about if he needs to come in sooner.  He understands and agrees.

## 2020-06-23 NOTE — LETTER
"6/23/2020       RE: Lazaro Porter  [address] [place] [phone]     Dear Colleague,    Thank you for referring your patient, Lazaro Porter, to the [facility] EAR NOSE [facility] THROAT at [facility] Please see a copy of my visit note below.    Lazaro Porter is a 69 year old male who is being evaluated via a billable video visit.      The patient has been notified of following:     \"This video visit will be conducted via a call between you and your physician/provider. We have found that certain health care needs can be provided without the need for an in-person physical exam.  This service lets us provide the care you need with a video conversation.  If a prescription is necessary we can send it directly to your pharmacy.  If lab work is needed we can place an order for that and you can then stop by our lab to have the test done at a later time.    Video visits are billed at different rates depending on your insurance coverage.  Please reach out to your insurance provider with any questions.    If during the course of the call the physician/provider feels a video visit is not appropriate, you will not be charged for this service.\"    Patient has given verbal consent for Video visit? Yes    Will anyone else be joining your video visit? No  {If patient encounters technical issues they should call [phone] :[id number]}      Video-Visit Details    Type of service:  Video Visit    Video Start Time: {video visit start/end time:152948}  Video End Time: {video visit start/end time:152948}    Originating Location (pt. Location): {video visit patient location:212992::\"Home\"}    Distant Location (provider location):  [facility] EAR NOSE AND THROAT     Platform used for Video Visit: {Virtual Visit Platforms:357503::\"[employer]\"}    {signature options:471454}          Again, thank you for allowing me to participate in the care of your patient.      Sincerely,    Garcia Worthy MD      "

## 2020-06-24 ENCOUNTER — VIRTUAL VISIT (OUTPATIENT)
Dept: RADIATION ONCOLOGY | Facility: CLINIC | Age: 70
End: 2020-06-24
Attending: RADIOLOGY
Payer: MEDICARE

## 2020-06-24 DIAGNOSIS — C01 MALIGNANT NEOPLASM OF BASE OF TONGUE (H): Primary | ICD-10-CM

## 2020-06-24 DIAGNOSIS — E03.9 HYPOTHYROIDISM, UNSPECIFIED TYPE: ICD-10-CM

## 2020-06-24 NOTE — PROGRESS NOTES
"Lazaro Porter is a 69 year old male who is being evaluated via a billable video visit.      The patient has been notified of following:     \"This video visit will be conducted via a call between you and your physician/provider. We have found that certain health care needs can be provided without the need for an in-person physical exam.  This service lets us provide the care you need with a video conversation.  If a prescription is necessary we can send it directly to your pharmacy.  If lab work is needed we can place an order for that and you can then stop by our lab to have the test done at a later time.    Video visits are billed at different rates depending on your insurance coverage.  Please reach out to your insurance provider with any questions.    If during the course of the call the physician/provider feels a video visit is not appropriate, you will not be charged for this service.\"    Patient has given verbal consent for Video visit? Yes    Will anyone else be joining your video visit? No            "

## 2020-06-24 NOTE — NURSING NOTE
FOLLOW-UP VISIT    Patient Name: Lazaro Porter      : 1950     Age: 69 year old        ______________________________________________________________________________     Chief Complaint   Patient presents with     Cancer     Pt is here for a follow up:Base of Tongue Cancer: Radiation dose 6996 cGy completed 10/13/18     Pain  Denies    Labs  Other Labs: No    Imaging  None      Dental:   Most Recent Dental Visit: No    Speech/Swallowing:   Most Recent evaluation or testing: No  Swallowing Restrictions: No difficulties with swallowing    Trismus/Jaw Exercises: yes    Nutrition:    Weight:   Wt Readings from Last 3 Encounters:   20 85.3 kg (188 lb)   20 85.3 kg (188 lb)   20 85.4 kg (188 lb 4.8 oz)         Oral Symptoms:   Xerostomia:0- None  Dysphagia: 0-None  Mucositis Oral Symptoms: 0-None  Mucositis: 0- None  Esophagitis:0- None    Other Appointments:     MD Name:  Appointment Date:    MD Name: Appointment Date:   MD Name: Appointment Date:   Other Appointment Notes:     Residual Radiation side effect: No concerns     Additional Instructions:

## 2020-07-13 ENCOUNTER — TELEPHONE (OUTPATIENT)
Dept: OTOLARYNGOLOGY | Facility: CLINIC | Age: 70
End: 2020-07-13

## 2020-07-13 NOTE — TELEPHONE ENCOUNTER
Called patient to schedule in October - November 2020:    1. CT Scans (order in Epic from Dr. Rolon)  2. 3-4 month follow up IN CLINIC with Dr. Worthy after completion of scans:    Appointment Type - [facility] RETURN  Provider - Dr. Worthy  Appointment Notes - 3-4 month f/u, CT scans prior    LVM with scheduling instructions and ENT call center number.

## 2020-08-17 ENCOUNTER — TELEPHONE (OUTPATIENT)
Dept: OTOLARYNGOLOGY | Facility: CLINIC | Age: 70
End: 2020-08-17

## 2020-08-25 ENCOUNTER — TELEPHONE (OUTPATIENT)
Dept: OTOLARYNGOLOGY | Facility: CLINIC | Age: 70
End: 2020-08-25

## 2020-08-25 NOTE — TELEPHONE ENCOUNTER
2nd attempt:    Called patient to schedule in October - November 2020:     1. CT Scans (order in Epic from Dr. Rolon)  2. 3-4 month follow up IN CLINIC with Dr. Worthy after completion of scans:     Appointment Type - [facility] RETURN  Provider - Dr. Worthy  Appointment Notes - 3-4 month f/u, CT scans prior     LVM with scheduling instructions and ENT call center number.

## 2020-09-25 DIAGNOSIS — C01 MALIGNANT NEOPLASM OF BASE OF TONGUE (H): Primary | ICD-10-CM

## 2020-11-10 ENCOUNTER — OFFICE VISIT (OUTPATIENT)
Dept: OTOLARYNGOLOGY | Facility: CLINIC | Age: 70
End: 2020-11-10
Payer: MEDICARE

## 2020-11-10 ENCOUNTER — ANCILLARY PROCEDURE (OUTPATIENT)
Dept: CT IMAGING | Facility: CLINIC | Age: 70
End: 2020-11-10
Attending: OTOLARYNGOLOGY
Payer: MEDICARE

## 2020-11-10 ENCOUNTER — ONCOLOGY VISIT (OUTPATIENT)
Dept: ONCOLOGY | Facility: CLINIC | Age: 70
End: 2020-11-10
Attending: INTERNAL MEDICINE
Payer: MEDICARE

## 2020-11-10 VITALS
HEIGHT: 72 IN | WEIGHT: 187.8 LBS | OXYGEN SATURATION: 99 % | BODY MASS INDEX: 25.44 KG/M2 | HEART RATE: 66 BPM | TEMPERATURE: 98 F | RESPIRATION RATE: 18 BRPM | SYSTOLIC BLOOD PRESSURE: 152 MMHG | DIASTOLIC BLOOD PRESSURE: 96 MMHG

## 2020-11-10 VITALS
SYSTOLIC BLOOD PRESSURE: 151 MMHG | TEMPERATURE: 97.9 F | WEIGHT: 186.7 LBS | DIASTOLIC BLOOD PRESSURE: 94 MMHG | BODY MASS INDEX: 25.32 KG/M2 | OXYGEN SATURATION: 99 % | HEART RATE: 63 BPM

## 2020-11-10 DIAGNOSIS — C01 MALIGNANT NEOPLASM OF BASE OF TONGUE (H): ICD-10-CM

## 2020-11-10 DIAGNOSIS — Z08 ENCOUNTER FOR FOLLOW-UP EXAMINATION AFTER COMPLETED TREATMENT FOR MALIGNANT NEOPLASM: ICD-10-CM

## 2020-11-10 DIAGNOSIS — D49.0 IPMN (INTRADUCTAL PAPILLARY MUCINOUS NEOPLASM): Primary | ICD-10-CM

## 2020-11-10 DIAGNOSIS — C01 MALIGNANT NEOPLASM OF BASE OF TONGUE (H): Primary | ICD-10-CM

## 2020-11-10 LAB
ALBUMIN SERPL-MCNC: 3.9 G/DL (ref 3.4–5)
ALP SERPL-CCNC: 68 U/L (ref 40–150)
ALT SERPL W P-5'-P-CCNC: 23 U/L (ref 0–70)
ANION GAP SERPL CALCULATED.3IONS-SCNC: 3 MMOL/L (ref 3–14)
AST SERPL W P-5'-P-CCNC: 18 U/L (ref 0–45)
BASOPHILS # BLD AUTO: 0 10E9/L (ref 0–0.2)
BASOPHILS NFR BLD AUTO: 0.6 %
BILIRUB SERPL-MCNC: 0.6 MG/DL (ref 0.2–1.3)
BUN SERPL-MCNC: 19 MG/DL (ref 7–30)
CALCIUM SERPL-MCNC: 9 MG/DL (ref 8.5–10.1)
CHLORIDE SERPL-SCNC: 110 MMOL/L (ref 94–109)
CO2 SERPL-SCNC: 28 MMOL/L (ref 20–32)
CREAT BLD-MCNC: 1.1 MG/DL (ref 0.66–1.25)
CREAT SERPL-MCNC: 1.06 MG/DL (ref 0.66–1.25)
DIFFERENTIAL METHOD BLD: NORMAL
EOSINOPHIL # BLD AUTO: 0.1 10E9/L (ref 0–0.7)
EOSINOPHIL NFR BLD AUTO: 1.8 %
ERYTHROCYTE [DISTWIDTH] IN BLOOD BY AUTOMATED COUNT: 12.7 % (ref 10–15)
GFR SERPL CREATININE-BSD FRML MDRD: 66 ML/MIN/{1.73_M2}
GFR SERPL CREATININE-BSD FRML MDRD: 70 ML/MIN/{1.73_M2}
GLUCOSE SERPL-MCNC: 102 MG/DL (ref 70–99)
HCT VFR BLD AUTO: 44.5 % (ref 40–53)
HGB BLD-MCNC: 14.7 G/DL (ref 13.3–17.7)
IMM GRANULOCYTES # BLD: 0 10E9/L (ref 0–0.4)
IMM GRANULOCYTES NFR BLD: 0.2 %
LYMPHOCYTES # BLD AUTO: 0.9 10E9/L (ref 0.8–5.3)
LYMPHOCYTES NFR BLD AUTO: 17.1 %
MCH RBC QN AUTO: 32 PG (ref 26.5–33)
MCHC RBC AUTO-ENTMCNC: 33 G/DL (ref 31.5–36.5)
MCV RBC AUTO: 97 FL (ref 78–100)
MONOCYTES # BLD AUTO: 0.4 10E9/L (ref 0–1.3)
MONOCYTES NFR BLD AUTO: 8.1 %
NEUTROPHILS # BLD AUTO: 3.7 10E9/L (ref 1.6–8.3)
NEUTROPHILS NFR BLD AUTO: 72.2 %
NRBC # BLD AUTO: 0 10*3/UL
NRBC BLD AUTO-RTO: 0 /100
PLATELET # BLD AUTO: 201 10E9/L (ref 150–450)
POTASSIUM SERPL-SCNC: 4.5 MMOL/L (ref 3.4–5.3)
PROT SERPL-MCNC: 7 G/DL (ref 6.8–8.8)
RBC # BLD AUTO: 4.59 10E12/L (ref 4.4–5.9)
SODIUM SERPL-SCNC: 140 MMOL/L (ref 133–144)
TSH SERPL DL<=0.005 MIU/L-ACNC: 2.37 MU/L (ref 0.4–4)
WBC # BLD AUTO: 5.1 10E9/L (ref 4–11)

## 2020-11-10 PROCEDURE — 85025 COMPLETE CBC W/AUTO DIFF WBC: CPT | Performed by: PATHOLOGY

## 2020-11-10 PROCEDURE — 84443 ASSAY THYROID STIM HORMONE: CPT | Performed by: PATHOLOGY

## 2020-11-10 PROCEDURE — 71260 CT THORAX DX C+: CPT | Mod: GC | Performed by: RADIOLOGY

## 2020-11-10 PROCEDURE — 80053 COMPREHEN METABOLIC PANEL: CPT | Performed by: PATHOLOGY

## 2020-11-10 PROCEDURE — 82565 ASSAY OF CREATININE: CPT | Mod: 59 | Performed by: PATHOLOGY

## 2020-11-10 PROCEDURE — G0463 HOSPITAL OUTPT CLINIC VISIT: HCPCS

## 2020-11-10 PROCEDURE — 36415 COLL VENOUS BLD VENIPUNCTURE: CPT | Performed by: PATHOLOGY

## 2020-11-10 PROCEDURE — 31575 DIAGNOSTIC LARYNGOSCOPY: CPT | Performed by: OTOLARYNGOLOGY

## 2020-11-10 PROCEDURE — 70491 CT SOFT TISSUE NECK W/DYE: CPT | Performed by: RADIOLOGY

## 2020-11-10 PROCEDURE — 99214 OFFICE O/P EST MOD 30 MIN: CPT | Mod: 25 | Performed by: OTOLARYNGOLOGY

## 2020-11-10 PROCEDURE — 99214 OFFICE O/P EST MOD 30 MIN: CPT | Performed by: INTERNAL MEDICINE

## 2020-11-10 RX ORDER — IOPAMIDOL 755 MG/ML
92 INJECTION, SOLUTION INTRAVASCULAR ONCE
Status: COMPLETED | OUTPATIENT
Start: 2020-11-10 | End: 2020-11-10

## 2020-11-10 RX ADMIN — IOPAMIDOL 92 ML: 755 INJECTION, SOLUTION INTRAVASCULAR at 12:14

## 2020-11-10 ASSESSMENT — MIFFLIN-ST. JEOR: SCORE: 1644.86

## 2020-11-10 ASSESSMENT — PAIN SCALES - GENERAL
PAINLEVEL: NO PAIN (0)
PAINLEVEL: NO PAIN (0)

## 2020-11-10 NOTE — LETTER
11/10/2020         RE: Lazaro Porter  [address] [facility] [address]        Dear Colleague,    Thank you for referring your patient, Lazaro Porter, to the [facility]. Please see a copy of my visit note below.    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    HISTORY OF PRESENT ILLNESS: Mr. 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 His oncologic history is as under.     Mr. Porter has recovered very well from chemoRT and his main issues currently are - moderate xerostomia, which has been stable and for which he's using artificial saliva and fluoride Rx; and mild lymphedema, which is well controlled with lymphedema therapy. Also doing well with voice therapy. Saw Dr. Worthy from ENT and Dr. Garay from Rad Onc in July 2020 and the endoscopy was negative in Feb 2020. No neck masses or clinical signs/symptoms of disease recurrence. Excellent PO intake and no G-tube or port.     No reported symptoms from the pancreatic tail mass. 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     ONCOLOGIC HISTORY:  1. 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   - Noticed enlarging neck mass in March 2018. In 5/2018 this was reassessed and prompted imaging and ultimately a biopsy of the mass at [facility] ([place], [place]) which demonstrated squamous cell carcinoma.   - PET/CT  "7/10/18: \"3.5 x 2.2 cm hypermetabolic right level 2 metastatic lymphadenopathy. There is imaging findings suggestive of extranodal extension. The mass is inseparable from the right SCM and abuts the right internal carotid artery about 180 degrees. No abnormal focal FDG uptake along the mucosal spaces to suggest a primary squamous cell cancer focus. Several subcentimeter pulmonary nodules with no increased FDG uptake. Attention on follow up is recommended. Multiple, too small to characterize hypodensities in the liver. A 9 mm nodule in the right adrenal gland, without increased FDG uptake. A 7 mm hypodensity in the pancreatic tail with no increased FDG uptake, may be a side branch IPMN. Tiny lucencies in the left iliac bone without increased FDG uptake, possibly focal osteoporotic areas.\"  - Seen by ENT and underwent DL with directed biopsies on 7/18/18 with pathology showing no evidence of malignancy within the mucosal sites.   - A Robotic demucosalization of the right tongue base on 8/3/18 was performed and pathology from this pathology demonstrated p16 positive nonkeratinizing squamous cell carcinoma in the level node lymph node and in the right tongue base.   - Started concurrent cisplatin plus radiation therapy based on tumor board recommendations. Cisplatin 100mg/m2 cycle 1: 8/28/18. Cycle 2 - delayed by 2 days due to  and given on 9/21/18. Cycle 3 - unable to tolerate 100mg/m2 due to myelosuppression and patient preference. Ultimately received cisplatin 40mg/m2 x1 on 10/12/18. RT - 6996 cGy over 33 fractions - 8/29/18 to 10/13/18.   - PET/CT with CT soft tissue neck with contrast 1/9/19: \"1. Interval resolution of hypermetabolic right level II lymph node. No residual radiotracer activity in this location and no other lymphadenopathy. 2. No evidence of mucosal, ivana, or soft tissue abnormality on contrast enhanced neck CT. 1. No evidence of metastatic disease in the chest, abdomen, or pelvis.\"  - Visual " endoscopy - 08/06/19, 11/13/19, 2/4/2020 - negative.  - CT chest and soft tissue neck with contrast 11/13/19 - [facility]. Incidental 8mm hypodense pancreatic tail cyst, possibly a side branch IPMN.  - MRI abd with contrast (pancreatic mass eval) 12/16/19 - [facility].  - 11/10/20: CT C/A/P and CT neck with contrast - prelim [facility].    REVIEW OF SYSTEMS:  14 point ROS negative other than the symptoms noted above in the HPI.    PAST MEDICAL HISTORY:   Past Medical History:   Diagnosis Date     Head and neck cancer (H)      PAST SURGICAL HISTORY:   Past Surgical History:   Procedure Laterality Date     DAVINCI RESECT TUMOR TRANSORAL Bilateral 8/3/2018    Procedure: DAVINCI RESECT TUMOR TRANSORAL;  Transoral Robotic Resection of the Base of Tongue,   IR Guided Core Needle Biopsy of Right Neck Mass;  Surgeon: Blas Calvillo MD;  Location: [facility] OR     FINE NEEDLE ASPIRATION WITH IMAGING GUIDANCE Right 8/3/2018    Procedure: FINE NEEDLE ASPIRATION WITH IMAGING GUIDANCE;  Ultrasound guided Core Biopsy of a Right Neck Mass;  Surgeon: Denis Monson MD;  Location: [facility] OR     HEAD & NECK SURGERY       LARYNGOSCOPY WITH BIOPSY(IES) Bilateral 7/18/2018    Procedure: LARYNGOSCOPY WITH BIOPSY(IES);  Direct Laryngoscopy with Direct Biopsies;  Surgeon: Garcia Worthy MD;  Location: [facility] OR     TONSILLECTOMY     In addition:  1. Irritated sweat gland removed   2. Fatty tumor on his back removed    3. Cataract surgery     SOCIAL HISTORY:   Social History     Socioeconomic History     Marital status:      Spouse name: Not on file     Number of children: Not on file     Years of education: Not on file     Highest education level: Not on file   Occupational History     Not on file   Social Needs     Financial resource strain: Not on file     Food insecurity     Worry: Not on file     Inability: Not on file     Transportation needs     Medical: Not on file     Non-medical: Not on file   Tobacco Use     Smoking status: Never Smoker      Smokeless tobacco: Never Used   Substance and Sexual Activity     Alcohol use: Yes     Comment: occasionally     Drug use: No     Sexual activity: Yes     Partners: Female     Birth control/protection: Other   Lifestyle     Physical activity     Days per week: Not on file     Minutes per session: Not on file     Stress: Not on file   Relationships     Social connections     Talks on phone: Not on file     Gets together: Not on file     Attends Congregational service: Not on file     Active member of club or organization: Not on file     Attends meetings of clubs or organizations: Not on file     Relationship status: Not on file     Intimate partner violence     Fear of current or ex partner: Not on file     Emotionally abused: Not on file     Physically abused: Not on file     Forced sexual activity: Not on file   Other Topics Concern     Not on file   Social History Narrative     Not on file   Patient is from [place], [place]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     FAMILY HISTORY:   Brother with clotting issue  Mother, unknown primary and    Maternal aunt with colon cancer  Maternal grandfather with head/neck cancer  Maternal cousins with cancers unknown types    ALLERGIES:  No Known Allergies    CURRENT MEDICATIONS:   Current Outpatient Medications:      ASPIRIN 81 PO, Take 2 tablets by mouth daily , Disp: , Rfl:      CANNABIDIOL, HEMP OIL/EXTRACT, OR CBD PRODUCT OTHER THAN MEDICAL CANNABIS,, Take by mouth daily 2 drops under tongue once a day, Disp: , Rfl:      Glucos-MSM-C-Gf-Qqpoto-Frfexc (GLUCOSAMINE MSM COMPLEX) TABS tablet, Take 2 tablets by mouth daily , Disp: , Rfl:      IBUPROFEN PO, Take 200 mg by mouth, Disp: , Rfl:      Multiple Vitamins-Minerals (MULTIVITAMIN & MINERAL PO), , Disp: , Rfl:      vitamin E 400 units TABS, Take 400 Units by mouth daily, Disp: , Rfl:   No current facility-administered medications for this visit.     PHYSICAL  EXAMINATION:  Vital signs: BP (!) 152/96   Pulse 66   Temp 98  F (36.7  C) (Oral)   Resp 18   Ht 1.829 m (6')   Wt 85.2 kg (187 lb 12.8 oz)   SpO2 99%   BMI 25.47 kg/m    ECOG performance status of 0.  ECOG performance status of 0.  GENERAL: Thin man, no acute distress  HEENT: Clear oropharynx with thick, white secretions and minimal saliva. No tongue mass.   NECK: No JVD/LAD.  LUNGS: clear bilaterally, no wheezes, good diaphragmatic excursion  CARDIOVASCULAR: Regular rate and rhythm, no murmurs, gallops or rubs  ABDOMEN: Soft, nontender and nondistended, bowel sounds present.  EXTREMITIES: No cyanosis, no clubbing, no edema  NEUROLOGIC: No focal deficits; alert and oriented to self, place, time    LABORATORY DATA:    Lab Test 11/10/20  1151 02/04/20  1240 11/13/19  1418   WBC 5.1 5.4 5.1   RBC 4.59 4.44 4.40   HGB 14.7 13.7 13.6   HCT 44.5 41.9 41.5   MCV 97 94 94   MCH 32.0 30.9 30.9   MCHC 33.0 32.7 32.8   RDW 12.7 13.2 12.9    189 205   NEUTROPHIL 72.2 73.3 71.4    142 141   POTASSIUM 4.5 4.7 4.0   CHLORIDE 110* 111* 109   CO2 28 27 26   ANIONGAP 3 5 6   * 95 89   BUN 19 22 22   CR 1.06 1.05 1.05   KM 9.0 9.1 8.9   PROTTOTAL 7.0 6.6* 6.8   ALBUMIN 3.9 3.8 3.9   BILITOTAL 0.6 0.7 0.5   ALKPHOS 68 65 67   AST 18 26 19   ALT 23 32 27     Lab Test 11/10/20  1151 02/04/20  1240 11/13/19  1418 08/06/19  1217 04/09/19  1451 08/28/18  0848 08/28/18  0848   TSH 2.37 2.35 1.93 1.56 1.52   < > 1.24   T4  --   --   --   --   --   --  1.14    < > = values in this interval not displayed.   Labs including CBC with differential, CMP, TSH and FT4 from OSH on 06/22/20 - unremarkable except for slight decrease in WBC and Hb, which is clinically non-significant.    IMAGING STUDIES:  As above.    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       HNSCC:  - Had a complete response on 3 month-post treatment PET/CT and has recovered well from concurrent cis+RT (completed Oct 2018). No clinical or endoscopic evidence of disease recurrence.  - Reviewed prelim read of CT chest and neck report from today that doesn't appear to have any evidence of disease relapse. Clinically asymptomatic as well. Will f/up on final read.  - Will continue active surveillance: Reviewed the signs/symptoms of recurrence such as palpable or visible abnormal masses in H&N, pathologic weight loss, cough, SOA, CP, bone pain, CNS symptoms etc; and his role in ensuring close follow-ups for next 5 years at length.   - Clinic eval every ~6 months for year 3. He's agreeable.  - Continue follow-ups with Dr. Garay and Dr. Worthy.  - No plan for routine repeat restaging from my side unless indicated by Lobito Worthy/Tanisha.     RT-induced xerostomia:  - Mgmt as above. Encouraged to keep using fluoride as directed.    Pancreatic cyst/IPMN:  - Asymptomatic currently.  - Referred to GI for further mgmt.   - Dr. Worthy is planning to repeat an MRI abdomen in 2 years (Dec 2021) based on the recommendations of the radiologist.     Return to clinic in 6 months.      All questions were answered, and patient was in complete agreement with this plan.     BILLIN    Pillo Rolon M.D.  . Professor of Medicine  Division of Hematology, Oncology & Transplantation  [facility]

## 2020-11-10 NOTE — LETTER
11/10/2020       RE: Lazaro Porter  [address] [facility] [address]     Dear Colleague,    Thank you for referring your patient, Lazaro Porter, to the [facility]AR [facility]AT [facility] [place] [facility]. Please see a copy of my visit note below.      Otolaryngology Clinic      Name: Lazaro Porter  MRN: [id number]  Age: 69 year old  : 1950  11/10/2020      Chief Complaint:   RECHECK     History of Present Illness:   Lazaro Porter is a 69 year old male with a history of T1N1 SCC of the tongue base s/p chemoradiation therapy and resection who presents for follow up.  He was last seen on 3-2020 , at which time he noted ongoing difficulty with diminished appetite and lack of saliva.       He had an MRI on 19. We reviewed the results which indicated pancreatic cyst, recommended follow-up in 2 years.  todays CT a[pears negativ.      Review of Systems:   Pertinent items are noted in HPI or as in patient entered ROS below, remainder of complete ROS is negative.    ENT ROS 2020   Constitutional -   Neurology -   Ears, Nose, Throat -   Gastrointestinal/Genitourinary -   Endocrine Thirst        Physical Exam:   BP (!) 151/94 (BP Location: Left arm, Patient Position: Sitting, Cuff Size: Adult Regular)   Pulse 63   Temp 97.9  F (36.6  C)   Wt 84.7 kg (186 lb 11.2 oz)   SpO2 99%   BMI 25.32 kg/m       PHYSICAL EXAMINATION:    Constitutional:  The patient was unaccompanied, well-groomed, and in no acute distress.    Skin:  Warm and pink.    Neurologic:  Alert and oriented x 3.  CN's III-XII within normal limits.  Voice normal.   Psychiatric:  The patient's affect was calm, cooperative, and appropriate.    Respiratory:  Breathing comfortably without stridor or exertion of accessory muscles.    Eyes: Extraocular movement intact.    Head:  Normocephalic and atraumatic.  No lesions or scars.    Ears:    EAC's and TM's were clear.     Nose:     Anterior rhinoscopy revealed midline septum and absence of purulence or polyps.    OC/OP:  Normal tongue, floor of mouth, buccal mucosa, and palate.  No lesions or masses on inspection or palpation.  No abnormal lymph tissue in the oropharynx.    Neck:  Supple with normal laryngeal and tracheal landmarks.  The parotid beds were without masses.  No palpable thyroid.  Lymphatic:  There is no palpable lymphadenopathy in the neck.       Fiberoptic Endoscopy:  zTransoraslly The soft palate appeared normal with good mobility.  The epiglottis was sharp and the visualized portion of the vallecula was clear.  The larynx was clear with mobile cords.  The arytenoids were clear and there was no pooling in the hypopharynx.       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 He is doing well today, no evidence for recurrence of malignancy. Reviewed recent imaging. Will follow-up in 4 months.    Garcia Worthy MD

## 2020-11-10 NOTE — NURSING NOTE
Chief Complaint   Patient presents with     Follow Up     Cancer     Blood pressure (!) 151/94, pulse 63, temperature 97.9  F (36.6  C), weight 84.7 kg (186 lb 11.2 oz), SpO2 99 %.    Padmini Mason RN

## 2020-11-10 NOTE — PROGRESS NOTES
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  He was last seen on 3-2020 , at which time he noted ongoing difficulty with diminished appetite and lack of saliva.       He had an MRI on 19. We reviewed the results which indicated pancreatic cyst, recommended follow-up in 2 years.  todays CT a[pears negativ.      Review of Systems:   Pertinent items are noted in HPI or as in patient entered ROS below, remainder of complete ROS is negative.    ENT ROS 2020   Constitutional -   Neurology -   Ears, Nose, Throat -   Gastrointestinal/Genitourinary -   Endocrine Thirst        Physical Exam:   BP (!) 151/94 (BP Location: Left arm, Patient Position: Sitting, Cuff Size: Adult Regular)   Pulse 63   Temp 97.9  F (36.6  C)   Wt 84.7 kg (186 lb 11.2 oz)   SpO2 99%   BMI 25.32 kg/m       PHYSICAL EXAMINATION:    Constitutional:  The patient was unaccompanied, well-groomed, and in no acute distress.    Skin:  Warm and pink.    Neurologic:  Alert and oriented x 3.  CN's III-XII within normal limits.  Voice normal.   Psychiatric:  The patient's affect was calm, cooperative, and appropriate.    Respiratory:  Breathing comfortably without stridor or exertion of accessory muscles.    Eyes: Extraocular movement intact.    Head:  Normocephalic and atraumatic.  No lesions or scars.    Ears:    EAC's and TM's were clear.     Nose:    Anterior rhinoscopy revealed midline septum and absence of purulence or polyps.    OC/OP:  Normal tongue, floor of mouth, buccal mucosa, and palate.  No lesions or masses on inspection or palpation.  No abnormal lymph tissue in the oropharynx.    Neck:  Supple with normal laryngeal and tracheal landmarks.  The  parotid beds were without masses.  No palpable thyroid.  Lymphatic:  There is no palpable lymphadenopathy in the neck.       Fiberoptic Endoscopy:  zTransoraslly The soft palate appeared normal with good mobility.  The epiglottis was sharp and the visualized portion of the vallecula was clear.  The larynx was clear with mobile cords.  The arytenoids were clear and there was no pooling in the hypopharynx.       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 He is doing well today, no evidence for recurrence of malignancy. Reviewed recent imaging. Will follow-up in 4 months.    Garcia Worthy MD

## 2020-11-10 NOTE — NURSING NOTE
Oncology Rooming Note    November 10, 2020 1:55 PM   Lazaro Porter is a 70 year old male who presents for:    Chief Complaint   Patient presents with     Oncology Clinic Visit     Malignant neoplasm of base of tongue (H)     Initial Vitals: BP (!) 152/96   Pulse 66   Temp 98  F (36.7  C) (Oral)   Resp 18   Ht 1.829 m (6')   Wt 85.2 kg (187 lb 12.8 oz)   SpO2 99%   BMI 25.47 kg/m   Estimated body mass index is 25.47 kg/m  as calculated from the following:    Height as of this encounter: 1.829 m (6').    Weight as of this encounter: 85.2 kg (187 lb 12.8 oz). Body surface area is 2.08 meters squared.  No Pain (0) Comment: Data Unavailable   No LMP for male patient.  Allergies reviewed: Yes  Medications reviewed: Yes    Medications: Medication refills not needed today.  Pharmacy name entered into [facility]: [id number] - [place], [place] - [address]    Clinical concerns: No new concerns today.       Elaina Holland MA

## 2020-11-11 ENCOUNTER — OFFICE VISIT (OUTPATIENT)
Dept: RADIATION ONCOLOGY | Facility: CLINIC | Age: 70
End: 2020-11-11
Attending: RADIOLOGY
Payer: MEDICARE

## 2020-11-11 ENCOUNTER — TELEPHONE (OUTPATIENT)
Dept: OTOLARYNGOLOGY | Facility: CLINIC | Age: 70
End: 2020-11-11

## 2020-11-11 VITALS — WEIGHT: 196 LBS | BODY MASS INDEX: 26.58 KG/M2

## 2020-11-11 DIAGNOSIS — C01 MALIGNANT NEOPLASM OF BASE OF TONGUE (H): Primary | ICD-10-CM

## 2020-11-11 DIAGNOSIS — E03.9 HYPOTHYROIDISM, UNSPECIFIED TYPE: ICD-10-CM

## 2020-11-11 PROCEDURE — G0463 HOSPITAL OUTPT CLINIC VISIT: HCPCS | Performed by: RADIOLOGY

## 2020-11-11 NOTE — LETTER
2020         RE: Lazaro Porter  [address] [place] [id number]           Department of Radiation Oncology  [facility]  [address] [place] [address]  [phone]       Radiation Oncology Follow-up Visit  2020    Lazaro Porter  MRN: [id number]   : 1950     DISEASE TREATED:   cT1 N1 M0 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    RADIATION THERAPY DELIVERED:   6996 cGy delivered in 33 daily fractions, from 2018 - 10/13/2018    SYSTEMIC THERAPY:  Cisplatin 100 mg/m  (initially began with high-dose therapy every 3 weeks with his second infusion delayed 2 days secondary to neutropenia and his final cycle switched to a low dose 40 mg/m  infusion secondary to ongoing cytopenias)    INTERVAL SINCE COMPLETION OF RADIATION THERAPY:   25 months    SUBJECTIVE:   Lazaro Porter is a 70 year old male with a PMHx significant for a 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 He was initially diagnosed after he presented with a palpable right neck mass with staging evaluation demonstrating a small primary lesion within the right tongue base with an enlarged right level II lymph node encasing the right carotid artery and concerning for extranodal extension. He received definitive-intent chemoradiotherapy as described above, receiving a total dose of 70 Gy in 33 fractions with concurrent high-dose cisplatin which was dose-reduced due to treatment-related cytopenias.     Mr. Porter returns to radiation oncology clinic today for a routine posttreatment disease surveillance visit. Overall, he reports that he is doing well. He has a mildly depressed mood secondary to the current political and economic climate. Regarding his prior head and neck therapy, he denies any dysphagia odynophagia but does affirm to some residual post-treatment induced xerostomia although this does not affect his overall quality of life.  His remaining ROS are otherwise negative.  He continues to follow with Dr. Rolon and Dr. Worthy and is planning for repeat imaging in 2021 to evaluate an asymptomatic pancreatic cyst versus IPMN incidentally discovered on his staging imaging.    OBJECTIVE:  Weight: 88.9 kg    General: Healthy-appearing 70-year-old gentleman seated comfortably in an examination chair in no acute distress  HEENT: NC/AT. EOMI. No rhinorrhea or epistaxis. Mildly dry mucous membranes. No visible oral cavity lesions or thrush. Bilateral buccal mucosa, gingiva, floor of mouth, oral tongue and bilateral base of tongue are soft palpation with no masses, nodularity or induration.  Neck: Minimal fibrosis of the bilateral neck. No significant submental lymphedema. No palpable cervical adenopathy.  Pulmonary: Wheezing, stridor or respiratory distress  Skin: Normal color and turgor    LABS AND IMAGIN/10/2020 TSH: 2.37    11/10/2020 CT neck:  Post-radiation changes with no evidence of recurrent disease    11/10/2020 CT chest:  No evidence of metastatic disease    IMPRESSION:   Mr. 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 He is more than 2-year status post completion of definitive chemoradiotherapy and is doing well on examination, with minimal residual radiation-induced toxicities.    PLAN:   1. Follow-up in radiation oncology clinic with NP in 6 months and with MD in 1 year  2. Repeat TSH due in 2021    Devon Klein MD-PhD PGY-5  Chief Radiation Oncology Resident, [facility]  [phone]      Attending addendum:   I saw and examined the patient with the resident and agree with the documented plan of care.    Nathan Garay MD/PhD    Dept of Radiation Oncology  [facility]      FOLLOW-UP VISIT    Patient Name: Lazaro Porter      : 1950     Age: 70 year old         ______________________________________________________________________________     Chief Complaint   Patient presents with     Cancer     Pt is here for a follow up:Base of Tongue Cancer: Radiation dose 6996 cGy completed 10/13/18       Pain  Denies    Labs  Other Labs: No    Imaging  CT: 11/10/20      Nutrition:    Weight:   Wt Readings from Last 3 Encounters:   11/11/20 88.9 kg (196 lb)   11/10/20 84.7 kg (186 lb 11.2 oz)   11/10/20 85.2 kg (187 lb 12.8 oz)         Oral Symptoms:   Xerostomia:1- Symptomatic without significant dietary alteration; unstimulated saliva flow >0.2 ml/min  Dysphagia: 0-None  Mucositis Oral Symptoms: 0-None  Mucositis: 0- None  Esophagitis:0- None    Other Appointments:     MD Name:  Appointment Date:    MD Name: Appointment Date:   MD Name: Appointment Date:   Other Appointment Notes:     Residual Radiation side effect: No concers     Additional Instructions:     Nurse face-to-face time: Level 3:  10 min face to face time      Nathan Garay MD

## 2020-11-11 NOTE — PROGRESS NOTES
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 He was initially diagnosed after he presented with a palpable right neck mass with staging evaluation demonstrating a small primary lesion within the right tongue base with an enlarged right level II lymph node encasing the right carotid artery and concerning for extranodal extension. He received definitive-intent chemoradiotherapy as described above, receiving a total dose of 70 Gy in 33 fractions with concurrent high-dose cisplatin which was dose-reduced due to treatment-related cytopenias.     Mr. Porter returns to radiation oncology clinic today for a routine posttreatment disease surveillance visit. Overall, he reports that he is doing well. He has a mildly depressed mood secondary to the current political and economic climate. Regarding his prior head and neck therapy, he denies any dysphagia odynophagia but does affirm to some residual post-treatment induced xerostomia although this does not affect his overall quality of life. His remaining ROS are otherwise negative.  He continues to follow with Dr. Rolon and  Dr. Worthy and is planning for repeat imaging in 2021 to evaluate an asymptomatic pancreatic cyst versus IPMN incidentally discovered on his staging imaging.    OBJECTIVE:  Weight: 88.9 kg    General: Healthy-appearing 70-year-old gentleman seated comfortably in an examination chair in no acute distress  HEENT: NC/AT. EOMI. No rhinorrhea or epistaxis. Mildly dry mucous membranes. No visible oral cavity lesions or thrush. Bilateral buccal mucosa, gingiva, floor of mouth, oral tongue and bilateral base of tongue are soft palpation with no masses, nodularity or induration.  Neck: Minimal fibrosis of the bilateral neck. No significant submental lymphedema. No palpable cervical adenopathy.  Pulmonary: Wheezing, stridor or respiratory distress  Skin: Normal color and turgor    LABS AND IMAGIN/10/2020 TSH: 2.37    11/10/2020 CT neck:  Post-radiation changes with no evidence of recurrent disease    11/10/2020 CT chest:  No evidence of metastatic disease    IMPRESSION:   Mr. 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 He is more than 2-year status post completion of definitive chemoradiotherapy and is doing well on examination, with minimal residual radiation-induced toxicities.    PLAN:   1. Follow-up in radiation oncology clinic with NP in 6 months and with MD in 1 year  2. Repeat TSH due in 2021    Devon Klein MD-PhD PGY-5  Chief Radiation Oncology Resident, [facility]  [phone]      Attending addendum:   I saw and examined the patient with the resident and agree with the documented plan of care.    Nathan Garay MD/PhD    Dept of Radiation Oncology  [facility]

## 2020-11-11 NOTE — PROGRESS NOTES
FOLLOW-UP VISIT    Patient Name: Lazaro Porter      : 1950     Age: 70 year old        ______________________________________________________________________________     Chief Complaint   Patient presents with     Cancer     Pt is here for a follow up:Base of Tongue Cancer: Radiation dose 6996 cGy completed 10/13/18       Pain  Denies    Labs  Other Labs: No    Imaging  CT: 11/10/20      Nutrition:    Weight:   Wt Readings from Last 3 Encounters:   20 88.9 kg (196 lb)   11/10/20 84.7 kg (186 lb 11.2 oz)   11/10/20 85.2 kg (187 lb 12.8 oz)         Oral Symptoms:   Xerostomia:1- Symptomatic without significant dietary alteration; unstimulated saliva flow >0.2 ml/min  Dysphagia: 0-None  Mucositis Oral Symptoms: 0-None  Mucositis: 0- None  Esophagitis:0- None    Other Appointments:     MD Name:  Appointment Date:    MD Name: Appointment Date:   MD Name: Appointment Date:   Other Appointment Notes:     Residual Radiation side effect: No concers     Additional Instructions:     Nurse face-to-face time: Level 3:  10 min face to face time

## 2020-11-11 NOTE — TELEPHONE ENCOUNTER
Writer reviewed recent CT results with patient     Chest CT  IMPRESSION: No evidence of metastatic disease to the chest.        And Neck CT  Impression: Postradiation changes in the neck with no evidence of  recurrent local tumor or recurrent lymphadenopathy.       Patient acknowledged these results.    Summer Hallman LPN

## 2020-11-12 ENCOUNTER — TELEPHONE (OUTPATIENT)
Dept: OTOLARYNGOLOGY | Facility: CLINIC | Age: 70
End: 2020-11-12

## 2020-11-12 ENCOUNTER — TELEPHONE (OUTPATIENT)
Dept: GASTROENTEROLOGY | Facility: CLINIC | Age: 70
End: 2020-11-12

## 2020-11-12 DIAGNOSIS — D49.0 IPMN (INTRADUCTAL PAPILLARY MUCINOUS NEOPLASM): Primary | ICD-10-CM

## 2020-11-12 NOTE — TELEPHONE ENCOUNTER
Advanced Endoscopy     Referring provider:  Pillo Rolon MD in [facility]    Referred to: [facility]     Provider Requested:  NA     Referral Received: 11/10/2020     Records received: in Epic     Images received: in Epic/PACS    Evaluation for: pancreatic IPMN - needs eval and surveillance recommendations.     Clinical History (per RN review):     See Full note from referring MD, 11/10/2020  Pancreatic cyst/IPMN:  - Asymptomatic currently.  - Referred to GI for further mgmt.   - Dr. Worthy is planning to repeat an MRI abdomen in 2 years (Dec 2021) based on the recommendations of the radiologist.     MRI - full read in CE, image in PACS  IMPRESSION:  1. Nonenhancing cystic lesion within the tail the pancreas measuring  approximately 1.0 x 0.8 x 1.4 cm in size.  No discrete communication to the  pancreatic duct is present.  Pancreatic duct is normal in caliber.  No  suspicious features are present.  Consider follow-up imaging in 2 years.  2. The right adrenal gland nodule seen on the prior CT is not well visualized  on today's examination.    MD review date: 11/12/2020  MD Decision for clinic consultation/Orders:            Referral updates/Patient contacted:

## 2020-11-12 NOTE — TELEPHONE ENCOUNTER
"LVM stating I am attempting to schedule patient for follow up with Dr. Worthy. Left ENT scheduling number for patient to call and make appt on his own.    SCHEDULING INSTRUCTIONS: Please schedule patient for 4 month follow up with Dr. Worthy in [facility]. Please include \"4 month follow up\" in notes.     Thank you.  "

## 2020-11-12 NOTE — TELEPHONE ENCOUNTER
Per Dr. Singh  Please schedule EUS in Unit [address] under MAC anesthesia     Orders placed, left message to discuss.    Michelle Sheldon, RN Care Coordinator

## 2020-11-13 ENCOUNTER — TELEPHONE (OUTPATIENT)
Dept: OTOLARYNGOLOGY | Facility: CLINIC | Age: 70
End: 2020-11-13

## 2020-11-13 NOTE — TELEPHONE ENCOUNTER
Called patient to discuss recommendation for EUS. He feels it was worked up and does not think he needs a procedure at this time but wants to think about it. Will call us back if he wishes to pursue referral.    Gastro referral deleted for now until we hear from patient.     MEERA

## 2020-11-13 NOTE — TELEPHONE ENCOUNTER
Patient called writer in regards to scheduling him for follow up with Dr. Worthy. Writer scheduling patient for 5/11/2021 at 1520. Patient was agreeable to this time and date.    Summer Hallman LPN

## 2020-11-18 ENCOUNTER — PATIENT OUTREACH (OUTPATIENT)
Dept: ONCOLOGY | Facility: CLINIC | Age: 70
End: 2020-11-18

## 2020-11-18 NOTE — PROGRESS NOTES
Spoke with Farhad re: the recommendation from GI for a EUS and possible biopsy. He stated he assumed he would have a consult first and be a part of the decision making re: the plan. He stated he would prefer to meet with Dr. Rolon again in the Spring, as planned, and talk with him about the recommendation from GI before moving forward.     Encouraged Farhad to call with any additional questions or concerns.     BRENDA LopesN, RN  RN Care Coordinator  [facility]

## 2021-01-03 ENCOUNTER — HEALTH MAINTENANCE LETTER (OUTPATIENT)
Age: 71
End: 2021-01-03

## 2021-03-30 ENCOUNTER — VIRTUAL VISIT (OUTPATIENT)
Dept: ONCOLOGY | Facility: CLINIC | Age: 71
End: 2021-03-30
Attending: INTERNAL MEDICINE
Payer: MEDICARE

## 2021-03-30 DIAGNOSIS — T66.XXXA ADVERSE EFFECT OF RADIATION, INITIAL ENCOUNTER: ICD-10-CM

## 2021-03-30 DIAGNOSIS — C01 MALIGNANT NEOPLASM OF BASE OF TONGUE (H): Primary | ICD-10-CM

## 2021-03-30 PROCEDURE — 99214 OFFICE O/P EST MOD 30 MIN: CPT | Mod: 95 | Performed by: INTERNAL MEDICINE

## 2021-03-30 PROCEDURE — 999N001193 HC VIDEO/TELEPHONE VISIT; NO CHARGE

## 2021-03-30 NOTE — PROGRESS NOTES
"CRISTIAN is a 70 year old who is being evaluated via a billable video visit.      How would you like to obtain your AVS? Mail a copy  If the video visit is dropped, the invitation should be resent by: Text to cell phone: [phone]  Will anyone else be joining your video visit? No     Vitals - Patient Reported  Weight (Patient Reported): 81.6 kg (180 lb)  Height (Patient Reported): 184.2 cm (6' 0.5\")  BMI (Based on Pt Reported Ht/Wt): 24.08  Pain Score: No Pain (0)    Erika Crowder CMA March 30, 2021  2:25 PM           Video-Visit Details  Type of service:  Video Visit  Video duration: 16 mins  Originating Location (pt. Location): Home  Distant Location (provider location):  [facility]   Platform used for Video Visit: Keivn Rolon MD      "

## 2021-03-30 NOTE — LETTER
"    3/30/2021         RE: Lazaro Porter  [address] [facility] [address]        Dear Colleague,    Thank you for referring your patient, Lazaro Porter, to the [facility]. Please see a copy of my visit note below.    CRISTIAN is a 70 year old who is being evaluated via a billable video visit.      How would you like to obtain your AVS? Mail a copy  If the video visit is dropped, the invitation should be resent by: Text to cell phone: [phone]  Will anyone else be joining your video visit? No     Vitals - Patient Reported  Weight (Patient Reported): 81.6 kg (180 lb)  Height (Patient Reported): 184.2 cm (6' 0.5\")  BMI (Based on Pt Reported Ht/Wt): 24.08  Pain Score: No Pain (0)    Erika Crowder CMA March 30, 2021  2:25 PM           Video-Visit Details  Type of service:  Video Visit  Video duration: 16 mins  Originating Location (pt. 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    HISTORY OF PRESENT ILLNESS: Mr. 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 His oncologic history is as under.     Mr. Porter has recovered very well from chemoRT and his main issues currently are - moderate xerostomia, which has been stable and for which he's using artificial saliva; occasional change in voice at the end of a day, which " "improves with vocal exercises; and mild lymphedema, which is well controlled with lymphedema therapy. Closely followed by Dr. Worthy from ENT and Dr. Garay from Rad Onc with a plan for endoscopy in next couple of months.     Reports of no neck masses or clinical signs/symptoms of disease recurrence. Excellent PO intake and no G-tube or port. No reported symptoms from the pancreatic tail mass but declined an EUS in Nov 2020 as GI team directly recommended a procedure and he wanted a consult first.     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     ONCOLOGIC HISTORY:  1. 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   - Noticed enlarging neck mass in March 2018. In 5/2018 this was reassessed and prompted imaging and ultimately a biopsy of the mass at [facility] ([place], [place]) which demonstrated squamous cell carcinoma.   - PET/CT 7/10/18: \"3.5 x 2.2 cm hypermetabolic right level 2 metastatic lymphadenopathy. There is imaging findings suggestive of extranodal extension. The mass is inseparable from the right SCM and abuts the right internal carotid artery about 180 degrees. No abnormal focal FDG uptake along the mucosal spaces to suggest a primary squamous cell cancer focus. Several subcentimeter pulmonary nodules with no increased FDG uptake. Attention on follow up is recommended. Multiple, too small to characterize hypodensities in the liver. A 9 mm nodule in the right adrenal gland, without increased FDG uptake. A 7 mm hypodensity in the pancreatic tail with no increased FDG uptake, may be a side branch IPMN. Tiny lucencies in the left iliac bone without increased FDG uptake, possibly focal osteoporotic areas.\"  - Seen by ENT and underwent DL with directed biopsies on 7/18/18 with pathology showing no evidence of malignancy within the mucosal sites.   - A Robotic demucosalization of the right tongue base on " "8/3/18 was performed and pathology from this pathology demonstrated p16 positive nonkeratinizing squamous cell carcinoma in the level node lymph node and in the right tongue base.   - Started concurrent cisplatin plus radiation therapy based on tumor board recommendations. Cisplatin 100mg/m2 cycle 1: 8/28/18. Cycle 2 - delayed by 2 days due to  and given on 9/21/18. Cycle 3 - unable to tolerate 100mg/m2 due to myelosuppression and patient preference. Ultimately received cisplatin 40mg/m2 x1 on 10/12/18. RT - 6996 cGy over 33 fractions - 8/29/18 to 10/13/18.   - PET/CT with CT soft tissue neck with contrast 1/9/19: \"1. Interval resolution of hypermetabolic right level II lymph node. No residual radiotracer activity in this location and no other lymphadenopathy. 2. No evidence of mucosal, ivana, or soft tissue abnormality on contrast enhanced neck CT. 1. No evidence of metastatic disease in the chest, abdomen, or pelvis.\"  - Visual endoscopy - 08/06/19, 11/13/19, 2/4/2020 - negative.  - CT chest and soft tissue neck with contrast 11/13/19 - [facility]ARD. Incidental 8mm hypodense pancreatic tail cyst, possibly a side branch IPMN.  - MRI abd with contrast (pancreatic mass eval) 12/16/19 - RICHARD.  - 11/10/20: CT C/A/P and CT neck with contrast - RICHARD.    REVIEW OF SYSTEMS:  14 point ROS negative other than the symptoms noted above in the HPI.    PAST MEDICAL HISTORY:   Past Medical History:   Diagnosis Date     Head and neck cancer (H)      PAST SURGICAL HISTORY:   Past Surgical History:   Procedure Laterality Date     DAVINCI RESECT TUMOR TRANSORAL Bilateral 8/3/2018    Procedure: DAVINCI RESECT TUMOR TRANSORAL;  Transoral Robotic Resection of the Base of Tongue,   IR Guided Core Needle Biopsy of Right Neck Mass;  Surgeon: Blas Calvillo MD;  Location: [facility] OR     FINE NEEDLE ASPIRATION WITH IMAGING GUIDANCE Right 8/3/2018    Procedure: FINE NEEDLE ASPIRATION WITH IMAGING GUIDANCE;  Ultrasound guided Core Biopsy of a " Right Neck Mass;  Surgeon: Denis Monson MD;  Location: [facility] OR     HEAD & NECK SURGERY       LARYNGOSCOPY WITH BIOPSY(IES) Bilateral 7/18/2018    Procedure: LARYNGOSCOPY WITH BIOPSY(IES);  Direct Laryngoscopy with Direct Biopsies;  Surgeon: Garcia Worthy MD;  [facility]     TONSILLECTOMY     In addition:  1. Irritated sweat gland removed   2. Fatty tumor on his back removed    3. Cataract surgery     SOCIAL HISTORY:   Social History     Socioeconomic History     Marital status:      Spouse name: Not on file     Number of children: Not on file     Years of education: Not on file     Highest education level: Not on file   Occupational History     Not on file   Social Needs     Financial resource strain: Not on file     Food insecurity     Worry: Not on file     Inability: Not on file     Transportation needs     Medical: Not on file     Non-medical: Not on file   Tobacco Use     Smoking status: Never Smoker     Smokeless tobacco: Never Used   Substance and Sexual Activity     Alcohol use: Yes     Comment: occasionally     Drug use: No     Sexual activity: Yes     Partners: Female     Birth control/protection: Other   Lifestyle     Physical activity     Days per week: Not on file     Minutes per session: Not on file     Stress: Not on file   Relationships     Social connections     Talks on phone: Not on file     Gets together: Not on file     Attends [employer] service: Not on file     Active member of club or organization: Not on file     Attends meetings of clubs or organizations: Not on file     Relationship status: Not on file     Intimate partner violence     Fear of current or ex partner: Not on file     Emotionally abused: Not on file     Physically abused: Not on file     Forced sexual activity: Not on file   Other Topics Concern     Not on file   Social History Narrative     Not on file   Patient is from [place], [place] and retired from running a mental health clinic. 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     FAMILY HISTORY:   Brother with clotting issue  Mother, unknown primary and    Maternal aunt with colon cancer  Maternal grandfather with head/neck cancer  Maternal cousins with cancers unknown types    ALLERGIES:  No Known Allergies    CURRENT MEDICATIONS:   Current Outpatient Medications:      ASPIRIN 81 PO, Take 2 tablets by mouth daily , Disp: , Rfl:      CANNABIDIOL, HEMP OIL/EXTRACT, OR CBD PRODUCT OTHER THAN MEDICAL CANNABIS,, Take by mouth daily 2 drops under tongue once a day, Disp: , Rfl:      Glucos-MSM-C-Fh-Weqmkg-Tahhsn (GLUCOSAMINE MSM COMPLEX) TABS tablet, Take 2 tablets by mouth daily , Disp: , Rfl:      IBUPROFEN PO, Take 200 mg by mouth, Disp: , Rfl:      Multiple Vitamins-Minerals (MULTIVITAMIN & MINERAL PO), , Disp: , Rfl:      UNABLE TO FIND, MEDICATION NAME: Turkey Tails, Disp: , Rfl:      UNABLE TO FIND, MEDICATION NAME: Mushroom immune support, Disp: , Rfl:      vitamin E 400 units TABS, Take 400 Units by mouth daily, Disp: , Rfl:     PHYSICAL EXAMINATION:  ECOG 0. Limited video exam.   GENERAL:  Middle-aged, well-nourished gentleman in a chair in no acute distress.   HEAD AND NECK: No visible masses.  CHEST:  Normal work of breathing.   NEUROLOGIC:  Cranial nerves II-XII grossly intact.    PSYCHIATRIC:  Mood and affect normal    LABORATORY DATA:   Lab Test 11/10/20  1151 20  1240 19  1418   WBC 5.1 5.4 5.1   RBC 4.59 4.44 4.40   HGB 14.7 13.7 13.6   HCT 44.5 41.9 41.5   MCV 97 94 94   MCH 32.0 30.9 30.9   MCHC 33.0 32.7 32.8   RDW 12.7 13.2 12.9    189 205   NEUTROPHIL 72.2 73.3 71.4    142 141   POTASSIUM 4.5 4.7 4.0   CHLORIDE 110* 111* 109   CO2 28 27 26   ANIONGAP 3 5 6   * 95 89   BUN 19 22 22   CR 1.06 1.05 1.05   KM 9.0 9.1 8.9   PROTTOTAL 7.0 6.6* 6.8   ALBUMIN 3.9 3.8 3.9   BILITOTAL 0.6 0.7 0.5   ALKPHOS 68 65 67   AST 18 26 19   ALT 23 32 27     Recent Labs   Lab Test 11/10/20  1151 20  1240 19  1418  19  1217 19  1451 18  0848 18  0848   TSH 2.37 2.35 1.93 1.56 1.52   < > 1.24   T4  --   --   --   --   --   --  1.14    < > = values in this interval not displayed.     Labs including CBC with differential, CMP, TSH and FT4 from OSH on 20 - unremarkable except for slight decrease in WBC and Hb, which is clinically non-significant.    IMAGING STUDIES:  As above.    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      HNSCC:  - Had a complete response on 3 month-post treatment PET/CT and has recovered well from concurrent cis+RT (completed Oct 2018). No clinical or endoscopic evidence of disease recurrence.  - CT chest and soft tissue neck from 2020 with no evidence of disease relapse.  - Will continue active surveillance: Reviewed the signs/symptoms of recurrence such as palpable or visible abnormal masses in H&N, pathologic weight loss, cough, SOA, CP, bone pain, CNS symptoms etc; and his role in ensuring close follow-ups for next 5 years at length.   - Clinic eval every ~6 months for year 3. He's agreeable.  - Continue follow-ups and endoscopies with Dr. Garay and Dr. Worthy. Will get labs in 6 weeks concurrent with their visits.   - No plan for routine repeat restaging from my side unless indicated by Lobito Worthy/Tanisha.     RT-induced xerostomia:  - Mgmt as above. Seen by Dentist ~2021.     Pancreatic cyst/IPMN:  - Asymptomatic currently. Referred to GI for further mgmt - I sent GI team a message today to set him up for an office visit and this is being arranged.  - Dr. Worthy is planning to repeat an MRI abdomen in 2 years (Dec 2021) based on the recommendations of the radiologist.     Return to clinic in 6 months.      All questions were answered, and patient was in complete agreement with this plan.     BILLIN - 14  ANDRES Tate. Professor of Medicine  Division of Hematology, Oncology & Transplantation  [facility]      Again, thank you for allowing me to participate in the care of your patient.        Sincerely,        Pillo Rolon MD

## 2021-03-30 NOTE — LETTER
Date:February 23, 2022      Patient was self referred, no letter generated. Do not send.        [facility] Health Information

## 2021-03-31 ENCOUNTER — PATIENT OUTREACH (OUTPATIENT)
Dept: GASTROENTEROLOGY | Facility: CLINIC | Age: 71
End: 2021-03-31

## 2021-04-01 NOTE — PROGRESS NOTES
Attending addendum:   I saw and examined the patient with the resident and agree with the documented plan of care.    Mr. Porter is doing well on examination with no clinical evidence of recurrent disease. He continues to have residual radiation-induced xerostomia and dysgeusia which are slowly improving since completion of therapy. I will have him return to clinic in 3 months for a routine surveillance visit with our NP and with me in 6 months. He will otherwise continue to follow-up with head and neck surgery (Dr. Worthy) and medical oncology (Dr. Rolon) as scheduled.    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     RADIATION THERAPY DELIVERED:   6996 cGy delivered in 33 daily fractions, from 2018 - 10/13/2018     SYSTEMIC THERAPY:  Cisplatin 100 mg/m  (initially began with high-dose therapy every 3 weeks with his second infusion delayed 2 days secondary to neutropenia and his final cycle switch to a low-dose 40 mg/m  infusion secondary to ongoing cytopenias)     INTERVAL SINCE COMPLETION OF RADIATION THERAPY:   Approximately 6 months    SUBJECTIVE:   Lazaro Porter is a 68 year old man with a history of 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 He was diagnosed after he initially presented with an enlarged right level 2 cervical lymph node. He tolerated treatment reasonably well with the anticipated acute nausea, mucositis and dermatitis. Systemic therapy was slightly altered with his second course of high-dose cisplatin delayed 2 days  Initial Clinical Review  OBSERVATION  3/31/21 @1909 CONVERTED TO INPATIENT ADMISSION 4/1/21 @1431 DUE TO CONTINUED STAY REQUIRED TO EVALUATE AND TREAT PATIENT WITH ELBOW PAIN LIKELY 2/2 CELLULITIS  WITH IV ABX, MONITORING TEMPS AND WBC'S    Admission: Date/Time/Statement:   Admission Orders (From admission, onward)     Ordered        04/01/21 1431  Inpatient Admission  Once         03/31/21 1909  Place in Observation  Once                   Orders Placed This Encounter   Procedures    Inpatient Admission     Standing Status:   Standing     Number of Occurrences:   1     Order Specific Question:   Level of Care     Answer:   Med Surg [16]     Order Specific Question:   Estimated length of stay     Answer:   More than 2 Midnights     Order Specific Question:   Certification     Answer:   I certify that inpatient services are medically necessary for this patient for a duration of greater than two midnights  See H&P and MD Progress Notes for additional information about the patient's course of treatment  ED Arrival Information     Expected Arrival Acuity Means of Arrival Escorted By Service Admission Type    - 3/31/2021 15:20 Less Urgent Walk-In Family Member General Medicine Urgent    Arrival Complaint    Elbow Pain        Chief Complaint   Patient presents with    Elbow Pain     Pt c/o R sided elbow pain since monday  Denies injury  States does not radiate anywhere  Assessment/Plan: 39 y o  male who presents with elbow pain  Past medical history significant for chronic kidney disease on hemodialysis Monday, Wednesday, Friday, renovascular hypertension, type 1 diabetes, recurrent peritoneal dialysis for which he has switched to hemodialysis  He presents to the emergency department for right elbow pain and swelling which has been worsening over the past week  Patient states that his pain initially began on Monday and has been gradually worsening    He describes limited range of motion with flexion, and extension of the elbow secondary to pain  He states that his pain is more localized to the back of his elbow and to the lateral aspect  He denies any chronic use  He does state that he recently started hemodialysis in November and he has recently been resting his right elbow on hemodialysis chair  He does walk with a cane using his right hand  He denies any fevers, chills, drainage, or trauma to that area  Olecranon bursitis  Assessment & Plan  · Patient with worsening swelling and pain in the left olecarnon since Monday  He has never had this pain before and came in when he noticed limited range of motion  · Patient states that he does ambulate with a cane with his right arm has been doing so for approximately 3 years, and does state that he newly started hemodialysis a couple of months ago and he does rest his right olecranon on the hemodialysis chair  · Was started on ceftriaxone and vancomycin in the emergency department possible septic bursitis  · CRP is mildly elevated, will check ESR  · No white count, or fevers, low suspicion for septic joint, or bursitis  · Differentials include gout verses bursitis  · Consult orthopedics  · Ice and elevation     End stage renal disease on dialysis due to type 1 diabetes mellitus [facility])  Assessment & Plan  · Patient previously on PD, however this was changed in December to HD 2/2 recurrent peritonitis  · Continue HD  · Labs appear stable at this time   · Consult nephrology for help with HD appreciate assistance          History of lacunar cerebrovascular accident (CVA)  Assessment & Plan  · Continue ASA, statin     Renovascular hypertension  Assessment & Plan  · History of uncontrolled hypertension likely secondary to above problem  · Continue home medications including:  ? Cardura 2 mg daily  ? Clonidine patch 0 3 mg  ? Hydralazine 50 mg t i d  ? Labetalol 300 mg b i d  ? Lisinopril 20 mg b i d  ? Nifedipine 60 mg b i d  ?  Torsemide 100 mg secondary to neutropenia and his final infusion switch to a low-dose (40 mg/m ) infusion due to persistent cytopenias. He returns to radiation oncology clinic today for a routine post-treatment surveillance visit. He was last seen in radiation oncology on 1/7/19 and was recovering well at that time.     Since last visit, Mr. Lam reports that overall he is doing well.  He feels that the most important part of his recovery is getting over the emotions of living with cancer. He endorses a good mood and says that his biggest problem right now is xerostomia. He brings water with him at all times and is not interested in pharmacologic treatments. He also complains of decreased taste but thinks this has improved mildly since completion of radiotherapy. He has not seen lymphadema therapy but does do some physical therapy exercises.    PHYSICAL EXAM:  Weight: 74.3 kg  BP: 131/75    Gen: Alert, in NAD  Eyes: PERRL, EOMI, sclera anicteric  HENT     Head: NC/AT     Ears: No external auricular lesions     Nose/sinus: No rhinorrhea or epistaxis     Oral Cavity/Oropharynx: Mildly dry mucous membranes. No visible oral cavity lesions or thrush. Floor of mouth, oral tongue and bilateral tongue base are soft to palpation with no induration, masses or nodularity.  Neck: Mild fibrosis of the bilateral neck (right > left). No palpable cervical adenopathy.  Pulm: No wheezing, stridor or respiratory distress  CV: Well-perfused, no cyanosis  Musculoskeletal: Normal bulk and tone   Skin: Normal color and turgor  Neurologic: A/Ox3, CN II-XII intact  Psychiatric: Appropriate mood and affect    Flexible Fiberoptic Nasopharyngoscopy:  Consent for fiberoptic laryngoscopy was obtained, and we confirmed correctness of procedure and identity of patient. Fiberoptic endoscope was indicated due to post-treatment surveillance. The nose was topically decongested and anesthetized through the left and right nares. The fiberoptic endoscope was passed  b  i  d  Kecia Oseguera ? Metallic zone 10 mg daily  · Patient with history of severely uncontrolled blood pressure, controlled here  · Monitor blood pressure     Type 1 diabetes mellitus (HCC)  Assessment & Plan        Lab Results   Component Value Date     HGBA1C 6 9 (H) 11/13/2020         No results for input(s): POCGLU in the last 72 hours      Blood Sugar Average: Last 72 hrs:     · Continue home insulin regimen   ? lantus 6 units HS   ? humalog 4 units TID   · Continue this w/ sliding scale   · accuchecks      VTE Prophylaxis: Heparin  / sequential compression device     ED Triage Vitals   Temperature Pulse Respirations Blood Pressure SpO2   03/31/21 1524 03/31/21 1524 03/31/21 1524 03/31/21 1524 03/31/21 1524   99 1 °F (37 3 °C) 83 16 159/91 98 %      Temp Source Heart Rate Source Patient Position - Orthostatic VS BP Location FiO2 (%)   03/31/21 1524 03/31/21 1524 03/31/21 1524 03/31/21 1524 --   Oral Monitor Sitting Right arm       Pain Score       03/31/21 1853       6          Wt Readings from Last 1 Encounters:   03/31/21 96 4 kg (212 lb 8 4 oz)     Additional Vital Signs:   Date/Time  Temp  Pulse  Resp  BP  MAP (mmHg)  SpO2    04/01/21 06:45:52  98 9 °F (37 2 °C)  79  18  157/78  104  96 %    04/01/21 01:02:25  --  82  16  163/89  114  97 %    03/31/21 2352  --  --  --  210/110Abnormal   --  --    03/31/21 23:45:39  98 7 °F (37 1 °C)  77  16  209/106Abnormal   140  95 %        Pertinent Labs/Diagnostic Test Results:        3/31 XR R elbow-No acute osseous abnormality  Minor soft tissue swelling overlying the olecranon    Results from last 7 days   Lab Units 04/01/21  0521 03/31/21  1651   WBC Thousand/uL 7 87 6 36   HEMOGLOBIN g/dL 12 7 12 7   HEMATOCRIT % 39 8 40 8   PLATELETS Thousands/uL 245 265   NEUTROS ABS Thousands/µL 5 05 4 11         Results from last 7 days   Lab Units 04/01/21  0521 03/31/21  1651   SODIUM mmol/L 142 140   POTASSIUM mmol/L 4 4 5 3   CHLORIDE mmol/L 103 98*   CO2 mmol/L 19* 30   ANION GAP sequentially through the right and then the left naris under endoscopic vision. The right nares appeared normal but with difficult passage into nasopharynx. After switching to the left naris, passage into the nasopharynx was performed. The nasopharynx, tonsils, and base of tongue demonstrated no concerning changes. There was mild erythema of the epiglottis and bilateral arytenoids. The cords were mobile bilaterally. The scope was then withdrawn without incident.    LABS AND IMAGING:  No recent labs or imaging.    IMPRESSION:   Mr. 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 He is approximately 6 months out from the completion of concurrent chemoradiotherapy and is doing very well, with continued recovery from his acute radiation-induced toxicities. He has no clinical or radiographic evidence concerning for residual disease with excellent PET response on his 3-month scan and no evidence of disease on flexible laryngoscopy today.    The patient does have residual side effects of xerostomia and dysgeusia.  We counseled him that the dysgeusia could improve with additional time.  He was not interested in pharmacologic management of his xerostomia.     PLAN:   1. Follow-up in radiation oncology clinic with BRIDGET Elliott in 3 months and Dr. Garay in 6 months  2. Continue to follow-up with medical oncology (Dr. Rolon) and head and neck surgery (Dr. Worthy) as scheduled for ongoing disease surveillance    The patient was seen and examined with Dr. Garay, who agrees with the above assessment and plan.    Dk Mann MD PGY-2   Radiation Oncology, [facility]  [phone] clinic  Pager [phone]           mmol/L 20* 12   BUN mg/dL 32* 22   CREATININE mg/dL 8 32* 6 60*   EGFR ml/min/1 73sq m 7 10   CALCIUM mg/dL 9 0 8 9     Results from last 7 days   Lab Units 03/31/21  1651   AST U/L 15   ALT U/L 14   ALK PHOS U/L 89   TOTAL PROTEIN g/dL 6 9   ALBUMIN g/dL 3 8   TOTAL BILIRUBIN mg/dL 0 59     Results from last 7 days   Lab Units 04/01/21  1058 04/01/21  0644 03/31/21  2345   POC GLUCOSE mg/dl 102 155* 254*     Results from last 7 days   Lab Units 04/01/21  0521 03/31/21  1651   GLUCOSE RANDOM mg/dL 240* 243*             BETA-HYDROXYBUTYRATE   Date Value Ref Range Status   12/15/2020 0 1 <0 6 mmol/L Final            Results from last 7 days   Lab Units 03/31/21  1651   CRP mg/L 25 0*   SED RATE mm/hour 10           Results from last 7 days   Lab Units 03/31/21  1654 03/31/21  1651   BLOOD CULTURE  Received in Microbiology Lab  Culture in Progress  Received in Microbiology Lab  Culture in Progress                 ED Treatment:   Medication Administration from 03/31/2021 1520 to 03/31/2021 2326       Date/Time Order Dose Route Action     03/31/2021 1954 vancomycin (VANCOCIN) 1,750 mg in sodium chloride 0 9 % 500 mL IVPB 1,750 mg Intravenous New Bag     03/31/2021 1853 ceftriaxone (ROCEPHIN) 1 g/50 mL in dextrose IVPB 1,000 mg Intravenous New Bag     03/31/2021 1853 HYDROmorphone (DILAUDID) injection 0 5 mg 0 5 mg Intravenous Given        Past Medical History:   Diagnosis Date    Acute kidney injury ([facility] )     Ambulates with cane     Anuria     Anxiety     Chronic kidney disease     Depression     Diabetes mellitus ([facility] [address] )     Diarrhea     Falls     Gastroparesis     GERD (gastroesophageal reflux disease)     History of shingles 2010    History of transfusion 02/2018    no adverse reaction    Hyperlipidemia     Hyperphosphatemia     Hypertension     Itching     Muscle weakness     general unsteadiness    Retinopathy     Seizures ([facility] [address] )     early 2020 - one time    Skin abnormality     some dime size areas where skin was scratched from itching    Stroke ([facility] )     x2 - off balance/no driving/fatigue    Swelling of both lower extremities     Vomiting     Wears glasses      Present on Admission:   Type 1 diabetes mellitus (HCC)   End stage renal disease on dialysis due to type 1 diabetes mellitus (HCC)   Renovascular hypertension      Admitting Diagnosis: Elbow pain [M25 529]  ESRD (end stage renal disease) ([facility] [address] ) [N18 6]  Swelling of elbow joint, right [M25 421]  Decreased range of motion of right elbow [M25 621]  Age/Sex: 39 y o  male  Admission Orders:  Scheduled Medications:  aspirin, 81 mg, Oral, Daily  atorvastatin, 40 mg, Oral, QPM  cefazolin, 500 mg, Intravenous, Once per day on Mon Wed Fri  cholecalciferol, 1,000 Units, Oral, Daily  [START ON 4/2/2021] cinacalcet, 60 mg, Oral, Every Other Day  cloNIDine, 0 3 mg, Transdermal, Weekly  [START ON 4/2/2021] doxazosin, 2 mg, Oral, Daily  doxazosin, 8 mg, Oral, HS  escitalopram, 10 mg, Oral, Daily  gabapentin, 200 mg, Oral, HS  hydrALAZINE, 50 mg, Oral, TID  insulin glargine, 6 Units, Subcutaneous, HS  insulin lispro, 1-5 Units, Subcutaneous, HS  insulin lispro, 1-6 Units, Subcutaneous, TID AC  insulin lispro, 4 Units, Subcutaneous, TID With Meals  labetalol, 300 mg, Oral, BID  lisinopril, 20 mg, Oral, BID  metolazone, 10 mg, Oral, Daily  [START ON 4/2/2021] minoxidil, 1 25 mg, Oral, Once per day on Mon Wed Fri  minoxidil, 10 mg, Oral, Daily  multivitamin stress formula, 1 tablet, Oral, Daily Before Lunch  NIFEdipine ER, 60 mg, Oral, BID  saccharomyces boulardii, 250 mg, Oral, BID  torsemide, 100 mg, Oral, BID      Continuous IV Infusions:     PRN Meds:  acetaminophen, 650 mg, Oral, Q6H PRN  famotidine, 40 mg, Oral, HS PRN      Ice to affected area  poct glucose QID    IP CONSULT TO NEPHROLOGY  IP CONSULT TO ORTHOPEDIC SURGERY    Network Utilization Review Department  ATTENTION: Please call with any questions or concerns to [phone] and carefully listen to the prompts so that you are directed to the right person  All voicemails are confidential   Samuel Espinoza all requests for admission clinical reviews, approved or denied determinations and any other requests to dedicated fax number below belonging to the campus where the patient is receiving treatment   List of dedicated fax numbers for the Facilities:  [address] DENIALS (Administrative/Medical Necessity) [phone]   [address] (Maternity/NICU/Pediatrics) [phone]   [address] Gilbert Johnson 1456 (  [address]ong [address]el "[address]iana" 103) [address] [place] [address] Charlee Oziel [address] [place] [address] [phone]

## 2021-05-11 ENCOUNTER — VIRTUAL VISIT (OUTPATIENT)
Dept: RADIATION ONCOLOGY | Facility: CLINIC | Age: 71
End: 2021-05-11
Attending: RADIOLOGY
Payer: MEDICARE

## 2021-05-11 ENCOUNTER — VIRTUAL VISIT (OUTPATIENT)
Dept: OTOLARYNGOLOGY | Facility: CLINIC | Age: 71
End: 2021-05-11
Payer: MEDICARE

## 2021-05-11 ENCOUNTER — TELEPHONE (OUTPATIENT)
Dept: OTOLARYNGOLOGY | Facility: CLINIC | Age: 71
End: 2021-05-11

## 2021-05-11 VITALS — HEIGHT: 72 IN | BODY MASS INDEX: 25.73 KG/M2 | WEIGHT: 190 LBS

## 2021-05-11 DIAGNOSIS — C01 MALIGNANT NEOPLASM OF BASE OF TONGUE (H): Primary | ICD-10-CM

## 2021-05-11 DIAGNOSIS — E03.9 HYPOTHYROIDISM, UNSPECIFIED TYPE: Primary | ICD-10-CM

## 2021-05-11 PROCEDURE — 99213 OFFICE O/P EST LOW 20 MIN: CPT | Mod: 95 | Performed by: OTOLARYNGOLOGY

## 2021-05-11 ASSESSMENT — MIFFLIN-ST. JEOR: SCORE: 1654.83

## 2021-05-11 NOTE — PROGRESS NOTES
Lazaro Lam is here for followup today.  He is 70 years of age.  We had 20 minutes of time over the phone today.  We talked about a couple things.    Right now he is eating, drinking and swallowing is fine.  His weight is stable.  He has done self-examination of his mouth and throat.  He has some dry mouth, dry throat, some difficulty with food, but this is a lot better than what it was.  He also is having a lot more strength over the past three to four months.  His oral cavity, oropharynx, otherwise has been clear and no other issues that he has noted except for a little bit of an allergy and that was when he canceled his appointment today.  He has been COVID vaccinated as well.  He states that he has had no other significant issues, but we talked quite a bit about this mass in his pancreas.  He has got an indeterminate mass in the pancreas, that probably needs some sort of endoscopic procedure and biopsy with a needle aspiration, an endoscopic ultrasound or something similar and because of that, this is recommended to be performed.  We went over the rationale for why this would be done from a cancer prevention perspective as well as interrogation of the mass type of perspective as well.  This was an incidental finding on a scan.    We are going to plan to see him again in three to four months in person.  He will have to have imaging done at the end of the year.  He is otherwise doing well and we wished him a very good summer additionally.    20 minutes phone chart review and chart time    Garcia Worthy MD

## 2021-05-11 NOTE — PROGRESS NOTES
Department of Radiation Oncology  [facility]  [address] [place] [address]  [phone]       Radiation Oncology Follow-up Visit  May 11, 2021    Billable telephone visit.  Patient initially scheduled for in person, but called this morning and has a cold and wanted to just have virtual visit.    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 He was initially diagnosed after he presented with a palpable right neck mass with staging evaluation demonstrating a small primary lesion within the right tongue base with an enlarged right level II lymph node encasing the right carotid artery and concerning for extranodal extension. He received definitive-intent chemoradiotherapy as described above, receiving a total dose of 70 Gy in 33 fractions with concurrent high-dose cisplatin which was dose-reduced due to treatment-related cytopenias.     Mr. Porter returns to radiation oncology clinic today for a routine posttreatment disease surveillance visit via phone as he has a cold or allergies. Regarding his prior head and neck therapy, he denies any dysphagia odynophagia but does affirm to some residual post-treatment induced xerostomia although this does not affect his overall quality of life. He is able to eat most foods.   Weight is stable.  He sees his dentist routinely and does use fluoride toothpaste.  He does lymphedema exercises daily and that helps. His remaining ROS are otherwise negative.  He continues to follow with Dr. Rolon (but is now being transferred to Dr. Bradshaw)  and Dr. Worthy.  He is due for a TSH.    He is seeing Dr. Jordan for incidental IPMN.  He was concerned that they called him to undergo a biopsy of this prior to having any discussion with a MD about risk of the procedure.  He did have an MRI of the abdomen to evaluate 2019 without concerning features and recommended repeat MRI in 2 years.    OBJECTIVE:  Phone visit.    LABS AND IMAGIN/10/2020 TSH: 2.37    11/10/2020 CT neck:  Post-radiation changes with no evidence of recurrent disease    11/10/2020 CT chest:  No evidence of metastatic disease    IMPRESSION:   Mr. 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 He is 2.5 years status post completion of definitive chemoradiotherapy and is doing well on examination, with minimal residual radiation-induced toxicities.    PLAN:   1. Follow-up in radiation oncology clinic with MD in 6 months.  2. Repeat TSH due in 2021-will send order to [facility] for him to have done there.  3. Continue all other cares, routine dental, fluoride, lymphedema, stretching and sunscreen.    Geraldine Elliott NP  Dept of Radiation Oncology  [facility]

## 2021-05-11 NOTE — LETTER
2021         RE: Lazaro Porter  [address] [facility] [address]        Dear Colleague,    Thank you for referring your patient, Lazaro Porter, to the [facility] RADIATION ONCOLOGY. Please see a copy of my visit note below.         Department of Radiation Oncology  [facility]  [address] [place] [address]  [phone]       Radiation Oncology Follow-up Visit  May 11, 2021    Billable telephone visit.  Patient initially scheduled for in person, but called this morning and has a cold and wanted to just have virtual visit.    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 He was initially diagnosed after he presented with a palpable right neck mass with staging evaluation demonstrating a small primary lesion within the right tongue base with an enlarged right level II lymph node encasing the right carotid artery and concerning for extranodal extension. He received definitive-intent chemoradiotherapy as described above, receiving a total dose of 70 Gy in 33 fractions with concurrent high-dose cisplatin which was dose-reduced due to treatment-related cytopenias.     Mr. Porter returns to radiation oncology clinic today for a routine posttreatment disease surveillance visit via phone as he has  a cold or allergies. Regarding his prior head and neck therapy, he denies any dysphagia odynophagia but does affirm to some residual post-treatment induced xerostomia although this does not affect his overall quality of life. He is able to eat most foods.  Weight is stable.  He sees his dentist routinely and does use fluoride toothpaste.  He does lymphedema exercises daily and that helps. His remaining ROS are otherwise negative.  He continues to follow with Dr. Rolon (but is now being transferred to Dr. Bradshaw)  and Dr. Worthy.  He is due for a TSH.    He is seeing Dr. Jordan for incidental IPMN.  He was concerned that they called him to undergo a biopsy of this prior to having any discussion with a MD about risk of the procedure.  He did have an MRI of the abdomen to evaluate 2019 without concerning features and recommended repeat MRI in 2 years.    OBJECTIVE:  Phone visit.    LABS AND IMAGIN/10/2020 TSH: 2.37    11/10/2020 CT neck:  Post-radiation changes with no evidence of recurrent disease    11/10/2020 CT chest:  No evidence of metastatic disease    IMPRESSION:   Mr. 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 He is 2.5 years status post completion of definitive chemoradiotherapy and is doing well on examination, with minimal residual radiation-induced toxicities.    PLAN:   1. Follow-up in radiation oncology clinic with MD in 6 months.  2. Repeat TSH due in 2021-will send order to [facility] for him to have done there.  3. Continue all other cares, routine dental, fluoride, lymphedema, stretching and sunscreen.    Geraldine Elliott NP  Dept of Radiation Oncology  [facility]        Again, thank you for allowing me to participate in the care of your patient.        Sincerely,        LORIE Chisholm CNP

## 2021-05-11 NOTE — LETTER
2021      RE: Lazaro Porter  [address] [facility] [address]            Department of Radiation Oncology  [facility]  [address] [place] [address]  [phone]       Radiation Oncology Follow-up Visit  May 11, 2021    Billable telephone visit.  Patient initially scheduled for in person, but called this morning and has a cold and wanted to just have virtual visit.    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 He was initially diagnosed after he presented with a palpable right neck mass with staging evaluation demonstrating a small primary lesion within the right tongue base with an enlarged right level II lymph node encasing the right carotid artery and concerning for extranodal extension. He received definitive-intent chemoradiotherapy as described above, receiving a total dose of 70 Gy in 33 fractions with concurrent high-dose cisplatin which was dose-reduced due to treatment-related cytopenias.     Mr. Porter returns to radiation oncology clinic today for a routine posttreatment disease surveillance visit via phone as he has a cold or allergies. Regarding his prior head and neck therapy, he denies any dysphagia odynophagia but does affirm to some residual post-treatment induced xerostomia although  this does not affect his overall quality of life. He is able to eat most foods.  Weight is stable.  He sees his dentist routinely and does use fluoride toothpaste.  He does lymphedema exercises daily and that helps. His remaining ROS are otherwise negative.  He continues to follow with Dr. Rolon (but is now being transferred to Dr. Bradshaw)  and Dr. Worthy.  He is due for a TSH.    He is seeing Dr. Jordan for incidental IPMN.  He was concerned that they called him to undergo a biopsy of this prior to having any discussion with a MD about risk of the procedure.  He did have an MRI of the abdomen to evaluate 2019 without concerning features and recommended repeat MRI in 2 years.    OBJECTIVE:  Phone visit.    LABS AND IMAGIN/10/2020 TSH: 2.37    11/10/2020 CT neck:  Post-radiation changes with no evidence of recurrent disease    11/10/2020 CT chest:  No evidence of metastatic disease    IMPRESSION:   Mr. 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 He is 2.5 years status post completion of definitive chemoradiotherapy and is doing well on examination, with minimal residual radiation-induced toxicities.    PLAN:   1. Follow-up in radiation oncology clinic with MD in 6 months.  2. Repeat TSH due in 2021-will send order to [facility] for him to have done there.  3. Continue all other cares, routine dental, fluoride, lymphedema, stretching and sunscreen.    Geraldine Elliott NP  Dept of Radiation Oncology  [facility]        LORIE Chisholm CNP

## 2021-05-11 NOTE — TELEPHONE ENCOUNTER
Completed 2nd LR IV bolus (1,000 mL x2 for a total of 2,000 mL fluid bolus). Current vitals:  (sinus tach), O2 sat 95% (3 L nasal cannula), BP 83/53, Resp 28. Patient responds appropriately and has no stated needs at this time. [facility]    Phone Message    May a detailed message be left on voicemail: yes     Reason for Call: Other:   Pt requested appt today at 320pm to be converted into a virtual visit because pt has a head cold and does not want to bring it to the clinic. Please call pt back with questions/concerns.     Action Taken: Other:  ent    Travel Screening: Not Applicable

## 2021-05-11 NOTE — LETTER
5/11/2021       RE: Lazaro Porter  [address] [facility] [address]     Dear Colleague,    Thank you for referring your patient, Lazaro Porter, to the [facility] EAR NOSE AND THROAT CLINIC [place] [facility]. Please see a copy of my visit note below.    Lazaro Lam is here for followup today.  He is 70 years of age.  We had 20 minutes of time over the phone today.  We talked about a couple things.    Right now he is eating, drinking and swallowing is fine.  His weight is stable.  He has done self-examination of his mouth and throat.  He has some dry mouth, dry throat, some difficulty with food, but this is a lot better than what it was.  He also is having a lot more strength over the past three to four months.  His oral cavity, oropharynx, otherwise has been clear and no other issues that he has noted except for a little bit of an allergy and that was when he canceled his appointment today.  He has been COVID vaccinated as well.  He states that he has had no other significant issues, but we talked quite a bit about this mass in his pancreas.  He has got an indeterminate mass in the pancreas, that probably needs some sort of endoscopic procedure and biopsy with a needle aspiration, an endoscopic ultrasound or something similar and because of that, this is recommended to be performed.  We went over the rationale for why this would be done from a cancer prevention perspective as well as interrogation of the mass type of perspective as well.  This was an incidental finding on a scan.    We are going to plan to see him again in three to four months in person.  He will have to have imaging done at the end of the year.  He is otherwise doing well and we wished him a very good summer additionally.    20 minutes phone chart review and chart time    Garcia Worthy MD

## 2021-05-11 NOTE — TELEPHONE ENCOUNTER
Writer called pt and informed him that his appointment has been changed to a video visit at 3:20 with Dr. Worthy. Patient expressed understanding.    Trina Garcia, EMT

## 2021-05-11 NOTE — PATIENT INSTRUCTIONS
1. You were seen in the ENT Clinic today by Dr. Worthy.  If you have any questions or concerns after your appointment, please call   -  ENT Clinic: [phone]      2.   Plan to return to clinic in 3 months. We will schedule MRI of abdomen at your next 3 months follow up appointment.       Summer Hallman LPN  [facility] Otolaryngology  [phone]

## 2021-05-11 NOTE — NURSING NOTE
Chief Complaint   Patient presents with     RECHECK       Height 1.829 m (6'), weight 86.2 kg (190 lb).    Trina Garcia, EMT

## 2021-05-12 ENCOUNTER — TELEPHONE (OUTPATIENT)
Dept: OTOLARYNGOLOGY | Facility: CLINIC | Age: 71
End: 2021-05-12

## 2021-05-12 NOTE — TELEPHONE ENCOUNTER
GHAZAL with scheduling number. He was accidentally scheduled for a 6 month follow up with Dr. Worthy in November but should have been scheduled a 3 month follow up in August.

## 2021-05-13 ENCOUNTER — VIRTUAL VISIT (OUTPATIENT)
Dept: GASTROENTEROLOGY | Facility: CLINIC | Age: 71
End: 2021-05-13
Payer: MEDICARE

## 2021-05-13 VITALS — BODY MASS INDEX: 25.73 KG/M2 | HEIGHT: 72 IN | WEIGHT: 190 LBS

## 2021-05-13 DIAGNOSIS — D49.0 IPMN (INTRADUCTAL PAPILLARY MUCINOUS NEOPLASM): Primary | ICD-10-CM

## 2021-05-13 PROCEDURE — 99204 OFFICE O/P NEW MOD 45 MIN: CPT | Mod: 95 | Performed by: INTERNAL MEDICINE

## 2021-05-13 ASSESSMENT — MIFFLIN-ST. JEOR: SCORE: 1654.83

## 2021-05-13 ASSESSMENT — PAIN SCALES - GENERAL: PAINLEVEL: NO PAIN (0)

## 2021-05-13 NOTE — PROGRESS NOTES
CRISTIAN is a 70 year old who is being evaluated via a billable video visit.      How would you like to obtain your AVS? [email or web address]  If the video visit is dropped, the invitation should be resent by: Text to cell phone: [phone]  Will anyone else be joining your video visit? No      Video Start Time:   Video-Visit Details    Type of service:  Video Visit    Video End Time:    Originating Location (pt. Location): Home    Distant Location (provider location):  [facility] PANCREAS AND BILIARY CLINIC [place]     Platform used for Video Visit: [employer]

## 2021-05-13 NOTE — NURSING NOTE
Chief Complaint   Patient presents with     New Patient     panc lesion, incindental finding       Vitals:    05/13/21 0830   Weight: 86.2 kg (190 lb)   Height: 1.829 m (6')       Body mass index is 25.77 kg/m .      Alban Bee LPN

## 2021-05-13 NOTE — PROGRESS NOTES
GASTROENTEROLOGY CONSULTATION NOTE    REFERRING PHYSICIAN:  Pillo Rolon MD     REASON FOR VISIT:  Pancreatic cyst evaluation.    HISTORY OF PRESENT ILLNESS:  This is a very pleasant, 70-year-old white male who was kindly referred to me by Dr. Rolon from Oncology for further followup of a pancreatic cyst.  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 Worthy from ENT and Dr. Garay from Radiation Oncology.  He was noted to have a pancreatic cyst as part of his surveillance for his tongue cancer.  This was first noted on an MRI of abdomen with IV contrast on 12/16/2019.  From Care Everywhere, this describes this pancreatic cyst to be a T2 hyperintense lesion within the tail of the pancreas measuring 1 x 0.8 x 1.4 cm with no direct communication with the pancreatic duct.  The patient was referred to me last year for an endoscopic ultrasound in 11/2020; however, the patient declined the procedure and wanted a consult to discuss the EUS procedure.  Of note, he has no antecedent history of acute pancreatitis.  He reports his radiation treatment was completed 2 years ago, and he denies any residual dysphagia, odynophagia or any concerns for radiation-induced stricture.  He also denies recent unintentional weight loss or loss of appetite.    PAST MEDICAL HISTORY:  A p16, stage I squamous cell carcinoma of the right base of the tongue, status post chemoradiation.      PAST SURGICAL HISTORY:  1.  Fatty tumor on his back removed.  2.  Cataracts.  3.  Irritated sweat gland removal.    SOCIAL HISTORY:  The patient is from [place], [place].  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  He denies smoking.  Drinks beer occasionally.  No history of IV drug abuse.    FAMILY HISTORY:  No history of pancreatic cancer or bile duct cancer. History of colon cancer in maternal aunt.  History of maternal grandfather with head and neck cancer. History of clotting disorder in brother.    ALLERGIES:  No known drug allergies.    MEDICATIONS:    1.  Aspirin.  2.  Ibuprofen.  3.  Vitamin E.  4.  Glucosamine.      ASSESSMENT AND PLAN:  This is a very pleasant, 70-year-old white male with a history of stage I (T1N1M0),  p16-positive squamous cell carcinoma of the right base of the tongue, status post concurrent chemoradiation with cisplatin, who has been doing really well.  He was identified to have an incidental pancreatic cyst, likely IPMN, on MRI performed in 12/2019.  The following are our recommendations:  1.  We discussed in detail the natural course of a pancreatic cyst.  I discussed with the patient he currently has no worrisome features or features suggestive of malignant potential based on the MRI performed in 12/2019.  He is due for an MRI for surveillance or EUS for surveillance later this year.  I recommended the patient to undergo endoscopic ultrasound, as that would provide us an opportunity to perform fine needle aspiration.    2.  I discussed with the patient in detail about EUS with fine needle aspiration.  I discussed the need for MAC sedation.  I discussed that we will perform fine needle aspiration, and the aspirate will be sent for analysis as per our pancreatic cyst protocol, which would include cyst fluid amylase, CEA, mucicarmine and cytology.  I discussed the complications as well as the risk of MAC sedation, risk of infection, which will be reduced by giving antibiotics prior to the procedure, risk of bleeding, which will be minimal given that he is not on anticoagulation, risk of post EUS/ FNA pancreatitis, risk of perforation, which is about 1:3000.  I reassured the patient and  also discussed the alternative with MRI surveillance.  3.  Based on the EUS/FNA, we will discuss further surveillance results at our next virtual visit.  The patient is agreeable to having this scheduled in Unit [address] under MAC sedation.  Our RN coordinator will reach out to him to discuss further specifics.  He would need preop clearance for the same.      A total of 30 minutes was spent in reviewing the chart, chart documentation, our clinic visit and discussing this procedure.

## 2021-05-13 NOTE — LETTER
5/13/2021         RE: Lazaro Porter  [address] [facility] [address]        Dear Colleague,    Thank you for referring your patient, Lazaro Porter, to the [facility] PANCREAS AND BILIARY CLINIC [place]. Please see a copy of my visit note below.    CRISTIAN is a 70 year old who is being evaluated via a billable video visit.      How would you like to obtain your AVS? MyChart  If the video visit is dropped, the invitation should be resent by: Text to cell phone: [phone]  Will anyone else be joining your video visit? No    GASTROENTEROLOGY CONSULTATION NOTE    REFERRING PHYSICIAN:  Pillo Rolon MD     REASON FOR VISIT:  Pancreatic cyst evaluation.    HISTORY OF PRESENT ILLNESS:  This is a very pleasant, 70-year-old white male who was kindly referred to me by Dr. Rolon from Oncology for further followup of a pancreatic cyst.  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 Worthy from ENT and Dr. Garay from Radiation Oncology.  He was noted to have a pancreatic cyst as part of his surveillance for his tongue cancer.  This was first noted on an MRI of abdomen with IV contrast on 12/16/2019.  From Care Everywhere, this describes this pancreatic cyst to be a T2 hyperintense lesion within the tail of the pancreas measuring 1 x 0.8 x 1.4 cm with no direct communication with the pancreatic duct.  The patient was referred to me last year for an endoscopic ultrasound in 11/2020; however, the patient declined the procedure and wanted a consult to discuss the EUS procedure.  Of note, he has no antecedent history of acute pancreatitis.  He reports his radiation treatment was completed 2 years ago, and he denies any residual dysphagia, odynophagia or any concerns for radiation-induced stricture.  He also denies recent unintentional weight loss  or loss of appetite.    PAST MEDICAL HISTORY:  A p16, stage I squamous cell carcinoma of the right base of the tongue, status post chemoradiation.      PAST SURGICAL HISTORY:  1.  Fatty tumor on his back removed.  2.  Cataracts.  3.  Irritated sweat gland removal.    SOCIAL HISTORY:  The patient is from [place], [place].  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  He denies smoking.  Drinks beer occasionally.  No history of IV drug abuse.    FAMILY HISTORY:  No history of pancreatic cancer or bile duct cancer. History of colon cancer in maternal aunt.  History of maternal grandfather with head and neck cancer. History of clotting disorder in brother.    ALLERGIES:  No known drug allergies.    MEDICATIONS:    1.  Aspirin.  2.  Ibuprofen.  3.  Vitamin E.  4.  Glucosamine.      ASSESSMENT AND PLAN:  This is a very pleasant, 70-year-old white male with a history of stage I (T1N1M0),  p16-positive squamous cell carcinoma of the right base of the tongue, status post concurrent chemoradiation with cisplatin, who has been doing really well.  He was identified to have an incidental pancreatic cyst, likely IPMN, on MRI performed in 12/2019.  The following are our recommendations:  1.  We discussed in detail the natural course of a pancreatic cyst.  I discussed with the patient he currently has no worrisome features or features suggestive of malignant potential based on the MRI performed in 12/2019.  He is due for an MRI for surveillance or EUS for surveillance later this year.  I recommended the patient to undergo endoscopic ultrasound, as that would provide us an opportunity to perform fine needle aspiration.    2.  I discussed with the patient in detail about EUS with fine needle aspiration.  I discussed the need for MAC sedation.  I  discussed that we will perform fine needle aspiration, and the aspirate will be sent for analysis as per our pancreatic cyst protocol, which would include cyst fluid amylase, CEA, mucicarmine and cytology.  I discussed the complications as well as the risk of MAC sedation, risk of infection, which will be reduced by giving antibiotics prior to the procedure, risk of bleeding, which will be minimal given that he is not on anticoagulation, risk of post EUS/ FNA pancreatitis, risk of perforation, which is about 1:3000.  I reassured the patient and also discussed the alternative with MRI surveillance.  3.  Based on the EUS/FNA, we will discuss further surveillance results at our next virtual visit.  The patient is agreeable to having this scheduled in Unit J under MAC sedation.  Our RN coordinator will reach out to him to discuss further specifics.  He would need preop clearance for the same.      A total of 30 minutes was spent in reviewing the chart, chart documentation, our clinic visit and discussing this procedure.    Again, thank you for allowing me to participate in the care of your patient.      Sincerely,    Guru Samantha MD

## 2021-05-14 ENCOUNTER — TELEPHONE (OUTPATIENT)
Dept: GASTROENTEROLOGY | Facility: CLINIC | Age: 71
End: 2021-05-14

## 2021-05-14 NOTE — TELEPHONE ENCOUNTER
Called Pt to follow up on clinic visit yesterday with Dr. Singh. Discussed with Pt that the order has been placed for the EUS-EGD and our Endo Clinic will call to get him scheduled. Pt stated ok and understanding.    Staff message sent to Endo Clinic to contact Pt to get him scheduled.    Alban Bee LPN

## 2021-05-14 NOTE — PATIENT INSTRUCTIONS
Follow up:    Dr. Singh has outlined the following steps after your recent clinic visit:    -Schedule EUS with fine needle aspiration with Dr. Singh    -Based on the EUS/FNA, we will discuss further surveillance results at next virtual visit       Please call with any questions or concerns regarding your clinic visit today.    It is a pleasure being involved in your health care.    Contacts post-consultation depending on your need:    Schedule Clinic Appointments            [phone] # 1   M-F 7:30 - 5 pm    Michelle Sheldon RN Care Coordinator  [phone]    Alban Bee LPN    [phone]     OR Procedure Scheduling                             [phone]    My Chart is available 24 hours a day and is a secure way to access your records and communicate with your care team.  I strongly recommend signing up if you haven't already done so, if you are comfortable with computers.  If you would like to inquire about this or are having problems with My Chart access, you may call [phone] or go online at [email or web address].  Please allow at least 24 hours for a response and extra time on weekends and Holidays.

## 2021-06-13 DIAGNOSIS — Z11.59 ENCOUNTER FOR SCREENING FOR OTHER VIRAL DISEASES: ICD-10-CM

## 2021-07-23 ENCOUNTER — TELEPHONE (OUTPATIENT)
Dept: GASTROENTEROLOGY | Facility: CLINIC | Age: 71
End: 2021-07-23

## 2021-07-23 NOTE — TELEPHONE ENCOUNTER
Writer reviewed pre-assessment questions with patient prior to upcoming EUS and EGD on 8.3.2021.      Pre-op: 7.27.2021 at clinic in [place], [place]; pt states results should come through care everywhere    Covid test scheduled: Pt was given COVID test scheduling number: [phone].     Arrival time: 0900    Facility location: [facility]    Sedation type: MAC    Reviewed EUS  prep instructions with patient.      Designated  policy reviewed with patient.     Patient verbalized understanding.  No further questions or concerns.    Ana Blake RN

## 2021-07-26 ENCOUNTER — EXTERNAL ORDER RESULTS (OUTPATIENT)
Dept: RADIATION ONCOLOGY | Facility: CLINIC | Age: 71
End: 2021-07-26

## 2021-07-26 LAB
T4 FREE SERPL-MCNC: 1.12 NG/DL (ref 0.7–1.7)
TSH SERPL-ACNC: 2.78 ULU/ML (ref 0.51–4.16)

## 2021-07-27 NOTE — TELEPHONE ENCOUNTER
Attempted to contact patient regarding upcoming EGD/EUS on 8/3/21 to see if Covid test has been scheduled. No answer.     Left message to return call [phone] #2    Will send mychart reminder regarding Covid test needed.     Sinai Carrillo RN

## 2021-07-28 RX ORDER — LIDOCAINE 40 MG/G
CREAM TOPICAL
Status: CANCELLED | OUTPATIENT
Start: 2021-07-28

## 2021-07-30 ENCOUNTER — LAB (OUTPATIENT)
Dept: LAB | Facility: CLINIC | Age: 71
End: 2021-07-30
Payer: MEDICARE

## 2021-07-30 DIAGNOSIS — Z11.59 ENCOUNTER FOR SCREENING FOR OTHER VIRAL DISEASES: ICD-10-CM

## 2021-07-30 PROCEDURE — U0005 INFEC AGEN DETEC AMPLI PROBE: HCPCS

## 2021-07-30 PROCEDURE — U0003 INFECTIOUS AGENT DETECTION BY NUCLEIC ACID (DNA OR RNA); SEVERE ACUTE RESPIRATORY SYNDROME CORONAVIRUS 2 (SARS-COV-2) (CORONAVIRUS DISEASE [COVID-19]), AMPLIFIED PROBE TECHNIQUE, MAKING USE OF HIGH THROUGHPUT TECHNOLOGIES AS DESCRIBED BY CMS-2020-01-R: HCPCS

## 2021-07-30 NOTE — TELEPHONE ENCOUNTER
Contacting pt again to ensure pt has no questions.  Per chart procedure for 8/3 was canceled and then r/s for 8/3.  Reviewed date, time, and location of appt.    Writer reviewed pre-assessment questions with patient prior to upcoming EUS/EGD on 8.3.2021.  COVID test scheduled for 7.30.2021.    Pre-op: 7.27.2021 with Cinthya Bolanos    Reviewed EGD/EUS prep instructions with patient.      Patient verbalized understanding.  No further questions or concerns.    Ana Blake RN

## 2021-07-31 LAB — SARS-COV-2 RNA RESP QL NAA+PROBE: NEGATIVE

## 2021-08-02 ENCOUNTER — ANESTHESIA EVENT (OUTPATIENT)
Dept: GASTROENTEROLOGY | Facility: CLINIC | Age: 71
End: 2021-08-02
Payer: MEDICARE

## 2021-08-02 NOTE — ANESTHESIA PREPROCEDURE EVALUATION
Anesthesia Pre-Procedure Evaluation    Patient: Lazaro Porter   MRN: [id number] : 1950        Preoperative Diagnosis: IPMN (intraductal papillary mucinous neoplasm) [D49.0]   Procedure : Procedure(s):  ENDOSCOPIC ULTRASOUND, ESOPHAGOSCOPY / UPPER GASTROINTESTINAL TRACT (GI)  ESOPHAGOGASTRODUODENOSCOPY (EGD)     Past Medical History:   Diagnosis Date     Head and neck cancer (H)       Past Surgical History:   Procedure Laterality Date     DAVINCI RESECT TUMOR TRANSORAL Bilateral 8/3/2018    Procedure: DAVINCI RESECT TUMOR TRANSORAL;  Transoral Robotic Resection of the Base of Tongue,   IR Guided Core Needle Biopsy of Right Neck Mass;  Surgeon: Blas Calvillo MD;  Location: [facility] OR     FINE NEEDLE ASPIRATION WITH IMAGING GUIDANCE Right 8/3/2018    Procedure: FINE NEEDLE ASPIRATION WITH IMAGING GUIDANCE;  Ultrasound guided Core Biopsy of a Right Neck Mass;  Surgeon: Denis Monson MD;  Location: [facility] OR     HEAD & NECK SURGERY       LARYNGOSCOPY WITH BIOPSY(IES) Bilateral 2018    Procedure: LARYNGOSCOPY WITH BIOPSY(IES);  Direct Laryngoscopy with Direct Biopsies;  Surgeon: Garcia Worthy MD;  Location: [facility] OR     TONSILLECTOMY        No Known Allergies   Social History     Tobacco Use     Smoking status: Never Smoker     Smokeless tobacco: Never Used   Substance Use Topics     Alcohol use: Yes     Comment: occasionally      Wt Readings from Last 1 Encounters:   21 86.2 kg (190 lb)        Anesthesia Evaluation            ROS/MED HX  ENT/Pulmonary:       Neurologic:       Cardiovascular:    (-) murmur and wheezes   METS/Exercise Tolerance:     Hematologic:       Musculoskeletal:       GI/Hepatic:       Renal/Genitourinary:       Endo:       Psychiatric/Substance Use:       Infectious Disease:       Malignancy:   (+) Malignancy, History of Other.Other CA head and neck status post.    Other:            Physical Exam    Airway        Mallampati: II   TM distance: > 3 FB   Neck ROM: full    Mouth opening: > 3 cm    Respiratory Devices and Support         Dental  no notable dental history         Cardiovascular          Rhythm and rate: regular and normal (-) no systolic click and no murmur    Pulmonary   pulmonary exam normal        breath sounds clear to auscultation   (-) no wheezes        OUTSIDE LABS:  CBC:   Lab Results   Component Value Date    WBC 5.1 11/10/2020    WBC 5.4 02/04/2020    HGB 14.7 11/10/2020    HGB 13.7 02/04/2020    HCT 44.5 11/10/2020    HCT 41.9 02/04/2020     11/10/2020     02/04/2020     BMP:   Lab Results   Component Value Date     11/10/2020     02/04/2020    POTASSIUM 4.5 11/10/2020    POTASSIUM 4.7 02/04/2020    CHLORIDE 110 (H) 11/10/2020    CHLORIDE 111 (H) 02/04/2020    CO2 28 11/10/2020    CO2 27 02/04/2020    BUN 19 11/10/2020    BUN 22 02/04/2020    CR 1.06 11/10/2020    CR 1.05 02/04/2020     (H) 11/10/2020    GLC 95 02/04/2020     COAGS: No results found for: PTT, INR, FIBR  POC:   Lab Results   Component Value Date    BGM 97 01/07/2019     HEPATIC:   Lab Results   Component Value Date    ALBUMIN 3.9 11/10/2020    PROTTOTAL 7.0 11/10/2020    ALT 23 11/10/2020    AST 18 11/10/2020    ALKPHOS 68 11/10/2020    BILITOTAL 0.6 11/10/2020     OTHER:   Lab Results   Component Value Date    KM 9.0 11/10/2020    PHOS 3.8 01/08/2019    MAG 2.2 10/12/2018    TSH 2.37 11/10/2020    T4 1.12 07/26/2021       Anesthesia Plan    ASA Status:  2      Anesthesia Type: MAC.   Induction: Intravenous.   Maintenance: Balanced.        Consents    Anesthesia Plan(s) and associated risks, benefits, and realistic alternatives discussed. Questions answered and patient/representative(s) expressed understanding.     - Discussed with:  Patient      - Extended Intubation/Ventilatory Support Discussed: No.      - Patient is DNR/DNI Status: No    Use of blood products discussed: Yes.     - Discussed with: Patient.     - Consented: consented to blood products             Reason for refusal: other.     Postoperative Care    Pain management: IV analgesics.   PONV prophylaxis: Ondansetron (or other 5HT-3)     Comments:                Christopher J. Behrens, MD

## 2021-08-03 ENCOUNTER — ANESTHESIA (OUTPATIENT)
Dept: GASTROENTEROLOGY | Facility: CLINIC | Age: 71
End: 2021-08-03
Payer: MEDICARE

## 2021-08-03 ENCOUNTER — HOSPITAL ENCOUNTER (OUTPATIENT)
Facility: CLINIC | Age: 71
Discharge: HOME | End: 2021-08-03
Attending: INTERNAL MEDICINE | Admitting: INTERNAL MEDICINE
Payer: MEDICARE

## 2021-08-03 VITALS
OXYGEN SATURATION: 96 % | HEIGHT: 72 IN | TEMPERATURE: 98.3 F | BODY MASS INDEX: 26.58 KG/M2 | RESPIRATION RATE: 16 BRPM | DIASTOLIC BLOOD PRESSURE: 88 MMHG | SYSTOLIC BLOOD PRESSURE: 125 MMHG | HEART RATE: 63 BPM | WEIGHT: 196.21 LBS

## 2021-08-03 LAB — UPPER EUS: NORMAL

## 2021-08-03 PROCEDURE — 250N000009 HC RX 250: Performed by: ANESTHESIOLOGY

## 2021-08-03 PROCEDURE — 250N000013 HC RX MED GY IP 250 OP 250 PS 637: Performed by: INTERNAL MEDICINE

## 2021-08-03 PROCEDURE — 258N000003 HC RX IP 258 OP 636: Performed by: ANESTHESIOLOGY

## 2021-08-03 PROCEDURE — 43259 EGD US EXAM DUODENUM/JEJUNUM: CPT | Performed by: INTERNAL MEDICINE

## 2021-08-03 PROCEDURE — 370N000017 HC ANESTHESIA TECHNICAL FEE, PER MIN: Performed by: INTERNAL MEDICINE

## 2021-08-03 PROCEDURE — 43235 EGD DIAGNOSTIC BRUSH WASH: CPT | Performed by: INTERNAL MEDICINE

## 2021-08-03 PROCEDURE — 250N000011 HC RX IP 250 OP 636: Performed by: ANESTHESIOLOGY

## 2021-08-03 PROCEDURE — 43237 ENDOSCOPIC US EXAM ESOPH: CPT | Performed by: INTERNAL MEDICINE

## 2021-08-03 RX ORDER — SODIUM CHLORIDE, SODIUM LACTATE, POTASSIUM CHLORIDE, CALCIUM CHLORIDE 600; 310; 30; 20 MG/100ML; MG/100ML; MG/100ML; MG/100ML
INJECTION, SOLUTION INTRAVENOUS CONTINUOUS
Status: DISCONTINUED | OUTPATIENT
Start: 2021-08-03 | End: 2021-08-03 | Stop reason: HOSPADM

## 2021-08-03 RX ORDER — DIMENHYDRINATE 50 MG/ML
25 INJECTION, SOLUTION INTRAMUSCULAR; INTRAVENOUS
Status: DISCONTINUED | OUTPATIENT
Start: 2021-08-03 | End: 2021-08-03 | Stop reason: HOSPADM

## 2021-08-03 RX ORDER — HALOPERIDOL 5 MG/ML
1 INJECTION INTRAMUSCULAR
Status: DISCONTINUED | OUTPATIENT
Start: 2021-08-03 | End: 2021-08-03 | Stop reason: HOSPADM

## 2021-08-03 RX ORDER — HYDROMORPHONE HYDROCHLORIDE 1 MG/ML
0.2 INJECTION, SOLUTION INTRAMUSCULAR; INTRAVENOUS; SUBCUTANEOUS EVERY 5 MIN PRN
Status: DISCONTINUED | OUTPATIENT
Start: 2021-08-03 | End: 2021-08-03 | Stop reason: HOSPADM

## 2021-08-03 RX ORDER — LIDOCAINE HYDROCHLORIDE 20 MG/ML
INJECTION, SOLUTION INFILTRATION; PERINEURAL PRN
Status: DISCONTINUED | OUTPATIENT
Start: 2021-08-03 | End: 2021-08-03

## 2021-08-03 RX ORDER — LIDOCAINE 40 MG/G
CREAM TOPICAL
Status: DISCONTINUED | OUTPATIENT
Start: 2021-08-03 | End: 2021-08-03 | Stop reason: HOSPADM

## 2021-08-03 RX ORDER — OXYCODONE HYDROCHLORIDE 5 MG/1
5 TABLET ORAL EVERY 4 HOURS PRN
Status: DISCONTINUED | OUTPATIENT
Start: 2021-08-03 | End: 2021-08-03 | Stop reason: HOSPADM

## 2021-08-03 RX ORDER — LEVOFLOXACIN 5 MG/ML
INJECTION, SOLUTION INTRAVENOUS PRN
Status: DISCONTINUED | OUTPATIENT
Start: 2021-08-03 | End: 2021-08-03

## 2021-08-03 RX ORDER — ACETAMINOPHEN 325 MG/1
975 TABLET ORAL EVERY 6 HOURS PRN
Status: DISCONTINUED | OUTPATIENT
Start: 2021-08-03 | End: 2021-08-03 | Stop reason: HOSPADM

## 2021-08-03 RX ORDER — SIMETHICONE 40MG/0.6ML
SUSPENSION, DROPS(FINAL DOSAGE FORM)(ML) ORAL PRN
Status: COMPLETED | OUTPATIENT
Start: 2021-08-03 | End: 2021-08-03

## 2021-08-03 RX ORDER — MEPERIDINE HYDROCHLORIDE 25 MG/ML
12.5 INJECTION INTRAMUSCULAR; INTRAVENOUS; SUBCUTANEOUS
Status: DISCONTINUED | OUTPATIENT
Start: 2021-08-03 | End: 2021-08-03 | Stop reason: HOSPADM

## 2021-08-03 RX ORDER — PROPOFOL 10 MG/ML
INJECTION, EMULSION INTRAVENOUS CONTINUOUS PRN
Status: DISCONTINUED | OUTPATIENT
Start: 2021-08-03 | End: 2021-08-03

## 2021-08-03 RX ORDER — FENTANYL CITRATE 50 UG/ML
25 INJECTION, SOLUTION INTRAMUSCULAR; INTRAVENOUS EVERY 5 MIN PRN
Status: DISCONTINUED | OUTPATIENT
Start: 2021-08-03 | End: 2021-08-03 | Stop reason: HOSPADM

## 2021-08-03 RX ORDER — ONDANSETRON 2 MG/ML
INJECTION INTRAMUSCULAR; INTRAVENOUS PRN
Status: DISCONTINUED | OUTPATIENT
Start: 2021-08-03 | End: 2021-08-03

## 2021-08-03 RX ORDER — LISINOPRIL 5 MG/1
5 TABLET ORAL DAILY
COMMUNITY
Start: 2021-07-27 | End: 2022-01-05

## 2021-08-03 RX ORDER — ONDANSETRON 2 MG/ML
4 INJECTION INTRAMUSCULAR; INTRAVENOUS EVERY 30 MIN PRN
Status: DISCONTINUED | OUTPATIENT
Start: 2021-08-03 | End: 2021-08-03 | Stop reason: HOSPADM

## 2021-08-03 RX ORDER — ALBUTEROL SULFATE 0.83 MG/ML
2.5 SOLUTION RESPIRATORY (INHALATION) EVERY 4 HOURS PRN
Status: DISCONTINUED | OUTPATIENT
Start: 2021-08-03 | End: 2021-08-03 | Stop reason: HOSPADM

## 2021-08-03 RX ORDER — ONDANSETRON 4 MG/1
4 TABLET, ORALLY DISINTEGRATING ORAL EVERY 30 MIN PRN
Status: DISCONTINUED | OUTPATIENT
Start: 2021-08-03 | End: 2021-08-03 | Stop reason: HOSPADM

## 2021-08-03 RX ADMIN — PROPOFOL 150 MCG/KG/MIN: 10 INJECTION, EMULSION INTRAVENOUS at 09:54

## 2021-08-03 RX ADMIN — LEVOFLOXACIN 500 MG: 5 INJECTION, SOLUTION INTRAVENOUS at 10:03

## 2021-08-03 RX ADMIN — LIDOCAINE HYDROCHLORIDE 60 MG: 20 INJECTION, SOLUTION INFILTRATION; PERINEURAL at 09:54

## 2021-08-03 RX ADMIN — ONDANSETRON 4 MG: 2 INJECTION INTRAMUSCULAR; INTRAVENOUS at 10:04

## 2021-08-03 RX ADMIN — PROPOFOL 50 MG: 10 INJECTION, EMULSION INTRAVENOUS at 09:55

## 2021-08-03 RX ADMIN — PROPOFOL 40 MG: 10 INJECTION, EMULSION INTRAVENOUS at 10:11

## 2021-08-03 RX ADMIN — PROPOFOL 30 MG: 10 INJECTION, EMULSION INTRAVENOUS at 09:59

## 2021-08-03 RX ADMIN — SIMETHICONE 133 MG: 63.3; 3.7 SOLUTION/ DROPS ORAL at 09:26

## 2021-08-03 RX ADMIN — SODIUM CHLORIDE, POTASSIUM CHLORIDE, SODIUM LACTATE AND CALCIUM CHLORIDE: 600; 310; 30; 20 INJECTION, SOLUTION INTRAVENOUS at 09:52

## 2021-08-03 ASSESSMENT — MIFFLIN-ST. JEOR: SCORE: 1683

## 2021-08-03 NOTE — ANESTHESIA CARE TRANSFER NOTE
Patient: Lazaro Porter    Procedure(s):  ENDOSCOPIC ULTRASOUND, ESOPHAGOSCOPY / UPPER GASTROINTESTINAL TRACT (GI)  ESOPHAGOGASTRODUODENOSCOPY (EGD)    Diagnosis: IPMN (intraductal papillary mucinous neoplasm) [D49.0]  Diagnosis Additional Information: No value filed.    Anesthesia Type:   MAC     Note:    Oropharynx: oropharynx clear of all foreign objects and spontaneously breathing  Level of Consciousness: awake  Oxygen Supplementation: room air    Independent Airway: airway patency satisfactory and stable  Dentition: dentition unchanged  Vital Signs Stable: post-procedure vital signs reviewed and stable  Report to RN Given: handoff report given  Patient transferred to: Phase II    Handoff Report: Identifed the Patient, Identified the Reponsible Provider, Reviewed the pertinent medical history, Discussed the surgical course, Reviewed Intra-OP anesthesia mangement and issues during anesthesia, Set expectations for post-procedure period and Allowed opportunity for questions and acknowledgement of understanding      Vitals:  Vitals Value Taken Time   BP     Temp     Pulse     Resp     SpO2         Electronically Signed By: LORIE Thompson CRNA  August 3, 2021  10:38 AM

## 2021-08-03 NOTE — DISCHARGE INSTRUCTIONS
Discharge Instructions after Upper Endoscopy (EGD) & Endoscopic Ultrasound    Activity and Diet  You were given medicine for pain. You may be dizzy or sleepy.  For 24 hours:    Do not drive or use heavy equipment.    Do not make important decisions.    Do not drink any alcohol.  ___ You may return to your regular diet.    Discomfort  You may have a sore throat for 2 to 3 days. It may help to:    Avoid hot liquids for 24 hours.    Use sore throat lozenges.    Gargle as needed with salt water up to 4 times a day. Mix 1 cup of warm water  with 1 teaspoon of salt. Do not swallow.    You may take Tylenol (acetaminophen) for pain unless your doctor has told you not to.      When to call us:  Problems are rare. Call right away if you have:    Unusual throat pain or trouble swallowing    Unusual pain in belly or chest that is not relieved by belching or passing air    Black stools (tar-like looking bowel movement)    Temperature above 100.6  F. (37.5  C).    If you vomit blood or have severe pain, go to an emergency room.    If you have questions, call:  Monday to Friday, 8 a.m. to 4:30 p.m.: Endoscopy: [phone] (We may have to call you back)    After hours: Hospital: [phone] (Ask for the GI fellow on call)

## 2021-08-03 NOTE — ANESTHESIA POSTPROCEDURE EVALUATION
Patient: Lazaro Porter    Procedure(s):  ENDOSCOPIC ULTRASOUND, ESOPHAGOSCOPY / UPPER GASTROINTESTINAL TRACT (GI)  ESOPHAGOGASTRODUODENOSCOPY (EGD)    Diagnosis:IPMN (intraductal papillary mucinous neoplasm) [D49.0]  Diagnosis Additional Information: No value filed.    Anesthesia Type:  MAC    Note:  Disposition: Outpatient   Postop Pain Control: Uneventful            Sign Out: Well controlled pain   PONV: No   Neuro/Psych: Uneventful            Sign Out: Acceptable/Baseline neuro status   Airway/Respiratory: Uneventful            Sign Out: Acceptable/Baseline resp. status   CV/Hemodynamics: Uneventful            Sign Out: Acceptable CV status   Other NRE: NONE   DID A NON-ROUTINE EVENT OCCUR? No           Last vitals:  Vitals Value Taken Time   /94 08/03/21 1055   Temp     Pulse 56 08/03/21 1055   Resp 16 08/03/21 1037   SpO2 96 % 08/03/21 1058   Vitals shown include unvalidated device data.    Electronically Signed By: Christopher J. Behrens, MD  August 3, 2021  11:19 AM

## 2021-10-10 ENCOUNTER — HEALTH MAINTENANCE LETTER (OUTPATIENT)
Age: 71
End: 2021-10-10

## 2022-01-03 NOTE — PATIENT INSTRUCTIONS
1. Please follow-up in clinic in 6 months.   2. Please call the ENT clinic with any questions,concerns, new or worsening symptoms.    -Clinic number is [phone]   - [facility] direct line (Dr. Worthy's nurse) [phone]

## 2022-01-04 ENCOUNTER — ANCILLARY PROCEDURE (OUTPATIENT)
Dept: CT IMAGING | Facility: CLINIC | Age: 72
End: 2022-01-04
Attending: OTOLARYNGOLOGY
Payer: COMMERCIAL

## 2022-01-04 ENCOUNTER — OFFICE VISIT (OUTPATIENT)
Dept: OTOLARYNGOLOGY | Facility: CLINIC | Age: 72
End: 2022-01-04
Payer: COMMERCIAL

## 2022-01-04 VITALS
BODY MASS INDEX: 26.82 KG/M2 | SYSTOLIC BLOOD PRESSURE: 132 MMHG | HEART RATE: 93 BPM | WEIGHT: 198 LBS | DIASTOLIC BLOOD PRESSURE: 100 MMHG | HEIGHT: 72 IN | TEMPERATURE: 97.1 F

## 2022-01-04 DIAGNOSIS — C01 MALIGNANT NEOPLASM OF BASE OF TONGUE (H): ICD-10-CM

## 2022-01-04 DIAGNOSIS — C01 MALIGNANT NEOPLASM OF BASE OF TONGUE (H): Primary | ICD-10-CM

## 2022-01-04 LAB
CREAT BLD-MCNC: 1.2 MG/DL (ref 0.7–1.3)
GFR SERPL CREATININE-BSD FRML MDRD: >60 ML/MIN/1.73M2

## 2022-01-04 PROCEDURE — 70491 CT SOFT TISSUE NECK W/DYE: CPT | Performed by: RADIOLOGY

## 2022-01-04 PROCEDURE — 71260 CT THORAX DX C+: CPT | Mod: GC | Performed by: RADIOLOGY

## 2022-01-04 PROCEDURE — 31575 DIAGNOSTIC LARYNGOSCOPY: CPT | Performed by: OTOLARYNGOLOGY

## 2022-01-04 PROCEDURE — 99213 OFFICE O/P EST LOW 20 MIN: CPT | Mod: 25 | Performed by: OTOLARYNGOLOGY

## 2022-01-04 RX ORDER — IOPAMIDOL 755 MG/ML
97 INJECTION, SOLUTION INTRAVASCULAR ONCE
Status: COMPLETED | OUTPATIENT
Start: 2022-01-04 | End: 2022-01-04

## 2022-01-04 RX ORDER — LISINOPRIL 10 MG/1
10 TABLET ORAL DAILY
COMMUNITY
Start: 2021-12-21

## 2022-01-04 RX ADMIN — IOPAMIDOL 97 ML: 755 INJECTION, SOLUTION INTRAVASCULAR at 14:03

## 2022-01-04 ASSESSMENT — PAIN SCALES - GENERAL: PAINLEVEL: NO PAIN (0)

## 2022-01-04 ASSESSMENT — MIFFLIN-ST. JEOR: SCORE: 1686.12

## 2022-01-04 NOTE — PROGRESS NOTES
[facility] CANCER CLINIC  FOLLOW-UP VISIT NOTE    PATIENT NAME: Lazaro Porter  MRN # [id number]   DATE OF VISIT: January 5, 2022  YOB: 1950     Otolaryngology: Dr. Worthy  Radiation Oncology: Dr. Garay    CANCER TYPE: SCC BOT, p16 +cinthia  STAGE: dH8Q2H8 (I)  ECOG PS: 0    PD-L1:  NGS:     SUMMARY  8/28/18~10/13/18 Chemoradiation to 6996 cGy with HD cisplatin (Dr. Garay, Dr. Rolon). Cisplatin changed to 40 mg/m2 for D43. D22 delayed a week due to neutropenia    ASSESSMENT AND PLAN   SCC, BOT, p16 +cinthia, cT1N1: Now just over 3 years after chemoradiation. RICHARD on imaging. Will see Dr. Garay later today. Discussed that he does not require separate follow up with me since he'll be undergoing surveillance exams and scans with Dr. Worthy and Dr. Garay. We discussed that it's our typical practice to get CT neck and chest annually up to 5 years, but that there's no firm national guidelines on repeat imaging; surveillance exams and clinical assessments are the primary critical component for surveillance. Will schedule survivorship visit; reviewed what the purpose would be.    Xerostomia: Discussed cevimeline briefly, will hold off for now.     Screening for hypothyroidism: TSH, free T4 ok today. Anticipate will be checked through Dr. Garay until discharged from his clinic but discussed with Farhad that he should have it checked every 6 months.     Pulm nodules: Stable    IPMN: Per Dr. Singh. Had EUS 8/2021, nothing for FNA. No change on current CT chest. Dr. Singh recommended MRI with contrast in 2-3 years until age 74, which can be done  through his PCP    RHM: Has annual exams with his PCP, up to date on colon cancer and prostate cancer screening, etc.     Review of the result(s) of each unique test - CMp, CBC Pd, mg  Independent interpretation of a test performed by another physician/other qualified health care professional (not separately reported) - CT neck and chest    30 minutes spent on the  date of the encounter doing chart review, history and exam, documentation and further activities per the note     Ida Bradshaw MD  Associate Professor of Medicine  Hematology, Oncology and Transplantation      SUBJECTIVE  Mr. Porter returns today for routine follow up of SCC BOT, p16 +cinthia. Doing well. No new issues. No acute concerns. Xerostomia is quite significant but manageable with water. Food still doesn't taste the same, which has caused some distress and he's been learning to like a different set of favorite foods since the prior ones no longer taste good. Some occasional mild tinnitus, but it doesn't bother him. No numbness/tingling. Eating well. No pain. No dysphagia. Sees his dentist annually. Doing lymphedema massages and swallowing exercises. No new issues.     PAST MEDICAL HISTORY  SCC as above  IPMN. EGD/EUS 8/2021. Evaluation done for pancreatic cyst seen on BOT cancer imaging  Lipoma removal from back  Cataracts  BCC s/p excision 8/2021 at [facility]    CURRENT OUTPATIENT MEDICATIONS  Current Outpatient Medications   Medication Sig Dispense Refill     ASPIRIN 81 PO Take 2 tablets by mouth daily        CANNABIDIOL, HEMP OIL/EXTRACT, OR CBD PRODUCT OTHER THAN MEDICAL CANNABIS, Take by mouth daily 2 drops under tongue once a day       Glucos-MSM-C-Bl-Smdpke-Codoef (GLUCOSAMINE MSM COMPLEX) TABS tablet Take 2 tablets by mouth daily        IBUPROFEN PO Take 200 mg by mouth       lisinopril (ZESTRIL) 10 MG tablet Take 10 mg by mouth daily       Multiple Vitamins-Minerals (MULTIVITAMIN & MINERAL PO)        UNABLE TO FIND MEDICATION NAME: Turkey Tails       UNABLE TO FIND MEDICATION NAME: Mushroom immune support       vitamin E 400 units TABS Take 400 Units by mouth daily       ALLERGIES  No Known Allergies     SOCIAL HISTORY: Lives in [place], [place]. 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      FAMILY HISTORY:   Family History   Problem Relation Age  of Onset     Cancer Mother         History of cancer in family     Cancer Maternal Grandfather      Cancer Cousin    Brother has clotting issue  Mother had a cancer, unknown primary, passed away from the cancer  Maternal aunt with colon cancer  Maternal grandfather had head/neck cancer  Maternal cousins have cancers    REVIEW OF SYSTEMS  As above in the HPI, o/w complete 12-point ROS was negative.    PHYSICAL EXAM  /82 (BP Location: Right arm, Patient Position: Sitting, Cuff Size: Adult Regular)   Pulse 72   Temp 98.1  F (36.7  C) (Oral)   Resp 16   Wt 91.3 kg (201 lb 3.2 oz)   SpO2 98%   BMI 27.29 kg/m    Wt Readings from Last 3 Encounters:   05/25/21 75 kg (165 lb 6.4 oz)   05/05/21 74.4 kg (164 lb)   05/05/21 74.8 kg (164 lb 14.4 oz)     GEN: NAD  HEENT: EOMI, no icterus, injection or pallor  LUNGS: clear bilaterally  CV: regular, no murmurs, rubs, or gallops  EXT:  no edema  NEURO: alert  SKIN: no obvious rashes on exposed skin    LABORATORY AND IMAGING STUDIES   1/5/2022 10:59   Sodium 142   Potassium 5.0   Chloride 110 (H)   Carbon Dioxide 28   Urea Nitrogen 26   Creatinine 1.19   GFR Estimate 65   Calcium 8.8   Anion Gap 4   Magnesium 2.6 (H)   Albumin 3.5   Protein Total 6.2 (L)   Bilirubin Total 0.6   Alkaline Phosphatase 61   ALT 25   AST 15   T4 Free 1.10   TSH 2.43   Glucose 108 (H)   WBC 4.8   Hemoglobin 13.6   Hematocrit 42.0   Platelet Count 185   RBC Count 4.41   MCV 95   MCH 30.8   MCHC 32.4   RDW 13.1   % Neutrophils 71   % Lymphocytes 16   % Monocytes 9   % Eosinophils 3   % Basophils 1   Absolute Basophils 0.0   Absolute Eosinophils 0.1   Absolute Immature Granulocytes 0.0   Absolute Lymphocytes 0.8   Absolute Monocytes 0.4   % Immature Granulocytes 0   Absolute Neutrophils 3.4   Absolute NRBCs 0.0   NRBCs per 100 WBC 0     Labs were independently reviewed and interpreted by me    CT Soft tissue neck w contrast  Narrative: CT SOFT TISSUE NECK W CONTRAST 1/4/2022 2:26 PM    History:   Malignant neoplasm of base of tongue (H)  ICD-10: Malignant neoplasm of base of tongue (H)    Additional information obtained from EMR: 71 year old male with a  history of?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 The patient was last seen  virtually on 05/11/2021 and endorsed?some dry mouth, dry throat,  and?some difficulty with food, but?noted it was?a lot better than what  it was.      Comparison:  11/10/2020 dated neck CT     Technique: Following intravenous administration of nonionic iodinated  contrast medium, thin section helical CT images were obtained from the  skull base down to the level of the aortic arch.  Axial, coronal and  sagittal reformations were performed with 2-3 mm slice thickness  reconstruction. Images were reviewed in soft tissue, lung and bone  windows.    Contrast: Isovue 370 97 mls    Findings:   No evidence of recurrent disease at the primary site.    No pathologically enlarged, necrotic, or otherwise abnormal lymph  nodes.    Expected post-treatment changes are noted including effacement of fat  planes, supraglottic mucosal edema and thickening of the skin and  subcutaneous soft tissues. Atrophic submandibular glands and parotid  glands.    There are no findings to suggest a second primary in the imaged  aerodigestive tract.    Evaluation of the visualized portions of the brain, orbits, spine, and  lungs show no aggressive lesion suspicious for metastatic involvement.    9The thyroid gland appears normal.    The major vascular structures in the neck are unremarkable.    The visualized paranasal sinuses are clear. The mastoid air cells are  clear.     The visualized lung apices are clear.  Impression: Impression:  Primary: 1} Expected post-treatment changes in the neck without  evidence of recurrent disease at the primary site.  Neck: 1}  No abnormal lymph node.     CECT Surveillance Legend:    Primary  1: No evidence of recurrence:  routine surveillance  2: Low suspicion    a) Superficial abnormality (skin, mucosal surface): direct visual  inspection    b) Ill-defined deep abnormality: short interval follow-up* or PET  3: High suspicion (new or enlarging discrete nodule/mass): biopsy  4: Definitive recurrence (path proven or clinical progression): no  biopsy needed    Nodes  1: No evidence of recurrence: routine surveillance  2: Low suspicion (ill-defined): short interval follow-up or PET  3: High suspicion (new or enlarging lymph node): biopsy if clinically  needed  4: Definitive recurrence (path proven or clinical progression): no  biopsy needed    *short interval follow-up: 3 months at our institution    SEPIDEH GUAJARDO MD         SYSTEM ID:  [id number]     EXAMINATION: CT CHEST W CONTRAST, 1/4/2022 2:26 PM     TECHNIQUE:  Helical CT images from the thoracic inlet through the  symphysis pubis were obtained  with contrast.      COMPARISON: Chest CT with contrast 11/10/2020, 11/13/2019, PET/CT  7/20/2019, abdominal MRI 12/16/2019     HISTORY: Malignant neoplasm of base of tongue (H)     FINDINGS:     Thorax     Lungs: A few sub-3 mm pulmonary nodules are not substantially changed  since 7/10/2018, including 2 mm nodule in the lateral right upper  lobe, series 5 image 76, and triangular density along the minor  fissure, series 5 image 129. The central tracheobronchial tree is  patent.  No airspace consolidation or pleural effusion.      Mediastinum: Normal heart size. No significant pericardial effusion.   No definite enlarged mediastinal lymph nodes by short axis criteria.       Vascular: Nonaneurysmal thoracic aorta.  No filling defects in the  main pulmonary arteries. Celiac artery, SMA , and renal arteries are  patent proximally.     Chest wall: No suspicious mass or lymphadenopathy.      Lower neck: Visualized portion of the thyroid gland is unremarkable.       Upper abdomen: Approximately 9 mm cystic structure in the pancreatic  tail, not  appreciably changed since 7/10/2018. Small sliding-type  hiatal hernia.     Bones/Soft Tissues: Multilevel degenerative changes of the visualized  spine.  No acute or aggressive appearing bone lesion.                                                                        IMPRESSION:   1. No evidence of metastatic disease in the chest.  2.  Unchanged small cystic structure in the pancreatic tail since  7/10/2018, most likely IPMN and previously evaluated on abdominal MRI.     I have personally reviewed the examination and initial interpretation  and I agree with the findings.     IRWIN CRISTOBAL MD     Imaging was personally reviewed and interpreted by me

## 2022-01-04 NOTE — LETTER
2022       RE: Lazaro Porter  [address] [facility] [address]     Dear Colleague,    Thank you for referring your patient, Lazaro Porter, to the [facility] EAR NOSE AND TH[facility]INIC [place] at [facility]. Please see a copy of my visit note below.      Otolaryngology Clinic    Name: Lazaro Porter  MRN: [id number]  Age: 71 year old  : 2022      Chief Complaint:   Follow up     History of Present Illness:   Lazaro Porter is a 71 year old male with a history of T1N1 SCC of the tongue base s/p chemoradiation therapy and resection who presents for follow up. The patient was last seen virtually on 2021 and endorsed some dry mouth, dry throat, and some difficulty with food, but noted it was a lot better than what it was.    Today, the patient endorses continued dryness.      Review of Systems:   Pertinent items are noted in HPI or as in patient entered ROS below, remainder of complete ROS is negative.   [facility] ROS 2020   Constitutional -   Neurology -   Ears, Nose, Throat -   Gastrointestinal/Genitourinary -   Endocrine Thirst        Physical Exam:   BP (!) 132/100 (BP Location: Left arm, Patient Position: Sitting, Cuff Size: Adult Regular)   Pulse 93   Temp 97.1  F (36.2  C) (Temporal)   Ht 1.829 m (6')   Wt 89.8 kg (198 lb)   BMI 26.85 kg/m       PHYSICAL EXAMINATION:    Constitutional:  The patient was unaccompanied, well-groomed, and in no acute distress.    Skin:  Warm and pink.    Neurologic:  Alert and oriented x 3.  CN's III-XII within normal limits.  Voice normal.   Psychiatric:  The patient's affect was calm, cooperative, and appropriate.    Respiratory:  Breathing comfortably without stridor or exertion of accessory muscles.    Eyes: Extraocular movement intact.    Head:  Normocephalic and atraumatic.  No lesions or scars.    Ears:  Pinnae and tragus non-tender.  EAC's and TM's were clear.    Nose:   Sinuses were non-tender.  Anterior rhinoscopy revealed midline septum and absence of purulence or polyps.    OC/OP:  Normal tongue, floor of mouth, buccal mucosa, and palate.  No lesions or masses on inspection or palpation.  No abnormal lymph tissue in the oropharynx.  The pterygoid region is non-tender.    Neck:  Supple with normal laryngeal and tracheal landmarks.  The parotid beds were without masses.  No palpable thyroid.  Lymphatic:  There is no palpable lymphadenopathy in the neck.     Fiberoptic Endoscopy:  Consent for fiberoptic laryngoscopy was obtained, and we confirmed correctness of procedure and identity of patient.  Fiberoptic laryngoscopy was indicated due to history ancers *.  The nose was topically decongested and anesthetized.  The fiberoptic laryngoscope was passed under endoscopic vision.  The turbinates were normal.  The inferior and middle meati were clear bilaterally without purulence, masses, or polyps.  The nasopharynx was clear.  The Eustachian tubes were clear.  The soft palate appeared normal with good mobility.  The epiglottis was sharp and the visualized portion of the vallecula was clear.  No vocal cord inflammation. There were mobile vocal cords.  The arytenoids were clear and there was no pooling in the hypopharynx         Assessment and Plan:    ICD-10-CM    1. Malignant neoplasm of base of tongue (H)  C01 CT Chest w contrast*     CT Soft tissue neck w contrast     Lazaro Porter is a 71 year old male with a history of T1N1 SCC of the tongue base s/p chemoradiation therapy and resection who presents for follow up. I am going to order a repeat CT for today. He can follow-up in 6 months.     Follow-up: No follow-ups on file.         Scribe Disclosure:  I, Danyell Bui, am serving as a scribe to document services personally performed by Garcia Worthy MD at this visit, based upon the provider's statements to me. All documentation has been reviewed by the aforementioned  provider prior to being entered into the official medical record.       Again, thank you for allowing me to participate in the care of your patient.      Sincerely,    Garcia Worthy MD

## 2022-01-04 NOTE — PROGRESS NOTES
Otolaryngology Clinic    Name: Lazaro Porter  MRN: [id number]  Age: 71 year old  : 2022      Chief Complaint:   Follow up     History of Present Illness:   Lazaro Porter is a 71 year old male with a history of T1N1 SCC of the tongue base s/p chemoradiation therapy and resection who presents for follow up. The patient was last seen virtually on 2021 and endorsed some dry mouth, dry throat, and some difficulty with food, but noted it was a lot better than what it was.    Today, the patient endorses continued dryness.      Review of Systems:   Pertinent items are noted in HPI or as in patient entered ROS below, remainder of complete ROS is negative.   [facility] ROS 2020   Constitutional -   Neurology -   Ears, Nose, Throat -   Gastrointestinal/Genitourinary -   Endocrine Thirst        Physical Exam:   BP (!) 132/100 (BP Location: Left arm, Patient Position: Sitting, Cuff Size: Adult Regular)   Pulse 93   Temp 97.1  F (36.2  C) (Temporal)   Ht 1.829 m (6')   Wt 89.8 kg (198 lb)   BMI 26.85 kg/m       PHYSICAL EXAMINATION:    Constitutional:  The patient was unaccompanied, well-groomed, and in no acute distress.    Skin:  Warm and pink.    Neurologic:  Alert and oriented x 3.  CN's III-XII within normal limits.  Voice normal.   Psychiatric:  The patient's affect was calm, cooperative, and appropriate.    Respiratory:  Breathing comfortably without stridor or exertion of accessory muscles.    Eyes: Extraocular movement intact.    Head:  Normocephalic and atraumatic.  No lesions or scars.    Ears:  Pinnae and tragus non-tender.  EAC's and TM's were clear.    Nose:  Sinuses were non-tender.  Anterior rhinoscopy revealed midline septum and absence of purulence or polyps.    OC/OP:  Normal tongue, floor of mouth, buccal mucosa, and palate.  No lesions or masses on inspection or palpation.  No abnormal lymph tissue in the oropharynx.  The pterygoid region is non-tender.    Neck:  Supple  with normal laryngeal and tracheal landmarks.  The parotid beds were without masses.  No palpable thyroid.  Lymphatic:  There is no palpable lymphadenopathy in the neck.     Fiberoptic Endoscopy:  Consent for fiberoptic laryngoscopy was obtained, and we confirmed correctness of procedure and identity of patient.  Fiberoptic laryngoscopy was indicated due to history ancers *.  The nose was topically decongested and anesthetized.  The fiberoptic laryngoscope was passed under endoscopic vision.  The turbinates were normal.  The inferior and middle meati were clear bilaterally without purulence, masses, or polyps.  The nasopharynx was clear.  The Eustachian tubes were clear.  The soft palate appeared normal with good mobility.  The epiglottis was sharp and the visualized portion of the vallecula was clear.  No vocal cord inflammation. There were mobile vocal cords.  The arytenoids were clear and there was no pooling in the hypopharynx         Assessment and Plan:    ICD-10-CM    1. Malignant neoplasm of base of tongue (H)  C01 CT Chest w contrast*     CT Soft tissue neck w contrast     Lazaro Porter is a 71 year old male with a history of T1N1 SCC of the tongue base s/p chemoradiation therapy and resection who presents for follow up. I am going to order a repeat CT for today. He can follow-up in 6 months.     Follow-up: No follow-ups on file.         Scribe Disclosure:  I, Danyell Bui, am serving as a scribe to document services personally performed by Garcia Worthy MD at this visit, based upon the provider's statements to me. All documentation has been reviewed by the aforementioned provider prior to being entered into the official medical record.

## 2022-01-05 ENCOUNTER — OFFICE VISIT (OUTPATIENT)
Dept: RADIATION ONCOLOGY | Facility: CLINIC | Age: 72
End: 2022-01-05
Attending: RADIOLOGY
Payer: COMMERCIAL

## 2022-01-05 ENCOUNTER — ONCOLOGY VISIT (OUTPATIENT)
Dept: ONCOLOGY | Facility: CLINIC | Age: 72
End: 2022-01-05
Attending: INTERNAL MEDICINE
Payer: COMMERCIAL

## 2022-01-05 ENCOUNTER — APPOINTMENT (OUTPATIENT)
Dept: LAB | Facility: CLINIC | Age: 72
End: 2022-01-05
Attending: INTERNAL MEDICINE
Payer: COMMERCIAL

## 2022-01-05 VITALS
BODY MASS INDEX: 26.76 KG/M2 | SYSTOLIC BLOOD PRESSURE: 163 MMHG | RESPIRATION RATE: 16 BRPM | WEIGHT: 197.3 LBS | OXYGEN SATURATION: 99 % | HEART RATE: 62 BPM | DIASTOLIC BLOOD PRESSURE: 94 MMHG

## 2022-01-05 VITALS
BODY MASS INDEX: 27.29 KG/M2 | SYSTOLIC BLOOD PRESSURE: 134 MMHG | OXYGEN SATURATION: 98 % | DIASTOLIC BLOOD PRESSURE: 82 MMHG | TEMPERATURE: 98.1 F | RESPIRATION RATE: 16 BRPM | HEART RATE: 72 BPM | WEIGHT: 201.2 LBS

## 2022-01-05 DIAGNOSIS — E83.42 HYPOMAGNESEMIA: ICD-10-CM

## 2022-01-05 DIAGNOSIS — C01 MALIGNANT NEOPLASM OF BASE OF TONGUE (H): Primary | ICD-10-CM

## 2022-01-05 DIAGNOSIS — C01 SQUAMOUS CELL CARCINOMA OF BASE OF TONGUE (H): ICD-10-CM

## 2022-01-05 DIAGNOSIS — Z13.29 SCREENING FOR HYPOTHYROIDISM: Primary | ICD-10-CM

## 2022-01-05 LAB
ALBUMIN SERPL-MCNC: 3.5 G/DL (ref 3.4–5)
ALP SERPL-CCNC: 61 U/L (ref 40–150)
ALT SERPL W P-5'-P-CCNC: 25 U/L (ref 0–70)
ANION GAP SERPL CALCULATED.3IONS-SCNC: 4 MMOL/L (ref 3–14)
AST SERPL W P-5'-P-CCNC: 15 U/L (ref 0–45)
BASOPHILS # BLD AUTO: 0 10E3/UL (ref 0–0.2)
BASOPHILS NFR BLD AUTO: 1 %
BILIRUB SERPL-MCNC: 0.6 MG/DL (ref 0.2–1.3)
BUN SERPL-MCNC: 26 MG/DL (ref 7–30)
CALCIUM SERPL-MCNC: 8.8 MG/DL (ref 8.5–10.1)
CHLORIDE BLD-SCNC: 110 MMOL/L (ref 94–109)
CO2 SERPL-SCNC: 28 MMOL/L (ref 20–32)
CREAT SERPL-MCNC: 1.19 MG/DL (ref 0.66–1.25)
EOSINOPHIL # BLD AUTO: 0.1 10E3/UL (ref 0–0.7)
EOSINOPHIL NFR BLD AUTO: 3 %
ERYTHROCYTE [DISTWIDTH] IN BLOOD BY AUTOMATED COUNT: 13.1 % (ref 10–15)
GFR SERPL CREATININE-BSD FRML MDRD: 65 ML/MIN/1.73M2
GLUCOSE BLD-MCNC: 108 MG/DL (ref 70–99)
HCT VFR BLD AUTO: 42 % (ref 40–53)
HGB BLD-MCNC: 13.6 G/DL (ref 13.3–17.7)
IMM GRANULOCYTES # BLD: 0 10E3/UL
IMM GRANULOCYTES NFR BLD: 0 %
LYMPHOCYTES # BLD AUTO: 0.8 10E3/UL (ref 0.8–5.3)
LYMPHOCYTES NFR BLD AUTO: 16 %
MAGNESIUM SERPL-MCNC: 2.6 MG/DL (ref 1.6–2.3)
MCH RBC QN AUTO: 30.8 PG (ref 26.5–33)
MCHC RBC AUTO-ENTMCNC: 32.4 G/DL (ref 31.5–36.5)
MCV RBC AUTO: 95 FL (ref 78–100)
MONOCYTES # BLD AUTO: 0.4 10E3/UL (ref 0–1.3)
MONOCYTES NFR BLD AUTO: 9 %
NEUTROPHILS # BLD AUTO: 3.4 10E3/UL (ref 1.6–8.3)
NEUTROPHILS NFR BLD AUTO: 71 %
NRBC # BLD AUTO: 0 10E3/UL
NRBC BLD AUTO-RTO: 0 /100
PLATELET # BLD AUTO: 185 10E3/UL (ref 150–450)
POTASSIUM BLD-SCNC: 5 MMOL/L (ref 3.4–5.3)
PROT SERPL-MCNC: 6.2 G/DL (ref 6.8–8.8)
RBC # BLD AUTO: 4.41 10E6/UL (ref 4.4–5.9)
SODIUM SERPL-SCNC: 142 MMOL/L (ref 133–144)
T4 FREE SERPL-MCNC: 1.1 NG/DL (ref 0.76–1.46)
TSH SERPL DL<=0.005 MIU/L-ACNC: 2.43 MU/L (ref 0.4–4)
WBC # BLD AUTO: 4.8 10E3/UL (ref 4–11)

## 2022-01-05 PROCEDURE — 31575 DIAGNOSTIC LARYNGOSCOPY: CPT | Performed by: RADIOLOGY

## 2022-01-05 PROCEDURE — G0463 HOSPITAL OUTPT CLINIC VISIT: HCPCS

## 2022-01-05 PROCEDURE — 84443 ASSAY THYROID STIM HORMONE: CPT | Performed by: INTERNAL MEDICINE

## 2022-01-05 PROCEDURE — 83735 ASSAY OF MAGNESIUM: CPT | Performed by: INTERNAL MEDICINE

## 2022-01-05 PROCEDURE — G0463 HOSPITAL OUTPT CLINIC VISIT: HCPCS | Mod: 25 | Performed by: RADIOLOGY

## 2022-01-05 PROCEDURE — 99214 OFFICE O/P EST MOD 30 MIN: CPT | Performed by: INTERNAL MEDICINE

## 2022-01-05 PROCEDURE — 31231 NASAL ENDOSCOPY DX: CPT | Performed by: RADIOLOGY

## 2022-01-05 PROCEDURE — 85025 COMPLETE CBC W/AUTO DIFF WBC: CPT | Performed by: INTERNAL MEDICINE

## 2022-01-05 PROCEDURE — 82040 ASSAY OF SERUM ALBUMIN: CPT | Performed by: INTERNAL MEDICINE

## 2022-01-05 PROCEDURE — 36415 COLL VENOUS BLD VENIPUNCTURE: CPT | Performed by: INTERNAL MEDICINE

## 2022-01-05 PROCEDURE — 84439 ASSAY OF FREE THYROXINE: CPT | Performed by: INTERNAL MEDICINE

## 2022-01-05 PROCEDURE — 80053 COMPREHEN METABOLIC PANEL: CPT | Performed by: INTERNAL MEDICINE

## 2022-01-05 ASSESSMENT — PAIN SCALES - GENERAL
PAINLEVEL: NO PAIN (0)
PAINLEVEL: NO PAIN (1)

## 2022-01-05 NOTE — PROGRESS NOTES
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 He was initially diagnosed after he presented with a palpable right neck mass with staging evaluation demonstrating a small primary lesion within the right tongue base with an enlarged right level II lymph node encasing the right carotid artery and concerning for extranodal extension. He received definitive-intent chemoradiotherapy as described above, receiving a total dose of 70 Gy in 33 fractions with concurrent high-dose cisplatin which was dose-reduced due to treatment-related cytopenias.      Mr. Porter returns to radiation oncology clinic today for a routine posttreatment disease surveillance visit. On exam, he reports that he is doing well and has no pressing concerns or complaints. He describes ongoing, stable xerostomia which is not significantly limiting his diet. He additionally has no reported oral cavity or oropharyngeal pain, dysphagia, nausea/vomiting, chest pain, dyspnea or abdominal pain with his remaining ROS likewise negative. He had a CT of the neck and chest performed yesterday which demonstrated no evidence of disease.    PHYSICAL EXAM:  Weight: 89.5 kg  BP: 163/94  Pulse: 62    General:  Healthy-appearing 71 year old gentleman seated comfortably in an examination chair in [facility]  HEENT: NC/AT. EOMI. No rhinorrhea or epistaxis. Mildly dry mucus membranes. No visible oral cavity lesions. Palpation of the bilateral buccal mucosa, gingiva, FOM, oral tongue, bilateral base of tongue and tonsillar fossae reveals no induration, nodularity or masses.  Neck: Mild fibrosis of the bilateral neck (right > left). No palpable cervical adenopathy.  Pulmonary: No wheezing, stridor or respiratory distress  Skin: Mild scattered telangiectasias of the neck (right > left).     Flexible Fiberoptic Nasopharyngoscopy:  Consent for fiberoptic laryngoscopy was obtained and I confirmed correctness of procedure and identity of patient. Fiberoptic laryngoscopy was indicated due to post radiotherapy disease surveillance. The nose was topically decongested and anesthetized. The fiberoptic laryngoscope was passed through the right naris under endoscopic vision. The turbinates were normal. The inferior and middle meati were clear without purulence, masses, or polyps. The nasopharynx was clear. The Eustachian tubes were clear. The soft palate appeared normal with good mobility. Passage of the scope into the oropharynx revealed bland-appearing mucosa with no discrete lesions. The laryngeal structures were normal in appearance with mobile cords bilaterally. There was no pooling of secretions within the hypopharynx. The scope was then removed and the procedure was terminated without incident.     LABS AND IMAGIN2022 labs:  TSH: 2.43    2022 CT chest:  No evidence of pulmonary metastases    2022 CT neck:  Post-treatment changes with no evidence of disease    IMPRESSION:   Mr. 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 He is currently over 3 years out from the completion of treatment and remains clinically and radiologically  RICHARD.    PLAN:   1. Follow-up in radiation oncology clinic in 6 months with NP and 12 months with MD  2. Repeat TSH in 7/2022  3. CT neck and chest in 1/2023    Nathan Garay MD/PhD    Department of Radiation Oncology  [facility]

## 2022-01-05 NOTE — NURSING NOTE
Oncology Rooming Note    January 5, 2022 12:47 PM   Lazaro Porter is a 71 year old male who presents for:    Chief Complaint   Patient presents with     Blood Draw     Labs drawn from  in lab by RN. VS taken.     Oncology Clinic Visit     [facility] RETURN - MALIGNANT NEOPLASM OF BASE OF TONGUE     Initial Vitals: /82 (BP Location: Right arm, Patient Position: Sitting, Cuff Size: Adult Regular)   Pulse 72   Temp 98.1  F (36.7  C) (Oral)   Resp 16   Wt 91.3 kg (201 lb 3.2 oz)   SpO2 98%   BMI 27.29 kg/m   Estimated body mass index is 27.29 kg/m  as calculated from the following:    Height as of 1/4/22: 1.829 m (6').    Weight as of this encounter: 91.3 kg (201 lb 3.2 oz). Body surface area is 2.15 meters squared.  No Pain (0) Comment: Data Unavailable   No LMP for male patient.  Allergies reviewed: Yes  Medications reviewed: Yes    Medications: Medication refills not needed today.  Pharmacy name entered into [facility]: [id number] - [place], [place] - [address]    Clinical concerns: No new concerns. Leeann was notified.      Carlos Eduardo Abrams LPN

## 2022-01-05 NOTE — NURSING NOTE
Chief Complaint   Patient presents with     Blood Draw     Labs drawn from  in lab by RN. VS taken.     Labs collected from venipuncture by RN. Vitals taken. Checked in for appointment(s).  Bhupendra Eaton RN

## 2022-01-05 NOTE — LETTER
1/5/2022         RE: Lazaro Porter  [address] [facility] [address]        Dear Colleague,    Thank you for referring your patient, Lazaro Porter, to the [facility]. Please see a copy of my visit note below.       [facility] CANCER [facility]  FOLLOW-UP VISIT NOTE    PATIENT NAME: Lazaro Porter  MRN # [id number]   DATE OF VISIT: January 5, 2022  YOB: 1950     Otolaryngology: Dr. Worthy  Radiation Oncology: Dr. Garay    CANCER TYPE: SCC BOT, p16 +cinthia  STAGE: [id number] (I)  ECOG PS: 0    PD-L1:  NGS:     SUMMARY  8/28/18~10/13/18 Chemoradiation to 6996 cGy with HD cisplatin (Dr. Garay, Dr. Rolon). Cisplatin changed to 40 mg/m2 for D43. D22 delayed a week due to neutropenia    ASSESSMENT AND PLAN   SCC, BOT, p16 +cinthia, cT1N1: Now just over 3 years after chemoradiation. RICHARD on imaging. Will see Dr. Garay later today. Discussed that he does not require separate follow up with me since he'll be undergoing surveillance exams and scans with Dr. Worthy and Dr. Garay. We discussed that it's our typical practice to get CT neck and chest annually up to 5 years, but that there's no firm national guidelines on repeat imaging; surveillance exams and clinical assessments are the primary critical component for surveillance. Will schedule survivorship visit; reviewed what the purpose would be.    Xerostomia: Discussed cevimeline briefly, will hold off for now.     Screening for hypothyroidism: TSH, free T4 ok today. Anticipate will be checked through Dr. Garay until discharged from his clinic but discussed with Farhad that he should have it checked every 6 months.     Pulm nodules: Stable    IPMN: Per Dr. Singh. Had EUS 8/2021, nothing for FNA. No change on current CT chest. Dr. Singh recommended MRI with contrast in 2-3 years until age 74, which can be done  through his PCP    RHM: Has annual exams with his PCP, up to date on colon cancer and prostate cancer screening, etc.      Review of the result(s) of each unique test - CMp, CBC Pd, mg  Independent interpretation of a test performed by another physician/other qualified health care professional (not separately reported) - CT neck and chest    30 minutes spent on the date of the encounter doing chart review, history and exam, documentation and further activities per the note     Ida Bradshaw MD  Associate Professor of Medicine  Hematology, Oncology and Transplantation      SUBJECTIVE  Mr. Porter returns today for routine follow up of SCC BOT, p16 +cinthia. Doing well. No new issues. No acute concerns. Xerostomia is quite significant but manageable with water. Food still doesn't taste the same, which has caused some distress and he's been learning to like a different set of favorite foods since the prior ones no longer taste good. Some occasional mild tinnitus, but it doesn't bother him. No numbness/tingling. Eating well. No pain. No dysphagia. Sees his dentist annually. Doing lymphedema massages and swallowing exercises. No new issues.     PAST MEDICAL HISTORY  SCC as above  IPMN. EGD/EUS 8/2021. Evaluation done for pancreatic cyst seen on BOT cancer imaging  Lipoma removal from back  Cataracts  [facility] s/p excision 8/2021 at [facility]    CURRENT OUTPATIENT MEDICATIONS  Current Outpatient Medications   Medication Sig Dispense Refill     ASPIRIN 81 PO Take 2 tablets by mouth daily        CANNABIDIOL, HEMP OIL/EXTRACT, OR CBD PRODUCT OTHER THAN MEDICAL CANNABIS, Take by mouth daily 2 drops under tongue once a day       Glucos-MSM-C-Or-Xtuoiy-Xnxspg (GLUCOSAMINE MSM COMPLEX) TABS tablet Take 2 tablets by mouth daily        IBUPROFEN PO Take 200 mg by mouth       lisinopril (ZESTRIL) 10 MG tablet Take 10 mg by mouth daily       Multiple Vitamins-Minerals (MULTIVITAMIN & MINERAL PO)        UNABLE TO FIND MEDICATION NAME: Turkey Tails       UNABLE TO FIND MEDICATION NAME: Mushroom immune support       vitamin E 400 units TABS Take 400 Units by  mouth daily       ALLERGIES  No Known Allergies     SOCIAL HISTORY: Lives in [place], [place]. Used to run a mental health clinic, retired from that, continues to work. Used to [email or web address].      FAMILY HISTORY:   Family History   Problem Relation Age of Onset     Cancer Mother         History of cancer in family     Cancer Maternal Grandfather      Cancer Cousin    Brother has clotting issue  Mother had a cancer, unknown primary, passed away from the cancer  Maternal aunt with colon cancer  Maternal grandfather had head/neck cancer  Maternal cousins have cancers    REVIEW OF SYSTEMS  As above in the HPI, o/w complete 12-point ROS was negative.    PHYSICAL EXAM  /82 (BP Location: Right arm, Patient Position: Sitting, Cuff Size: Adult Regular)   Pulse 72   Temp 98.1  F (36.7  C) (Oral)   Resp 16   Wt 91.3 kg (201 lb 3.2 oz)   SpO2 98%   BMI 27.29 kg/m    Wt Readings from Last 3 Encounters:   05/25/21 75 kg (165 lb 6.4 oz)   05/05/21 74.4 kg (164 lb)   05/05/21 74.8 kg (164 lb 14.4 oz)     GEN: NAD  HEENT: EOMI, no icterus, injection or pallor  LUNGS: clear bilaterally  CV: regular, no murmurs, rubs, or gallops  EXT:  no edema  NEURO: alert  SKIN: no obvious rashes on exposed skin    LABORATORY AND IMAGING STUDIES   1/5/2022 10:59   Sodium 142   Potassium 5.0   Chloride 110 (H)   Carbon Dioxide 28   Urea Nitrogen 26   Creatinine 1.19   GFR Estimate 65   Calcium 8.8   Anion Gap 4   Magnesium 2.6 (H)   Albumin 3.5   Protein Total 6.2 (L)   Bilirubin Total 0.6   Alkaline Phosphatase 61   ALT 25   AST 15   T4 Free 1.10   TSH 2.43   Glucose 108 (H)   WBC 4.8   Hemoglobin 13.6   Hematocrit 42.0   Platelet Count 185   RBC Count 4.41   MCV 95   MCH 30.8   MCHC 32.4   RDW 13.1   % Neutrophils 71   % Lymphocytes 16   % Monocytes 9   % Eosinophils 3   % Basophils 1   Absolute Basophils 0.0   Absolute Eosinophils 0.1   Absolute Immature Granulocytes 0.0   Absolute Lymphocytes 0.8   Absolute Monocytes 0.4   % Immature  Granulocytes 0   Absolute Neutrophils 3.4   Absolute NRBCs 0.0   NRBCs per 100 WBC 0     Labs were independently reviewed and interpreted by me    CT Soft tissue neck w contrast  Narrative: CT SOFT TISSUE NECK W CONTRAST 1/4/2022 2:26 PM    History:  Malignant neoplasm of base of tongue (H)  ICD-10: Malignant neoplasm of base of tongue (H)    Additional information obtained from EMR: 71 year old male with a  history of?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 The patient was last seen  virtually on 05/11/2021 and endorsed?some dry mouth, dry throat,  and?some difficulty with food, but?noted it was?a lot better than what  it was.      Comparison:  11/10/2020 dated neck CT     Technique: Following intravenous administration of nonionic iodinated  contrast medium, thin section helical CT images were obtained from the  skull base down to the level of the aortic arch.  Axial, coronal and  sagittal reformations were performed with 2-3 mm slice thickness  reconstruction. Images were reviewed in soft tissue, lung and bone  windows.    Contrast: Isovue 370 97 mls    Findings:   No evidence of recurrent disease at the primary site.    No pathologically enlarged, necrotic, or otherwise abnormal lymph  nodes.    Expected post-treatment changes are noted including effacement of fat  planes, supraglottic mucosal edema and thickening of the skin and  subcutaneous soft tissues. Atrophic submandibular glands and parotid  glands.    There are no findings to suggest a second primary in the imaged  aerodigestive tract.    Evaluation of the visualized portions of the brain, orbits, spine, and  lungs show no aggressive lesion suspicious for metastatic involvement.    9The thyroid gland appears normal.    The major vascular structures in the neck are unremarkable.    The visualized paranasal sinuses are clear. The mastoid air cells are  clear.     The visualized lung apices are  clear.  Impression: Impression:  Primary: 1} Expected post-treatment changes in the neck without  evidence of recurrent disease at the primary site.  Neck: 1}  No abnormal lymph node.     CECT Surveillance Legend:    Primary  1: No evidence of recurrence: routine surveillance  2: Low suspicion    a) Superficial abnormality (skin, mucosal surface): direct visual  inspection    b) Ill-defined deep abnormality: short interval follow-up* or PET  3: High suspicion (new or enlarging discrete nodule/mass): biopsy  4: Definitive recurrence (path proven or clinical progression): no  biopsy needed    Nodes  1: No evidence of recurrence: routine surveillance  2: Low suspicion (ill-defined): short interval follow-up or PET  3: High suspicion (new or enlarging lymph node): biopsy if clinically  needed  4: Definitive recurrence (path proven or clinical progression): no  biopsy needed    *short interval follow-up: 3 months at our institution    SEPIDEH GUAJARDO MD         SYSTEM ID:  [id number]     EXAMINATION: CT CHEST W CONTRAST, 1/4/2022 2:26 PM     TECHNIQUE:  Helical CT images from the thoracic inlet through the  symphysis pubis were obtained  with contrast.      COMPARISON: Chest CT with contrast 11/10/2020, 11/13/2019, PET/CT  7/20/2019, abdominal MRI 12/16/2019     HISTORY: Malignant neoplasm of base of tongue (H)     FINDINGS:     Thorax     Lungs: A few sub-3 mm pulmonary nodules are not substantially changed  since 7/10/2018, including 2 mm nodule in the lateral right upper  lobe, series 5 image 76, and triangular density along the minor  fissure, series 5 image 129. The central tracheobronchial tree is  patent.  No airspace consolidation or pleural effusion.      Mediastinum: Normal heart size. No significant pericardial effusion.   No definite enlarged mediastinal lymph nodes by short axis criteria.       Vascular: Nonaneurysmal thoracic aorta.  No filling defects in the  main pulmonary arteries. Celiac artery, SMA , and  renal arteries are  patent proximally.     Chest wall: No suspicious mass or lymphadenopathy.      Lower neck: Visualized portion of the thyroid gland is unremarkable.       Upper abdomen: Approximately 9 mm cystic structure in the pancreatic  tail, not appreciably changed since 7/10/2018. Small sliding-type  hiatal hernia.     Bones/Soft Tissues: Multilevel degenerative changes of the visualized  spine.  No acute or aggressive appearing bone lesion.                                                                        IMPRESSION:   1. No evidence of metastatic disease in the chest.  2.  Unchanged small cystic structure in the pancreatic tail since  7/10/2018, most likely IPMN and previously evaluated on abdominal MRI.     I have personally reviewed the examination and initial interpretation  and I agree with the findings.     IRWIN CRISTOBAL MD     Imaging was personally reviewed and interpreted by me           Again, thank you for allowing me to participate in the care of your patient.      Sincerely,    Ida Bradshaw MD

## 2022-01-05 NOTE — PROGRESS NOTES
FOLLOW-UP VISIT    Patient Name: Lazaro Porter      : 1950     Age: 71 year old        ______________________________________________________________________________     Chief Complaint   Patient presents with     Cancer     follow up to radiation treatment      BP (!) 163/94 (BP Location: Right arm, Patient Position: Sitting, Cuff Size: Adult Regular)   Pulse 62   Resp 16   Wt 89.5 kg (197 lb 4.8 oz)   SpO2 99%   BMI 26.76 kg/m       Date Radiation Completed: 10/13/18    Pain  Denies    Labs  Other Labs: Yes: cmp, cbc, tsh     Imaging  CT: chest and neck 2022      Dental:   Most Recent Dental Visit: 2021  No fluoride treatments     Speech/Swallowing:   Most Recent evaluation or testin2019  Swallowing Restrictions: No difficulties with swallowing    Trismus/Jaw Exercises: daily    Nutrition:  Oral Intake: 100%  Weight:   Wt Readings from Last 3 Encounters:   22 89.5 kg (197 lb 4.8 oz)   22 91.3 kg (201 lb 3.2 oz)   22 89.8 kg (198 lb)         Oral Symptoms:   Xerostomia:1- Symptomatic without significant dietary alteration; unstimulated saliva flow >0.2 ml/min  Dysphagia: 0-None  Mucositis Oral Symptoms: 0-None  Mucositis: 0- None  Esophagitis:0- None    Other Appointments:     MD Name: ENT  Appointment Date: 2022   MD Name: Appointment Date:   MD Name: Appointment Date:   Other Appointment Notes:     Residual Radiation side effect: tinnitus      Additional Instructions: follow up     Nurse face-to-face time: Level 2:  5 min face to face time    2022   2:55 PM  Scope used today  #2 Pentax Nasolaryngoscope FNL 10P2 Serial number [id number]

## 2022-01-29 ENCOUNTER — HEALTH MAINTENANCE LETTER (OUTPATIENT)
Age: 72
End: 2022-01-29

## 2022-09-06 ENCOUNTER — OFFICE VISIT (OUTPATIENT)
Dept: RADIATION ONCOLOGY | Facility: CLINIC | Age: 72
End: 2022-09-06
Attending: NURSE PRACTITIONER
Payer: COMMERCIAL

## 2022-09-06 ENCOUNTER — OFFICE VISIT (OUTPATIENT)
Dept: OTOLARYNGOLOGY | Facility: CLINIC | Age: 72
End: 2022-09-06
Payer: COMMERCIAL

## 2022-09-06 ENCOUNTER — LAB (OUTPATIENT)
Dept: LAB | Facility: CLINIC | Age: 72
End: 2022-09-06
Payer: COMMERCIAL

## 2022-09-06 VITALS
SYSTOLIC BLOOD PRESSURE: 174 MMHG | BODY MASS INDEX: 25.73 KG/M2 | WEIGHT: 190 LBS | HEIGHT: 72 IN | HEART RATE: 83 BPM | DIASTOLIC BLOOD PRESSURE: 84 MMHG

## 2022-09-06 VITALS — BODY MASS INDEX: 26.72 KG/M2 | WEIGHT: 197 LBS

## 2022-09-06 DIAGNOSIS — C01 MALIGNANT NEOPLASM OF BASE OF TONGUE (H): ICD-10-CM

## 2022-09-06 DIAGNOSIS — E03.9 HYPOTHYROIDISM, UNSPECIFIED TYPE: ICD-10-CM

## 2022-09-06 DIAGNOSIS — C01 MALIGNANT NEOPLASM OF BASE OF TONGUE (H): Primary | ICD-10-CM

## 2022-09-06 LAB
HOLD SPECIMEN: NORMAL
TSH SERPL DL<=0.005 MIU/L-ACNC: 1.64 UIU/ML (ref 0.3–4.2)

## 2022-09-06 PROCEDURE — 31575 DIAGNOSTIC LARYNGOSCOPY: CPT | Performed by: OTOLARYNGOLOGY

## 2022-09-06 PROCEDURE — G0463 HOSPITAL OUTPT CLINIC VISIT: HCPCS

## 2022-09-06 PROCEDURE — 99213 OFFICE O/P EST LOW 20 MIN: CPT | Performed by: NURSE PRACTITIONER

## 2022-09-06 PROCEDURE — 36415 COLL VENOUS BLD VENIPUNCTURE: CPT | Performed by: PATHOLOGY

## 2022-09-06 PROCEDURE — 84443 ASSAY THYROID STIM HORMONE: CPT | Performed by: RADIOLOGY

## 2022-09-06 PROCEDURE — 99213 OFFICE O/P EST LOW 20 MIN: CPT | Mod: 25 | Performed by: OTOLARYNGOLOGY

## 2022-09-06 ASSESSMENT — PAIN SCALES - GENERAL
PAINLEVEL: NO PAIN (0)
PAINLEVEL: NO PAIN (0)

## 2022-09-06 NOTE — PROGRESS NOTES
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 He was initially diagnosed after he presented with a palpable right neck mass with staging evaluation demonstrating a small primary lesion within the right tongue base with an enlarged right level II lymph node encasing the right carotid artery and concerning for extranodal extension. He received definitive-intent chemoradiotherapy as described above, receiving a total dose of 70 Gy in 33 fractions with concurrent high-dose cisplatin which was dose-reduced due to treatment-related cytopenias.      Mr. Porter returns to radiation oncology clinic today for a routine posttreatment disease surveillance visit. On exam, he reports that he is doing well and has no pressing concerns or complaints. He describes ongoing, stable xerostomia which is not significantly limiting his diet. He additionally has no reported oral cavity or oropharyngeal pain, dysphagia, nausea/vomiting, chest pain, dyspnea or abdominal pain with his remaining ROS likewise negative.  He sees his dentist every 6 months and has not had any dental issues recently.  He continues to exercise.    PHYSICAL EXAM:  Wt 89.4 kg (197 lb)   BMI 26.72 kg/m         General: Healthy-appearing 71 year old gentleman seated comfortably in an examination chair in [facility]  HEENT: NC/AT. EOMI. No rhinorrhea or epistaxis. Mildly dry mucus membranes. No visible oral cavity lesions. Palpation of the bilateral buccal mucosa, gingiva, FOM, oral tongue, bilateral base of tongue and tonsillar fossae reveals no induration, nodularity or masses.  Neck: Mild fibrosis of the bilateral neck (right > left). No palpable cervical adenopathy.  Pulmonary: No wheezing, stridor or respiratory distress  Skin: Mild scattered telangiectasias of the neck (right > left).          LABS AND IMAGIN2022 labs:  TSH: 1.64    IMPRESSION:   Mr. 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 He is currently almost 4 years out from the completion of treatment and remains clinically and radiologically RICHARD.    PLAN:   1. Follow-up in radiation oncology clinic in January with Dr. Wilson  2. CT neck and chest in 2023      Cancer surveillance and follow-up schedule:   -recommend continued follow-up with ENT for exams.  See radiation oncology every 3 months for 2-3 years and then every 6 months.  Continue to follow with medical oncology.     Potential long term side effects: Given treatment with platinum-based chemotherapy and radiation, the patient is at risk for potential long term side effects, as follows:     Neurotoxicity: At risk for cisplatin-induced neuropathy and ototoxicity.   - Would not expect to develop as a late side effect     GI toxicity:  - Treatment can cause dysphagia and risk for aspiration. If patient has ongoing difficulties swallowing, unexplained weight loss, or recurrent lung infections, recommend revising with speech language specialist.     Renal toxicity:  - If renal impairment developed on treatment, monitor blood pressure and renal function as indicated by primary care team      Cardiovascular toxicity:   - Exposure to cisplatin can sometimes cause high cholesterol levels earlier that would be expected based on age. Recommend routine lipid testing annually. Cisplatin can also cause vascular changes that can affect blood circulation, and risk of hypertension, stroke, angina, MI.   - Radiation to the neck can lead to carotid stenosis. Recommend a carotid US 5-10 years post-treatment, to be arranged by primary care.  - Patients should see primary care provider annually with aggressive management of cardiac risk factors (hypertension, blood glucose, lipids) as indicated, and should exercise regularly.     Dermatologic toxicity:  - Radiation can cause damage to the skin, including color and texture changes in the area of radiation, tightness of the skin, increased skin sensitivity, and increased risk of skin cancers  - Recommend annual skin exam, let primary care team or dermatologist know if any new skin concerns     Endocrine Toxicity:  - Risk for hypothyroidism. Monitor TSH every 6-12 months with treatment as indicated. This most commonly manifests 2-5 years after treatment but can by up to 10 or more years after.  - Risk for osteoporosis. Recommend intake of at least 6662-9044 mg calcium daily with at least 800 international units vitamin D daily. Dexa scans as recommended by primary care team     Lymphedema: Can occur at any time, secondary to radiation and/or surgeries.  - Lymphedema therapy can be very helpful if you develop swelling in the face or neck area     Anxiety, depression, trauma, and distress:  -This is not uncommon with a cancer diagnosis and treatment.  Speaking with a counselor can be very helpful.      -Reviewed cancer support resources.     Cognitive function:  - Encouraged organizational strategies (ie notebooks, planners, reminders, smart phone technology), routine physical activity, limiting use of alcohol, considering yoga, meditation, mindfulness-based stress  reduction, and cognitive training (Ie brain games)     Fatigue:  - Can consider CBC, CPM, TSH  - Encourage continued exercise     Screening for subsequent new primary cancers:  - Recommended that the patient undergo routine screening for their gender and age, to be arranged by primary care provider  - Low dose CT chest as indicated if patient has smoking history, to be arranged by primary care provider  - There is a rare risk of secondary cancers developing after chemothearpy, most commonly MDS, leukemia, or lymphoma. This typically occurs 4-10 years after therapy but can by as soon as 1-3 years. Recommend annual CBC. Monitoring for consitutional symptoms fevers, night sweats, fatigue, unexplained weight loss.      Healthy behavior recommendations:  - Recommend healthy BMI, engaging in at least 150 minutes of moderate activity weekly, and healthy diet high in vegetables, fruits, and whole grains; and low in sugars and saturated fats; practicing sun safety with use of sunscreen of at least 30, that protects against UVA and UVB rays, and is water resistant.  - Dental exams and cleanings every 6-12 months.  Fluoride treatments.  - Eye exams every 1-2 years or as indicated  - PCP visit annually     Immunizations:  - Recommend influenza vaccination annually  - Recombinant zoster vaccination in all survivors aged 50 and over  -COVID vaccinations. Reviewed recommendations for vaccination.    Geraldine Elliott NP  Radiation Oncology

## 2022-09-06 NOTE — PATIENT INSTRUCTIONS
"You were seen in the clinic today by Dr. Worthy. If you have any questions or concerns after your appointment, please call the clinic at [phone]. Press \"1\" for scheduling, press \"3\" for nurse advice.    2.   The following has been recommended for you based upon your appointment today:   -neck and lung CT when you return    3.   Plan to return the clinic in 6-8 months.    Lashawn HERNANDEZ, RN  [facility]  Department of Otolaryngology     "

## 2022-09-06 NOTE — NURSING NOTE
Chief Complaint   Patient presents with     RECHECK     Follow up       Blood pressure (!) 174/84, pulse 83, height 1.829 m (6'), weight 86.2 kg (190 lb).    Trina Garcia, EMT

## 2022-09-06 NOTE — PROGRESS NOTES
Otolaryngology Clinic    Name: Lazaro Porter  MRN: [id number]  Age: 71 year old  : 2022      Chief Complaint:   Follow up     History of Present Illness:   Lazaro Porter is a 71 year old male with a history of T1N1 SCC of the tongue base s/p chemoradiation therapy and resection who presents for follow up. He was last seen on 2022 and endorsed some mouth dryness at that time.    Today he endorses continued dry mouth difficulties.     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 This summer he stayed in [place], but he plans to go south for the winter at some point.     Review of Systems:   Pertinent items are noted in HPI or as in patient entered ROS below, remainder of complete ROS is negative.    ENT ROS 2020   Constitutional: -   Neurology: -   Ears, Nose, Throat: -   Gastrointestinal/Genitourinary: -   Endocrine: Thirst        Physical Exam:   BP (!) 174/84 (BP Location: Left arm, Patient Position: Sitting, Cuff Size: Adult Regular)   Pulse 83   Ht 1.829 m (6')   Wt 86.2 kg (190 lb)   BMI 25.77 kg/m       PHYSICAL EXAMINATION:    Constitutional:  The patient was unaccompanied, well-groomed, and in no acute distress.    Skin:  Warm and pink.    Neurologic:  Alert and oriented x 3.  CN's III-XII within normal limits.  Voice normal.   Psychiatric:  The patient's affect was calm, cooperative, and appropriate.    Respiratory:  Breathing comfortably without stridor or exertion of accessory muscles.    Eyes: Extraocular movement intact.    Head:  Normocephalic and atraumatic.  No lesions or scars.    Ears:  Pinnae and tragus non-tender.  EAC's and TM's were clear.    Nose:  Sinuses were non-tender.  Anterior rhinoscopy revealed midline septum and absence of purulence or polyps.    OC/OP:  Normal tongue, floor of mouth, buccal mucosa, and palate.  No lesions or masses on inspection or palpation.  No abnormal lymph tissue in the  oropharynx.  The pterygoid region is non-tender. Radiation healing in oropharynx.   Neck:  Supple with normal laryngeal and tracheal landmarks.  The parotid beds were without masses.  No palpable thyroid.  Lymphatic:  There is no palpable lymphadenopathy in the neck.     Fiberoptic Endoscopy:  Consent for fiberoptic laryngoscopy was obtained, and we confirmed correctness of procedure and identity of patient.  Fiberoptic laryngoscopy was indicated due to history cancers.  The nose was topically decongested and anesthetized.  The fiberoptic laryngoscope was passed under endoscopic vision.  The turbinates were normal.  The inferior and middle meati were clear bilaterally without purulence, masses, or polyps.  The nasopharynx was clear.  The Eustachian tubes were clear.  The soft palate appeared normal with good mobility.  The epiglottis was sharp and the visualized portion of the vallecula was clear.  No vocal cord inflammation. There were mobile vocal cords.  The arytenoids were clear and there was no pooling in the hypopharynx.       Assessment and Plan:  Lazaro Porter is a 71 year old male with a history of T1N1 SCC of the tongue base s/p chemoradiation therapy and resection who presents for follow up.     He states that he is doing well today. Fiberoptic endoscopy performed today. Plan to obtain CT and follow up in 6-8 months in conjunction with radiation.      Scribe Disclosure:  I, Sekou Acosta, am serving as a scribe to document services personally performed by Garcia Worthy MD at this visit, based upon the provider's statements to me. All documentation has been reviewed by the aforementioned provider prior to being entered into the official medical record.

## 2022-09-06 NOTE — LETTER
2022       RE: Lazaro Porter  [address] [facility] [address]     Dear Colleague,    Thank you for referring your patient, Lazaro Porter, to the [facility] EAR NOSE AND THROAT CLINIC [place] [facility]. Please see a copy of my visit note below.    Otolaryngology Clinic    Name: Lazaro Porter  MRN: [id number]  Age: 71 year old  : 2022      Chief Complaint:   Follow up     History of Present Illness:   Lazaro Porter is a 71 year old male with a history of T1N1 SCC of the tongue base s/p chemoradiation therapy and resection who presents for follow up. He was last seen on 2022 and endorsed some mouth dryness at that time.    Today he endorses continued dry mouth difficulties.     He states he has been volunteering working with [employer] projects and building a [place] area. This summer he stayed in [place], but he plans to go south for the winter at some point.     Review of Systems:   Pertinent items are noted in HPI or as in patient entered ROS below, remainder of complete ROS is negative.    ENT ROS 2020   Constitutional: -   Neurology: -   Ears, Nose, Throat: -   Gastrointestinal/Genitourinary: -   Endocrine: Thirst        Physical Exam:   BP (!) 174/84 (BP Location: Left arm, Patient Position: Sitting, Cuff Size: Adult Regular)   Pulse 83   Ht 1.829 m (6')   Wt 86.2 kg (190 lb)   BMI 25.77 kg/m       PHYSICAL EXAMINATION:    Constitutional:  The patient was unaccompanied, well-groomed, and in no acute distress.    Skin:  Warm and pink.    Neurologic:  Alert and oriented x 3.  CN's III-XII within normal limits.  Voice normal.   Psychiatric:  The patient's affect was calm, cooperative, and appropriate.    Respiratory:  Breathing comfortably without stridor or exertion of accessory muscles.    Eyes: Extraocular movement intact.    Head:  Normocephalic and atraumatic.  No lesions or scars.    Ears:  Pinnae and  tragus non-tender.  EAC's and TM's were clear.    Nose:  Sinuses were non-tender.  Anterior rhinoscopy revealed midline septum and absence of purulence or polyps.    OC/OP:  Normal tongue, floor of mouth, buccal mucosa, and palate.  No lesions or masses on inspection or palpation.  No abnormal lymph tissue in the oropharynx.  The pterygoid region is non-tender. Radiation healing in oropharynx.   Neck:  Supple with normal laryngeal and tracheal landmarks.  The parotid beds were without masses.  No palpable thyroid.  Lymphatic:  There is no palpable lymphadenopathy in the neck.     Fiberoptic Endoscopy:  Consent for fiberoptic laryngoscopy was obtained, and we confirmed correctness of procedure and identity of patient.  Fiberoptic laryngoscopy was indicated due to history cancers.  The nose was topically decongested and anesthetized.  The fiberoptic laryngoscope was passed under endoscopic vision.  The turbinates were normal.  The inferior and middle meati were clear bilaterally without purulence, masses, or polyps.  The nasopharynx was clear.  The Eustachian tubes were clear.  The soft palate appeared normal with good mobility.  The epiglottis was sharp and the visualized portion of the vallecula was clear.  No vocal cord inflammation. There were mobile vocal cords.  The arytenoids were clear and there was no pooling in the hypopharynx.       Assessment and Plan:  Lazaro Porter is a 71 year old male with a history of T1N1 SCC of the tongue base s/p chemoradiation therapy and resection who presents for follow up.     He states that he is doing well today. Fiberoptic endoscopy performed today. Plan to obtain CT and follow up in 6-8 months in conjunction with radiation.      Scribe Disclosure:  I, Sekou Acosta, am serving as a scribe to document services personally performed by Garcia Worthy MD at this visit, based upon the provider's statements to me. All documentation has been reviewed by the aforementioned  provider prior to being entered into the official medical record.       Again, thank you for allowing me to participate in the care of your patient.      Sincerely,    Garcia Worthy MD

## 2022-09-06 NOTE — LETTER
2022         RE: Lazaro Porter  [address] [facility] [address]        Dear Colleague,    Thank you for referring your patient, Lazaro Porter, to the [facility] RADIATION ONCOLOGY. Please see a copy of my visit note below.       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 He was initially diagnosed after he presented with a palpable right neck mass with staging evaluation demonstrating a small primary lesion within the right tongue base with an enlarged right level II lymph node encasing the right carotid artery and concerning for extranodal extension. He received definitive-intent chemoradiotherapy as described above, receiving a total dose of 70 Gy in 33 fractions with concurrent high-dose cisplatin which was dose-reduced due to treatment-related cytopenias.      Mr. Porter returns to radiation oncology clinic today for a routine posttreatment disease surveillance visit. On exam, he reports that he is doing well and has no pressing concerns or complaints. He describes ongoing, stable xerostomia which is not significantly limiting his diet. He additionally has no reported oral cavity or oropharyngeal pain, dysphagia,  nausea/vomiting, chest pain, dyspnea or abdominal pain with his remaining ROS likewise negative.  He sees his dentist every 6 months and has not had any dental issues recently.  He continues to exercise.    PHYSICAL EXAM:  Wt 89.4 kg (197 lb)   BMI 26.72 kg/m        General: Healthy-appearing 71 year old gentleman seated comfortably in an examination chair in [facility]  HEENT: NC/AT. EOMI. No rhinorrhea or epistaxis. Mildly dry mucus membranes. No visible oral cavity lesions. Palpation of the bilateral buccal mucosa, gingiva, FOM, oral tongue, bilateral base of tongue and tonsillar fossae reveals no induration, nodularity or masses.  Neck: Mild fibrosis of the bilateral neck (right > left). No palpable cervical adenopathy.  Pulmonary: No wheezing, stridor or respiratory distress  Skin: Mild scattered telangiectasias of the neck (right > left).          LABS AND IMAGIN2022 labs:  TSH: 1.64    IMPRESSION:   Mr. 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 He is currently almost 4 years out from the completion of treatment and remains clinically and radiologically RICHARD.    PLAN:   1. Follow-up in radiation oncology clinic in January with Dr. Wilson  2. CT neck and chest in 2023      Cancer surveillance and follow-up schedule:   -recommend continued follow-up with ENT for exams.  See radiation oncology every 3 months for 2-3 years and then every 6 months.  Continue to follow with medical oncology.     Potential long term side effects: Given treatment with platinum-based chemotherapy and radiation, the patient is at risk for potential long term side effects, as follows:     Neurotoxicity: At risk for cisplatin-induced neuropathy and ototoxicity.   - Would not expect to develop as a late side effect     GI toxicity:  - Treatment can cause dysphagia and risk for aspiration. If patient has ongoing difficulties swallowing,  unexplained weight loss, or recurrent lung infections, recommend revising with speech language specialist.     Renal toxicity:  - If renal impairment developed on treatment, monitor blood pressure and renal function as indicated by primary care team     Cardiovascular toxicity:   - Exposure to cisplatin can sometimes cause high cholesterol levels earlier that would be expected based on age. Recommend routine lipid testing annually. Cisplatin can also cause vascular changes that can affect blood circulation, and risk of hypertension, stroke, angina, MI.   - Radiation to the neck can lead to carotid stenosis. Recommend a carotid US 5-10 years post-treatment, to be arranged by primary care.  - Patients should see primary care provider annually with aggressive management of cardiac risk factors (hypertension, blood glucose, lipids) as indicated, and should exercise regularly.     Dermatologic toxicity:  - Radiation can cause damage to the skin, including color and texture changes in the area of radiation, tightness of the skin, increased skin sensitivity, and increased risk of skin cancers  - Recommend annual skin exam, let primary care team or dermatologist know if any new skin concerns     Endocrine Toxicity:  - Risk for hypothyroidism. Monitor TSH every 6-12 months with treatment as indicated. This most commonly manifests 2-5 years after treatment but can by up to 10 or more years after.  - Risk for osteoporosis. Recommend intake of at least 3212-6109 mg calcium daily with at least 800 international units vitamin D daily. Dexa scans as recommended by primary care team     Lymphedema: Can occur at any time, secondary to radiation and/or surgeries.  - Lymphedema therapy can be very helpful if you develop swelling in the face or neck area     Anxiety, depression, trauma, and distress:  -This is not uncommon with a cancer diagnosis and treatment.  Speaking with a counselor can be very helpful.      -Reviewed cancer  support resources.     Cognitive function:  - Encouraged organizational strategies (ie notebooks, planners, reminders, smart phone technology), routine physical activity, limiting use of alcohol, considering yoga, meditation, mindfulness-based stress reduction, and cognitive training (Ie brain games)     Fatigue:  - Can consider CBC, CPM, TSH  - Encourage continued exercise     Screening for subsequent new primary cancers:  - Recommended that the patient undergo routine screening for their gender and age, to be arranged by primary care provider  - Low dose CT chest as indicated if patient has smoking history, to be arranged by primary care provider  - There is a rare risk of secondary cancers developing after chemothearpy, most commonly MDS, leukemia, or lymphoma. This typically occurs 4-10 years after therapy but can by as soon as 1-3 years. Recommend annual CBC. Monitoring for consitutional symptoms fevers, night sweats, fatigue, unexplained weight loss.      Healthy behavior recommendations:  - Recommend healthy BMI, engaging in at least 150 minutes of moderate activity weekly, and healthy diet high in vegetables, fruits, and whole grains; and low in sugars and saturated fats; practicing sun safety with use of sunscreen of at least 30, that protects against UVA and UVB rays, and is water resistant.  - Dental exams and cleanings every 6-12 months.  Fluoride treatments.  - Eye exams every 1-2 years or as indicated  - PCP visit annually     Immunizations:  - Recommend influenza vaccination annually  - Recombinant zoster vaccination in all survivors aged 50 and over  -COVID vaccinations. Reviewed recommendations for vaccination.    Geraldine Elliott NP  Radiation Oncology

## 2022-09-18 ENCOUNTER — HEALTH MAINTENANCE LETTER (OUTPATIENT)
Age: 72
End: 2022-09-18

## 2023-01-17 ENCOUNTER — OFFICE VISIT (OUTPATIENT)
Dept: OTOLARYNGOLOGY | Facility: CLINIC | Age: 73
End: 2023-01-17
Payer: COMMERCIAL

## 2023-01-17 ENCOUNTER — TELEPHONE (OUTPATIENT)
Dept: OTOLARYNGOLOGY | Facility: CLINIC | Age: 73
End: 2023-01-17

## 2023-01-17 VITALS
BODY MASS INDEX: 25.87 KG/M2 | SYSTOLIC BLOOD PRESSURE: 167 MMHG | DIASTOLIC BLOOD PRESSURE: 96 MMHG | HEART RATE: 87 BPM | WEIGHT: 191 LBS | HEIGHT: 72 IN

## 2023-01-17 DIAGNOSIS — C01 MALIGNANT NEOPLASM OF BASE OF TONGUE (H): Primary | ICD-10-CM

## 2023-01-17 PROCEDURE — 31575 DIAGNOSTIC LARYNGOSCOPY: CPT | Performed by: OTOLARYNGOLOGY

## 2023-01-17 PROCEDURE — 99213 OFFICE O/P EST LOW 20 MIN: CPT | Mod: 25 | Performed by: OTOLARYNGOLOGY

## 2023-01-17 RX ORDER — LISINOPRIL 10 MG/1
10 TABLET ORAL
COMMUNITY
Start: 2022-12-14

## 2023-01-17 RX ORDER — VITAMIN E 268 MG
400 CAPSULE ORAL
COMMUNITY

## 2023-01-17 ASSESSMENT — PAIN SCALES - GENERAL: PAINLEVEL: NO PAIN (0)

## 2023-01-17 NOTE — LETTER
2023       RE: Lazaro Porter  [address] [facility] [address]     Dear Colleague,    Thank you for referring your patient, Lazaro Porter, to the [facility] EAR NOSE AND THROAT CLINIC [place] [facility]. Please see a copy of my visit note below.      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 At our last visit, he reported ongoing dry mouth. CT neck did not show evidence of recurrent disease, and CT chest does not demonstrate metastasis.     Today, he reports that he is doing well, although he continues to endure mild tinnitus.    Review of Systems:   Pertinent items are noted in HPI or as in patient entered ROS below, remainder of complete ROS is negative.    ENT ROS 2020   Constitutional: -   Neurology: -   Ears, Nose, Throat: -   Gastrointestinal/Genitourinary: -   Endocrine: Thirst        Physical Exam:   BP (!) 167/96 (BP Location: Right arm, Patient Position: Sitting, Cuff Size: Adult Regular)   Pulse 87   Ht 1.829 m (6')   Wt 86.6 kg (191 lb)   BMI 25.90 kg/m       PHYSICAL EXAMINATION:    Constitutional:  The patient was unaccompanied, well-groomed, and in no acute distress.    Skin:  Warm and pink.    Neurologic:  Alert and oriented x 3.  CN's III-XII within normal limits.  Voice normal.   Psychiatric:  The patient's affect was calm, cooperative, and appropriate.   Respiratory:  Breathing comfortably without stridor or exertion of accessory muscles.    Eyes: Extraocular movement intact.    Head:  Normocephalic and atraumatic.  No lesions or scars.    Ears:  Pinnae and tragus non-tender.  EAC's and TM's were clear.    Nose:  Sinuses were  non-tender.  Anterior rhinoscopy revealed midline septum and absence of purulence or polyps.    OC/OP:  Normal tongue, floor of mouth, buccal mucosa, and palate.  No lesions or masses on inspection or palpation.  No abnormal lymph tissue in the oropharynx.  The pterygoid region is non-tender. Radiation healing in oropharynx. Stringy saliva.  Neck:  Supple with normal laryngeal and tracheal landmarks.  The parotid beds were without masses.  No palpable thyroid.  Lymphatic:  There is no palpable lymphadenopathy in the neck.      Fiberoptic Endoscopy:  Consent for fiberoptic laryngoscopy was obtained, and we confirmed correctness of procedure and identity of patient.  Fiberoptic laryngoscopy was indicated due to history of cancer.  The nose was topically decongested and anesthetized.  The fiberoptic laryngoscope was passed under endoscopic vision.  The turbinates were normal.  The inferior and middle meati were clear bilaterally without purulence, masses, or polyps.  The nasopharynx was clear.  The Eustachian tubes were clear.  The soft palate appeared normal with good mobility.  The epiglottis was sharp and the visualized portion of the vallecula was clear.  No vocal cord inflammation. There were mobile vocal cords.  The arytenoids were clear and there was no pooling in the hypopharynx.      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 On exam and scope, he is stable. He will follow up in 6 months. If he is stable at the next appointment, we can move out to 1-year visits.     Scribe Disclosure:  I, Bridget Barbosa, am serving as a scribe to document services personally performed by Garcia Worthy MD at this visit, based upon the provider's statements to me. All documentation has been reviewed by the aforementioned provider prior to being entered into the official medical record.            Again, thank you for allowing me to participate in the care of your patient.      Sincerely,    Garcia Worthy MD

## 2023-01-17 NOTE — PATIENT INSTRUCTIONS
"You were seen in the clinic today by Dr. Worthy. If you have any questions or concerns after your appointment, please call the clinic at [phone]. Press \"1\" for scheduling, press \"3\" for nurse advice.    2.   The following has been recommended for you based upon your appointment today:   -Plan to return the clinic in 6 months for routine surveillance follow-up.    Lashawn HERNANDEZ, RN  [facility]  Department of Otolaryngology  [phone]      "

## 2023-01-17 NOTE — PROGRESS NOTES
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 At our last visit, he reported ongoing dry mouth. CT neck did not show evidence of recurrent disease, and CT chest does not demonstrate metastasis.     Today, he reports that he is doing well, although he continues to endure mild tinnitus.    Review of Systems:   Pertinent items are noted in HPI or as in patient entered ROS below, remainder of complete ROS is negative.    ENT ROS 2020   Constitutional: -   Neurology: -   Ears, Nose, Throat: -   Gastrointestinal/Genitourinary: -   Endocrine: Thirst        Physical Exam:   BP (!) 167/96 (BP Location: Right arm, Patient Position: Sitting, Cuff Size: Adult Regular)   Pulse 87   Ht 1.829 m (6')   Wt 86.6 kg (191 lb)   BMI 25.90 kg/m       PHYSICAL EXAMINATION:    Constitutional:  The patient was unaccompanied, well-groomed, and in no acute distress.    Skin:  Warm and pink.    Neurologic:  Alert and oriented x 3.  CN's III-XII within normal limits.  Voice normal.   Psychiatric:  The patient's affect was calm, cooperative, and appropriate.   Respiratory:  Breathing comfortably without stridor or exertion of accessory muscles.    Eyes: Extraocular movement intact.    Head:  Normocephalic and atraumatic.  No lesions or scars.    Ears:  Pinnae and tragus non-tender.  EAC's and TM's were clear.    Nose:  Sinuses were non-tender.  Anterior rhinoscopy revealed midline septum and absence of purulence or polyps.    OC/OP:  Normal tongue, floor of mouth, buccal mucosa, and palate.  No lesions or masses on inspection or palpation.  No abnormal lymph tissue in the oropharynx.  The pterygoid region is non-tender. Radiation healing in oropharynx.  Stringy saliva.  Neck:  Supple with normal laryngeal and tracheal landmarks.  The parotid beds were without masses.  No palpable thyroid.  Lymphatic:  There is no palpable lymphadenopathy in the neck.      Fiberoptic Endoscopy:  Consent for fiberoptic laryngoscopy was obtained, and we confirmed correctness of procedure and identity of patient.  Fiberoptic laryngoscopy was indicated due to history of cancer.  The nose was topically decongested and anesthetized.  The fiberoptic laryngoscope was passed under endoscopic vision.  The turbinates were normal.  The inferior and middle meati were clear bilaterally without purulence, masses, or polyps.  The nasopharynx was clear.  The Eustachian tubes were clear.  The soft palate appeared normal with good mobility.  The epiglottis was sharp and the visualized portion of the vallecula was clear.  No vocal cord inflammation. There were mobile vocal cords.  The arytenoids were clear and there was no pooling in the hypopharynx.      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 On exam and scope, he is stable. He will follow up in 6 months. If he is stable at the next appointment, we can move out to 1-year visits.     Scribe Disclosure:  IBridget, am serving as a scribe to document services personally performed by Garcia Worthy MD at this visit, based upon the provider's statements to me. All documentation has been reviewed by the aforementioned provider prior to being entered into the official medical record.

## 2023-01-18 ENCOUNTER — OFFICE VISIT (OUTPATIENT)
Dept: RADIATION ONCOLOGY | Facility: CLINIC | Age: 73
End: 2023-01-18
Attending: NURSE PRACTITIONER
Payer: COMMERCIAL

## 2023-01-18 VITALS
WEIGHT: 191 LBS | BODY MASS INDEX: 25.9 KG/M2 | SYSTOLIC BLOOD PRESSURE: 204 MMHG | HEART RATE: 78 BPM | DIASTOLIC BLOOD PRESSURE: 106 MMHG

## 2023-01-18 DIAGNOSIS — I65.23 OCCLUSION AND STENOSIS OF BILATERAL CAROTID ARTERIES: ICD-10-CM

## 2023-01-18 DIAGNOSIS — C01 MALIGNANT NEOPLASM OF BASE OF TONGUE (H): Primary | ICD-10-CM

## 2023-01-18 PROCEDURE — 99213 OFFICE O/P EST LOW 20 MIN: CPT | Mod: GC | Performed by: RADIOLOGY

## 2023-01-18 PROCEDURE — G0463 HOSPITAL OUTPT CLINIC VISIT: HCPCS | Performed by: RADIOLOGY

## 2023-01-18 NOTE — PROGRESS NOTES
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 He was initially diagnosed after he presented with a palpable right neck mass with staging evaluation demonstrating a small primary lesion within the right tongue base with an enlarged right level II lymph node encasing the right carotid artery and concerning for extranodal extension. He received definitive-intent chemoradiotherapy as described above, receiving a total dose of 70 Gy in 33 fractions with concurrent high-dose cisplatin which was dose-reduced for his third infusion due to treatment-related cytopenias.      Mr. Porter returns to radiation oncology clinic today for a routine posttreatment disease surveillance visit. On exam, he reports that he is doing well and has no pressing concerns or complaints. He describes ongoing, stable xerostomia which is not significantly limiting his diet and is well controlled by continuing increased fluid intake. He specifically reports no odynophagia or dysphagia with his remaining ROS likewise negative. He was recently seen by Dr. Worthy in ENT clinic yesterday (2023) with examination including endoscopic evaluation revealing no evidence of disease.    PHYSICAL EXAM:    Weight: 86.6 kg  BP: 204/106  Pulse: 78    General: Healthy-appearing 72 year old gentleman seated comfortably in an examination chair in [facility]  HEENT: NC/AT. EOMI. No rhinorrhea or epistaxis. Mildly dry mucus membranes. No visible oral cavity lesions. Palpation of the bilateral buccal mucosa, gingiva, FOM, oral tongue, bilateral base of tongue and tonsillar fossae reveals no induration, nodularity or masses.  Neck: Mild fibrosis of the bilateral neck. No palpable cervical adenopathy.  Pulmonary: No wheezing, stridor or respiratory distress  Skin: Mild scattered telangiectasias of the neck (right > left).      LABS AND IMAGIN2022 labs:  TSH: 1.64    IMPRESSION:   Mr. 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 He is over 4 years out from completion of treatment and is clinically RICHARD.    PLAN:   1. Follow-up in radiation oncology clinic with NP in 6 months  2. Ultrasound carotid with next follow-up visit  3. Repeat TSH in 3/2023    Matt Wilks MD  PGY-2 Radiation Oncology  Department of Radiation Oncology  [facility]  Phone: [phone]      Attending addendum:   I saw and examined the patient with the resident and agree with the documented plan of care.    Nathan Garay MD/PhD    Dept of Radiation Oncology  [facility]

## 2023-01-18 NOTE — PROGRESS NOTES
FOLLOW-UP VISIT    Patient Name: Lazaro Porter      : 1950     Age: 72 year old        ______________________________________________________________________________     Chief Complaint   Patient presents with     Cancer     Follow up:Base of Tongue Cancer: Radiation dose 6996 cGy completed 10/13/18         Pain  Denies    Labs  Other Labs: No    Imaging  CT: No      Dental:   Most Recent Dental Visit: No      Speech/Swallowing:   Most Recent evaluation or testing: No  Swallowing Restrictions: No difficulties with swallowing    Trismus/Jaw Exercises: Sometimes    Nutrition:    Weight:   Wt Readings from Last 3 Encounters:   23 86.6 kg (191 lb)   22 89.4 kg (197 lb)   22 86.2 kg (190 lb)         Oral Symptoms:   Xerostomia:1- Symptomatic without significant dietary alteration; unstimulated saliva flow >0.2 ml/min  Dysphagia: 0-None  Mucositis Oral Symptoms: 0-None  Mucositis: 0- None  Esophagitis:0- None    Other Appointments:     MD Name:  Appointment Date:    MD Name: Appointment Date:   MD Name: Appointment Date:   Other Appointment Notes:     Residual Radiation side effect: No concerns     2023   11:15 AM  Scope used today  #2 Pentax Nasolaryngoscope FNL 10P2 Serial number [id number]

## 2023-01-18 NOTE — LETTER
2023         RE: Lazaro Porter  [address] [facility] [address]      FOLLOW-UP VISIT    Patient Name: Lazaro Porter      : 1950     Age: 72 year old        ______________________________________________________________________________     Chief Complaint   Patient presents with     Cancer     Follow up:Base of Tongue Cancer: Radiation dose 6996 cGy completed 10/13/18         Pain  Denies    Labs  Other Labs: No    Imaging  CT: No      Dental:   Most Recent Dental Visit: No      Speech/Swallowing:   Most Recent evaluation or testing: No  Swallowing Restrictions: No difficulties with swallowing    Trismus/Jaw Exercises: Sometimes    Nutrition:    Weight:   Wt Readings from Last 3 Encounters:   23 86.6 kg (191 lb)   22 89.4 kg (197 lb)   22 86.2 kg (190 lb)         Oral Symptoms:   Xerostomia:1- Symptomatic without significant dietary alteration; unstimulated saliva flow >0.2 ml/min  Dysphagia: 0-None  Mucositis Oral Symptoms: 0-None  Mucositis: 0- None  Esophagitis:0- None    Other Appointments:     MD Name:  Appointment Date:    MD Name: Appointment Date:   MD Name: Appointment Date:   Other Appointment Notes:     Residual Radiation side effect: No concerns     2023   11:15 AM  Scope used today  #2 Pentax Nasolaryngoscope FNL 10P2 Serial number [id number]         Department of Radiation Oncology  [address] [address]  [phone]       Radiation Oncology Follow-up Visit  2023    Lazaro Porter  MRN: [id number]   : 1950     DISEASE TREATED:   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 He was initially diagnosed after he presented with a palpable right neck mass with staging evaluation demonstrating a small primary lesion within the right tongue base with an enlarged right level II lymph node encasing the right carotid artery and concerning for extranodal extension. He received definitive-intent chemoradiotherapy as described above, receiving a total dose of 70 Gy in 33 fractions with concurrent high-dose cisplatin which was dose-reduced for his third infusion due to treatment-related cytopenias.      Mr. Porter returns to radiation oncology clinic today for a routine posttreatment disease surveillance visit. On exam, he reports that he is doing well and has no pressing concerns or complaints. He describes ongoing, stable xerostomia which is not significantly limiting his diet and is well controlled by continuing increased fluid intake. He specifically reports no odynophagia or dysphagia with his remaining ROS likewise negative. He was recently seen by Dr. Worthy in ENT clinic yesterday (1/17/2023) with examination including endoscopic evaluation revealing no evidence of disease.    PHYSICAL EXAM:   Weight: 86.6 kg  BP: 204/106  Pulse: 78    General: Healthy-appearing 72 year old gentleman seated comfortably in an examination chair in [facility]  HEENT: NC/AT. EOMI. No rhinorrhea or epistaxis. Mildly dry mucus membranes. No visible oral cavity lesions. Palpation of the bilateral buccal mucosa, gingiva, FOM, oral tongue, bilateral base of tongue and tonsillar fossae reveals no induration, nodularity or masses.  Neck: Mild fibrosis of the bilateral neck. No palpable cervical adenopathy.  Pulmonary: No wheezing, stridor or respiratory distress  Skin: Mild scattered telangiectasias of the neck (right > left).       LABS AND IMAGIN2022 labs:  TSH: 1.64    IMPRESSION:   Mr. 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 He is over 4 years out from completion of treatment and is clinically RICHARD.    PLAN:   1. Follow-up in radiation oncology clinic with NP in 6 months  2. Ultrasound carotid with next follow-up visit  3. Repeat TSH in 3/2023    Matt Wilks MD  PGY-2 Radiation Oncology  Department of Radiation Oncology  [facility]  Phone: [phone]      Attending addendum:   I saw and examined the patient with the resident and agree with the documented plan of care.    Nathan Garay MD/PhD    Dept of Radiation Oncology  [facility]

## 2023-07-05 ENCOUNTER — PATIENT OUTREACH (OUTPATIENT)
Dept: GASTROENTEROLOGY | Facility: CLINIC | Age: 73
End: 2023-07-05
Payer: COMMERCIAL

## 2023-07-05 NOTE — PROGRESS NOTES
Following EUS 8/3/21, Dr. Singh recommends: Will recommend MRI with IV Gadolinium in 2-3 years for surveillance which can be arranged with the primary provider. Will not pursue surveillance beyond 74 years of age.    Called patient to discuss. He would like to speak with his oncology team to determine if they need imaging as well. Provided patient with my contact information.     Annette Ascencio, RN Care Coordinator

## 2023-09-26 ENCOUNTER — OFFICE VISIT (OUTPATIENT)
Dept: OTOLARYNGOLOGY | Facility: CLINIC | Age: 73
End: 2023-09-26
Payer: COMMERCIAL

## 2023-09-26 ENCOUNTER — OFFICE VISIT (OUTPATIENT)
Dept: RADIATION ONCOLOGY | Facility: CLINIC | Age: 73
End: 2023-09-26
Attending: NURSE PRACTITIONER
Payer: COMMERCIAL

## 2023-09-26 ENCOUNTER — HOSPITAL ENCOUNTER (OUTPATIENT)
Dept: ULTRASOUND IMAGING | Facility: CLINIC | Age: 73
Discharge: HOME | End: 2023-09-26
Attending: RADIOLOGY | Admitting: RADIOLOGY
Payer: COMMERCIAL

## 2023-09-26 VITALS
RESPIRATION RATE: 18 BRPM | OXYGEN SATURATION: 96 % | HEART RATE: 64 BPM | WEIGHT: 190.2 LBS | SYSTOLIC BLOOD PRESSURE: 189 MMHG | DIASTOLIC BLOOD PRESSURE: 99 MMHG | BODY MASS INDEX: 25.8 KG/M2

## 2023-09-26 VITALS — HEIGHT: 72 IN | BODY MASS INDEX: 25.6 KG/M2 | WEIGHT: 189 LBS

## 2023-09-26 DIAGNOSIS — C01 MALIGNANT NEOPLASM OF BASE OF TONGUE (H): Primary | ICD-10-CM

## 2023-09-26 DIAGNOSIS — I65.23 OCCLUSION AND STENOSIS OF BILATERAL CAROTID ARTERIES: ICD-10-CM

## 2023-09-26 DIAGNOSIS — C01 MALIGNANT NEOPLASM OF BASE OF TONGUE (H): ICD-10-CM

## 2023-09-26 PROCEDURE — 99214 OFFICE O/P EST MOD 30 MIN: CPT | Mod: 25 | Performed by: OTOLARYNGOLOGY

## 2023-09-26 PROCEDURE — G0463 HOSPITAL OUTPT CLINIC VISIT: HCPCS | Performed by: NURSE PRACTITIONER

## 2023-09-26 PROCEDURE — 93880 EXTRACRANIAL BILAT STUDY: CPT | Mod: 26 | Performed by: RADIOLOGY

## 2023-09-26 PROCEDURE — 99213 OFFICE O/P EST LOW 20 MIN: CPT | Performed by: NURSE PRACTITIONER

## 2023-09-26 PROCEDURE — 31575 DIAGNOSTIC LARYNGOSCOPY: CPT | Performed by: OTOLARYNGOLOGY

## 2023-09-26 PROCEDURE — 93880 EXTRACRANIAL BILAT STUDY: CPT

## 2023-09-26 ASSESSMENT — PAIN SCALES - GENERAL
PAINLEVEL: NO PAIN (0)
PAINLEVEL: NO PAIN (0)

## 2023-09-26 NOTE — LETTER
2023         RE: Lazaro Porter  [address] [place] [id number]        Dear Colleague,    Thank you for referring your patient, Lazaro Porter, to the [facility] RADIATION ONCOLOGY. Please see a copy of my visit note below.    FOLLOW-UP VISIT    Patient Name: Lazaro Porter      : 1950     Age: 73 year old        ______________________________________________________________________________     Chief Complaint   Patient presents with     Oncology Clinic Visit     Radiation Therapy     Radiation therapy follow up     BP (!) 189/99   Pulse 64   Resp 18   Wt 86.3 kg (190 lb 3.2 oz)   SpO2 96%   BMI 25.80 kg/m       Date Radiation Completed: 10/13/2018    Pain  Denies    Labs  Other Labs: No    Imaging  None        Pt was noted to have elevated BP and he was advised to check record his BP at home for about a week then follow up with his primary care provider           Department of Radiation Oncology  [address] [address] [phone]       Radiation Oncology Follow-up Visit  Sep 26, 2023    Lazaro Porter  MRN: [id number]   : 1950     DISEASE TREATED:   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 He was initially diagnosed after he presented with a palpable right neck mass with staging evaluation demonstrating a small primary lesion within the right tongue base with an enlarged right level II lymph node encasing the right  carotid artery and concerning for extranodal extension. He received definitive-intent chemoradiotherapy as described above, receiving a total dose of 70 Gy in 33 fractions with concurrent high-dose cisplatin which was dose-reduced for his third infusion due to treatment-related cytopenias.      Mr. Porter returns to radiation oncology clinic today for a routine posttreatment disease surveillance visit. On exam, he reports that he is doing well and has no pressing concerns or complaints. He describes ongoing, stable xerostomia which is not significantly limiting his diet and is well controlled by continuing increased fluid intake. He specifically reports no odynophagia or dysphagia with his remaining ROS likewise negative. He will see Dr. Worthy in ENT clinic today.     PHYSICAL EXAM:   BP (!) 189/99   Pulse 64   Resp 18   Wt 86.3 kg (190 lb 3.2 oz)   SpO2 96%   BMI 25.80 kg/m        General: Healthy-appearing 72 year old gentleman seated comfortably in an examination chair in [facility]  HEENT: NC/AT. EOMI. No rhinorrhea or epistaxis. Mildly dry mucus membranes. No visible oral cavity lesions. Palpation of the bilateral buccal mucosa, gingiva, FOM, oral tongue, bilateral base of tongue and tonsillar fossae reveals no induration, nodularity or masses.  Neck: Mild fibrosis of the bilateral neck. No palpable cervical adenopathy.  Pulmonary: No wheezing, stridor or respiratory distress  Skin: Mild scattered telangiectasias of the neck (right > left).      LABS AND IMAGIN2022 labs:  TSH: 1.64    IMPRESSION:   Mr. 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 He is almost 5 years out from completion of treatment and is clinically RICHARD.    PLAN:   Follow-up in radiation oncology clinic PRN  Ultrasound carotid done today.  Will mail results.  Repeat TSH at next primary care visit.    Geraldine Elliott NP  Dept of Radiation  Oncology  [facility]        Again, thank you for allowing me to participate in the care of your patient.        Sincerely,        LORIE Chisholm CNP

## 2023-09-26 NOTE — PROGRESS NOTES
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 He was initially diagnosed after he presented with a palpable right neck mass with staging evaluation demonstrating a small primary lesion within the right tongue base with an enlarged right level II lymph node encasing the right carotid artery and concerning for extranodal extension. He received definitive-intent chemoradiotherapy as described above, receiving a total dose of 70 Gy in 33 fractions with concurrent high-dose cisplatin which was dose-reduced for his third infusion due to treatment-related cytopenias.      Mr. Porter returns to radiation oncology clinic today for a routine posttreatment disease surveillance visit. On exam, he reports that he is doing well and has no pressing concerns or complaints. He describes ongoing, stable xerostomia which is not significantly limiting his diet and is well controlled by continuing increased fluid intake. He specifically reports no odynophagia or dysphagia with his remaining ROS likewise negative. He will see Dr. Worthy in ENT clinic today.     PHYSICAL EXAM:   BP (!) 189/99   Pulse 64   Resp 18   Wt 86.3 kg (190 lb 3.2 oz)   SpO2 96%   BMI 25.80 kg/m        General:  Healthy-appearing 72 year old gentleman seated comfortably in an examination chair in [facility]  HEENT: NC/AT. EOMI. No rhinorrhea or epistaxis. Mildly dry mucus membranes. No visible oral cavity lesions. Palpation of the bilateral buccal mucosa, gingiva, FOM, oral tongue, bilateral base of tongue and tonsillar fossae reveals no induration, nodularity or masses.  Neck: Mild fibrosis of the bilateral neck. No palpable cervical adenopathy.  Pulmonary: No wheezing, stridor or respiratory distress  Skin: Mild scattered telangiectasias of the neck (right > left).      LABS AND IMAGIN2022 labs:  TSH: 1.64    IMPRESSION:   Mr. 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 He is almost 5 years out from completion of treatment and is clinically RICHARD.    PLAN:   Follow-up in radiation oncology clinic PRN  Ultrasound carotid done today.  Will mail results.  Repeat TSH at next primary care visit.    Geraldine Elliott NP  Dept of Radiation Oncology  [facility]

## 2023-09-26 NOTE — LETTER
2023       RE: Lazaro Porter  [address] [facility] [address]     Dear Colleague,    Thank you for referring your patient, Lazaro Porter, to the [facility] [place] [facility]. Please see a copy of my visit note below.      Otolaryngology Clinic    Name: Lazaro Porter  MRN: [id number]  Age: 72 year old  : 2023      Chief Complaint:   Follow up     History of Present Illness:   Lazaro Porter is a 72 year old male with a history of T1N1 SCC of the tongue base s/p chemoradiation therapy and resection who presents for follow up. At our last visit, he reported ongoing dry mouth. CT neck did not show evidence of recurrent disease, and CT chest does not demonstrate metastasis.     Today, he reports that he is doing well, although he continues to endure mild tinnitus.    Review of Systems:   Pertinent items are noted in HPI or as in patient entered ROS below, remainder of complete ROS is negative.       2020     1:32 PM    ENT ROS   Endocrine Thirst        Physical Exam:   Ht 1.829 m (6')   Wt 85.7 kg (189 lb)   BMI 25.63 kg/m       PHYSICAL EXAMINATION:    Constitutional:  The patient was unaccompanied, well-groomed, and in no acute distress.    Skin:  Warm and pink.    Neurologic:  Alert and oriented x 3.  CN's III-XII within normal limits.  Voice normal.   Psychiatric:  The patient's affect was calm, cooperative, and appropriate.   Respiratory:  Breathing comfortably without stridor or exertion of accessory muscles.    Eyes: Extraocular movement intact.    Head:  Normocephalic and atraumatic.  No lesions or scars.    Ears:  Pinnae and tragus non-tender.  EAC's and TM's were clear.    Nose:  Sinuses were non-tender.  Anterior rhinoscopy revealed midline septum and absence of purulence or polyps.    OC/OP:  Normal tongue, floor of mouth, buccal mucosa, and palate.  No lesions or masses on  inspection or palpation.  No abnormal lymph tissue in the oropharynx.  The pterygoid region is non-tender. Radiation healing in oropharynx. Stringy saliva.  Neck:  Supple with normal laryngeal and tracheal landmarks.  The parotid beds were without masses.  No palpable thyroid.  Lymphatic:  There is no palpable lymphadenopathy in the neck.      Fiberoptic Endoscopy:  Consent for fiberoptic laryngoscopy was obtained, and we confirmed correctness of procedure and identity of patient.  Fiberoptic laryngoscopy was indicated due to history of cancer.  The nose was topically decongested and anesthetized.  The fiberoptic laryngoscope was passed under endoscopic vision.  The turbinates were normal.  The inferior and middle meati were clear bilaterally without purulence, masses, or polyps.  The nasopharynx was clear.  The Eustachian tubes were clear.  The soft palate appeared normal with good mobility.  The epiglottis was sharp and the visualized portion of the vallecula was clear.  No vocal cord inflammation. There were mobile vocal cords.  The arytenoids were clear and there was no pooling in the hypopharynx.      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 On exam and scope, he is stable. He will follow up in 12-18  months.       Again, thank you for allowing me to participate in the care of your patient.      Sincerely,    Garcia Worthy MD

## 2023-09-26 NOTE — PROGRESS NOTES
FOLLOW-UP VISIT    Patient Name: Lazaro Porter      : 1950     Age: 73 year old        ______________________________________________________________________________     Chief Complaint   Patient presents with    Oncology Clinic Visit    Radiation Therapy     Radiation therapy follow up     BP (!) 189/99   Pulse 64   Resp 18   Wt 86.3 kg (190 lb 3.2 oz)   SpO2 96%   BMI 25.80 kg/m       Date Radiation Completed: 10/13/2018    Pain  Denies    Labs  Other Labs: No    Imaging  None        Pt was noted to have elevated BP and he was advised to check record his BP at home for about a week then follow up with his primary care provider

## 2023-09-26 NOTE — PROGRESS NOTES
Otolaryngology Clinic    Name: Lazaro Porter  MRN: [id number]  Age: 72 year old  : 2023      Chief Complaint:   Follow up     History of Present Illness:   Lazaro Porter is a 72 year old male with a history of T1N1 SCC of the tongue base s/p chemoradiation therapy and resection who presents for follow up. At our last visit, he reported ongoing dry mouth. CT neck did not show evidence of recurrent disease, and CT chest does not demonstrate metastasis.     Today, he reports that he is doing well, although he continues to endure mild tinnitus.    Review of Systems:   Pertinent items are noted in HPI or as in patient entered ROS below, remainder of complete ROS is negative.       2020     1:32 PM    ENT ROS   Endocrine Thirst        Physical Exam:   Ht 1.829 m (6')   Wt 85.7 kg (189 lb)   BMI 25.63 kg/m       PHYSICAL EXAMINATION:    Constitutional:  The patient was unaccompanied, well-groomed, and in no acute distress.    Skin:  Warm and pink.    Neurologic:  Alert and oriented x 3.  CN's III-XII within normal limits.  Voice normal.   Psychiatric:  The patient's affect was calm, cooperative, and appropriate.   Respiratory:  Breathing comfortably without stridor or exertion of accessory muscles.    Eyes: Extraocular movement intact.    Head:  Normocephalic and atraumatic.  No lesions or scars.    Ears:  Pinnae and tragus non-tender.  EAC's and TM's were clear.    Nose:  Sinuses were non-tender.  Anterior rhinoscopy revealed midline septum and absence of purulence or polyps.    OC/OP:  Normal tongue, floor of mouth, buccal mucosa, and palate.  No lesions or masses on inspection or palpation.  No abnormal lymph tissue in the oropharynx.  The pterygoid region is non-tender. Radiation healing in oropharynx. Stringy saliva.  Neck:  Supple with normal laryngeal and tracheal landmarks.  The parotid beds were without masses.  No palpable thyroid.  Lymphatic:  There is no palpable  Addended by: MARIAJOSE CORNELL on: 8/15/2023 11:58 AM     Modules accepted: Level of Service     lymphadenopathy in the neck.      Fiberoptic Endoscopy:  Consent for fiberoptic laryngoscopy was obtained, and we confirmed correctness of procedure and identity of patient.  Fiberoptic laryngoscopy was indicated due to history of cancer.  The nose was topically decongested and anesthetized.  The fiberoptic laryngoscope was passed under endoscopic vision.  The turbinates were normal.  The inferior and middle meati were clear bilaterally without purulence, masses, or polyps.  The nasopharynx was clear.  The Eustachian tubes were clear.  The soft palate appeared normal with good mobility.  The epiglottis was sharp and the visualized portion of the vallecula was clear.  No vocal cord inflammation. There were mobile vocal cords.  The arytenoids were clear and there was no pooling in the hypopharynx.      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 On exam and scope, he is stable. He will follow up in 12-18  months.

## 2023-09-26 NOTE — PATIENT INSTRUCTIONS
"You were seen in the clinic today by Dr. Worthy. If you have any questions or concerns after your appointment, please call the clinic at [phone]. Press \"1\" for scheduling, press \"3\" for nurse advice.    2.   The following has been recommended for you based upon your appointment today:   -Plan to return to the clinic in 1-2 years for follow-up.    Lashawn HERNANDEZ, RN  [facility]  Department of Otolaryngology  [phone]     "

## 2023-12-09 NOTE — LETTER
2022         RE: Lazaro Porter  [address] [facility] [address]      FOLLOW-UP VISIT    Patient Name: Lazaro Porter      : 1950     Age: 71 year old        ______________________________________________________________________________     Chief Complaint   Patient presents with     Cancer     follow up to radiation treatment      BP (!) 163/94 (BP Location: Right arm, Patient Position: Sitting, Cuff Size: Adult Regular)   Pulse 62   Resp 16   Wt 89.5 kg (197 lb 4.8 oz)   SpO2 99%   BMI 26.76 kg/m       Date Radiation Completed: 10/13/18    Pain  Denies    Labs  Other Labs: Yes: cmp, cbc, tsh     Imaging  CT: chest and neck 2022      Dental:   Most Recent Dental Visit: 2021  No fluoride treatments     Speech/Swallowing:   Most Recent evaluation or testin2019  Swallowing Restrictions: No difficulties with swallowing    Trismus/Jaw Exercises: daily    Nutrition:  Oral Intake: 100%  Weight:   Wt Readings from Last 3 Encounters:   22 89.5 kg (197 lb 4.8 oz)   22 91.3 kg (201 lb 3.2 oz)   22 89.8 kg (198 lb)         Oral Symptoms:   Xerostomia:1- Symptomatic without significant dietary alteration; unstimulated saliva flow >0.2 ml/min  Dysphagia: 0-None  Mucositis Oral Symptoms: 0-None  Mucositis: 0- None  Esophagitis:0- None    Other Appointments:     MD Name: ENT  Appointment Date: 2022   MD Name: Appointment Date:   MD Name: Appointment Date:   Other Appointment Notes:     Residual Radiation side effect: tinnitus      Additional Instructions: follow up     Nurse face-to-face time: Level 2:  5 min face to face time    2022   2:55 PM  Scope used today  #2 Pentax Nasolaryngoscope FNL 10P2 Serial number [id number]         Department of Radiation Oncology  [facility]  [address] [place] [address] [phone]       Radiation Oncology Follow-up Visit  2022      Lazaro Porter  MRN: [id number]   : 1950      DISEASE TREATED:   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 He was initially diagnosed after he presented with a palpable right neck mass with staging evaluation demonstrating a small primary lesion within the right tongue base with an enlarged right level II lymph node encasing the right carotid artery and concerning for extranodal extension. He received definitive-intent chemoradiotherapy as described above, receiving a total dose of 70 Gy in 33 fractions with concurrent high-dose cisplatin which was dose-reduced due to treatment-related cytopenias.      Mr. Porter returns to radiation oncology clinic today for a routine posttreatment disease surveillance visit. On exam, he reports that he is doing well and has no pressing concerns or complaints. He describes ongoing, stable xerostomia which is not significantly limiting his diet. He additionally has no reported oral cavity or oropharyngeal pain, dysphagia, nausea/vomiting, chest pain, dyspnea or abdominal pain with his remaining ROS likewise negative. He had a CT of the neck and chest performed yesterday which demonstrated no evidence of disease.    PHYSICAL EXAM:  Weight: 89.5 kg  BP: 163/94  Pulse: 62    General: Healthy-appearing 71 year old gentleman seated comfortably in an examination chair in [facility]  HEENT: NC/AT. EOMI. No rhinorrhea or epistaxis. Mildly dry mucus membranes. No visible oral cavity lesions. Palpation of the bilateral buccal mucosa, gingiva,  FOM, oral tongue, bilateral base of tongue and tonsillar fossae reveals no induration, nodularity or masses.  Neck: Mild fibrosis of the bilateral neck (right > left). No palpable cervical adenopathy.  Pulmonary: No wheezing, stridor or respiratory distress  Skin: Mild scattered telangiectasias of the neck (right > left).     Flexible Fiberoptic Nasopharyngoscopy:  Consent for fiberoptic laryngoscopy was obtained and I confirmed correctness of procedure and identity of patient. Fiberoptic laryngoscopy was indicated due to post radiotherapy disease surveillance. The nose was topically decongested and anesthetized. The fiberoptic laryngoscope was passed through the right naris under endoscopic vision. The turbinates were normal. The inferior and middle meati were clear without purulence, masses, or polyps. The nasopharynx was clear. The Eustachian tubes were clear. The soft palate appeared normal with good mobility. Passage of the scope into the oropharynx revealed bland-appearing mucosa with no discrete lesions. The laryngeal structures were normal in appearance with mobile cords bilaterally. There was no pooling of secretions within the hypopharynx. The scope was then removed and the procedure was terminated without incident.     LABS AND IMAGIN2022 labs:  TSH: 2.43    2022 CT chest:  No evidence of pulmonary metastases    2022 CT neck:  Post-treatment changes with no evidence of disease    IMPRESSION:   Mr. 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 He is currently over 3 years out from the completion of treatment and remains clinically and radiologically RICHARD.    PLAN:   1. Follow-up in radiation oncology clinic in 6 months with NP and 12 months with MD  2. Repeat TSH in 2022  3. CT neck and chest in 2023    Nathan Garay MD/PhD    Department of Radiation  negative... Oncology  [facility]     - - -

## 2024-02-25 ENCOUNTER — HEALTH MAINTENANCE LETTER (OUTPATIENT)
Age: 74
End: 2024-02-25

## 2024-08-08 ENCOUNTER — TELEPHONE (OUTPATIENT)
Dept: OTOLARYNGOLOGY | Facility: CLINIC | Age: 74
End: 2024-08-08
Payer: COMMERCIAL

## 2024-08-08 NOTE — TELEPHONE ENCOUNTER
Left Voicemail (1st Attempt) for the patient to call back and schedule the following:    Appointment type: Return ENT   Provider: Dr. Worthy  Return date: around   September/ October  Specialty phone number: writer's direct  Additional appointment(s) needed:   Additional Notes: 1 year follow up

## 2024-08-12 ENCOUNTER — TELEPHONE (OUTPATIENT)
Dept: OTOLARYNGOLOGY | Facility: CLINIC | Age: 74
End: 2024-08-12
Payer: COMMERCIAL

## 2024-08-12 NOTE — TELEPHONE ENCOUNTER
Left Voicemail (2nd Attempt) for the patient to call back and schedule the following:    Appointment type: Return ENT   Provider: Dr. Worthy  Return date: September or October     Specialty phone number: [phone]  Additional appointment(s) needed:   Additional Notes: 1 year follow up

## 2024-09-24 NOTE — NURSING NOTE
September 24, 2024     Patient: Jean-Claude Garcia   YOB: 1964   Date of Visit: 9/24/2024       To Whom it May Concern:    Jean-Claude Garcia was seen in my clinic on 9/24/2024 at 11:30 am.    Please be advised Jean-Claude is under my care. 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 If you have any questions, please call the office.  Sincerely,         Kristi Dodson, DO    Medical information is confidential and cannot be disclosed without the written consent of the patient or his representative.     "Oncology Rooming Note    April 9, 2019 3:10 PM   Lazaro Porter is a 68 year old male who presents for:    Chief Complaint   Patient presents with     Oncology Clinic Visit     Survivorship visit     Blood Draw     Labs drawn via  in lab by RN. VS taken. Patient checked into next appointment.     Initial Vitals: /70 (BP Location: Right arm, Patient Position: Sitting, Cuff Size: Adult Regular)   Pulse 64   Temp 97.4  F (36.3  C) (Oral)   Resp 16   Ht 1.829 m (6' 0.01\")   Wt 76.7 kg (169 lb)   SpO2 97%   BMI 22.92 kg/m   Estimated body mass index is 22.92 kg/m  as calculated from the following:    Height as of this encounter: 1.829 m (6' 0.01\").    Weight as of this encounter: 76.7 kg (169 lb). Body surface area is 1.97 meters squared.  No Pain (0) Comment: Data Unavailable   No LMP for male patient.  Allergies reviewed: Yes  Medications reviewed: Yes    Medications: Medication refills not needed today.  Pharmacy name entered into [facility]: [id number] - [place], [place] - [address]    Clinical concerns: None,  Dr Rolon was NOT notified.      SUZANNA HUANG LPN            "

## 2025-04-03 NOTE — PROGRESS NOTES
"MEDICAL ONCOLOGY CLINIC NOTE    REFERRING PROVIDER: Garcia Worthy MD from [facility]  RADIATION ONCOLOGIST: Nathan Garay MD from 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    HISTORY OF PRESENT ILLNESS: Mr. 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 His oncologic history is as under.     Mr. Porter has recovered very well from chemoRT and his main issues currently are - moderate xerostomia, which has been stable and for which he's using artificial saliva and fluoride Rx; and mild lymphedema, which is well controlled with lymphedema therapy. Also doing well with voice therapy. Saw Dr. Worthy from ENT and Dr. Garay from Rad Onc in July 2020 and the endoscopy was negative in Feb 2020. No neck masses or clinical signs/symptoms of disease recurrence. Excellent PO intake and no G-tube or port.     No reported symptoms from the pancreatic tail mass. 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     ONCOLOGIC HISTORY:  1. 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   - Noticed enlarging neck mass in March 2018. In 5/2018 this was reassessed and prompted imaging and ultimately a biopsy of the mass at [facility] ([place], [place]) which demonstrated squamous cell carcinoma.   - PET/CT 7/10/18: \"3.5 x 2.2 cm hypermetabolic right level 2 metastatic lymphadenopathy. There is imaging findings suggestive of extranodal extension. The mass is inseparable from the right SCM and abuts the right internal carotid artery about 180 degrees. No " "abnormal focal FDG uptake along the mucosal spaces to suggest a primary squamous cell cancer focus. Several subcentimeter pulmonary nodules with no increased FDG uptake. Attention on follow up is recommended. Multiple, too small to characterize hypodensities in the liver. A 9 mm nodule in the right adrenal gland, without increased FDG uptake. A 7 mm hypodensity in the pancreatic tail with no increased FDG uptake, may be a side branch IPMN. Tiny lucencies in the left iliac bone without increased FDG uptake, possibly focal osteoporotic areas.\"  - Seen by ENT and underwent DL with directed biopsies on 7/18/18 with pathology showing no evidence of malignancy within the mucosal sites.   - A Robotic demucosalization of the right tongue base on 8/3/18 was performed and pathology from this pathology demonstrated p16 positive nonkeratinizing squamous cell carcinoma in the level node lymph node and in the right tongue base.   - Started concurrent cisplatin plus radiation therapy based on tumor board recommendations. Cisplatin 100mg/m2 cycle 1: 8/28/18. Cycle 2 - delayed by 2 days due to  and given on 9/21/18. Cycle 3 - unable to tolerate 100mg/m2 due to myelosuppression and patient preference. Ultimately received cisplatin 40mg/m2 x1 on 10/12/18. RT - 6996 cGy over 33 fractions - 8/29/18 to 10/13/18.   - PET/CT with CT soft tissue neck with contrast 1/9/19: \"1. Interval resolution of hypermetabolic right level II lymph node. No residual radiotracer activity in this location and no other lymphadenopathy. 2. No evidence of mucosal, ivana, or soft tissue abnormality on contrast enhanced neck CT. 1. No evidence of metastatic disease in the chest, abdomen, or pelvis.\"  - Visual endoscopy - 08/06/19, 11/13/19, 2/4/2020 - negative.  - CT chest and soft tissue neck with contrast 11/13/19 - [facility]. Incidental 8mm hypodense pancreatic tail cyst, possibly a side branch IPMN.  - MRI abd with contrast (pancreatic mass eval) 12/16/19 - " [facility].  - 11/10/20: CT C/A/P and CT neck with contrast - prelim [facility].    REVIEW OF SYSTEMS:  14 point ROS negative other than the symptoms noted above in the HPI.    PAST MEDICAL HISTORY:   Past Medical History:   Diagnosis Date     Head and neck cancer (H)      PAST SURGICAL HISTORY:   Past Surgical History:   Procedure Laterality Date     DAVINCI RESECT TUMOR TRANSORAL Bilateral 8/3/2018    Procedure: DAVINCI RESECT TUMOR TRANSORAL;  Transoral Robotic Resection of the Base of Tongue,   IR Guided Core Needle Biopsy of Right Neck Mass;  Surgeon: Blas Calvillo MD;  Location: [facility] OR     FINE NEEDLE ASPIRATION WITH IMAGING GUIDANCE Right 8/3/2018    Procedure: FINE NEEDLE ASPIRATION WITH IMAGING GUIDANCE;  Ultrasound guided Core Biopsy of a Right Neck Mass;  Surgeon: Denis Monson MD;  Location: [facility] OR     HEAD & NECK SURGERY       LARYNGOSCOPY WITH BIOPSY(IES) Bilateral 7/18/2018    Procedure: LARYNGOSCOPY WITH BIOPSY(IES);  Direct Laryngoscopy with Direct Biopsies;  Surgeon: Garcia Worthy MD;  Location: [facility] OR     TONSILLECTOMY     In addition:  1. Irritated sweat gland removed   2. Fatty tumor on his back removed    3. Cataract surgery     SOCIAL HISTORY:   Social History     Socioeconomic History     Marital status:      Spouse name: Not on file     Number of children: Not on file     Years of education: Not on file     Highest education level: Not on file   Occupational History     Not on file   Social Needs     Financial resource strain: Not on file     Food insecurity     Worry: Not on file     Inability: Not on file     Transportation needs     Medical: Not on file     Non-medical: Not on file   Tobacco Use     Smoking status: Never Smoker     Smokeless tobacco: Never Used   Substance and Sexual Activity     Alcohol use: Yes     Comment: occasionally     Drug use: No     Sexual activity: Yes     Partners: Female     Birth control/protection: Other   Lifestyle     Physical activity     Days  per week: Not on file     Minutes per session: Not on file     Stress: Not on file   Relationships     Social connections     Talks on phone: Not on file     Gets together: Not on file     Attends Hoahaoism service: Not on file     Active member of club or organization: Not on file     Attends meetings of clubs or organizations: Not on file     Relationship status: Not on file     Intimate partner violence     Fear of current or ex partner: Not on file     Emotionally abused: Not on file     Physically abused: Not on file     Forced sexual activity: Not on file   Other Topics Concern     Not on file   Social History Narrative     Not on file   Patient is from [place], [place]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     FAMILY HISTORY:   Brother with clotting issue  Mother, unknown primary and    Maternal aunt with colon cancer  Maternal grandfather with head/neck cancer  Maternal cousins with cancers unknown types    ALLERGIES:  No Known Allergies    CURRENT MEDICATIONS:   Current Outpatient Medications:      ASPIRIN 81 PO, Take 2 tablets by mouth daily , Disp: , Rfl:      CANNABIDIOL, HEMP OIL/EXTRACT, OR CBD PRODUCT OTHER THAN MEDICAL CANNABIS,, Take by mouth daily 2 drops under tongue once a day, Disp: , Rfl:      Glucos-MSM-C-Ju-Acsarw-Cdfgsc (GLUCOSAMINE MSM COMPLEX) TABS tablet, Take 2 tablets by mouth daily , Disp: , Rfl:      IBUPROFEN PO, Take 200 mg by mouth, Disp: , Rfl:      Multiple Vitamins-Minerals (MULTIVITAMIN & MINERAL PO), , Disp: , Rfl:      vitamin E 400 units TABS, Take 400 Units by mouth daily, Disp: , Rfl:   No current facility-administered medications for this visit.     PHYSICAL EXAMINATION:  Vital signs: BP (!) 152/96   Pulse 66   Temp 98  F (36.7  C) (Oral)   Resp 18   Ht 1.829 m (6')   Wt 85.2 kg (187 lb 12.8 oz)   SpO2 99%   BMI 25.47 kg/m    ECOG performance status of 0.  ECOG performance status of 0.  GENERAL: Thin man,  no acute distress  HEENT: Clear oropharynx with thick, white secretions and minimal saliva. No tongue mass.   NECK: No JVD/LAD.  LUNGS: clear bilaterally, no wheezes, good diaphragmatic excursion  CARDIOVASCULAR: Regular rate and rhythm, no murmurs, gallops or rubs  ABDOMEN: Soft, nontender and nondistended, bowel sounds present.  EXTREMITIES: No cyanosis, no clubbing, no edema  NEUROLOGIC: No focal deficits; alert and oriented to self, place, time    LABORATORY DATA:    Lab Test 11/10/20  1151 02/04/20  1240 11/13/19  1418   WBC 5.1 5.4 5.1   RBC 4.59 4.44 4.40   HGB 14.7 13.7 13.6   HCT 44.5 41.9 41.5   MCV 97 94 94   MCH 32.0 30.9 30.9   MCHC 33.0 32.7 32.8   RDW 12.7 13.2 12.9    189 205   NEUTROPHIL 72.2 73.3 71.4    142 141   POTASSIUM 4.5 4.7 4.0   CHLORIDE 110* 111* 109   CO2 28 27 26   ANIONGAP 3 5 6   * 95 89   BUN 19 22 22   CR 1.06 1.05 1.05   KM 9.0 9.1 8.9   PROTTOTAL 7.0 6.6* 6.8   ALBUMIN 3.9 3.8 3.9   BILITOTAL 0.6 0.7 0.5   ALKPHOS 68 65 67   AST 18 26 19   ALT 23 32 27     Lab Test 11/10/20  1151 02/04/20  1240 11/13/19  1418 08/06/19  1217 04/09/19  1451 08/28/18  0848 08/28/18  0848   TSH 2.37 2.35 1.93 1.56 1.52   < > 1.24   T4  --   --   --   --   --   --  1.14    < > = values in this interval not displayed.   Labs including CBC with differential, CMP, TSH and FT4 from OSH on 06/22/20 - unremarkable except for slight decrease in WBC and Hb, which is clinically non-significant.    IMAGING STUDIES:  As above.    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      HNSCC:  - Had a complete response on 3 month-post treatment PET/CT and has recovered well from concurrent cis+RT (completed Oct 2018). No clinical or endoscopic evidence of disease recurrence.  - Reviewed prelim read of CT chest and neck report from today that  doesn't appear to have any evidence of disease relapse. Clinically asymptomatic as well. Will f/up on final read.  - Will continue active surveillance: Reviewed the signs/symptoms of recurrence such as palpable or visible abnormal masses in H&N, pathologic weight loss, cough, SOA, CP, bone pain, CNS symptoms etc; and his role in ensuring close follow-ups for next 5 years at length.   - Clinic eval every ~6 months for year 3. He's agreeable.  - Continue follow-ups with Dr. Garay and Dr. Worthy.  - No plan for routine repeat restaging from my side unless indicated by Lobito Worthy/Tanisha.     RT-induced xerostomia:  - Mgmt as above. Encouraged to keep using fluoride as directed.    Pancreatic cyst/IPMN:  - Asymptomatic currently.  - Referred to GI for further mgmt.   - Dr. Worthy is planning to repeat an MRI abdomen in 2 years (Dec 2021) based on the recommendations of the radiologist.     Return to clinic in 6 months.      All questions were answered, and patient was in complete agreement with this plan.     BILLIN    Pillo Rolon M.D.  . Professor of Medicine  Division of Hematology, Oncology & Transplantation  [facility]   Yes

## 2025-06-07 ENCOUNTER — HEALTH MAINTENANCE LETTER (OUTPATIENT)
Age: 75
End: 2025-06-07

## (undated) DEVICE — ESU CORD BIPOLAR GREEN 10-4000

## (undated) DEVICE — BLADE KNIFE SURG 15 371115

## (undated) DEVICE — APPLICATOR COTTON TIP 6"X2 STERILE LF 6012

## (undated) DEVICE — DRAPE U SPLIT 74X120" 29440

## (undated) DEVICE — LINEN TOWEL PACK X6 WHITE 5487

## (undated) DEVICE — COVER NEOPROBE SOFTFLEX 5X96" W/BANDS 20-PC596

## (undated) DEVICE — DRAPE MAYO STAND 23X54 8337

## (undated) DEVICE — NDL BIOPSY TEMNO 18GAX11CM ACT1811

## (undated) DEVICE — SUCTION MANIFOLD NEPTUNE 2 SYS 1 PORT 702-025-000

## (undated) DEVICE — SOL NACL 0.9% IRRIG 500ML BOTTLE 2F7123

## (undated) DEVICE — LINEN TOWEL PACK X30 5481

## (undated) DEVICE — RAD KNIFE HANDLE W/11 BLADE DISPOSABLE 371611

## (undated) DEVICE — SU VICRYL 3-0 SH 8X18" UND J864D

## (undated) DEVICE — NDL SPINAL 22GA 3.5" QUINCKE 405181

## (undated) DEVICE — DRSG TELFA 3X8" 1238

## (undated) DEVICE — LINEN TOWEL PACK X5 5464

## (undated) DEVICE — Device

## (undated) DEVICE — DRAPE SHEET REV FOLD 3/4 9349

## (undated) DEVICE — DAVINCI XI DRAPE ARM 470015

## (undated) DEVICE — SYSTEM CLEARIFY VISUALIZATION 21-345

## (undated) DEVICE — TUBING SUCTION MEDI-VAC SOFT 3/16"X20' N520A

## (undated) DEVICE — GLOVE PROTEXIS MICRO 7.5  2D73PM75

## (undated) DEVICE — ESU SUCTION CAUTERY 10FR FOOT CONTROL E2505-10FR

## (undated) DEVICE — SWAB PROCTO 16" 2/PK 32-046

## (undated) DEVICE — SPONGE COTTONOID 1/2X1/2" 20-04S

## (undated) DEVICE — SPONGE RAY-TEC 4X8" 7318

## (undated) DEVICE — ESU GROUND PAD ADULT REM W/15' CORD E7507DB

## (undated) DEVICE — CLIP HORIZON SM RED WIDE SLOT 001201

## (undated) DEVICE — PACK ENT ENDOSCOPY CUSTOM ASC

## (undated) DEVICE — NDL COUNTER 20CT 31142493

## (undated) DEVICE — GLOVE PROTEXIS POWDER FREE SMT 8.0  2D72PT80X

## (undated) DEVICE — DAVINCI XI DRAPE COLUMN 470341

## (undated) DEVICE — ENDO TOOTH GUARD SAC2001

## (undated) DEVICE — GLOVE PROTEXIS MICRO 7.0  2D73PM70

## (undated) DEVICE — SU SILK 2-0 SH CR 5X18" C0125

## (undated) RX ORDER — HYDROMORPHONE HYDROCHLORIDE 1 MG/ML
INJECTION, SOLUTION INTRAMUSCULAR; INTRAVENOUS; SUBCUTANEOUS
Status: DISPENSED
Start: 2018-08-03

## (undated) RX ORDER — GLYCOPYRROLATE 0.2 MG/ML
INJECTION, SOLUTION INTRAMUSCULAR; INTRAVENOUS
Status: DISPENSED
Start: 2018-08-03

## (undated) RX ORDER — EPHEDRINE SULFATE 50 MG/ML
INJECTION, SOLUTION INTRAMUSCULAR; INTRAVENOUS; SUBCUTANEOUS
Status: DISPENSED
Start: 2018-08-03

## (undated) RX ORDER — FENTANYL CITRATE 50 UG/ML
INJECTION, SOLUTION INTRAMUSCULAR; INTRAVENOUS
Status: DISPENSED
Start: 2018-08-03

## (undated) RX ORDER — PHENYLEPHRINE HCL IN 0.9% NACL 1 MG/10 ML
SYRINGE (ML) INTRAVENOUS
Status: DISPENSED
Start: 2018-08-03

## (undated) RX ORDER — PROPOFOL 10 MG/ML
INJECTION, EMULSION INTRAVENOUS
Status: DISPENSED
Start: 2018-07-18

## (undated) RX ORDER — GABAPENTIN 300 MG/1
CAPSULE ORAL
Status: DISPENSED
Start: 2018-07-18

## (undated) RX ORDER — ESMOLOL HYDROCHLORIDE 10 MG/ML
INJECTION INTRAVENOUS
Status: DISPENSED
Start: 2018-08-03

## (undated) RX ORDER — OXYCODONE HYDROCHLORIDE 5 MG/1
TABLET ORAL
Status: DISPENSED
Start: 2018-07-18

## (undated) RX ORDER — ALBUMIN, HUMAN INJ 5% 5 %
SOLUTION INTRAVENOUS
Status: DISPENSED
Start: 2018-08-03

## (undated) RX ORDER — ONDANSETRON 2 MG/ML
INJECTION INTRAMUSCULAR; INTRAVENOUS
Status: DISPENSED
Start: 2018-07-18

## (undated) RX ORDER — DEXAMETHASONE SODIUM PHOSPHATE 4 MG/ML
INJECTION, SOLUTION INTRA-ARTICULAR; INTRALESIONAL; INTRAMUSCULAR; INTRAVENOUS; SOFT TISSUE
Status: DISPENSED
Start: 2018-07-18

## (undated) RX ORDER — OXYMETAZOLINE HYDROCHLORIDE 0.05 G/100ML
SPRAY NASAL
Status: DISPENSED
Start: 2018-07-18

## (undated) RX ORDER — OXYMETAZOLINE HYDROCHLORIDE 0.05 G/100ML
SPRAY NASAL
Status: DISPENSED
Start: 2018-08-03

## (undated) RX ORDER — PROPOFOL 10 MG/ML
INJECTION, EMULSION INTRAVENOUS
Status: DISPENSED
Start: 2021-08-03

## (undated) RX ORDER — DEXAMETHASONE SODIUM PHOSPHATE 4 MG/ML
INJECTION, SOLUTION INTRA-ARTICULAR; INTRALESIONAL; INTRAMUSCULAR; INTRAVENOUS; SOFT TISSUE
Status: DISPENSED
Start: 2018-08-03

## (undated) RX ORDER — PROPOFOL 10 MG/ML
INJECTION, EMULSION INTRAVENOUS
Status: DISPENSED
Start: 2018-08-03

## (undated) RX ORDER — LIDOCAINE HYDROCHLORIDE 20 MG/ML
INJECTION, SOLUTION EPIDURAL; INFILTRATION; INTRACAUDAL; PERINEURAL
Status: DISPENSED
Start: 2021-08-03

## (undated) RX ORDER — ONDANSETRON 2 MG/ML
INJECTION INTRAMUSCULAR; INTRAVENOUS
Status: DISPENSED
Start: 2021-08-03

## (undated) RX ORDER — ACETAMINOPHEN 325 MG/1
TABLET ORAL
Status: DISPENSED
Start: 2018-08-03

## (undated) RX ORDER — LIDOCAINE HYDROCHLORIDE 20 MG/ML
INJECTION, SOLUTION EPIDURAL; INFILTRATION; INTRACAUDAL; PERINEURAL
Status: DISPENSED
Start: 2018-07-18

## (undated) RX ORDER — LIDOCAINE HYDROCHLORIDE AND EPINEPHRINE 10; 10 MG/ML; UG/ML
INJECTION, SOLUTION INFILTRATION; PERINEURAL
Status: DISPENSED
Start: 2018-07-18

## (undated) RX ORDER — FENTANYL CITRATE 50 UG/ML
INJECTION, SOLUTION INTRAMUSCULAR; INTRAVENOUS
Status: DISPENSED
Start: 2018-07-18

## (undated) RX ORDER — NEOSTIGMINE METHYLSULFATE 1 MG/ML
VIAL (ML) INJECTION
Status: DISPENSED
Start: 2018-08-03

## (undated) RX ORDER — ONDANSETRON 2 MG/ML
INJECTION INTRAMUSCULAR; INTRAVENOUS
Status: DISPENSED
Start: 2018-08-03

## (undated) RX ORDER — HEPARIN SODIUM 1000 [USP'U]/ML
INJECTION, SOLUTION INTRAVENOUS; SUBCUTANEOUS
Status: DISPENSED
Start: 2018-08-03

## (undated) RX ORDER — LIDOCAINE HYDROCHLORIDE 10 MG/ML
INJECTION, SOLUTION EPIDURAL; INFILTRATION; INTRACAUDAL; PERINEURAL
Status: DISPENSED
Start: 2018-08-03

## (undated) RX ORDER — GLYCOPYRROLATE 0.2 MG/ML
INJECTION, SOLUTION INTRAMUSCULAR; INTRAVENOUS
Status: DISPENSED
Start: 2021-08-03

## (undated) RX ORDER — ACETAMINOPHEN 325 MG/1
TABLET ORAL
Status: DISPENSED
Start: 2018-07-18